# Patient Record
Sex: FEMALE | Race: WHITE | NOT HISPANIC OR LATINO | Employment: OTHER | URBAN - METROPOLITAN AREA
[De-identification: names, ages, dates, MRNs, and addresses within clinical notes are randomized per-mention and may not be internally consistent; named-entity substitution may affect disease eponyms.]

---

## 2017-01-10 ENCOUNTER — HOSPITAL ENCOUNTER (OUTPATIENT)
Dept: GENERAL RADIOLOGY | Facility: CLINIC | Age: 72
Discharge: HOME | End: 2017-01-10
Attending: ORTHOPAEDIC SURGERY | Admitting: ORTHOPAEDIC SURGERY
Payer: MEDICARE

## 2017-01-10 VITALS — DIASTOLIC BLOOD PRESSURE: 73 MMHG | OXYGEN SATURATION: 98 % | HEART RATE: 74 BPM | SYSTOLIC BLOOD PRESSURE: 114 MMHG

## 2017-01-10 DIAGNOSIS — M25.552 PAIN OF LEFT HIP JOINT: ICD-10-CM

## 2017-01-10 LAB
APPEARANCE FLD: NORMAL
COLOR FLD: NORMAL
EOSINOPHIL NFR FLD MANUAL: 7 %
GRAM STN SPEC: NORMAL
LYMPHOCYTES NFR FLD MANUAL: 75 %
MICRO REPORT STATUS: NORMAL
MONOS+MACROS NFR FLD MANUAL: 3 %
NEUTS BAND NFR FLD MANUAL: 15 %
RBC # FLD: NORMAL /UL
SPECIMEN SOURCE FLD: NORMAL
SPECIMEN SOURCE: NORMAL
WBC # FLD AUTO: 248 /UL

## 2017-01-10 PROCEDURE — 87075 CULTR BACTERIA EXCEPT BLOOD: CPT | Performed by: ORTHOPAEDIC SURGERY

## 2017-01-10 PROCEDURE — 87205 SMEAR GRAM STAIN: CPT | Performed by: ORTHOPAEDIC SURGERY

## 2017-01-10 PROCEDURE — 89051 BODY FLUID CELL COUNT: CPT | Performed by: ORTHOPAEDIC SURGERY

## 2017-01-10 PROCEDURE — 87070 CULTURE OTHR SPECIMN AEROBIC: CPT | Performed by: ORTHOPAEDIC SURGERY

## 2017-01-10 PROCEDURE — 20610 DRAIN/INJ JOINT/BURSA W/O US: CPT | Mod: RT

## 2017-01-10 PROCEDURE — 25000125 ZZHC RX 250: Performed by: PHYSICIAN ASSISTANT

## 2017-01-10 RX ORDER — LIDOCAINE HYDROCHLORIDE 10 MG/ML
30 INJECTION, SOLUTION EPIDURAL; INFILTRATION; INTRACAUDAL; PERINEURAL ONCE
Status: COMPLETED | OUTPATIENT
Start: 2017-01-10 | End: 2017-01-10

## 2017-01-10 RX ADMIN — LIDOCAINE HYDROCHLORIDE 3 ML: 10 INJECTION, SOLUTION EPIDURAL; INFILTRATION; INTRACAUDAL; PERINEURAL at 10:31

## 2017-01-10 NOTE — IP AVS SNAPSHOT
MRN:[id number]                      After Visit Summary   1/10/2017    Anne Rangel    MRN: [id number]           Visit Information        Provider Department      1/10/2017 10:00 AM  [facility];  IMAGING NURSE; [facility] Radiology           Review of your medicines      UNREVIEWED medicines. Ask your doctor about these medicines        Dose / Directions    buPROPion 150 MG 12 hr tablet   Commonly known as:  WELLBUTRIN SR        Dose:  150 mg   Take 1 tablet (150 mg) by mouth 2 times daily   Quantity:  60 tablet   Refills:  1       celeBREX 200 MG capsule   Used for:  Myalgia and myositis, unspecified   Generic drug:  celecoxib        TAKE ONE CAPSULE BY MOUTH up to twice DAILY   Quantity:  62   Refills:  6       ciprofloxacin 500 MG tablet   Commonly known as:  CIPRO   Used for:  Other specified counseling        Dose:  500 mg   Take 1 tablet (500 mg) by mouth 2 times daily Take 1 tablet two times a day for up to 3 days for severe diarrhea during travel.   Quantity:  6 tablet   Refills:  0       citalopram 20 MG tablet   Commonly known as:  celeXA        Dose:  20 mg   Take 1 tablet (20 mg) by mouth daily   Quantity:  90 tablet   Refills:  3       IMITREX 100 MG tablet   Used for:  Migraine, unspecified, without mention of intractable migraine without mention of status migrainosus   Generic drug:  SUMAtriptan        1 tab po prn migraines   Quantity:  6   Refills:  6       LIPITOR 40 MG tablet   Generic drug:  atorvastatin        Dose:  40 mg   Take 1 tablet (40 mg) by mouth daily   Quantity:  30 tablet   Refills:  1       LORazepam 1 MG tablet   Commonly known as:  ATIVAN        Dose:  0.5-1 mg   Take 0.5-1 tablets (0.5-1 mg) by mouth every 8 hours as needed for anxiety Take 30 minutes prior to departure.  Do not operate a vehicle after taking this medication   Quantity:  4 tablet   Refills:  0       LUNESTA 2 MG tablet   Generic drug:  eszopiclone        Dose:  2 mg   Take 1  tablet (2 mg) by mouth nightly as needed for sleep   Quantity:  30 tablet   Refills:  5       omeprazole 20 MG tablet        Dose:  20 mg   Take 1 tablet (20 mg) by mouth daily Take 30-60 minutes before a meal.   Quantity:  30 tablet   Refills:  1       traMADol 50 MG tablet   Commonly known as:  ULTRAM        Dose:   mg   Take 1-2 tablets ( mg) by mouth every 6 hours as needed for moderate pain   Quantity:  20 tablet   Refills:  0                Protect others around you: Learn how to safely use, store and throw away your medicines at www.disposemymeds.org.         Follow-ups after your visit        Your next 10 appointments already scheduled     Genaro 10, 2017 10:00 AM   XR JOINT ASPIRATION MAJOR RT with SHXR4, SH IMAGING NURSE, SH [facility] RAD   [facility] Radiology ([facility])    [facility] [facility] [address] [phone]           Stop drinking 1 hour before the exam.  You may take your medicines as usual, except for blood thinners (Coumadin, Plavix, Ticlid, Persantine, Aggrenox, Pletal, Effient, Brilliant). Talk to your doctor if you take these.  Tell your doctor if:   You have ever had an allergic reaction to X-ray dye (contrast fluid).   There is a chance you may be pregnant.  Please bring a list of your current medicines to your exam. Include vitamins, minerals and over-the-counter medicines.  Please call the Imaging Department at your exam site with any questions.            Feb 03, 2017   Procedure with Juan Moncada MD   [facility] PeriOP Services (--)    [facility] [address]  [facility] [id number] [phone]               Care Instructions        Further instructions from your care team         Orthopedic Discharge Instructions:   After Your Injection or Aspiration  ________________________________________    Patient Name:  Anne Rangel  Today's Date:  January 10, 2017  The doctor who performed your Joint Aspiration was  Javon  CODY Mensah at [facility] in the  General Radiology Department  Care of needle site    If you have new numbness down your leg, this may last up to 6 hours, but it should go away. You may need help with walking until your leg feels normal.     Over the next 24 to 48 hours, pain at the needle site may increase before it gets better.     For the next 48 hours, use ice packs for 15 minutes, three to four times a day for pain.    If you have a bandage, you may remove it the next morning.     No tub baths, hot tubs or swimming for 48 hours. You may shower the next day.   Activity    Do not drive until morning.     Limit your activity based on your pain level. Follow your doctor s orders for activity.     You may eat a normal diet.     If you had sedation,   - You may feel sleepy, forgetful or unsteady.   - Do not drink alcohol for 24 hours.  Medicines    If you take aspirin or platelet inhibitors, you can restart them tomorrow.     Restart all other medicines today at your regular dose, including Coumadin (warfarin).    If you are restarting Coumadin, talk to your doctor about having your INR checked.   If you had a steroid shot     The medicine should help reduce swelling and pain. This may take from 7 to 10 days.     Side effects from the shot will be mild and go away in 2 to 3 days. Common side effects may include:  -  Insomnia (trouble sleeping).  -  Heartburn.  -  Flushed face.  -  Water retention (bloating or fluid build-up).  -  Increased appetite (feeling more hungry than usual).  -  Increased blood sugar.  If you have diabetes, watch your blood sugar closely. If needed, call your doctor to help you control your blood sugar.  Some patients will get lasting relief from a single shot. Others may require up to three shots to get results. If you have more than one steroid shot, they should be given two weeks apart.  Some patients do not have relief of symptoms.    Follow-up:  No follow-up  "needed     Call your doctor  or go to the Emergency Room if you have severe pain, fever or problems with bowel or bladder control.      Additional Information About Your Visit        [employer] Information     [employer] lets you send messages to your doctor, view your test results, renew your prescriptions, schedule appointments and more. To sign up, go to [email or web address] . Click on \"Log in\" on the left side of the screen, which will take you to the Welcome page. Then click on \"Sign up Now\" on the right side of the page.     You will be asked to enter the access code listed below, as well as some personal information. Please follow the directions to create your username and password.     Your access code is: [email or web address]  Expires: 4/10/2017  9:39 AM     Your access code will  in 90 days. If you need help or a new code, please call your [facility] clinic or [phone].        Care EveryWhere ID     This is your Care EveryWhere ID. This could be used by other organizations to access your [facility] medical records  [id number]         Primary Care Provider Office Phone # Fax #    Nevaeh Wood [phone]      Thank you!     Thank you for choosing [facility] for your care. Our goal is always to provide you with excellent care. Hearing back from our patients is one way we can continue to improve our services. Please take a few minutes to complete the written survey that you may receive in the mail after you visit with us. Thank you!             Medication List: This is a list of all your medications and when to take them. Check marks below indicate your daily home schedule. Keep this list as a reference.      Medications           Morning Afternoon Evening Bedtime As Needed    buPROPion 150 MG 12 hr tablet   Commonly known as:  WELLBUTRIN SR   Take 1 tablet (150 mg) by mouth 2 times daily                                celeBREX 200 MG capsule   TAKE ONE CAPSULE BY MOUTH up to twice DAILY "   Generic drug:  celecoxib                                ciprofloxacin 500 MG tablet   Commonly known as:  CIPRO   Take 1 tablet (500 mg) by mouth 2 times daily Take 1 tablet two times a day for up to 3 days for severe diarrhea during travel.                                citalopram 20 MG tablet   Commonly known as:  celeXA   Take 1 tablet (20 mg) by mouth daily                                IMITREX 100 MG tablet   1 tab po prn migraines   Generic drug:  SUMAtriptan                                LIPITOR 40 MG tablet   Take 1 tablet (40 mg) by mouth daily   Generic drug:  atorvastatin                                LORazepam 1 MG tablet   Commonly known as:  ATIVAN   Take 0.5-1 tablets (0.5-1 mg) by mouth every 8 hours as needed for anxiety Take 30 minutes prior to departure.  Do not operate a vehicle after taking this medication                                LUNESTA 2 MG tablet   Take 1 tablet (2 mg) by mouth nightly as needed for sleep   Generic drug:  eszopiclone                                omeprazole 20 MG tablet   Take 1 tablet (20 mg) by mouth daily Take 30-60 minutes before a meal.                                traMADol 50 MG tablet   Commonly known as:  ULTRAM   Take 1-2 tablets ( mg) by mouth every 6 hours as needed for moderate pain

## 2017-01-10 NOTE — IP AVS SNAPSHOT
[facility] Radiology    [address] [facility] [address]    Phone:  [phone]                                       After Visit Summary   1/10/2017    Anne Rangel    MRN: [id number]           After Visit Summary Signature Page     I have received my discharge instructions, and my questions have been answered. I have discussed any challenges I see with this plan with the nurse or doctor.    ..........................................................................................................................................  Patient/Patient Representative Signature      ..........................................................................................................................................  Patient Representative Print Name and Relationship to Patient    ..................................................               ................................................  Date                                            Time    ..........................................................................................................................................  Reviewed by Signature/Title    ...................................................              ..............................................  Date                                                            Time

## 2017-01-15 LAB
BACTERIA SPEC CULT: NO GROWTH
MICRO REPORT STATUS: NORMAL
SPECIMEN SOURCE: NORMAL

## 2017-01-18 ENCOUNTER — HOSPITAL LABORATORY (OUTPATIENT)
Dept: OTHER | Facility: CLINIC | Age: 72
End: 2017-01-18

## 2017-01-18 LAB — HGB BLD-MCNC: 13.4 G/DL (ref 11.7–15.7)

## 2017-01-19 ENCOUNTER — TRANSFERRED RECORDS (OUTPATIENT)
Dept: HEALTH INFORMATION MANAGEMENT | Facility: CLINIC | Age: 72
End: 2017-01-19

## 2017-01-23 RX ORDER — ALBUTEROL SULFATE 90 UG/1
2 AEROSOL, METERED RESPIRATORY (INHALATION) 4 TIMES DAILY PRN
COMMUNITY
End: 2017-05-18

## 2017-01-23 RX ORDER — POLYETHYLENE GLYCOL 3350 17 G/17G
17 POWDER, FOR SOLUTION ORAL DAILY PRN
COMMUNITY
End: 2024-09-26

## 2017-01-24 LAB
BACTERIA SPEC CULT: NORMAL
Lab: NORMAL
MICRO REPORT STATUS: NORMAL
SPECIMEN SOURCE: NORMAL

## 2017-02-03 ENCOUNTER — ANESTHESIA EVENT (OUTPATIENT)
Dept: SURGERY | Facility: CLINIC | Age: 72
DRG: 468 | End: 2017-02-03
Payer: MEDICARE

## 2017-02-03 ENCOUNTER — APPOINTMENT (OUTPATIENT)
Dept: GENERAL RADIOLOGY | Facility: CLINIC | Age: 72
DRG: 468 | End: 2017-02-03
Attending: ORTHOPAEDIC SURGERY
Payer: MEDICARE

## 2017-02-03 ENCOUNTER — ANESTHESIA (OUTPATIENT)
Dept: SURGERY | Facility: CLINIC | Age: 72
DRG: 468 | End: 2017-02-03
Payer: MEDICARE

## 2017-02-03 ENCOUNTER — HOSPITAL ENCOUNTER (INPATIENT)
Facility: CLINIC | Age: 72
LOS: 4 days | Discharge: HOME | DRG: 468 | End: 2017-02-07
Attending: ORTHOPAEDIC SURGERY | Admitting: ORTHOPAEDIC SURGERY
Payer: MEDICARE

## 2017-02-03 DIAGNOSIS — M25.552 LEFT HIP PAIN: Primary | ICD-10-CM

## 2017-02-03 DIAGNOSIS — Z96.649 S/P REVISION OF TOTAL HIP: ICD-10-CM

## 2017-02-03 LAB
ABO + RH BLD: ABNORMAL
ABO + RH BLD: ABNORMAL
BLD GP AB SCN SERPL QL: ABNORMAL
BLD PROD TYP BPU: ABNORMAL
BLOOD BANK CMNT PATIENT-IMP: ABNORMAL
CREAT SERPL-MCNC: 1.25 MG/DL (ref 0.52–1.04)
GFR SERPL CREATININE-BSD FRML MDRD: 42 ML/MIN/1.7M2
GRAM STN SPEC: NORMAL
HGB BLD-MCNC: 13.7 G/DL (ref 11.7–15.7)
Lab: NORMAL
MICRO REPORT STATUS: NORMAL
NUM BPU REQUESTED: 2
PLATELET # BLD AUTO: 195 10E9/L (ref 150–450)
SPECIMEN EXP DATE BLD: ABNORMAL
SPECIMEN SOURCE: NORMAL

## 2017-02-03 PROCEDURE — 88305 TISSUE EXAM BY PATHOLOGIST: CPT | Mod: 26 | Performed by: ORTHOPAEDIC SURGERY

## 2017-02-03 PROCEDURE — 36415 COLL VENOUS BLD VENIPUNCTURE: CPT | Performed by: ORTHOPAEDIC SURGERY

## 2017-02-03 PROCEDURE — 25000128 H RX IP 250 OP 636: Performed by: ORTHOPAEDIC SURGERY

## 2017-02-03 PROCEDURE — 25800025 ZZH RX 258: Performed by: ANESTHESIOLOGY

## 2017-02-03 PROCEDURE — 25800025 ZZH RX 258: Performed by: ORTHOPAEDIC SURGERY

## 2017-02-03 PROCEDURE — 27210794 ZZH OR GENERAL SUPPLY STERILE: Performed by: ORTHOPAEDIC SURGERY

## 2017-02-03 PROCEDURE — 88331 PATH CONSLTJ SURG 1 BLK 1SPC: CPT | Performed by: ORTHOPAEDIC SURGERY

## 2017-02-03 PROCEDURE — 87077 CULTURE AEROBIC IDENTIFY: CPT | Performed by: ORTHOPAEDIC SURGERY

## 2017-02-03 PROCEDURE — 40000940 XR PELVIS 1/2 VW

## 2017-02-03 PROCEDURE — C1776 JOINT DEVICE (IMPLANTABLE): HCPCS | Performed by: ORTHOPAEDIC SURGERY

## 2017-02-03 PROCEDURE — 25000125 ZZHC RX 250: Performed by: ORTHOPAEDIC SURGERY

## 2017-02-03 PROCEDURE — 0SRB0JA REPLACEMENT OF LEFT HIP JOINT WITH SYNTHETIC SUBSTITUTE, UNCEMENTED, OPEN APPROACH: ICD-10-PCS | Performed by: ORTHOPAEDIC SURGERY

## 2017-02-03 PROCEDURE — 12000007 ZZH R&B INTERMEDIATE

## 2017-02-03 PROCEDURE — 86901 BLOOD TYPING SEROLOGIC RH(D): CPT | Performed by: ORTHOPAEDIC SURGERY

## 2017-02-03 PROCEDURE — 40000940 XR PELVIS AND HIP LEFT 1 VIEW

## 2017-02-03 PROCEDURE — 36000067 ZZH SURGERY LEVEL 5 1ST 30 MIN: Performed by: ORTHOPAEDIC SURGERY

## 2017-02-03 PROCEDURE — 40000940 XR PELVIS PORT 1/2 VW: Mod: TC

## 2017-02-03 PROCEDURE — 82565 ASSAY OF CREATININE: CPT | Performed by: ORTHOPAEDIC SURGERY

## 2017-02-03 PROCEDURE — 25000128 H RX IP 250 OP 636: Performed by: NURSE ANESTHETIST, CERTIFIED REGISTERED

## 2017-02-03 PROCEDURE — 88305 TISSUE EXAM BY PATHOLOGIST: CPT | Performed by: ORTHOPAEDIC SURGERY

## 2017-02-03 PROCEDURE — 25000125 ZZHC RX 250: Performed by: REGISTERED NURSE

## 2017-02-03 PROCEDURE — 36000069 ZZH SURGERY LEVEL 5 EA 15 ADDTL MIN: Performed by: ORTHOPAEDIC SURGERY

## 2017-02-03 PROCEDURE — 71000012 ZZH RECOVERY PHASE 1 LEVEL 1 FIRST HR: Performed by: ORTHOPAEDIC SURGERY

## 2017-02-03 PROCEDURE — 0SPB0JZ REMOVAL OF SYNTHETIC SUBSTITUTE FROM LEFT HIP JOINT, OPEN APPROACH: ICD-10-PCS | Performed by: ORTHOPAEDIC SURGERY

## 2017-02-03 PROCEDURE — 86900 BLOOD TYPING SEROLOGIC ABO: CPT | Performed by: ORTHOPAEDIC SURGERY

## 2017-02-03 PROCEDURE — 25000125 ZZHC RX 250: Performed by: NURSE ANESTHETIST, CERTIFIED REGISTERED

## 2017-02-03 PROCEDURE — 85049 AUTOMATED PLATELET COUNT: CPT | Performed by: ORTHOPAEDIC SURGERY

## 2017-02-03 PROCEDURE — 88331 PATH CONSLTJ SURG 1 BLK 1SPC: CPT | Mod: 26 | Performed by: ORTHOPAEDIC SURGERY

## 2017-02-03 PROCEDURE — A9270 NON-COVERED ITEM OR SERVICE: HCPCS | Mod: GY | Performed by: ORTHOPAEDIC SURGERY

## 2017-02-03 PROCEDURE — 25000125 ZZHC RX 250: Performed by: ANESTHESIOLOGY

## 2017-02-03 PROCEDURE — 25000132 ZZH RX MED GY IP 250 OP 250 PS 637: Mod: GY | Performed by: ORTHOPAEDIC SURGERY

## 2017-02-03 PROCEDURE — 87205 SMEAR GRAM STAIN: CPT | Performed by: ORTHOPAEDIC SURGERY

## 2017-02-03 PROCEDURE — 85018 HEMOGLOBIN: CPT | Performed by: ORTHOPAEDIC SURGERY

## 2017-02-03 PROCEDURE — 86902 BLOOD TYPE ANTIGEN DONOR EA: CPT | Performed by: ORTHOPAEDIC SURGERY

## 2017-02-03 PROCEDURE — 86850 RBC ANTIBODY SCREEN: CPT | Performed by: ORTHOPAEDIC SURGERY

## 2017-02-03 PROCEDURE — 87075 CULTR BACTERIA EXCEPT BLOOD: CPT | Performed by: ORTHOPAEDIC SURGERY

## 2017-02-03 PROCEDURE — 37000008 ZZH ANESTHESIA TECHNICAL FEE, 1ST 30 MIN: Performed by: ORTHOPAEDIC SURGERY

## 2017-02-03 PROCEDURE — 37000009 ZZH ANESTHESIA TECHNICAL FEE, EACH ADDTL 15 MIN: Performed by: ORTHOPAEDIC SURGERY

## 2017-02-03 PROCEDURE — 25000128 H RX IP 250 OP 636: Performed by: REGISTERED NURSE

## 2017-02-03 PROCEDURE — 40000170 ZZH STATISTIC PRE-PROCEDURE ASSESSMENT II: Performed by: ORTHOPAEDIC SURGERY

## 2017-02-03 PROCEDURE — 87070 CULTURE OTHR SPECIMN AEROBIC: CPT | Performed by: ORTHOPAEDIC SURGERY

## 2017-02-03 PROCEDURE — 86922 COMPATIBILITY TEST ANTIGLOB: CPT | Performed by: ORTHOPAEDIC SURGERY

## 2017-02-03 DEVICE — IMP SCR BONE STRK TORX 6.5X35MM CAN 2030-6535-1: Type: IMPLANTABLE DEVICE | Site: HIP | Status: FUNCTIONAL

## 2017-02-03 DEVICE — IMP SCR BONE STRK TORX 6.5X20MM CAN 2030-6520-1: Type: IMPLANTABLE DEVICE | Site: HIP | Status: FUNCTIONAL

## 2017-02-03 DEVICE — IMP PLUG HIP SECUR-FIT APICAL 2060-0000-1: Type: IMPLANTABLE DEVICE | Site: HIP | Status: FUNCTIONAL

## 2017-02-03 DEVICE — IMP SCR BONE STRK TORX 6.5X25MM CAN 2030-6525-1: Type: IMPLANTABLE DEVICE | Site: HIP | Status: FUNCTIONAL

## 2017-02-03 RX ORDER — BUPROPION HYDROCHLORIDE 150 MG/1
150 TABLET ORAL DAILY
Status: DISCONTINUED | OUTPATIENT
Start: 2017-02-04 | End: 2017-02-07 | Stop reason: HOSPADM

## 2017-02-03 RX ORDER — OXYCODONE HYDROCHLORIDE 5 MG/1
5-10 TABLET ORAL
Status: DISCONTINUED | OUTPATIENT
Start: 2017-02-03 | End: 2017-02-05

## 2017-02-03 RX ORDER — GLYCOPYRROLATE 0.2 MG/ML
INJECTION, SOLUTION INTRAMUSCULAR; INTRAVENOUS PRN
Status: DISCONTINUED | OUTPATIENT
Start: 2017-02-03 | End: 2017-02-03

## 2017-02-03 RX ORDER — ONDANSETRON 2 MG/ML
4 INJECTION INTRAMUSCULAR; INTRAVENOUS EVERY 6 HOURS PRN
Status: DISCONTINUED | OUTPATIENT
Start: 2017-02-03 | End: 2017-02-07

## 2017-02-03 RX ORDER — AMOXICILLIN 250 MG
1-2 CAPSULE ORAL 2 TIMES DAILY
Status: DISCONTINUED | OUTPATIENT
Start: 2017-02-03 | End: 2017-02-07

## 2017-02-03 RX ORDER — ONDANSETRON 2 MG/ML
INJECTION INTRAMUSCULAR; INTRAVENOUS PRN
Status: DISCONTINUED | OUTPATIENT
Start: 2017-02-03 | End: 2017-02-03

## 2017-02-03 RX ORDER — CYCLOBENZAPRINE HCL 5 MG
5 TABLET ORAL 3 TIMES DAILY PRN
Status: DISCONTINUED | OUTPATIENT
Start: 2017-02-03 | End: 2017-02-07

## 2017-02-03 RX ORDER — LIDOCAINE HYDROCHLORIDE 20 MG/ML
INJECTION, SOLUTION INFILTRATION; PERINEURAL PRN
Status: DISCONTINUED | OUTPATIENT
Start: 2017-02-03 | End: 2017-02-03

## 2017-02-03 RX ORDER — FENTANYL CITRATE 0.05 MG/ML
25-50 INJECTION, SOLUTION INTRAMUSCULAR; INTRAVENOUS
Status: DISCONTINUED | OUTPATIENT
Start: 2017-02-03 | End: 2017-02-03

## 2017-02-03 RX ORDER — SODIUM CHLORIDE, SODIUM LACTATE, POTASSIUM CHLORIDE, CALCIUM CHLORIDE 600; 310; 30; 20 MG/100ML; MG/100ML; MG/100ML; MG/100ML
INJECTION, SOLUTION INTRAVENOUS CONTINUOUS
Status: DISCONTINUED | OUTPATIENT
Start: 2017-02-03 | End: 2017-02-03 | Stop reason: HOSPADM

## 2017-02-03 RX ORDER — HYDROMORPHONE HYDROCHLORIDE 1 MG/ML
0.2 INJECTION, SOLUTION INTRAMUSCULAR; INTRAVENOUS; SUBCUTANEOUS
Status: DISCONTINUED | OUTPATIENT
Start: 2017-02-03 | End: 2017-02-03

## 2017-02-03 RX ORDER — DEXAMETHASONE SODIUM PHOSPHATE 4 MG/ML
INJECTION, SOLUTION INTRA-ARTICULAR; INTRALESIONAL; INTRAMUSCULAR; INTRAVENOUS; SOFT TISSUE PRN
Status: DISCONTINUED | OUTPATIENT
Start: 2017-02-03 | End: 2017-02-03

## 2017-02-03 RX ORDER — ACETAMINOPHEN 325 MG/1
975 TABLET ORAL EVERY 8 HOURS
Status: DISCONTINUED | OUTPATIENT
Start: 2017-02-03 | End: 2017-02-05

## 2017-02-03 RX ORDER — EPHEDRINE SULFATE 50 MG/ML
INJECTION, SOLUTION INTRAMUSCULAR; INTRAVENOUS; SUBCUTANEOUS PRN
Status: DISCONTINUED | OUTPATIENT
Start: 2017-02-03 | End: 2017-02-03

## 2017-02-03 RX ORDER — ONDANSETRON 4 MG/1
4 TABLET, ORALLY DISINTEGRATING ORAL EVERY 6 HOURS PRN
Status: DISCONTINUED | OUTPATIENT
Start: 2017-02-03 | End: 2017-02-07

## 2017-02-03 RX ORDER — CEFAZOLIN SODIUM 1 G/3ML
1 INJECTION, POWDER, FOR SOLUTION INTRAMUSCULAR; INTRAVENOUS SEE ADMIN INSTRUCTIONS
Status: DISCONTINUED | OUTPATIENT
Start: 2017-02-03 | End: 2017-02-03 | Stop reason: HOSPADM

## 2017-02-03 RX ORDER — ALBUTEROL SULFATE 90 UG/1
2 AEROSOL, METERED RESPIRATORY (INHALATION) 4 TIMES DAILY PRN
Status: DISCONTINUED | OUTPATIENT
Start: 2017-02-03 | End: 2017-02-07 | Stop reason: HOSPADM

## 2017-02-03 RX ORDER — BUPIVACAINE HYDROCHLORIDE 7.5 MG/ML
INJECTION, SOLUTION INTRASPINAL PRN
Status: DISCONTINUED | OUTPATIENT
Start: 2017-02-03 | End: 2017-02-03

## 2017-02-03 RX ORDER — CEFAZOLIN SODIUM 2 G/100ML
2 INJECTION, SOLUTION INTRAVENOUS
Status: COMPLETED | OUTPATIENT
Start: 2017-02-03 | End: 2017-02-03

## 2017-02-03 RX ORDER — SODIUM CHLORIDE, SODIUM LACTATE, POTASSIUM CHLORIDE, CALCIUM CHLORIDE 600; 310; 30; 20 MG/100ML; MG/100ML; MG/100ML; MG/100ML
INJECTION, SOLUTION INTRAVENOUS CONTINUOUS
Status: DISCONTINUED | OUTPATIENT
Start: 2017-02-03 | End: 2017-02-03

## 2017-02-03 RX ORDER — ACETAMINOPHEN 500 MG
1000 TABLET ORAL ONCE
Status: COMPLETED | OUTPATIENT
Start: 2017-02-03 | End: 2017-02-03

## 2017-02-03 RX ORDER — ONDANSETRON 4 MG/1
4 TABLET, ORALLY DISINTEGRATING ORAL EVERY 30 MIN PRN
Status: DISCONTINUED | OUTPATIENT
Start: 2017-02-03 | End: 2017-02-03

## 2017-02-03 RX ORDER — NALOXONE HYDROCHLORIDE 0.4 MG/ML
.1-.4 INJECTION, SOLUTION INTRAMUSCULAR; INTRAVENOUS; SUBCUTANEOUS
Status: DISCONTINUED | OUTPATIENT
Start: 2017-02-03 | End: 2017-02-06

## 2017-02-03 RX ORDER — ACETAMINOPHEN 325 MG/1
650 TABLET ORAL EVERY 4 HOURS PRN
Status: DISCONTINUED | OUTPATIENT
Start: 2017-02-06 | End: 2017-02-07

## 2017-02-03 RX ORDER — TEMAZEPAM 7.5 MG/1
7.5 CAPSULE ORAL
Status: DISCONTINUED | OUTPATIENT
Start: 2017-02-04 | End: 2017-02-05

## 2017-02-03 RX ORDER — HYDROMORPHONE HYDROCHLORIDE 1 MG/ML
.3-.5 INJECTION, SOLUTION INTRAMUSCULAR; INTRAVENOUS; SUBCUTANEOUS
Status: DISCONTINUED | OUTPATIENT
Start: 2017-02-03 | End: 2017-02-07

## 2017-02-03 RX ORDER — CEFAZOLIN SODIUM 1 G/3ML
1 INJECTION, POWDER, FOR SOLUTION INTRAMUSCULAR; INTRAVENOUS EVERY 8 HOURS
Status: COMPLETED | OUTPATIENT
Start: 2017-02-04 | End: 2017-02-04

## 2017-02-03 RX ORDER — HYDROXYZINE HYDROCHLORIDE 10 MG/1
10 TABLET, FILM COATED ORAL EVERY 6 HOURS PRN
Status: DISCONTINUED | OUTPATIENT
Start: 2017-02-03 | End: 2017-02-07

## 2017-02-03 RX ORDER — ATORVASTATIN CALCIUM 40 MG/1
40 TABLET, FILM COATED ORAL AT BEDTIME
Status: DISCONTINUED | OUTPATIENT
Start: 2017-02-04 | End: 2017-02-07 | Stop reason: HOSPADM

## 2017-02-03 RX ORDER — PROPOFOL 10 MG/ML
INJECTION, EMULSION INTRAVENOUS CONTINUOUS PRN
Status: DISCONTINUED | OUTPATIENT
Start: 2017-02-03 | End: 2017-02-03

## 2017-02-03 RX ORDER — ONDANSETRON 2 MG/ML
4 INJECTION INTRAMUSCULAR; INTRAVENOUS EVERY 30 MIN PRN
Status: DISCONTINUED | OUTPATIENT
Start: 2017-02-03 | End: 2017-02-03

## 2017-02-03 RX ORDER — ESCITALOPRAM OXALATE 20 MG/1
20 TABLET ORAL DAILY
Status: DISCONTINUED | OUTPATIENT
Start: 2017-02-04 | End: 2017-02-07 | Stop reason: HOSPADM

## 2017-02-03 RX ORDER — DEXTROSE MONOHYDRATE, SODIUM CHLORIDE, AND POTASSIUM CHLORIDE 50; 1.49; 4.5 G/1000ML; G/1000ML; G/1000ML
INJECTION, SOLUTION INTRAVENOUS CONTINUOUS
Status: DISCONTINUED | OUTPATIENT
Start: 2017-02-03 | End: 2017-02-04

## 2017-02-03 RX ORDER — PROCHLORPERAZINE MALEATE 5 MG
5 TABLET ORAL EVERY 6 HOURS PRN
Status: DISCONTINUED | OUTPATIENT
Start: 2017-02-03 | End: 2017-02-07

## 2017-02-03 RX ORDER — LIDOCAINE 40 MG/G
CREAM TOPICAL
Status: DISCONTINUED | OUTPATIENT
Start: 2017-02-03 | End: 2017-02-06

## 2017-02-03 RX ADMIN — DEXAMETHASONE SODIUM PHOSPHATE 4 MG: 4 INJECTION, SOLUTION INTRAMUSCULAR; INTRAVENOUS at 13:59

## 2017-02-03 RX ADMIN — Medication 10 MG: at 14:46

## 2017-02-03 RX ADMIN — LIDOCAINE HYDROCHLORIDE 40 MG: 20 INJECTION, SOLUTION INFILTRATION; PERINEURAL at 13:55

## 2017-02-03 RX ADMIN — GLYCOPYRROLATE 0.2 MG: 0.2 INJECTION, SOLUTION INTRAMUSCULAR; INTRAVENOUS at 14:37

## 2017-02-03 RX ADMIN — DEXMEDETOMIDINE 4 MCG: 100 INJECTION, SOLUTION, CONCENTRATE INTRAVENOUS at 14:00

## 2017-02-03 RX ADMIN — SODIUM CHLORIDE, POTASSIUM CHLORIDE, SODIUM LACTATE AND CALCIUM CHLORIDE: 600; 310; 30; 20 INJECTION, SOLUTION INTRAVENOUS at 15:00

## 2017-02-03 RX ADMIN — SENNOSIDES AND DOCUSATE SODIUM 1 TABLET: 8.6; 5 TABLET ORAL at 21:15

## 2017-02-03 RX ADMIN — PHENYLEPHRINE HYDROCHLORIDE 25 MCG: 10 INJECTION, SOLUTION INTRAMUSCULAR; INTRAVENOUS; SUBCUTANEOUS at 15:28

## 2017-02-03 RX ADMIN — PHENYLEPHRINE HYDROCHLORIDE 25 MCG: 10 INJECTION, SOLUTION INTRAMUSCULAR; INTRAVENOUS; SUBCUTANEOUS at 15:58

## 2017-02-03 RX ADMIN — Medication 5 MG: at 14:28

## 2017-02-03 RX ADMIN — Medication 5 MG: at 14:38

## 2017-02-03 RX ADMIN — MIDAZOLAM HYDROCHLORIDE 2 MG: 1 INJECTION, SOLUTION INTRAMUSCULAR; INTRAVENOUS at 13:45

## 2017-02-03 RX ADMIN — BUPIVACAINE HYDROCHLORIDE IN DEXTROSE 12 MG: 7.5 INJECTION, SOLUTION SUBARACHNOID at 13:54

## 2017-02-03 RX ADMIN — PHENYLEPHRINE HYDROCHLORIDE 50 MCG: 10 INJECTION, SOLUTION INTRAMUSCULAR; INTRAVENOUS; SUBCUTANEOUS at 15:46

## 2017-02-03 RX ADMIN — TRANEXAMIC ACID 1 G: 100 INJECTION, SOLUTION INTRAVENOUS at 17:14

## 2017-02-03 RX ADMIN — ACETAMINOPHEN 1000 MG: 500 TABLET ORAL at 12:27

## 2017-02-03 RX ADMIN — PHENYLEPHRINE HYDROCHLORIDE 25 MCG: 10 INJECTION, SOLUTION INTRAMUSCULAR; INTRAVENOUS; SUBCUTANEOUS at 15:19

## 2017-02-03 RX ADMIN — HYDROMORPHONE HYDROCHLORIDE 0.2 MG: 1 INJECTION, SOLUTION INTRAMUSCULAR; INTRAVENOUS; SUBCUTANEOUS at 20:03

## 2017-02-03 RX ADMIN — FENTANYL CITRATE 25 MCG: 50 INJECTION, SOLUTION INTRAMUSCULAR; INTRAVENOUS at 18:20

## 2017-02-03 RX ADMIN — CEFAZOLIN 1 G: 1 INJECTION, POWDER, FOR SOLUTION INTRAMUSCULAR; INTRAVENOUS at 16:00

## 2017-02-03 RX ADMIN — Medication 5 MG: at 16:03

## 2017-02-03 RX ADMIN — CYCLOBENZAPRINE HYDROCHLORIDE 5 MG: 5 TABLET, FILM COATED ORAL at 21:14

## 2017-02-03 RX ADMIN — ACETAMINOPHEN 975 MG: 325 TABLET, FILM COATED ORAL at 21:14

## 2017-02-03 RX ADMIN — DEXMEDETOMIDINE 4 MCG: 100 INJECTION, SOLUTION, CONCENTRATE INTRAVENOUS at 13:54

## 2017-02-03 RX ADMIN — PHENYLEPHRINE HYDROCHLORIDE 25 MCG: 10 INJECTION, SOLUTION INTRAMUSCULAR; INTRAVENOUS; SUBCUTANEOUS at 15:10

## 2017-02-03 RX ADMIN — GLYCOPYRROLATE 0.2 MG: 0.2 INJECTION, SOLUTION INTRAMUSCULAR; INTRAVENOUS at 16:30

## 2017-02-03 RX ADMIN — SODIUM CHLORIDE, POTASSIUM CHLORIDE, SODIUM LACTATE AND CALCIUM CHLORIDE: 600; 310; 30; 20 INJECTION, SOLUTION INTRAVENOUS at 12:53

## 2017-02-03 RX ADMIN — ONDANSETRON 4 MG: 2 INJECTION INTRAMUSCULAR; INTRAVENOUS at 16:04

## 2017-02-03 RX ADMIN — CEFAZOLIN SODIUM 2 G: 2 INJECTION, SOLUTION INTRAVENOUS at 14:00

## 2017-02-03 RX ADMIN — Medication 5 MG: at 16:32

## 2017-02-03 RX ADMIN — HYDROMORPHONE HYDROCHLORIDE 0.5 MG: 1 INJECTION, SOLUTION INTRAMUSCULAR; INTRAVENOUS; SUBCUTANEOUS at 23:39

## 2017-02-03 RX ADMIN — Medication 5 MG: at 14:25

## 2017-02-03 RX ADMIN — HYDROMORPHONE HYDROCHLORIDE 0.5 MG: 1 INJECTION, SOLUTION INTRAMUSCULAR; INTRAVENOUS; SUBCUTANEOUS at 22:23

## 2017-02-03 RX ADMIN — PHENYLEPHRINE HYDROCHLORIDE 50 MCG: 10 INJECTION, SOLUTION INTRAMUSCULAR; INTRAVENOUS; SUBCUTANEOUS at 16:10

## 2017-02-03 RX ADMIN — Medication 5 MG: at 14:34

## 2017-02-03 RX ADMIN — PHENYLEPHRINE HYDROCHLORIDE 50 MCG: 10 INJECTION, SOLUTION INTRAMUSCULAR; INTRAVENOUS; SUBCUTANEOUS at 15:37

## 2017-02-03 RX ADMIN — PHENYLEPHRINE HYDROCHLORIDE 25 MCG: 10 INJECTION, SOLUTION INTRAMUSCULAR; INTRAVENOUS; SUBCUTANEOUS at 16:03

## 2017-02-03 RX ADMIN — PHENYLEPHRINE HYDROCHLORIDE 50 MCG: 10 INJECTION, SOLUTION INTRAMUSCULAR; INTRAVENOUS; SUBCUTANEOUS at 14:58

## 2017-02-03 RX ADMIN — Medication 5 MG: at 14:55

## 2017-02-03 RX ADMIN — OXYCODONE HYDROCHLORIDE 10 MG: 5 TABLET ORAL at 21:14

## 2017-02-03 RX ADMIN — PROPOFOL 50 MCG/KG/MIN: 10 INJECTION, EMULSION INTRAVENOUS at 13:55

## 2017-02-03 RX ADMIN — TRANEXAMIC ACID 1 G: 100 INJECTION, SOLUTION INTRAVENOUS at 14:12

## 2017-02-03 RX ADMIN — PHENYLEPHRINE HYDROCHLORIDE 25 MCG: 10 INJECTION, SOLUTION INTRAMUSCULAR; INTRAVENOUS; SUBCUTANEOUS at 15:52

## 2017-02-03 RX ADMIN — Medication 10 MG: at 14:31

## 2017-02-03 RX ADMIN — PROCHLORPERAZINE EDISYLATE 5 MG: 5 INJECTION INTRAMUSCULAR; INTRAVENOUS at 20:03

## 2017-02-03 RX ADMIN — DEXMEDETOMIDINE 4 MCG: 100 INJECTION, SOLUTION, CONCENTRATE INTRAVENOUS at 13:51

## 2017-02-03 RX ADMIN — POTASSIUM CHLORIDE, DEXTROSE MONOHYDRATE AND SODIUM CHLORIDE: 150; 5; 450 INJECTION, SOLUTION INTRAVENOUS at 22:23

## 2017-02-03 ASSESSMENT — ACTIVITIES OF DAILY LIVING (ADL)
DRESS: 0-->INDEPENDENT
RETIRED_EATING: 0-->INDEPENDENT
FALL_HISTORY_WITHIN_LAST_SIX_MONTHS: NO
COMMUNICATION: 0-->UNDERSTANDS/COMMUNICATES WITHOUT DIFFICULTY
COGNITION: 0 - NO COGNITION ISSUES REPORTED
AMBULATION: 1-->ASSISTIVE EQUIPMENT
RETIRED_COMMUNICATION: 0-->UNDERSTANDS/COMMUNICATES WITHOUT DIFFICULTY
TOILETING: 0-->INDEPENDENT
SWALLOWING: 0-->SWALLOWS FOODS/LIQUIDS WITHOUT DIFFICULTY
SWALLOWING: 0-->SWALLOWS FOODS/LIQUIDS WITHOUT DIFFICULTY
DRESS: 0-->INDEPENDENT
BATHING: 0-->INDEPENDENT
TRANSFERRING: 1-->ASSISTIVE EQUIPMENT
AMBULATION: 1-->ASSISTIVE EQUIPMENT
EATING: 0-->INDEPENDENT
BATHING: 0-->INDEPENDENT
TOILETING: 0-->INDEPENDENT
TRANSFERRING: 1-->ASSISTIVE EQUIPMENT

## 2017-02-03 ASSESSMENT — ENCOUNTER SYMPTOMS: SEIZURES: 0

## 2017-02-03 ASSESSMENT — LIFESTYLE VARIABLES: TOBACCO_USE: 0

## 2017-02-03 ASSESSMENT — COPD QUESTIONNAIRES: COPD: 0

## 2017-02-03 NOTE — ANESTHESIA PROCEDURE NOTES
Peripheral nerve/Neuraxial procedure note : intrathecal  Pre-Procedure  Performed by DANIELITO VICENTE  Location: OB      Pre-Anesthestic Checklist: patient identified, IV checked, site marked, risks and benefits discussed, informed consent, monitors and equipment checked, pre-op evaluation, at physician/surgeon's request and post-op pain management    Timeout  Correct Patient: Yes   Correct Procedure: Yes   Correct Site: Yes   Correct Laterality: Yes   Correct Position: Yes   Site Marked: Yes   .   Procedure Documentation    .    Procedure:    Intrathecal.  Insertion Site:L2-3  (midline approach)      Patient Prep;povidone-iodine 7.5% surgical scrub.  .  Needle: (). # of attempts: 1. # of redirects:. Spinal Needle: Martine tip 25 G. 3 in.  Introducer used. Introducer: 20 G. .     Assessment/Narrative  Paresthesias: No.  .  .  clear CSF fluid removed while sitting   . Comments:  Patient sitting on edge of OR bed, lower back cleaned and prepped in sterile fashion with betadine. 1% lido used to numb area. Introducer placed, spinal needle through introducer. Appropriate flow of CSF and confirmed with aspiration via syringe. Spinal dose given, 12 mg 0.75% bupivacaine. No complications.

## 2017-02-03 NOTE — ANESTHESIA PREPROCEDURE EVALUATION
Procedure: Procedure(s):  ARTHROPLASTY REVISION HIP  Preop diagnosis: failed total hip    Allergies   Allergen Reactions     Dilaudid [Hydromorphone] Nausea and Vomiting     Doxepin Unknown     Meperidine Nausea and Vomiting     Demerol     Past Medical History   Diagnosis Date     Osteoarthrosis, unspecified whether generalized or localized, lower leg 11/00     Osteoarthrosis, unspecified whether generalized or localized, hand 11/00     Myalgia and myositis, unspecified 11/00     Irritable bowel syndrome 11/00     Chronic fatigue syndrome 11/00     Pure hypercholesterolemia 11/00     Urticaria, unspecified 00 Neutropenia 00     iamAFTERCARE FOLLOWING JOINT REPLACEMENT 7/23/2007     Depression with anxiety      Fatigue      Uncomplicated asthma      RBBB      Noninfectious ileitis      Decreased libido      HA (headache)      Gastroesophageal reflux disease      Carpal tunnel syndrome      Anemia      Renal disease      mld     Past Surgical History   Procedure Laterality Date     C nonspecific procedure       s/p Rt. Carpal tunnel release     C nonspecific procedure       s/p bilat Tubal ligation     Gi surgery       hemorrhoid repair     Orthopedic surgery       right sabine-arthroplasty, trigger finger repair, left knee arthroscopy and replacement, bilateral hip replacement,     Gyn surgery       tubal ligation     Prior to Admission medications    Medication Sig Start Date End Date Taking? Authorizing Provider   Celecoxib (CELEBREX PO) Take 200 mg by mouth daily   Yes Reported, Patient   BuPROPion HCl (WELLBUTRIN XL PO) Take 150 mg by mouth daily   Yes Reported, Patient   LORAZEPAM PO Take 0.5 mg by mouth At Bedtime (0.5 x 1 mg tablet = 0.5 mg dose)   Yes Reported, Patient   SUMAtriptan Succinate (IMITREX PO) Take 100 mg by mouth daily as needed for migraine   Yes Reported, Patient   Escitalopram Oxalate (LEXAPRO PO) Take 20 mg by mouth daily   Yes Reported, Patient   albuterol (PROAIR HFA/PROVENTIL  HFA/VENTOLIN HFA) 108 (90 BASE) MCG/ACT Inhaler Inhale 2 puffs into the lungs 4 times daily as needed for shortness of breath / dyspnea or wheezing   Yes Reported, Patient   Ascorbic Acid (VITAMIN C PO) Take 500 mg by mouth daily   Yes Reported, Patient   VITAMIN D, CHOLECALCIFEROL, PO Take 5,000 Units by mouth three times a week (Monday, Wednesday and Friday)   Yes Reported, Patient   Probiotic Product (PROBIOTIC & ACIDOPHILUS EX ST PO) Take 1 capsule by mouth daily   Yes Reported, Patient   polyethylene glycol (MIRALAX/GLYCOLAX) powder Take 17 g by mouth daily as needed for constipation   Yes Reported, Patient   atorvastatin (LIPITOR) 40 MG tablet Take 40 mg by mouth At Bedtime  3/4/15  Yes Meena Woo APRN CNP   omeprazole 20 MG tablet Take 1 tablet (20 mg) by mouth daily Take 30-60 minutes before a meal. 3/4/15  Yes Meena Woo APRN CNP   traMADol (ULTRAM) 50 MG tablet Take 1-2 tablets ( mg) by mouth every 6 hours as needed for moderate pain 3/4/15  Yes Meena Woo APRN CNP   eszopiclone (LUNESTA) 2 MG tablet Take 2 mg by mouth At Bedtime  3/4/15  Yes Meena Woo APRN CNP     Current Facility-Administered Medications Ordered in Epic   Medication Dose Route Frequency Last Rate Last Dose     ceFAZolin sodium-dextrose (ANCEF) infusion 2 g  2 g Intravenous Pre-Op/Pre-procedure x 1 dose         ceFAZolin (ANCEF) 1 g vial to attach to  ml bag for ADULT or 50 ml bag for PEDS  1 g Intravenous See Admin Instructions         tranexamic acid (CYKLOKAPRON) 1 g in NaCl 0.9 % 60 mL bolus  1 g Intravenous Once         tranexamic acid (CYKLOKAPRON) 1 g in NaCl 0.9 % 60 mL bolus  1 g Intravenous Once         lidocaine 1 % 1 mL  1 mL Other Q1H PRN         lactated ringers infusion   Intravenous Continuous 25 mL/hr at 02/03/17 1253       No current [facility]-ordered outpatient prescriptions on file.     Wt Readings from Last 1 Encounters:   02/03/17 53.978 kg (119 lb)     Temp Readings from Last  1 Encounters:   02/03/17 36.5  C (97.7  F) Temporal     BP Readings from Last 6 Encounters:   02/03/17 145/70   01/10/17 114/73   03/04/15 120/60   12/30/04 130/78   07/12/04 108/70   03/04/04 114/70     Pulse Readings from Last 4 Encounters:   01/10/17 74   03/04/15 89   07/12/04 80   03/04/04 80     Resp Readings from Last 1 Encounters:   02/03/17 16     SpO2 Readings from Last 1 Encounters:   02/03/17 98%     Recent Labs   Lab Test  02/03/17   1238   CR  1.25*     Recent Labs   Lab Test  02/03/17   1238  01/18/17   1635  12/20/16   0810   WBC   --    --   3.5*   HGB  13.7  13.4  13.3   PLT   --    --   172              ECG:   ECHO:   CXR:          Anesthesia Evaluation     .        ROS/MED HX    ENT/Pulmonary:     (+)Intermittent asthma , . .   (-) tobacco use, COPD and sleep apnea   Neurologic:      (-) seizures, CVA and migraines   Cardiovascular:     (+) Dyslipidemia, hypertension----. : . . . :. .       METS/Exercise Tolerance:     Hematologic:        (-) history of blood clots   Musculoskeletal:   (+) , , other musculoskeletal-      Upper extremity injury: fibromyalgia.   GI/Hepatic:     (+) GERD Asymptomatic on medication,       Renal/Genitourinary:     (+) chronic renal disease, type: CRI,       Endo:      (-) Type I DM, Type II DM and thyroid disease   Psychiatric:     (+) psychiatric history anxiety and depression      Infectious Disease:         Malignancy:         Other:               Physical Exam  Normal systems: cardiovascular, pulmonary and dental    Airway   Mallampati: III  TM distance: >3 FB  Neck ROM: full    Dental     Cardiovascular   Rhythm and rate: regular and normal  (-) no murmur    Pulmonary    breath sounds clear to auscultation                    Anesthesia Plan      History & Physical Review      ASA Status:  3 .    NPO Status:  > 8 hours    Plan for Spinal with Propofol induction.   PONV prophylaxis:  Ondansetron (or other 5HT-3) and Dexamethasone or Solumedrol       Postoperative  Care  Postoperative pain management:  IV analgesics and Oral pain medications.      Consents  Anesthetic plan, risks, benefits and alternatives discussed with:  Patient..                          .

## 2017-02-03 NOTE — ANESTHESIA CARE TRANSFER NOTE
Patient: Anne Rangel    Procedure(s):  REVISION LEFT TOTAL  HIP ARTHROPLASTY  - Wound Class: I-Clean    Diagnosis: failed total hip  Diagnosis Additional Information: No value filed.    Anesthesia Type:   Spinal     Note:  Airway :Face Mask  Patient transferred to:PACU  Comments: Uneventful transport to PACU   Report to RN  Exchanging well  Pt responds appropriately to command  IV patent  Lips/teeth/dentition as preop status  Questions answered  /81  HR 68 sr wth big pacs  TAT 97.3  RR 16  Sat 98        Vitals: (Last set prior to Anesthesia Care Transfer)    CRNA VITALS  2/3/2017 1715 - 2/3/2017 1750      2/3/2017             Ht Rate: 70    Resp Rate (set): 10                Electronically Signed By: LEONIE GARNETT CRNA  February 3, 2017  5:50 PM

## 2017-02-03 NOTE — BRIEF OP NOTE
[facility] Brief Operative Note    Pre-operative diagnosis: failed total hip   Post-operative diagnosis * No post-op diagnosis entered *     Procedure: Procedure(s):  REVISION LEFT TOTAL  HIP ARTHROPLASTY  - Wound Class: I-Clean   Surgeon(s): Surgeon(s) and Role:     * Juan Moncada MD - Primary   Estimated blood loss: 150 mL    Specimens:   ID Type Source Tests Collected by Time Destination   1 : Left Hip Wound Other ANAEROBIC BACTERIAL CULTURE, GRAM STAIN, WOUND CULTURE AEROBIC BACTERIAL Juan Moncada MD 2/3/2017  2:35 PM    A : Left Hip Tissue Other SURGICAL PATHOLOGY EXAM Juan Moncada MD 2/3/2017  2:42 PM       Findings: See dictatated note

## 2017-02-03 NOTE — PROGRESS NOTES
Admission medication history interview status for the 2/3/2017  admission is complete. See EPIC admission navigator for prior to admission medications     Medication history source reliability:Good    Medication history interview source(s):Patient    Medication history resources (including written lists, pill bottles, clinic record):Patient mailed in her list prior to surgery    Primary pharmacy[facility]    Additional medication history information not noted on PTA med list :None    Time spent in this activity: 30 minutes    Prior to Admission medications    Medication Sig Last Dose Taking? Auth Provider   Celecoxib (CELEBREX PO) Take 200 mg by mouth daily 1/27/2017 at AM Yes Reported, Patient   BuPROPion HCl (WELLBUTRIN XL PO) Take 150 mg by mouth daily 2/3/2017 at 0830 Yes Reported, Patient   LORAZEPAM PO Take 0.5 mg by mouth At Bedtime (0.5 x 1 mg tablet = 0.5 mg dose) 2/2/2017 at 2200 Yes Reported, Patient   SUMAtriptan Succinate (IMITREX PO) Take 100 mg by mouth daily as needed for migraine More than a Month at PRN Yes Reported, Patient   Escitalopram Oxalate (LEXAPRO PO) Take 20 mg by mouth daily 2/3/2017 at 0830 Yes Reported, Patient   albuterol (PROAIR HFA/PROVENTIL HFA/VENTOLIN HFA) 108 (90 BASE) MCG/ACT Inhaler Inhale 2 puffs into the lungs 4 times daily as needed for shortness of breath / dyspnea or wheezing More than a Month at PRN Yes Reported, Patient   Ascorbic Acid (VITAMIN C PO) Take 500 mg by mouth daily 2/3/2017 at 0830 Yes Reported, Patient   VITAMIN D, CHOLECALCIFEROL, PO Take 5,000 Units by mouth three times a week (Monday, Wednesday and Friday) 2/3/2017 at 0830 Yes Reported, Patient   Probiotic Product (PROBIOTIC & ACIDOPHILUS EX ST PO) Take 1 capsule by mouth daily 2/3/2017 at 0830 Yes Reported, Patient   polyethylene glycol (MIRALAX/GLYCOLAX) powder Take 17 g by mouth daily as needed for constipation More than a month at PRN Yes Reported, Patient   atorvastatin (LIPITOR) 40 MG tablet Take 40 mg  by mouth At Bedtime  2/2/2017 at 2200 Yes Meena Woo APRN CNP   omeprazole 20 MG tablet Take 1 tablet (20 mg) by mouth daily Take 30-60 minutes before a meal. 2/1/2017 at AM Yes Meena Woo APRN CNP   traMADol (ULTRAM) 50 MG tablet Take 1-2 tablets ( mg) by mouth every 6 hours as needed for moderate pain More than a Month at PRN Yes Meena Woo APRN CNP   eszopiclone (LUNESTA) 2 MG tablet Take 2 mg by mouth At Bedtime  2/2/2017 at 2200 Yes Meena Woo APRN CNP

## 2017-02-03 NOTE — IP AVS SNAPSHOT
MRN:[id number]                      After Visit Summary   2/3/2017    Anne Rangel    MRN: [id number]           Thank you!     Thank you for choosing [facility] for your care. Our goal is always to provide you with excellent care. Hearing back from our patients is one way we can continue to improve our services. Please take a few minutes to complete the written survey that you may receive in the mail after you visit with us. Thank you!        Patient Information     Date Of Birth          1945        About your hospital stay     You were admitted on:  February 3, 2017 You last received care in the:  [facility]    You were discharged on:  February 7, 2017        Reason for your hospital stay       Revision of prior hip replacement. You were found to have a slow heart rate during your hospitalization and underwent a pacemaker placement            Reason for your hospital stay       Revision of a total hip replacement                  Who to Call     For medical emergencies, please call 911.  For non-urgent questions about your medical care, please call your primary care provider or clinic, [phone]  For questions related to your surgery, please call your surgery clinic        Attending Provider     Provider    Juan Moncada MD       Primary Care Provider Office Phone # Fax #    Nevaeh Wood [phone]       [facility] [address] [address]        After Care Instructions     Activity       Your activity upon discharge is weight bearing as tolerated, use a walker or crutches for assistance            Diet       Follow this diet upon discharge: Regular diet            Diet       Follow this diet upon discharge: Regular            Discharge Instructions - Follow up with Device Check RN        Follow up with Device Check RN in 7-10 days.            Discharge Instructions - Keep incision dry for 72 hours        Keep incision dry for 72 hours (unless Derma Mcfarlane was applied)            Supplies       List the supplies the pt needs to go home  Walker  Gregorio stockings  Aquacel dressing            Wound care and dressings       Instructions to care for your wound at home: Leave aquacel bandage until follow up appointment                  Follow-up Appointments     Follow-up and recommended labs and tests        Follow up with primary care provider, Nevaeh Wood, within 2 weeks for hospital follow- up.  No follow up labs or test are needed.    **Appointment scheduled for Tuesday, Feb. 21st at 11:20 am.            Follow-up and recommended labs and tests        Follow up with Dr. Tinsley as scheduled on 2/16                  Your next 10 appointments already scheduled     Feb 15, 2017 11:00 AM   Pacemaker Check with CANALES DCR2   [facility] [place] ([facility])    [address] [place] [id number] [phone]              Future tests that were ordered for you     Compression stockings                 Further instructions from your care team       Home Care after a Pacemaker  __________________________________________    Patient Name:  Anne Rangel  Today's Date:  February 7, 2017    How should I care for the wound?  Keep the bandage on for 3 days. Change it only if it gets loose or soaked. If you need to change it, use 4x4-inch gauze and a large clear bandage.     Leave the strips of tape on. They will fall off on their own, or we will remove them at your first check-up.    Check your wound daily for signs of infection, such as increased redness, severe swelling or draining. Fever may also be a sign of infection. Call us if you see any of these signs.    Can I take a shower or bath?  If there are no signs of infection, you may shower after the bandage comes off in 3 days. If you take a tub bath, keep the wound dry.    Will I have much pain?  You may have mild to  medium pain for 3 to 5 days. Take acetaminophen (Tylenol) or ibuprofen (Advil) for the pain. If the pain persists or is severe, call us.    How should I limit my activities?  For at least 3 weeks: Do not raise your elbow above your shoulder. You can begin to use your arm as it feels comfortable to you.    In 6 to 8 weeks: You may begin to golf, play tennis, swim and do similar activities.    If you received a new generator (battery), you should not drive for 24 hours.    What about after the wound has healed and I am feeling better?  For your safety, do not swim alone. If possible, avoid climbing a ladder. It is best to stay within 4 feet of the ground.    Avoid activities that may involve rough contact or a hard hit to the area of the device.    What if I feel dizzy or light-headed?  If you feel dizzy or light-headed, please call us.    How will others know that I have a device implanted in me?  Before you leave the hospital, you will receive a temporary ID card. A permanent card will be mailed to you about 6 to 8 weeks later. Always carry the ID card with you. It has important details about your device.    You should also get a MedicAlert bracelet or tag that says you have a pacemaker or defibrillator.   Please ask us for a [employer] brochure, or go to [email or web address].     Always tell doctors, dentists and other care providers that you have a device implanted in you.    Let us know before you plan any surgeries. Your care team must take special steps to keep you safe during certain procedures. These steps will depend on the type of device you have. Your doctor will need to see your ID card. He or she may need to call us for instructions.    Will I need to be careful around electrical equipment?    All of these household appliances are safe to use:    Microwaves     Remote controls    Radios    Small electrical tools    Cordless phones    However, you need to keep all electrical equipment in good  repair.    Strong electro-magnetic fields can interfere with your device. Stay at least a few feet away from:    Stereo speakers, including boom boxes    Magnets and magnetic wands used by airport security; bingo wands    Do not try to fix any motor while it is running. Running motors can make an electro-magnetic field that can interfere with your device.    Use cell phones with the ear that is farthest from your device. Do not put a cell phone in a pocket near your device, unless the phone is turned off.    Avoid the following, which can interfere with your device:    Magnetic resonance imaging (MRI) tests in the hospital    Arc welding    Getting close to a TV or radio transmission tower or to an antenna on a ham and [employer] radio    When do I need to come back for a check-up?  You will need to come back for your first check-up in 7 to 10 days. We will contact you after you leave the hospital. If you do not hear from us within 3 days, call us to set up a visit. Please bring your medicine list or your medicine bottles to this visit.    The battery in your device will need to be checked every 3 months. As it gets older, it will need to be checked more often than this.    For questions or to make an appointment:    Call [facility] at [phone].    If you are deaf or hard of hearing, please let us know. We provide many free services including sign language interpreters, oral interpreters, TTYs, telephone amplifiers, note takers and written materials.            TOTAL HIP REPLACEMENT/ REVISION TAKE HOME INSTRUCTIONS  Your surgeon will answer any questions about your progress. General guidelines for your care are listed below. Your surgeon may give additional instructions for your care at home. Please follow them carefully.    Activity Level  1. Physical activity may be resumed gradually according to your comfort level and your surgeon s instructions. Follow your exercise program as instructed by your  "therapist. Do exercises at least twice daily. Refer to pages 19-22 of your \"Total Hip Replacement \" booklet for details.  2. Complete exercises two hours before bedtime to minimize the effect pain may have on sleep.  3. Do not cross legs. Do not bend past 90 .    Good Health Practices  1. Maintain an adequate fluid intake and eat a well balanced diet.  2. Be sure to include the basic food groups, such as dairy products, meat/fish, vegetables, and fruit. Each of these foods contribute to wound healing and increasing your strength.  3. Surgery, decreased activity and pain medication all contribute to a descrease in bowel activity that can result in constipation. It is recommended that you increase your liquid intake, add fiber to your diet, increase activity, and decrease pain medication use. If you have any problems, notify your physician.  4. Notify your dentist of your total hip surgery and call your dentist one week before a dental appointment for antibiotics.    Incision/Dressing Care  1. Keep incision clean and dry except for showers. Okay to shower per ortho once okay with cardiology. Keep dressing in place during showers, pat dry when done.  2. Keep dressing in place, no dressing changes until seen by ortho.     Things to Watch For  1. Check area surrounding incision daily for increased redness, tenderness, swelling, or drainage. If these occur, please notify your doctor. Also, call if you develop a fever above 101 .  2. Please notify your doctor if you experience any calf pain and/or if you have surgical pain not relieved by the pain medication prescribed by your doctor.    Follow up appointment:___________________________________  Nurse Signature: ________________________________ Date _________ Time ______  Patient Signature:_______________________________ Date ____________________      Revised 01/2014          Pending Results     Date and Time Order Name Status Description    2/3/2017 1436 Anaerobic " "bacterial culture Preliminary             Statement of Approval     Ordered          02/07/17 1004  I have reviewed and agree with all the recommendations and orders detailed in this document.   EFFECTIVE NOW     Approved and electronically signed by:  Leigh Ann Maharaj MD             Admission Information        Provider Department Dept Phone    2/3/2017 Juan Moncada MD  Card[facility] Care [phone]      Your Vitals Were     Blood Pressure Pulse Temperature    123/88 mmHg 81 97.7  F (36.5  C) (Oral)    Respirations Height Weight    16 1.575 m (5' 2\") 59.512 kg (131 lb 3.2 oz)    BMI (Body Mass Index) Pulse Oximetry       23.99 kg/m2 92%       MyChart Information     "[employer] gives you secure access to your electronic health record. If you see a primary care provider, you can also send messages to your care team and make appointments. If you have questions, please call your primary care clinic.  If you do not have a primary care provider, please call [phone] and they will assist you.        Care EveryWhere ID     This is your Care EveryWhere ID. This could be used by other organizations to access your [facility] medical records  [id number]           Review of your medicines      START taking        Dose / Directions    acetaminophen-codeine 300-30 MG per tablet   Commonly known as:  TYLENOL #3   Used for:  S/P revision of total hip        Dose:  1-2 tablet   Take 1-2 tablets by mouth every 4 hours as needed for other (mild or moderate pain)   Quantity:  50 tablet   Refills:  0       aspirin  MG EC tablet   Used for:  S/P revision of total hip        Dose:  325 mg   Take 1 tablet (325 mg) by mouth 2 times daily   Quantity:  90 tablet   Refills:  3       senna-docusate 8.6-50 MG per tablet   Commonly known as:  SENOKOT-S;PERICOLACE   Used for:  S/P revision of total hip        Dose:  2 tablet   Take 2 tablets by mouth 2 times daily   Quantity:  100 tablet   Refills:  1         CONTINUE these " medicines which have NOT CHANGED        Dose / Directions    albuterol 108 (90 BASE) MCG/ACT Inhaler   Commonly known as:  PROAIR HFA/PROVENTIL HFA/VENTOLIN HFA        Dose:  2 puff   Inhale 2 puffs into the lungs 4 times daily as needed for shortness of breath / dyspnea or wheezing   Refills:  0       CELEBREX PO        Dose:  200 mg   Take 200 mg by mouth daily   Refills:  0       IMITREX PO        Dose:  100 mg   Take 100 mg by mouth daily as needed for migraine   Refills:  0       LEXAPRO PO        Dose:  20 mg   Take 20 mg by mouth daily   Refills:  0       LIPITOR 40 MG tablet   Generic drug:  atorvastatin        Dose:  40 mg   Take 40 mg by mouth At Bedtime   Quantity:  30 tablet   Refills:  1       LORAZEPAM PO        Dose:  0.5 mg   Take 0.5 mg by mouth At Bedtime (0.5 x 1 mg tablet = 0.5 mg dose)   Refills:  0       LUNESTA 2 MG tablet   Generic drug:  eszopiclone        Dose:  2 mg   Take 2 mg by mouth At Bedtime   Quantity:  30 tablet   Refills:  5       omeprazole 20 MG tablet        Dose:  20 mg   Take 1 tablet (20 mg) by mouth daily Take 30-60 minutes before a meal.   Quantity:  30 tablet   Refills:  1       polyethylene glycol powder   Commonly known as:  MIRALAX/GLYCOLAX        Dose:  17 g   Take 17 g by mouth daily as needed for constipation   Refills:  0       PROBIOTIC & ACIDOPHILUS EX ST PO        Dose:  1 capsule   Take 1 capsule by mouth daily   Refills:  0       traMADol 50 MG tablet   Commonly known as:  ULTRAM        Dose:   mg   Take 1-2 tablets ( mg) by mouth every 6 hours as needed for moderate pain   Quantity:  20 tablet   Refills:  0       VITAMIN C PO        Dose:  500 mg   Take 500 mg by mouth daily   Refills:  0       VITAMIN D (CHOLECALCIFEROL) PO        Dose:  5000 Units   Take 5,000 Units by mouth three times a week (Monday, Wednesday and Friday)   Refills:  0       WELLBUTRIN XL PO        Dose:  150 mg   Take 150 mg by mouth daily   Refills:  0            Where to  get your medicines      These medications were sent to [facility] [place] - [address]  [address] [place] [address]     Phone:  [phone]    - senna-docusate 8.6-50 MG per tablet      Some of these will need a paper prescription and others can be bought over the counter. Ask your nurse if you have questions.     Bring a paper prescription for each of these medications    - acetaminophen-codeine 300-30 MG per tablet  - aspirin  MG EC tablet             Protect others around you: Learn how to safely use, store and throw away your medicines at www.disposemymeds.org.             Medication List: This is a list of all your medications and when to take them. Check marks below indicate your daily home schedule. Keep this list as a reference.      Medications           Morning Afternoon Evening Bedtime As Needed    acetaminophen-codeine 300-30 MG per tablet   Commonly known as:  TYLENOL #3   Take 1-2 tablets by mouth every 4 hours as needed for other (mild or moderate pain)   Last time this was given:  1 tablet on 2/7/2017  6:56 AM                                albuterol 108 (90 BASE) MCG/ACT Inhaler   Commonly known as:  PROAIR HFA/PROVENTIL HFA/VENTOLIN HFA   Inhale 2 puffs into the lungs 4 times daily as needed for shortness of breath / dyspnea or wheezing   Next Dose Due:  Resume home schedule                                aspirin  MG EC tablet   Take 1 tablet (325 mg) by mouth 2 times daily                                CELEBREX PO   Take 200 mg by mouth daily   Next Dose Due:  Resume home schedule                                IMITREX PO   Take 100 mg by mouth daily as needed for migraine   Next Dose Due:  Resume home schedule                                LEXAPRO PO   Take 20 mg by mouth daily   Last time this was given:  20 mg on 2/7/2017  9:05 AM   Next Dose Due:  2/8 AM                                   LIPITOR 40 MG tablet   Take 40 mg by mouth At  Bedtime   Last time this was given:  40 mg on 2/6/2017  9:53 PM   Generic drug:  atorvastatin   Next Dose Due:  2/7 bedtime                                   LORAZEPAM PO   Take 0.5 mg by mouth At Bedtime (0.5 x 1 mg tablet = 0.5 mg dose)   Last time this was given:  0.5 mg on 2/5/2017  9:55 PM   Next Dose Due:  2/7 bedtime                                   LUNESTA 2 MG tablet   Take 2 mg by mouth At Bedtime   Last time this was given:  2 mg on 2/6/2017 10:31 PM   Generic drug:  eszopiclone   Next Dose Due:  2/7 bedtime                                   omeprazole 20 MG tablet   Take 1 tablet (20 mg) by mouth daily Take 30-60 minutes before a meal.   Next Dose Due:  Resume home schedule                                polyethylene glycol powder   Commonly known as:  MIRALAX/GLYCOLAX   Take 17 g by mouth daily as needed for constipation   Next Dose Due:  Resume home schedule                                PROBIOTIC & ACIDOPHILUS EX ST PO   Take 1 capsule by mouth daily   Next Dose Due:  Resume home schedule                                senna-docusate 8.6-50 MG per tablet   Commonly known as:  SENOKOT-S;PERICOLACE   Take 2 tablets by mouth 2 times daily   Last time this was given:  2 tablets on 2/7/2017 10:47 AM                                traMADol 50 MG tablet   Commonly known as:  ULTRAM   Take 1-2 tablets ( mg) by mouth every 6 hours as needed for moderate pain   Last time this was given:  100 mg on 2/6/2017  6:25 PM   Next Dose Due:  As needed for pain, alternate with tylenol #3                                VITAMIN C PO   Take 500 mg by mouth daily   Next Dose Due:  Resume home schedule                                VITAMIN D (CHOLECALCIFEROL) PO   Take 5,000 Units by mouth three times a week (Monday, Wednesday and Friday)   Next Dose Due:  Resume home schedule                                WELLBUTRIN XL PO   Take 150 mg by mouth daily   Last time this was given:  150 mg on 2/7/2017  9:05 AM   Next  Dose Due:  2/8 AM

## 2017-02-03 NOTE — IP AVS SNAPSHOT
[facility]    [address] [place] [address]., [address]    [place] [address]    Phone:  [phone]                                       After Visit Summary   2/3/2017    Anne Rangel    MRN: [id number]           After Visit Summary Signature Page     I have received my discharge instructions, and my questions have been answered. I have discussed any challenges I see with this plan with the nurse or doctor.    ..........................................................................................................................................  Patient/Patient Representative Signature      ..........................................................................................................................................  Patient Representative Print Name and Relationship to Patient    ..................................................               ................................................  Date                                            Time    ..........................................................................................................................................  Reviewed by Signature/Title    ...................................................              ..............................................  Date                                                            Time

## 2017-02-04 ENCOUNTER — APPOINTMENT (OUTPATIENT)
Dept: PHYSICAL THERAPY | Facility: CLINIC | Age: 72
DRG: 468 | End: 2017-02-04
Attending: ORTHOPAEDIC SURGERY
Payer: MEDICARE

## 2017-02-04 ENCOUNTER — APPOINTMENT (OUTPATIENT)
Dept: CARDIOLOGY | Facility: CLINIC | Age: 72
DRG: 468 | End: 2017-02-04
Attending: INTERNAL MEDICINE
Payer: MEDICARE

## 2017-02-04 LAB
GLUCOSE SERPL-MCNC: 131 MG/DL (ref 70–99)
HGB BLD-MCNC: 11.2 G/DL (ref 11.7–15.7)
TSH SERPL DL<=0.005 MIU/L-ACNC: 1.74 MU/L (ref 0.4–4)

## 2017-02-04 PROCEDURE — 93005 ELECTROCARDIOGRAM TRACING: CPT

## 2017-02-04 PROCEDURE — 97116 GAIT TRAINING THERAPY: CPT | Mod: GP | Performed by: PHYSICAL THERAPIST

## 2017-02-04 PROCEDURE — 25000132 ZZH RX MED GY IP 250 OP 250 PS 637: Mod: GY | Performed by: INTERNAL MEDICINE

## 2017-02-04 PROCEDURE — 82947 ASSAY GLUCOSE BLOOD QUANT: CPT | Performed by: ORTHOPAEDIC SURGERY

## 2017-02-04 PROCEDURE — 97530 THERAPEUTIC ACTIVITIES: CPT | Mod: GP | Performed by: PHYSICAL THERAPIST

## 2017-02-04 PROCEDURE — 25000132 ZZH RX MED GY IP 250 OP 250 PS 637: Mod: GY | Performed by: PHYSICIAN ASSISTANT

## 2017-02-04 PROCEDURE — 25000132 ZZH RX MED GY IP 250 OP 250 PS 637: Mod: GY | Performed by: ORTHOPAEDIC SURGERY

## 2017-02-04 PROCEDURE — 84443 ASSAY THYROID STIM HORMONE: CPT | Performed by: ORTHOPAEDIC SURGERY

## 2017-02-04 PROCEDURE — 25000128 H RX IP 250 OP 636: Performed by: ORTHOPAEDIC SURGERY

## 2017-02-04 PROCEDURE — 99222 1ST HOSP IP/OBS MODERATE 55: CPT | Performed by: INTERNAL MEDICINE

## 2017-02-04 PROCEDURE — A9270 NON-COVERED ITEM OR SERVICE: HCPCS | Mod: GY | Performed by: PHYSICIAN ASSISTANT

## 2017-02-04 PROCEDURE — 25000125 ZZHC RX 250: Performed by: ORTHOPAEDIC SURGERY

## 2017-02-04 PROCEDURE — 36415 COLL VENOUS BLD VENIPUNCTURE: CPT | Performed by: ORTHOPAEDIC SURGERY

## 2017-02-04 PROCEDURE — 93306 TTE W/DOPPLER COMPLETE: CPT | Mod: 26 | Performed by: INTERNAL MEDICINE

## 2017-02-04 PROCEDURE — 93306 TTE W/DOPPLER COMPLETE: CPT

## 2017-02-04 PROCEDURE — 40000193 ZZH STATISTIC PT WARD VISIT: Performed by: PHYSICAL THERAPIST

## 2017-02-04 PROCEDURE — 12000007 ZZH R&B INTERMEDIATE

## 2017-02-04 PROCEDURE — A9270 NON-COVERED ITEM OR SERVICE: HCPCS | Mod: GY | Performed by: ORTHOPAEDIC SURGERY

## 2017-02-04 PROCEDURE — 85018 HEMOGLOBIN: CPT | Performed by: ORTHOPAEDIC SURGERY

## 2017-02-04 PROCEDURE — 93010 ELECTROCARDIOGRAM REPORT: CPT | Performed by: INTERNAL MEDICINE

## 2017-02-04 PROCEDURE — 97161 PT EVAL LOW COMPLEX 20 MIN: CPT | Mod: GP | Performed by: PHYSICAL THERAPIST

## 2017-02-04 PROCEDURE — 99222 1ST HOSP IP/OBS MODERATE 55: CPT | Mod: 25 | Performed by: INTERNAL MEDICINE

## 2017-02-04 PROCEDURE — A9270 NON-COVERED ITEM OR SERVICE: HCPCS | Mod: GY | Performed by: INTERNAL MEDICINE

## 2017-02-04 PROCEDURE — 84443 ASSAY THYROID STIM HORMONE: CPT | Performed by: INTERNAL MEDICINE

## 2017-02-04 PROCEDURE — 97110 THERAPEUTIC EXERCISES: CPT | Mod: GP | Performed by: PHYSICAL THERAPIST

## 2017-02-04 RX ORDER — OXYCODONE HYDROCHLORIDE 5 MG/1
5-10 TABLET ORAL
Qty: 50 TABLET | Refills: 0 | Status: SHIPPED | OUTPATIENT
Start: 2017-02-04 | End: 2017-02-07

## 2017-02-04 RX ORDER — ESZOPICLONE 1 MG/1
2 TABLET, FILM COATED ORAL AT BEDTIME
Status: DISCONTINUED | OUTPATIENT
Start: 2017-02-04 | End: 2017-02-06

## 2017-02-04 RX ORDER — LORAZEPAM 0.5 MG/1
0.5 TABLET ORAL DAILY PRN
Status: DISCONTINUED | OUTPATIENT
Start: 2017-02-04 | End: 2017-02-07 | Stop reason: HOSPADM

## 2017-02-04 RX ADMIN — OXYCODONE HYDROCHLORIDE 10 MG: 5 TABLET ORAL at 04:25

## 2017-02-04 RX ADMIN — OXYCODONE HYDROCHLORIDE 10 MG: 5 TABLET ORAL at 21:26

## 2017-02-04 RX ADMIN — CEFAZOLIN SODIUM 1 G: 1 INJECTION, POWDER, FOR SOLUTION INTRAMUSCULAR; INTRAVENOUS at 00:30

## 2017-02-04 RX ADMIN — ONDANSETRON 4 MG: 4 TABLET, ORALLY DISINTEGRATING ORAL at 18:55

## 2017-02-04 RX ADMIN — OXYCODONE HYDROCHLORIDE 10 MG: 5 TABLET ORAL at 00:29

## 2017-02-04 RX ADMIN — OXYCODONE HYDROCHLORIDE 5 MG: 5 TABLET ORAL at 09:04

## 2017-02-04 RX ADMIN — ONDANSETRON 4 MG: 4 TABLET, ORALLY DISINTEGRATING ORAL at 12:07

## 2017-02-04 RX ADMIN — ATORVASTATIN CALCIUM 40 MG: 40 TABLET, FILM COATED ORAL at 21:26

## 2017-02-04 RX ADMIN — CYCLOBENZAPRINE HYDROCHLORIDE 5 MG: 5 TABLET, FILM COATED ORAL at 06:26

## 2017-02-04 RX ADMIN — ENOXAPARIN SODIUM 40 MG: 40 INJECTION SUBCUTANEOUS at 16:58

## 2017-02-04 RX ADMIN — OMEPRAZOLE 20 MG: 20 CAPSULE, DELAYED RELEASE ORAL at 08:52

## 2017-02-04 RX ADMIN — SENNOSIDES AND DOCUSATE SODIUM 2 TABLET: 8.6; 5 TABLET ORAL at 21:26

## 2017-02-04 RX ADMIN — ACETAMINOPHEN 975 MG: 325 TABLET, FILM COATED ORAL at 06:26

## 2017-02-04 RX ADMIN — OXYCODONE HYDROCHLORIDE 10 MG: 5 TABLET ORAL at 12:07

## 2017-02-04 RX ADMIN — BUPROPION HYDROCHLORIDE 150 MG: 150 TABLET, FILM COATED, EXTENDED RELEASE ORAL at 08:52

## 2017-02-04 RX ADMIN — ACETAMINOPHEN 975 MG: 325 TABLET, FILM COATED ORAL at 21:26

## 2017-02-04 RX ADMIN — CEFAZOLIN SODIUM 1 G: 1 INJECTION, POWDER, FOR SOLUTION INTRAMUSCULAR; INTRAVENOUS at 08:53

## 2017-02-04 RX ADMIN — ACETAMINOPHEN 975 MG: 325 TABLET, FILM COATED ORAL at 14:39

## 2017-02-04 RX ADMIN — ESZOPICLONE 2 MG: 1 TABLET, FILM COATED ORAL at 21:26

## 2017-02-04 RX ADMIN — OXYCODONE HYDROCHLORIDE 10 MG: 5 TABLET ORAL at 15:45

## 2017-02-04 RX ADMIN — ESCITALOPRAM 20 MG: 20 TABLET, FILM COATED ORAL at 08:53

## 2017-02-04 RX ADMIN — SENNOSIDES AND DOCUSATE SODIUM 1 TABLET: 8.6; 5 TABLET ORAL at 08:53

## 2017-02-04 NOTE — PLAN OF CARE
Problem: Hip Replacement, Total (Adult)  Goal: Signs and Symptoms of Listed Potential Problems Will be Absent or Manageable (Hip Replacement, Total)  Signs and symptoms of listed potential problems will be absent or manageable by discharge/transition of care (reference Hip Replacement, Total (Adult) CPG).   Outcome: No Change  A&O x 4, VSS on RA, pain rated a 10/10 ortho notified pain medication increased, drsg CDI, CMS intact, repositioned, voiding adequately in patent patle, IV infusing, continue to monitor.

## 2017-02-04 NOTE — PROVIDER NOTIFICATION
Spoke to on call for Ortho regarding pt's bradycardia, pt is asymptomatic. New order for hospitalist to re-consult.

## 2017-02-04 NOTE — PROGRESS NOTES
SPIRITUAL HEALTH SERVICES  Progress Note  [facility]    Pt was in a lot of pain when I came to room.  Pt asked for short prayer.  After prayer, pt and spouse were told that SH is available for them during their visit.        Tiera James  Chaplain Resident  Pager [phone]

## 2017-02-04 NOTE — PLAN OF CARE
Problem: Goal Outcome Summary  Goal: Goal Outcome Summary  Outcome: Improving  Pt A&O. VSS except a low pulse. Rated pain 4-7, pain managed by IV dilaudid, oxycodone, tylenol, flexeril, dangled and able to make a few steps at bed side, patel catheter D'Cd and DTV. Dressing is DCI. Will continue to monitor.

## 2017-02-04 NOTE — PROGRESS NOTES
hospitalist consult received; patient is s/p revision of L BEATRICE due to ill fitting prosthesis, PMH significant for depression, anxiety, HLD, CKD-3, fibromyalgia, and GERD.  As patient does not have chronic medical conditions that require monitoring, hospitalist will sign-off at this time. PTA medications resumed.  Discussed with bedside RN.  Please call with additional questions or concerns.    James Patel PA-C  2/3/2017, 8:15 PM

## 2017-02-04 NOTE — PROGRESS NOTES
02/04/17 0941   Quick Adds   Type of Visit Initial PT Evaluation   Living Environment   Lives With spouse   Living Arrangements house  (multi-level)   Home Accessibility stairs within home;stairs to enter home;tub/shower is not walk in   Number of Stairs to Enter Home 2   Number of Stairs Within Home 14   Stair Railings at Home inside, present on left side   Transportation Available car;family or friend will provide   Self-Care   Dominant Hand right   Usual Activity Tolerance poor   Current Activity Tolerance poor   Regular Exercise no   Equipment Currently Used at Home none   Activity/Exercise/Self-Care Comment Pt. reports has been in bed for the last 6 weeks due to painful left hip; amb. within the the home to BR and for meals   Functional Level Prior   Ambulation 0-->independent   Transferring 0-->independent   Toileting 0-->independent   Bathing 0-->independent   Dressing 0-->independent   Eating 0-->independent   Communication 0-->understands/communicates without difficulty   Swallowing 0-->swallows foods/liquids without difficulty   Cognition 0 - no cognition issues reported   Fall history within last six months no   Which of the above functional risks had a recent onset or change? ambulation;transferring;toileting;bathing;dressing   Prior Functional Level Comment Pt. amb. stairs on all fours. Pt. furniture walked in home and for showering. Stood for showering but really reached for objets in environment to get into shower.   General Information   Onset of Illness/Injury or Date of Surgery - Date 02/03/17   Referring Physician Juan Telles   Patient/Family Goals Statement Return home with assist of spouse   Pertinent History of Current Problem (include personal factors and/or comorbidities that impact the POC) Admitted for revision left BEATRICE. PMHX: CKD, stage 3, depression, fibromyalgia, asthma, right sabine-arthroplasty knee 2005, left TKA 2007, zoila. BEATRICE 12/2013, 3/2015.   Precautions/Limitations fall  "precautions;other (see comments);left hip precautions  (Anterolateral or direct lateral approach, amb. with assist)   Weight-Bearing Status - LLE weight-bearing as tolerated   Heart Disease Risk Factors (Today with bradycardia and \"dropped beats\" per hospitalist)   General Observations Resting comfortably in bed with , Gerardo, present   Cognitive Status Examination   Orientation orientation to person, place and time   Level of Consciousness alert   Follows Commands and Answers Questions 100% of the time   Personal Safety and Judgment intact   Memory intact   Pain Assessment   Patient Currently in Pain Yes, see Vital Sign flowsheet   Integumentary/Edema   Integumentary/Edema other (describe)   Integumentary/Edema Comments left lat. hip covered with gauze and tape   Posture    Posture Comments Not assess due to supine   Range of Motion (ROM)   ROM Comment Right LE WFLs. Left hip flex. limited to approx. 40 degrees, ankle and knee ROM appear WFLs.   Strength   Strength Comments Right knee ext. and DF 5/5. Assist required with left heelslides and SAQs due to left hip pain.   Bed Mobility   Bed Mobility Comments Hospitalist arrived to state pt. having \"missed beats\" on EKG thus hold mobility until cleared by cardiology which has been ordered.   Transfer Skills   Transfer Comments See above   Gait   Gait Comments See above   Balance   Balance Comments Not assessed   Sensory Examination   Sensory Perception no deficits were identified   Coordination   Coordination no deficits were identified   Muscle Tone   Muscle Tone no deficits were identified   Modality Interventions   Planned Modality Interventions Cryotherapy   General Therapy Interventions   Planned Therapy Interventions bed mobility training;gait training;ROM;strengthening;stretching;transfer training;home program guidelines   Clinical Impression   Criteria for Skilled Therapeutic Intervention yes, treatment indicated   PT Diagnosis impaired mobility, painful " "mobility, weakness and decreased ROM left hip   Influenced by the following impairments Revision left BEATRICE, decreased functional mobility at home for the last 6 weeks   Functional limitations due to impairments Falls risk, assist required for all mobility   Clinical Presentation Evolving/Changing   Clinical Presentation Rationale Declining mobility at home, bradycardia at present with missed beats, clinical judgement   Clinical Decision Making (Complexity) Low complexity   Therapy Frequency` 2 times/day   Predicted Duration of Therapy Intervention (days/wks) 3-4 days   Anticipated Equipment Needs at Discharge tub bench;front wheeled walker;other (see comments)  (RTS)   Anticipated Discharge Disposition Home with Assist;Home with Home Therapy;Transitional Care Facility   Risk & Benefits of therapy have been explained Yes   Patient, Family & other staff in agreement with plan of care Yes   Clinical Impression Comments Pt. will benefit from skilled PT to promote indep. with mobility and ADLs.   [facility] [employer]-PAC TM \"6 Clicks\"   2016, Trustees of [facility], under license to [employer].  All rights reserved.   6 Clicks Short Forms Basic Mobility Inpatient Short Form   [facility] AM-PAC  \"6 Clicks\" V.2 Basic Mobility Inpatient Short Form   1. Turning from your back to your side while in a flat bed without using bedrails? 2 - A Lot   2. Moving from lying on your back to sitting on the side of a flat bed without using bedrails? 2 - A Lot   3. Moving to and from a bed to a chair (including a wheelchair)? 2 - A Lot   4. Standing up from a chair using your arms (e.g., wheelchair, or bedside chair)? 2 - A Lot   5. To walk in hospital room? 2 - A Lot   6. Climbing 3-5 steps with a railing? 1 - Total   Basic Mobility Raw Score (Score out of 24.Lower scores equate to lower levels of function) 11   Total Evaluation Time   Total Evaluation Time (Minutes) 10     "

## 2017-02-04 NOTE — PLAN OF CARE
Problem: Goal Outcome Summary  Goal: Goal Outcome Summary  Outcome: Therapy, unable to show any progress toward functional goals  Surgeon Discharge Plan: Did not specify.     Current Functional Status: Pt. Performed supine LE ex. X 10 reps. With MIN A 1. Pain remained 3/10 throughout Tx. Hold mobility until after cardiology visit and echo. HR in 40's this morning. Hospitalist arrived during PT Tx to deliver this news.    Barriers to Plan/Home: Spouse and pt. would like to return home at ID. Pt. With decreased functional endurance over last 6 weeks due to increased left hip pain with mobility. Was amb. Only short distances in the home, using furniture for support. Reports amb. Up and down steps ( 2 sets of 7-steps) on all fours. Spouse present during Tx. And very helpful. Barriers to returning home are 14 steps to access bedroom, 2 steps into house, pt. With revision BEATRICE left, decreased functional endurance prior to surgery.

## 2017-02-04 NOTE — PROGRESS NOTES
"Orthopedic Surgery  2/4/2017  POD #1    S: Patient voices no complaints today.     O: Blood pressure 111/61, pulse 45, temperature 97.8  F (36.6  C), temperature source Oral, resp. rate 16, height 1.575 m (5' 2\"), weight 53.978 kg (119 lb), SpO2 96 %.  HGB     11.2   2/4/2017  Neurovascularly intact.  Calves are negative bilaterally, both soft and nontender.  The dressing is C/D/I.      A: Ms. Rangel is doing well status post Procedure(s):  ARTHROPLASTY REVISION HIP.    P: Continue physical therapy. Anticipate discharge to home on monday.    Patient is aware that i will be out of town for the next week, and my partners are covering in my absence.    Juan Telles  [phone]    "

## 2017-02-04 NOTE — ANESTHESIA POSTPROCEDURE EVALUATION
Patient: Anne Rangel    Procedure(s):  REVISION LEFT TOTAL  HIP ARTHROPLASTY  - Wound Class: I-Clean    Diagnosis:failed total hip  Diagnosis Additional Information: No value filed.    Anesthesia Type:  Spinal    Note:  Anesthesia Post Evaluation    Patient location during evaluation: PACU  Patient participation: Able to fully participate in evaluation  Level of consciousness: awake and alert  Pain management: satisfactory to patient  Airway patency: patent  Cardiovascular status: hemodynamically stable  Respiratory status: acceptable and unassisted  Hydration status: balanced  PONV: none     Anesthetic complications: None          Last vitals:  Filed Vitals:    02/03/17 1820 02/03/17 1830 02/03/17 1840   BP: 115/81 113/62 111/64   Temp:      Resp: 12 12 18   SpO2: 99%           Electronically Signed By: Florencio Mosqueda MD  February 3, 2017  7:00 PM

## 2017-02-04 NOTE — H&P
CARDIOLOGY CONSULTATION      REASON FOR CONSULTATION:  Second-degree AV block, 2:1 conduction.      HISTORY OF PRESENT ILLNESS:  Mrs. Anne Rangel is a very nice 71-year-old woman with a history of prior left hip arthroplasty who underwent surgical revision on 02/03/2017.  Her history is also notable for RBBB, anxiety, migraine, depression, dyslipidemia and gastroesophageal reflux disease.  Postoperatively, she was noted to be bradycardic and an ECG demonstrated 2:1 AV node conduction with a ventricular rate of 43 beats per minute and a right bundle branch morphology.  The patient denied feeling dizzy, short of breath,  hest pain or fatigue. The baseline ECG demonstrates a right bundle branch block pattern with a QRS duration of 138 milliseconds and a normal DE interval.  An echocardiogram demonstrated preserved LV systolic function with an ejection fraction of 60-65% and no evidence of regional wall motion abnormality and no significant valvular heart disease.      It is notable that the patient has had significant uncontrolled pain following her surgery.  She has continued to struggle with adequate pain control and is currently complaining of 5/10 leg pain despite ongoing treatment with narcotics.  Additionally, the patient has a history of severe nausea in response to narcotics and is receiving around the clock Zofran and Compazine in order to prophylax against this.  She has not been up and out of bed yet but denies any cardiac or pulmonary symptoms at rest.      PAST MEDICAL HISTORY:   1.  Baseline right bundle branch block.   2.  Status post left hip arthroplasty revision.   3.  Anxiety/depression.   4.  Dyslipidemia.   5.  GERD.      FAMILY HISTORY:  Noncontributory.      SOCIAL HISTORY:  The patient is .  She is a nonsmoker.      PHYSICAL EXAMINATION:   GENERAL:  Well-appearing older woman, appears mildly uncomfortable.   NECK:  There are intermittent lin A waves.   CARDIAC:  Bradycardic,  regular with no significant murmurs.   LUNGS:  On anterior auscultation are clear throughout.   ABDOMEN:  Soft and nontender.   EXTREMITIES:  The left extremity is fully wrapped.  The right extremity is warm.   NEUROLOGIC:  Alert and oriented x3.   PSYCHIATRIC:  Appropriate demeanor and affect.      ASSESSMENT AND PLAN:   1.  2:1 AV block.      At baseline there is a right bundle branch block, and she may have developed progressive conduction disease.  However, in the setting of her significant uncontrolled pain and nausea, this may represent increased vagal tone as well.  As the patient is hemodynamically stable and asymptomatic, I think it is prudent to monitor for the time being.  If the patient's block does not resolve with improved pain control then she should undergo implantation of a dual-chamber pacemaker device Monday.  The patient should be kept n.p.o. on  night and I will request that the Heart Rhythm team formally assessed the patient on Monday as well.         JAYMIE PAZ MD             D: 2017 14:42   T: 2017 15:32   MT: #[id number]      Name:     AYLA AGUILAR   MRN:      [id number]        Account:      [id number]   :      1945           Admitted:     [id number]      Document: [id number]

## 2017-02-04 NOTE — PLAN OF CARE
Problem: Goal Outcome Summary  Goal: Goal Outcome Summary  OT: per PT, hold OT today until cleared for OOB by cardiology.  Will reschedule.

## 2017-02-04 NOTE — PROGRESS NOTES
[facility]  Progress Note  [facility]    Visit per request.  Pt didn't feel good but welcomed a short visit.  Shared about her family and that she was a member of [facility].  By the time she finished sharing, she was tired.  Ended the visit with a prayer.      Tiera James  Chaplain Resident  Pager [phone]

## 2017-02-04 NOTE — CONSULTS
[facility]    Hospitalist Consultation    Date of Admission:  2/3/2017  Date of Consult (When I saw the patient): 02/04/2017    Assessment and Plan  Anne Rangel is a 71 year old female with hx of anxiety and migraines who was admitted to the Orthopedic service on 2/3/2017 for left BEATRICE revision. Hospitalist service was consulted for bradycardia.    Status post left BEATRICE revision on 2/3/2017  Tolerated procedure with no immediate complications.  mL.  - Post-op cares, pain control, and VTE prophylaxis as per Ortho    2nd degree AV block, mobitz type II  Noted to be bradycardic to 40s post-op. Most recent EKG (1/27) showed sinus bradycardia (HR 62) with RBBB. EKG (2/4) showed 2nd degree AV block, mobitz type II. The patient has otherwise been asymptomatic  - TTE  - Check TSH  - Cardiology has been consulted  Addendum: TSH and TTE reviewed. No significant findings. Will await Cardiology input    Dyslipidemia  Chronic and stable on atorvastatin 40 mg daily    GERD  Chronic and stable on omeprazole    Major recurrent depressive disorder with anxiety  [PTA: bupropion 150 mg daily, escitalopram 20 mg daily, eszopiclone 2 mg qhs, lorazepam 0.5 mg qhs]  - Continues on bupropion, escitalopram, and eszopiclone  - Lorazepam available prn    FEN: regular diet  DVT Prophylaxis: Enoxaparin (Lovenox) SQ, as per Ortho  Code Status: Full Code    Disposition: Expected discharge in 2-3 days once cleared by Cardiology.    Leigh Ann Maharaj    Reason for Consult  Reason for consult: Bradycardia    Primary Care Physician  Nevaeh Wood    Chief Complaint  Left BEATRICE revision    History is obtained from the patient and review of medical records    History of Present Illness  Anne Rangel is a 71 year old female with hx of anxiety and migraines who was admitted to the Orthopedic service on 2/3/2017 for left BEATRICE revision. Hospitalist service was consulted for bradycardia.    The patient reports no known  history of bradycardia. Her pre-op EKG on 1/27/17 showed sinus bradycardia (HR 62) with RBBB. She was noted to be bradycardic to mid-40s post-op. I ordered an EKG today which showed 2nd degree AV block, mobitz type 2. She is asymptomatic. She does not take any AV-king[place] blocking agents. She denies current or recent history of cp/sob, dizziness/lightheadedness, or history of syncope. She denies fevers/chills or recent illnesses. Her main concern is left hip pain.     Past Medical History   I have reviewed this patient's medical history and updated the medical record  Past Medical History   Diagnosis Date     Osteoarthrosis, unspecified whether generalized or localized, lower leg 11/00     Osteoarthrosis, unspecified whether generalized or localized, hand 11/00     Myalgia and myositis, unspecified 11/00     Irritable bowel syndrome 11/00     Chronic fatigue syndrome 11/00     Pure hypercholesterolemia 11/00     Urticaria, unspecified 00 Neutropenia 00     iamAFTERCARE FOLLOWING JOINT REPLACEMENT 7/23/2007     Depression with anxiety      Fatigue      Uncomplicated asthma      RBBB      Noninfectious ileitis      Decreased libido      HA (headache)      Gastroesophageal reflux disease      Carpal tunnel syndrome      Anemia      Renal disease      mld       Past Surgical History  I have reviewed this patient's surgical history and updated the medical record  Past Surgical History   Procedure Laterality Date     C nonspecific procedure       s/p Rt. Carpal tunnel release     C nonspecific procedure       s/p bilat Tubal ligation     Gi surgery       hemorrhoid repair     Orthopedic surgery       right sabine-arthroplasty, trigger finger repair, left knee arthroscopy and replacement, bilateral hip replacement,     Gyn surgery       tubal ligation       Prior to Admission Medications  Prior to Admission Medications   Prescriptions Last Dose Informant Patient Reported? Taking?   Ascorbic Acid (VITAMIN C PO) 2/3/2017 at  0830 Self Yes Yes   Sig: Take 500 mg by mouth daily   BuPROPion HCl (WELLBUTRIN XL PO) 2/3/2017 at 0830 Self Yes Yes   Sig: Take 150 mg by mouth daily   Celecoxib (CELEBREX PO) 1/27/2017 at AM Self Yes Yes   Sig: Take 200 mg by mouth daily   Escitalopram Oxalate (LEXAPRO PO) 2/3/2017 at 0830 Self Yes Yes   Sig: Take 20 mg by mouth daily   LORAZEPAM PO 2/2/2017 at 2200 Self Yes Yes   Sig: Take 0.5 mg by mouth At Bedtime (0.5 x 1 mg tablet = 0.5 mg dose)   Probiotic Product (PROBIOTIC & ACIDOPHILUS EX ST PO) 2/3/2017 at 0830 Self Yes Yes   Sig: Take 1 capsule by mouth daily   SUMAtriptan Succinate (IMITREX PO) More than a Month at PRN Self Yes Yes   Sig: Take 100 mg by mouth daily as needed for migraine   VITAMIN D, CHOLECALCIFEROL, PO 2/3/2017 at 0830 Self Yes Yes   Sig: Take 5,000 Units by mouth three times a week (Monday, Wednesday and Friday)   albuterol (PROAIR HFA/PROVENTIL HFA/VENTOLIN HFA) 108 (90 BASE) MCG/ACT Inhaler More than a Month at PRN Self Yes Yes   Sig: Inhale 2 puffs into the lungs 4 times daily as needed for shortness of breath / dyspnea or wheezing   atorvastatin (LIPITOR) 40 MG tablet 2/2/2017 at 2200 Self Yes Yes   Sig: Take 40 mg by mouth At Bedtime    eszopiclone (LUNESTA) 2 MG tablet 2/2/2017 at 2200 Self Yes Yes   Sig: Take 2 mg by mouth At Bedtime    omeprazole 20 MG tablet 2/1/2017 at AM Self Yes Yes   Sig: Take 1 tablet (20 mg) by mouth daily Take 30-60 minutes before a meal.   polyethylene glycol (MIRALAX/GLYCOLAX) powder More than a month at PRN Self Yes Yes   Sig: Take 17 g by mouth daily as needed for constipation   traMADol (ULTRAM) 50 MG tablet More than a Month at PRN Self Yes Yes   Sig: Take 1-2 tablets ( mg) by mouth every 6 hours as needed for moderate pain      Facility-Administered Medications: None     Allergies  Allergies   Allergen Reactions     Dilaudid [Hydromorphone] Nausea and Vomiting     Doxepin Unknown     Meperidine Nausea and Vomiting     Demerol       Social  History  I have reviewed this patient's social history  - She denies history of smoking. She reports drinking 1 glass of wine every evening.   - She is  and lives with her .     Family History  I have reviewed this patient's family history  Family History   Problem Relation Age of Onset     C.A.D. Paternal Grandmother      angina     DIABETES Maternal Grandfather      adult onset     Hypertension Mother      Allergies Father      Alzheimer Disease Mother      Memory issues- unsure if alzheimers     Arthritis Mother      osteoarthritis     CANCER Maternal Grandfather      of stomach or esophagus-heavy smoker     CEREBROVASCULAR DISEASE Maternal Grandmother      series of TIA's     Depression Daughter      Depression Mother      GASTROINTESTINAL DISEASE Mother      acid reflux     Neurologic Disorder Father      migraines     Neurologic Disorder Sister      migraines     Respiratory Father      chronic brochitis- born during a flu epidemic in 1918       Review of Systems  The 10 point Review of Systems is negative other than noted in the HPI    Physical Exam  Temp: 97.8  F (36.6  C) Temp src: Oral BP: 111/61 mmHg Pulse: (!) 45 Heart Rate: 44 Resp: 16 SpO2: 96 % O2 Device: None (Room air) Oxygen Delivery: 2 LPM  Vital Signs with Ranges  Temp:  [97.4  F (36.3  C)-98.1  F (36.7  C)] 97.8  F (36.6  C)  Pulse:  [45] 45  Heart Rate:  [41-61] 44  Resp:  [11-18] 16  BP: ()/(55-81) 111/61 mmHg  SpO2:  [87 %-100 %] 96 %  119 lbs 0 oz    Constitutional: Appears comfortable, NAD  Eyes: Sclera white, conjunctiva clear, EOMI  HEENT: NC/AT, MMM  Respiratory: Breathing non-labored. Lungs CTAB - no wheezes/crackles/rhonchi  Cardiovascular: Heart rate slow, no m/r/g. No pedal edema.   GI: +BS. Abd soft/NT  Skin: Warm and dry. No rash.  Musculoskeletal: Normal muscle bulk and tone  Neurologic: Alert and appropriate. MUSTAFA  Psychiatric: Normal affect    Data  -Data reviewed today: All pertinent laboratory and imaging  results from this encounter were reviewed. I personally reviewed the EKG tracing showing 2nd degree AV block mobitz type II.    Recent Labs  Lab 02/04/17  0640 02/03/17  1238   HGB 11.2* 13.7   PLT  --  195   CR  --  1.25*   *  --        Recent Results (from the past 24 hour(s))   XR Pelvis Port 1/2 Views    Narrative    This exam was marked as non-reportable because it will not be read by a   radiologist or a [place] non-radiologist provider.             XR Pelvis 1/2 Views    Narrative    PELVIS ONE TO TWO VIEWS  2/3/2017 4:07 PM     HISTORY: Intraoperative imaging    COMPARISON: 2/3/2017 at 1532 hours       Impression    IMPRESSION: A femoral component appears in place projecting within the  proximal femur. Acetabular component appears in place. The femoral  head has not been placed in the femur is not in anatomic position  currently.    KIMBERLEY OLVERA MD   XR Pelvis w Hip Left 1 View    Narrative    XR PELVIS AND HIP LEFT 1 VIEW  2/3/2017 6:24 PM     HISTORY:  Post Op Hip Arthroplasty    COMPARISON: Earlier films    FINDINGS:  A left hip arthroplasty has been performed. The components  are in good position. No postoperative complications are identified. A  previous right hip arthroplasty is also noted.    JEVON KYAE MD

## 2017-02-05 ENCOUNTER — APPOINTMENT (OUTPATIENT)
Dept: PHYSICAL THERAPY | Facility: CLINIC | Age: 72
DRG: 468 | End: 2017-02-05
Attending: ORTHOPAEDIC SURGERY
Payer: MEDICARE

## 2017-02-05 LAB
ANION GAP SERPL CALCULATED.3IONS-SCNC: 4 MMOL/L (ref 3–14)
BACTERIA SPEC CULT: NO GROWTH
BUN SERPL-MCNC: 15 MG/DL (ref 7–30)
CALCIUM SERPL-MCNC: 7.9 MG/DL (ref 8.5–10.1)
CHLORIDE SERPL-SCNC: 110 MMOL/L (ref 94–109)
CO2 SERPL-SCNC: 28 MMOL/L (ref 20–32)
CREAT SERPL-MCNC: 1.26 MG/DL (ref 0.52–1.04)
ERYTHROCYTE [DISTWIDTH] IN BLOOD BY AUTOMATED COUNT: 13.5 % (ref 10–15)
GFR SERPL CREATININE-BSD FRML MDRD: 42 ML/MIN/1.7M2
GLUCOSE SERPL-MCNC: 86 MG/DL (ref 70–99)
HCT VFR BLD AUTO: 32.5 % (ref 35–47)
HGB BLD-MCNC: 10.6 G/DL (ref 11.7–15.7)
Lab: NORMAL
MCH RBC QN AUTO: 31.6 PG (ref 26.5–33)
MCHC RBC AUTO-ENTMCNC: 32.6 G/DL (ref 31.5–36.5)
MCV RBC AUTO: 97 FL (ref 78–100)
MICRO REPORT STATUS: NORMAL
PLATELET # BLD AUTO: 141 10E9/L (ref 150–450)
POTASSIUM SERPL-SCNC: 4.3 MMOL/L (ref 3.4–5.3)
RBC # BLD AUTO: 3.35 10E12/L (ref 3.8–5.2)
SODIUM SERPL-SCNC: 142 MMOL/L (ref 133–144)
SPECIMEN SOURCE: NORMAL
WBC # BLD AUTO: 4.9 10E9/L (ref 4–11)

## 2017-02-05 PROCEDURE — 25000132 ZZH RX MED GY IP 250 OP 250 PS 637: Mod: GY | Performed by: ORTHOPAEDIC SURGERY

## 2017-02-05 PROCEDURE — 25000128 H RX IP 250 OP 636: Performed by: ORTHOPAEDIC SURGERY

## 2017-02-05 PROCEDURE — A9270 NON-COVERED ITEM OR SERVICE: HCPCS | Mod: GY | Performed by: ORTHOPAEDIC SURGERY

## 2017-02-05 PROCEDURE — 40000193 ZZH STATISTIC PT WARD VISIT: Performed by: PHYSICAL THERAPIST

## 2017-02-05 PROCEDURE — 36415 COLL VENOUS BLD VENIPUNCTURE: CPT | Performed by: INTERNAL MEDICINE

## 2017-02-05 PROCEDURE — A9270 NON-COVERED ITEM OR SERVICE: HCPCS | Mod: GY | Performed by: PHYSICIAN ASSISTANT

## 2017-02-05 PROCEDURE — 25000132 ZZH RX MED GY IP 250 OP 250 PS 637: Mod: GY | Performed by: PHYSICIAN ASSISTANT

## 2017-02-05 PROCEDURE — 99232 SBSQ HOSP IP/OBS MODERATE 35: CPT | Performed by: INTERNAL MEDICINE

## 2017-02-05 PROCEDURE — 97530 THERAPEUTIC ACTIVITIES: CPT | Mod: GP | Performed by: PHYSICAL THERAPIST

## 2017-02-05 PROCEDURE — 97116 GAIT TRAINING THERAPY: CPT | Mod: GP | Performed by: PHYSICAL THERAPIST

## 2017-02-05 PROCEDURE — 97110 THERAPEUTIC EXERCISES: CPT | Mod: GP | Performed by: PHYSICAL THERAPIST

## 2017-02-05 PROCEDURE — 25000132 ZZH RX MED GY IP 250 OP 250 PS 637: Mod: GY | Performed by: INTERNAL MEDICINE

## 2017-02-05 PROCEDURE — 25000125 ZZHC RX 250: Performed by: ORTHOPAEDIC SURGERY

## 2017-02-05 PROCEDURE — 85027 COMPLETE CBC AUTOMATED: CPT | Performed by: INTERNAL MEDICINE

## 2017-02-05 PROCEDURE — A9270 NON-COVERED ITEM OR SERVICE: HCPCS | Mod: GY | Performed by: INTERNAL MEDICINE

## 2017-02-05 PROCEDURE — 12000007 ZZH R&B INTERMEDIATE

## 2017-02-05 PROCEDURE — 80048 BASIC METABOLIC PNL TOTAL CA: CPT | Performed by: INTERNAL MEDICINE

## 2017-02-05 PROCEDURE — 99231 SBSQ HOSP IP/OBS SF/LOW 25: CPT | Performed by: INTERNAL MEDICINE

## 2017-02-05 RX ORDER — HYDROCODONE BITARTRATE AND ACETAMINOPHEN 5; 325 MG/1; MG/1
1-2 TABLET ORAL EVERY 4 HOURS PRN
Status: DISCONTINUED | OUTPATIENT
Start: 2017-02-05 | End: 2017-02-07

## 2017-02-05 RX ORDER — TRAMADOL HYDROCHLORIDE 50 MG/1
50-100 TABLET ORAL EVERY 6 HOURS PRN
Status: DISCONTINUED | OUTPATIENT
Start: 2017-02-05 | End: 2017-02-07

## 2017-02-05 RX ORDER — ESZOPICLONE 1 MG/1
2 TABLET, FILM COATED ORAL
Status: DISCONTINUED | OUTPATIENT
Start: 2017-02-05 | End: 2017-02-07

## 2017-02-05 RX ADMIN — HYDROCODONE BITARTRATE AND ACETAMINOPHEN 1 TABLET: 5; 325 TABLET ORAL at 18:41

## 2017-02-05 RX ADMIN — LORAZEPAM 0.5 MG: 0.5 TABLET ORAL at 21:55

## 2017-02-05 RX ADMIN — CYCLOBENZAPRINE HYDROCHLORIDE 5 MG: 5 TABLET, FILM COATED ORAL at 23:29

## 2017-02-05 RX ADMIN — SENNOSIDES AND DOCUSATE SODIUM 2 TABLET: 8.6; 5 TABLET ORAL at 21:55

## 2017-02-05 RX ADMIN — TRAMADOL HYDROCHLORIDE 100 MG: 50 TABLET, COATED ORAL at 11:40

## 2017-02-05 RX ADMIN — HYDROCODONE BITARTRATE AND ACETAMINOPHEN 1 TABLET: 5; 325 TABLET ORAL at 19:55

## 2017-02-05 RX ADMIN — OMEPRAZOLE 20 MG: 20 CAPSULE, DELAYED RELEASE ORAL at 06:53

## 2017-02-05 RX ADMIN — ESZOPICLONE 2 MG: 1 TABLET, FILM COATED ORAL at 21:55

## 2017-02-05 RX ADMIN — ONDANSETRON 4 MG: 4 TABLET, ORALLY DISINTEGRATING ORAL at 18:13

## 2017-02-05 RX ADMIN — BUPROPION HYDROCHLORIDE 150 MG: 150 TABLET, FILM COATED, EXTENDED RELEASE ORAL at 09:17

## 2017-02-05 RX ADMIN — SENNOSIDES AND DOCUSATE SODIUM 2 TABLET: 8.6; 5 TABLET ORAL at 09:17

## 2017-02-05 RX ADMIN — ONDANSETRON 4 MG: 4 TABLET, ORALLY DISINTEGRATING ORAL at 11:05

## 2017-02-05 RX ADMIN — ACETAMINOPHEN 975 MG: 325 TABLET, FILM COATED ORAL at 06:53

## 2017-02-05 RX ADMIN — CYCLOBENZAPRINE HYDROCHLORIDE 5 MG: 5 TABLET, FILM COATED ORAL at 14:45

## 2017-02-05 RX ADMIN — ESCITALOPRAM 20 MG: 20 TABLET, FILM COATED ORAL at 09:17

## 2017-02-05 RX ADMIN — ATORVASTATIN CALCIUM 40 MG: 40 TABLET, FILM COATED ORAL at 21:54

## 2017-02-05 RX ADMIN — ENOXAPARIN SODIUM 40 MG: 40 INJECTION SUBCUTANEOUS at 18:15

## 2017-02-05 NOTE — PROGRESS NOTES
[facility]    Hospitalist Progress Note    Date of Service (when I saw the patient): 02/05/2017    Assessment and Plan  Anne Rangel is a 71 year old female with hx of anxiety and migraines who was admitted to the Orthopedic service on 2/3/2017 for left BEATRICE revision. Hospitalist service was consulted for bradycardia.    Status post left BEATRICE revision on 2/3/2017  Tolerated procedure with no immediate complications.  mL.  - Post-op cares, pain control, and VTE prophylaxis as per Ortho    2nd degree AV block, mobitz type II  Noted to be bradycardic to 40s post-op, but asymptomatic. Most recent EKG prior to admission (1/27) showed sinus bradycardia (HR 62) with RBBB. EKG (2/4) showed 2nd degree AV block, mobitz type II with 2:1 conduction. TTE (2/6) showed moderately dilated left atrium and borderline dilated ascending aorta, but was otherwise normal with LVEF 60-65%.   - Remains in 2nd degree AV block with 2:1 conduction  - EP to see patient tomorrow for potential pacemaker    Dyslipidemia  Chronic and stable on atorvastatin 40 mg daily    GERD  Chronic and stable on omeprazole    Major recurrent depressive disorder with anxiety  [PTA: bupropion 150 mg daily, escitalopram 20 mg daily, eszopiclone 2 mg qhs, lorazepam 0.5 mg qhs]  - Continues on bupropion, escitalopram, and eszopiclone  - Lorazepam available prn    DVT Prophylaxis: Enoxaparin (Lovenox) SQ  Code Status: Full Code    Disposition: Expected discharge in the next 2 days once evaluate by EP Cardiology.    Leigh Ann Maharaj    Interval History  No events overnight. Remains in 2nd degree AV block with 2:1 conduction. Denies cp/sob, dizziness/lightheadedness.     -Data reviewed today: I reviewed all new labs and imaging results over the last 24 hours. I personally reviewed no images or EKG's today.    Physical Exam  Temp: 97.5  F (36.4  C) Temp src: Oral BP: 115/73 mmHg Pulse: (!) 45 Heart Rate: 47 Resp: 16 SpO2: 93 % O2 Device:  None (Room air)    Filed Vitals:    02/03/17 1230   Weight: 53.978 kg (119 lb)     Vital Signs with Ranges  Temp:  [97.4  F (36.3  C)-98.3  F (36.8  C)] 97.5  F (36.4  C)  Pulse:  [44-47] 45  Heart Rate:  [44-48] 47  Resp:  [16] 16  BP: (100-115)/(51-73) 115/73 mmHg  SpO2:  [92 %-96 %] 93 %  I/O last 3 completed shifts:  In: 440 [P.O.:440]  Out: 900 [Urine:900]    Constitutional: Appears comfortable, NAD  Respiratory: Breathing non-labored.   Neuro: Alert and appropriate, MUSTAFA  Psych: Normal affect      Medications       eszopiclone  2 mg Oral At Bedtime     sodium chloride (PF)  3 mL Intracatheter Q8H     senna-docusate  1-2 tablet Oral BID     enoxaparin  40 mg Subcutaneous Q24H     acetaminophen  975 mg Oral Q8H     atorvastatin  40 mg Oral At Bedtime     buPROPion (WELLBUTRIN XL) 24 hr tablet 150 mg  150 mg Oral Daily     escitalopram (LEXAPRO) tablet 20 mg  20 mg Oral Daily     omeprazole  20 mg Oral QAM AC       Data    Recent Labs  Lab 02/05/17  0620 02/04/17  0640 02/03/17  1238   WBC 4.9  --   --    HGB 10.6* 11.2* 13.7   MCV 97  --   --    *  --  195     --   --    POTASSIUM 4.3  --   --    CHLORIDE 110*  --   --    CO2 28  --   --    BUN 15  --   --    CR 1.26*  --  1.25*   ANIONGAP 4  --   --    SUE 7.9*  --   --    GLC 86 131*  --        No results found for this or any previous visit (from the past 24 hour(s)).

## 2017-02-05 NOTE — PROGRESS NOTES
[facility]    Cardiology Progress Note    Date of Service (when I saw the patient): 02/05/2017    Assessment and Plan  Mrs. Anne Rangel is a very nice 71-year-old woman with a history of prior left hip arthroplasty who underwent surgical revision on 02/03/2017 and post operatively was found to have 2:1 AV block. Her baseline ECG demonstrated RBBB. Her HRs have been in the mid-40s, and she has been asymptomatic including while walking with PT. TSH was normal, she has had no chest pain or ischemic ECG changes. An echocardiogram demonstrated preserved LV systolic function with an ejection fraction of 60-65% and no evidence of regional wall motion abnormality and no significant valvular heart disease.     24 hour events;  Remains bradycardic. There is ongoing 2:1 conduction on the ECG and tele.  Tele was disconnected during the patient's PT and I am unable to assess if conduction changed with exercise. I've asked the nurses to print strips this afternoon while the patient is working with PT for our review.     Plan:  1. Second degree heart block, possible Mobitz II  Her pain control is improved, and she is >36 hours post op. If the AV block were due to mediations or vagal tone I expect it would have resolved by now. The persistent block and baseline right bundle suggest this is Mobitz II and she would benefit from placement of a dual chamber PPM. However, we will review the strips during PT to see if she can conduct 1:1 with exercise, and make a final determination about the need for device therapy tomorrow morning as her pain control continues to improve.   -Keep NPO at MN for possible device tomorrow  -Heart rhythm to see in AM      Suha Barrios      Physical Exam  Temp: 97.5  F (36.4  C) Temp src: Oral BP: 115/73 mmHg Pulse: (!) 45 Heart Rate: 47 Resp: 16 SpO2: 93 % O2 Device: None (Room air)    Filed Vitals:    02/03/17 1230   Weight: 53.978 kg (119 lb)     Vital Signs with Ranges  Temp:   [97.4  F (36.3  C)-98.3  F (36.8  C)] 97.5  F (36.4  C)  Pulse:  [44-47] 45  Heart Rate:  [44-48] 47  Resp:  [16] 16  BP: (100-115)/(51-73) 115/73 mmHg  SpO2:  [92 %-96 %] 93 %  I/O last 3 completed shifts:  In: 440 [P.O.:440]  Out: 900 [Urine:900]    PHYSICAL EXAMINATION:    GENERAL:  Well-appearing older woman, appears mildly uncomfortable.    NECK:  There are intermittent lin A waves.    CARDIAC:  Bradycardic, regular with no significant murmurs.    LUNGS:  On anterior auscultation are clear throughout.    ABDOMEN:  Soft and nontender.    EXTREMITIES:  The left extremity is fully wrapped.  The right extremity is warm.    NEUROLOGIC:  Alert and oriented x3.    PSYCHIATRIC:  Appropriate demeanor and affect.     Medications       eszopiclone  2 mg Oral At Bedtime     sodium chloride (PF)  3 mL Intracatheter Q8H     senna-docusate  1-2 tablet Oral BID     enoxaparin  40 mg Subcutaneous Q24H     acetaminophen  975 mg Oral Q8H     atorvastatin  40 mg Oral At Bedtime     buPROPion (WELLBUTRIN XL) 24 hr tablet 150 mg  150 mg Oral Daily     escitalopram (LEXAPRO) tablet 20 mg  20 mg Oral Daily     omeprazole  20 mg Oral QAM AC       Data    Recent Labs  Lab 02/05/17  0620 02/04/17  0640 02/03/17  1238   WBC 4.9  --   --    HGB 10.6* 11.2* 13.7   MCV 97  --   --    *  --  195     --   --    POTASSIUM 4.3  --   --    CHLORIDE 110*  --   --    CO2 28  --   --    BUN 15  --   --    CR 1.26*  --  1.25*   ANIONGAP 4  --   --    SUE 7.9*  --   --    GLC 86 131*  --        No results found for this or any previous visit (from the past 24 hour(s)).

## 2017-02-05 NOTE — PLAN OF CARE
Problem: Goal Outcome Summary  Goal: Goal Outcome Summary  Outcome: No Change  Pt A/O x4. Up Ax1 GB and walker to BSC.  VSS, bradycardia. On tele. Cardiac consult. CMS intact. Drsg C/D/I. Denies nausea. Pain controlled with oxyCODONE. Voiding per BSC. Progressing per POC. Will cont to monitor.

## 2017-02-05 NOTE — PROGRESS NOTES
"[facility]  Orthopaedics/Foot and Ankle Surgery  Daily Post-Op Note    02/05/2017          Assessment and Plan:    Assessment:   Post-operative day #2  Procedure(s) with comments:  ARTHROPLASTY REVISION HIP (Left) - REVISION LEFT TOTAL  HIP ARTHROPLASTY       Plan:   1. WBAT LLE.  2. Cont. PT/OT.  3. Continued nausea with oxycodone.  Will change to Norco and add tramadol to pain regimen to help potentially limit need for narcotics.  4. Appreciate hospitalist co-management.  Remains in 2nd AV block but asymptomatic.  EP evaluation tomorrow.  5. Lovenox, SCDs for DVT prophy.  6. D/c home pending EP evaluation tomorrow.            Interval History:   No acute events overnight.  HR remains stable in 2nd degree block.  Continued nausea with oxycodone but otherwise no complaints.              Physical Exam:   Blood pressure 119/47, pulse 46, temperature 97.6  F (36.4  C), temperature source Oral, resp. rate 16, height 1.575 m (5' 2\"), weight 53.978 kg (119 lb), SpO2 97 %.  I/O last 3 completed shifts:  In: 1040 [P.O.:1040]  Out: 800 [Urine:800]    L hip dressings c/d/i.  AT, GSC, EHL intact.  SILT sp/dp/plantar nn.  Toes wwp w/ brisk cap refill.  No focal calf swelling or tenderness.           Data:   All laboratory data related to this surgery reviewed.    Recent Labs   Lab Test  02/05/17   0620  02/04/17   0640  02/03/17   1238  01/18/17   1635  12/20/16   0810   HGB  10.6*  11.2*  13.7  13.4  13.3     No lab results found.   Recent Labs   Lab Test  02/05/17   0620   WBC  4.9   PLT  141*   POTASSIUM  4.3   CR  1.26*       "

## 2017-02-05 NOTE — PLAN OF CARE
Problem: Goal Outcome Summary  Goal: Goal Outcome Summary  OT: Orders received, chart reviewed and noted that pt is in bradycardia and is undergoing cardiac evaluation and possible pacer placement.  Chart states hold mobility until cleared by cardiology.  Will hold OT today and determine when appropriate to evaluate.

## 2017-02-05 NOTE — PLAN OF CARE
Problem: Goal Outcome Summary  Goal: Goal Outcome Summary  Outcome: Improving  Pt is A & O x 4. Lungs sound clear, bowel sounds active, cms intact. Received tramadol and flexeril for pain. Voiding adequately. SL. Up with 1 and walker. Tele was 2nd degree AVB, type 1, cardiology following. Will continue to monitor.

## 2017-02-06 ENCOUNTER — APPOINTMENT (OUTPATIENT)
Dept: CARDIOLOGY | Facility: CLINIC | Age: 72
DRG: 468 | End: 2017-02-06
Attending: INTERNAL MEDICINE
Payer: MEDICARE

## 2017-02-06 LAB
COPATH REPORT: NORMAL
CREAT SERPL-MCNC: 1 MG/DL (ref 0.52–1.04)
GFR SERPL CREATININE-BSD FRML MDRD: 55 ML/MIN/1.7M2
PLATELET # BLD AUTO: 132 10E9/L (ref 150–450)

## 2017-02-06 PROCEDURE — 99152 MOD SED SAME PHYS/QHP 5/>YRS: CPT

## 2017-02-06 PROCEDURE — 33208 INSRT HEART PM ATRIAL & VENT: CPT | Mod: KX | Performed by: INTERNAL MEDICINE

## 2017-02-06 PROCEDURE — 25000125 ZZHC RX 250: Performed by: INTERNAL MEDICINE

## 2017-02-06 PROCEDURE — 02H63JZ INSERTION OF PACEMAKER LEAD INTO RIGHT ATRIUM, PERCUTANEOUS APPROACH: ICD-10-PCS | Performed by: INTERNAL MEDICINE

## 2017-02-06 PROCEDURE — 99152 MOD SED SAME PHYS/QHP 5/>YRS: CPT | Performed by: INTERNAL MEDICINE

## 2017-02-06 PROCEDURE — C1892 INTRO/SHEATH,FIXED,PEEL-AWAY: HCPCS

## 2017-02-06 PROCEDURE — 33208 INSRT HEART PM ATRIAL & VENT: CPT

## 2017-02-06 PROCEDURE — 0JH606Z INSERTION OF PACEMAKER, DUAL CHAMBER INTO CHEST SUBCUTANEOUS TISSUE AND FASCIA, OPEN APPROACH: ICD-10-PCS | Performed by: INTERNAL MEDICINE

## 2017-02-06 PROCEDURE — 85049 AUTOMATED PLATELET COUNT: CPT | Performed by: ORTHOPAEDIC SURGERY

## 2017-02-06 PROCEDURE — 82565 ASSAY OF CREATININE: CPT | Performed by: ORTHOPAEDIC SURGERY

## 2017-02-06 PROCEDURE — A9270 NON-COVERED ITEM OR SERVICE: HCPCS | Mod: GY | Performed by: ORTHOPAEDIC SURGERY

## 2017-02-06 PROCEDURE — 02HK3JZ INSERTION OF PACEMAKER LEAD INTO RIGHT VENTRICLE, PERCUTANEOUS APPROACH: ICD-10-PCS | Performed by: INTERNAL MEDICINE

## 2017-02-06 PROCEDURE — 99153 MOD SED SAME PHYS/QHP EA: CPT | Performed by: INTERNAL MEDICINE

## 2017-02-06 PROCEDURE — 21000001 ZZH R&B HEART CARE

## 2017-02-06 PROCEDURE — 99222 1ST HOSP IP/OBS MODERATE 55: CPT | Mod: 25 | Performed by: INTERNAL MEDICINE

## 2017-02-06 PROCEDURE — 25000132 ZZH RX MED GY IP 250 OP 250 PS 637: Mod: GY | Performed by: ORTHOPAEDIC SURGERY

## 2017-02-06 PROCEDURE — 99153 MOD SED SAME PHYS/QHP EA: CPT

## 2017-02-06 PROCEDURE — A9270 NON-COVERED ITEM OR SERVICE: HCPCS | Mod: GY | Performed by: PHYSICIAN ASSISTANT

## 2017-02-06 PROCEDURE — 27210795 ZZH PAD DEFIB QUICK CR4

## 2017-02-06 PROCEDURE — 25000128 H RX IP 250 OP 636: Performed by: INTERNAL MEDICINE

## 2017-02-06 PROCEDURE — 25000132 ZZH RX MED GY IP 250 OP 250 PS 637: Mod: GY | Performed by: PHYSICIAN ASSISTANT

## 2017-02-06 PROCEDURE — C1898 LEAD, PMKR, OTHER THAN TRANS: HCPCS

## 2017-02-06 PROCEDURE — 99231 SBSQ HOSP IP/OBS SF/LOW 25: CPT | Performed by: INTERNAL MEDICINE

## 2017-02-06 PROCEDURE — 36415 COLL VENOUS BLD VENIPUNCTURE: CPT | Performed by: ORTHOPAEDIC SURGERY

## 2017-02-06 PROCEDURE — 27210784 ZZH KIT PACEMAKER CR8

## 2017-02-06 PROCEDURE — C1785 PMKR, DUAL, RATE-RESP: HCPCS

## 2017-02-06 RX ORDER — MORPHINE SULFATE 2 MG/ML
1-2 INJECTION, SOLUTION INTRAMUSCULAR; INTRAVENOUS EVERY 5 MIN PRN
Status: DISCONTINUED | OUTPATIENT
Start: 2017-02-06 | End: 2017-02-06 | Stop reason: HOSPADM

## 2017-02-06 RX ORDER — IBUTILIDE FUMARATE 1 MG/10ML
1 INJECTION, SOLUTION INTRAVENOUS
Status: DISCONTINUED | OUTPATIENT
Start: 2017-02-06 | End: 2017-02-06 | Stop reason: HOSPADM

## 2017-02-06 RX ORDER — CEFAZOLIN SODIUM 2 G/100ML
2 INJECTION, SOLUTION INTRAVENOUS
Status: COMPLETED | OUTPATIENT
Start: 2017-02-06 | End: 2017-02-06

## 2017-02-06 RX ORDER — LIDOCAINE HYDROCHLORIDE 10 MG/ML
10-30 INJECTION, SOLUTION EPIDURAL; INFILTRATION; INTRACAUDAL; PERINEURAL
Status: DISCONTINUED | OUTPATIENT
Start: 2017-02-06 | End: 2017-02-06 | Stop reason: HOSPADM

## 2017-02-06 RX ORDER — LIDOCAINE HYDROCHLORIDE AND EPINEPHRINE 10; 10 MG/ML; UG/ML
10-30 INJECTION, SOLUTION INFILTRATION; PERINEURAL
Status: DISCONTINUED | OUTPATIENT
Start: 2017-02-06 | End: 2017-02-06 | Stop reason: HOSPADM

## 2017-02-06 RX ORDER — SODIUM CHLORIDE 450 MG/100ML
INJECTION, SOLUTION INTRAVENOUS CONTINUOUS
Status: DISCONTINUED | OUTPATIENT
Start: 2017-02-06 | End: 2017-02-06 | Stop reason: HOSPADM

## 2017-02-06 RX ORDER — NALOXONE HYDROCHLORIDE 0.4 MG/ML
0.4 INJECTION, SOLUTION INTRAMUSCULAR; INTRAVENOUS; SUBCUTANEOUS EVERY 5 MIN PRN
Status: DISCONTINUED | OUTPATIENT
Start: 2017-02-06 | End: 2017-02-06 | Stop reason: HOSPADM

## 2017-02-06 RX ORDER — LORAZEPAM 2 MG/ML
.5-2 INJECTION INTRAMUSCULAR EVERY 10 MIN PRN
Status: DISCONTINUED | OUTPATIENT
Start: 2017-02-06 | End: 2017-02-06 | Stop reason: HOSPADM

## 2017-02-06 RX ORDER — PROMETHAZINE HYDROCHLORIDE 25 MG/ML
6.25-25 INJECTION, SOLUTION INTRAMUSCULAR; INTRAVENOUS EVERY 4 HOURS PRN
Status: DISCONTINUED | OUTPATIENT
Start: 2017-02-06 | End: 2017-02-06 | Stop reason: HOSPADM

## 2017-02-06 RX ORDER — DIPHENHYDRAMINE HYDROCHLORIDE 50 MG/ML
25-50 INJECTION INTRAMUSCULAR; INTRAVENOUS
Status: DISCONTINUED | OUTPATIENT
Start: 2017-02-06 | End: 2017-02-06 | Stop reason: HOSPADM

## 2017-02-06 RX ORDER — ONDANSETRON 2 MG/ML
4 INJECTION INTRAMUSCULAR; INTRAVENOUS EVERY 4 HOURS PRN
Status: DISCONTINUED | OUTPATIENT
Start: 2017-02-06 | End: 2017-02-06 | Stop reason: HOSPADM

## 2017-02-06 RX ORDER — IBUTILIDE FUMARATE 1 MG/10ML
0.01 INJECTION, SOLUTION INTRAVENOUS
Status: DISCONTINUED | OUTPATIENT
Start: 2017-02-06 | End: 2017-02-06 | Stop reason: HOSPADM

## 2017-02-06 RX ORDER — KETOROLAC TROMETHAMINE 30 MG/ML
15 INJECTION, SOLUTION INTRAMUSCULAR; INTRAVENOUS
Status: DISCONTINUED | OUTPATIENT
Start: 2017-02-06 | End: 2017-02-06 | Stop reason: HOSPADM

## 2017-02-06 RX ORDER — HEPARIN SODIUM 1000 [USP'U]/ML
1000-10000 INJECTION, SOLUTION INTRAVENOUS; SUBCUTANEOUS EVERY 5 MIN PRN
Status: DISCONTINUED | OUTPATIENT
Start: 2017-02-06 | End: 2017-02-06 | Stop reason: HOSPADM

## 2017-02-06 RX ORDER — PROTAMINE SULFATE 10 MG/ML
5-40 INJECTION, SOLUTION INTRAVENOUS EVERY 10 MIN PRN
Status: DISCONTINUED | OUTPATIENT
Start: 2017-02-06 | End: 2017-02-06 | Stop reason: HOSPADM

## 2017-02-06 RX ORDER — FLUMAZENIL 0.1 MG/ML
0.2 INJECTION, SOLUTION INTRAVENOUS
Status: DISCONTINUED | OUTPATIENT
Start: 2017-02-06 | End: 2017-02-06 | Stop reason: HOSPADM

## 2017-02-06 RX ORDER — BUPIVACAINE HYDROCHLORIDE 2.5 MG/ML
10-30 INJECTION, SOLUTION EPIDURAL; INFILTRATION; INTRACAUDAL
Status: DISCONTINUED | OUTPATIENT
Start: 2017-02-06 | End: 2017-02-06 | Stop reason: HOSPADM

## 2017-02-06 RX ORDER — DOBUTAMINE HYDROCHLORIDE 200 MG/100ML
5-40 INJECTION INTRAVENOUS CONTINUOUS PRN
Status: DISCONTINUED | OUTPATIENT
Start: 2017-02-06 | End: 2017-02-06 | Stop reason: HOSPADM

## 2017-02-06 RX ORDER — LIDOCAINE HYDROCHLORIDE 10 MG/ML
10-30 INJECTION, SOLUTION EPIDURAL; INFILTRATION; INTRACAUDAL; PERINEURAL
Status: COMPLETED | OUTPATIENT
Start: 2017-02-06 | End: 2017-02-06

## 2017-02-06 RX ORDER — ADENOSINE 3 MG/ML
6-12 INJECTION, SOLUTION INTRAVENOUS EVERY 5 MIN PRN
Status: DISCONTINUED | OUTPATIENT
Start: 2017-02-06 | End: 2017-02-06 | Stop reason: HOSPADM

## 2017-02-06 RX ORDER — LIDOCAINE 40 MG/G
CREAM TOPICAL
Status: CANCELLED | OUTPATIENT
Start: 2017-02-06

## 2017-02-06 RX ORDER — BUPIVACAINE HYDROCHLORIDE 2.5 MG/ML
10-30 INJECTION, SOLUTION EPIDURAL; INFILTRATION; INTRACAUDAL
Status: COMPLETED | OUTPATIENT
Start: 2017-02-06 | End: 2017-02-06

## 2017-02-06 RX ORDER — FUROSEMIDE 10 MG/ML
20-100 INJECTION INTRAMUSCULAR; INTRAVENOUS
Status: DISCONTINUED | OUTPATIENT
Start: 2017-02-06 | End: 2017-02-06 | Stop reason: HOSPADM

## 2017-02-06 RX ORDER — PROTAMINE SULFATE 10 MG/ML
1-5 INJECTION, SOLUTION INTRAVENOUS
Status: DISCONTINUED | OUTPATIENT
Start: 2017-02-06 | End: 2017-02-06 | Stop reason: HOSPADM

## 2017-02-06 RX ORDER — FENTANYL CITRATE 50 UG/ML
25-50 INJECTION, SOLUTION INTRAMUSCULAR; INTRAVENOUS
Status: DISCONTINUED | OUTPATIENT
Start: 2017-02-06 | End: 2017-02-06 | Stop reason: HOSPADM

## 2017-02-06 RX ORDER — LIDOCAINE 40 MG/G
CREAM TOPICAL
Status: DISCONTINUED | OUTPATIENT
Start: 2017-02-06 | End: 2017-02-07 | Stop reason: HOSPADM

## 2017-02-06 RX ORDER — NALOXONE HYDROCHLORIDE 0.4 MG/ML
.1-.4 INJECTION, SOLUTION INTRAMUSCULAR; INTRAVENOUS; SUBCUTANEOUS
Status: DISCONTINUED | OUTPATIENT
Start: 2017-02-06 | End: 2017-02-07 | Stop reason: HOSPADM

## 2017-02-06 RX ADMIN — TRAMADOL HYDROCHLORIDE 100 MG: 50 TABLET, COATED ORAL at 18:25

## 2017-02-06 RX ADMIN — OMEPRAZOLE 20 MG: 20 CAPSULE, DELAYED RELEASE ORAL at 08:24

## 2017-02-06 RX ADMIN — ESCITALOPRAM 20 MG: 20 TABLET, FILM COATED ORAL at 08:24

## 2017-02-06 RX ADMIN — TRAMADOL HYDROCHLORIDE 50 MG: 50 TABLET, COATED ORAL at 09:46

## 2017-02-06 RX ADMIN — CEFAZOLIN SODIUM 2 G: 2 INJECTION, SOLUTION INTRAVENOUS at 11:42

## 2017-02-06 RX ADMIN — ESZOPICLONE 2 MG: 1 TABLET, FILM COATED ORAL at 22:31

## 2017-02-06 RX ADMIN — LIDOCAINE HYDROCHLORIDE 10 ML: 10 INJECTION, SOLUTION EPIDURAL; INFILTRATION; INTRACAUDAL; PERINEURAL at 11:43

## 2017-02-06 RX ADMIN — CYCLOBENZAPRINE HYDROCHLORIDE 5 MG: 5 TABLET, FILM COATED ORAL at 22:31

## 2017-02-06 RX ADMIN — BUPROPION HYDROCHLORIDE 150 MG: 150 TABLET, FILM COATED, EXTENDED RELEASE ORAL at 08:24

## 2017-02-06 RX ADMIN — FENTANYL CITRATE 50 MCG: 50 INJECTION, SOLUTION INTRAMUSCULAR; INTRAVENOUS at 11:42

## 2017-02-06 RX ADMIN — SENNOSIDES AND DOCUSATE SODIUM 2 TABLET: 8.6; 5 TABLET ORAL at 21:53

## 2017-02-06 RX ADMIN — ACETAMINOPHEN 650 MG: 325 TABLET, FILM COATED ORAL at 22:29

## 2017-02-06 RX ADMIN — ATORVASTATIN CALCIUM 40 MG: 40 TABLET, FILM COATED ORAL at 21:53

## 2017-02-06 RX ADMIN — MIDAZOLAM HYDROCHLORIDE 1 MG: 1 INJECTION, SOLUTION INTRAMUSCULAR; INTRAVENOUS at 11:42

## 2017-02-06 RX ADMIN — BUPIVACAINE HYDROCHLORIDE 10 ML: 2.5 INJECTION, SOLUTION EPIDURAL; INFILTRATION; INTRACAUDAL at 11:44

## 2017-02-06 RX ADMIN — SENNOSIDES AND DOCUSATE SODIUM 1 TABLET: 8.6; 5 TABLET ORAL at 08:24

## 2017-02-06 ASSESSMENT — PAIN DESCRIPTION - DESCRIPTORS: DESCRIPTORS: ACHING

## 2017-02-06 NOTE — PLAN OF CARE
Problem: Goal Outcome Summary  Goal: Goal Outcome Summary  Surgeon Discharge Plan: Home Monday.    Current Functional Status: PT: Needs Janeth and vc for exercise, bed mobility, transfers, and gait with FWW, WBAT L, 100', followed by w/c.    Barriers to Plan/Home: Postop pain, edema, weakness, stairs, and current Cardiology CORRALES.

## 2017-02-06 NOTE — PLAN OF CARE
Problem: Goal Outcome Summary  Goal: Goal Outcome Summary  Outcome: Improving  Pt A/O x4. Up Ax1 GB and walker. VSS-Tele Bradycardia with 2degree AVB. Pain controlled with Norco and flexeril. Denied Nausea. Drsg C/D/I. CMS intact. Voiding adequately per BR, progressing per POC. Will cont to monitor.

## 2017-02-06 NOTE — CONSULTS
[facility]    Cardiac Electrophysiology Consultation     Date of Admission:  2/3/2017  Date of Consult (When I saw the patient): 02/06/2017    Assessment and Plan  Anne Rangel is a 71 year old female who was admitted on 2/3/2017. I was asked to see the patient for 2:1 AV block with RBBB. S/p left BEATRICE revision 3 days ago and noted to be in this rhythm since. No AVN meds. Pre-op ECG shows nsr with normal pr interval and RBBB. Normal LVEF. does note intermittent sob and lightheadedness at home. Suspect AV conduction abnl from intrinsic conduction disease and likely was reason for intermittent sob and near-syncope. Likely would get worse over time and may result in fall re-injure her hip. Recommending a ppm today. Risks and benefits discussed including but not limited to vascular injury and infection..    Eliud Shaffer    Code Status   Full Code    Primary Care Physician  Nevaeh Wood    History is obtained from the patient    Past Medical History  I have reviewed this patient's medical history and updated it with pertinent information if needed.   Past Medical History   Diagnosis Date     Osteoarthrosis, unspecified whether generalized or localized, lower leg 11/00     Osteoarthrosis, unspecified whether generalized or localized, hand 11/00     Myalgia and myositis, unspecified 11/00     Irritable bowel syndrome 11/00     Chronic fatigue syndrome 11/00     Pure hypercholesterolemia 11/00     Urticaria, unspecified 00     Neutropenia 00     iamAFTERCARE FOLLOWING JOINT REPLACEMENT 7/23/2007     Depression with anxiety      Fatigue      Uncomplicated asthma      RBBB      Noninfectious ileitis      Decreased libido      HA (headache)      Gastroesophageal reflux disease      Carpal tunnel syndrome      Anemia      Renal disease      mld       Past Surgical History  I have reviewed this patient's surgical history and updated it with pertinent information if needed.  Past Surgical History    Procedure Laterality Date     C nonspecific procedure       s/p Rt. Carpal tunnel release     C nonspecific procedure       s/p bilat Tubal ligation     Gi surgery       hemorrhoid repair     Orthopedic surgery       right sabine-arthroplasty, trigger finger repair, left knee arthroscopy and replacement, bilateral hip replacement,     Gyn surgery       tubal ligation       Prior to Admission Medications  Prior to Admission Medications   Prescriptions Last Dose Informant Patient Reported? Taking?   Ascorbic Acid (VITAMIN C PO) 2/3/2017 at 0830 Self Yes Yes   Sig: Take 500 mg by mouth daily   BuPROPion HCl (WELLBUTRIN XL PO) 2/3/2017 at 0830 Self Yes Yes   Sig: Take 150 mg by mouth daily   Celecoxib (CELEBREX PO) 1/27/2017 at AM Self Yes Yes   Sig: Take 200 mg by mouth daily   Escitalopram Oxalate (LEXAPRO PO) 2/3/2017 at 0830 Self Yes Yes   Sig: Take 20 mg by mouth daily   LORAZEPAM PO 2/2/2017 at 2200 Self Yes Yes   Sig: Take 0.5 mg by mouth At Bedtime (0.5 x 1 mg tablet = 0.5 mg dose)   Probiotic Product (PROBIOTIC & ACIDOPHILUS EX ST PO) 2/3/2017 at 0830 Self Yes Yes   Sig: Take 1 capsule by mouth daily   SUMAtriptan Succinate (IMITREX PO) More than a Month at PRN Self Yes Yes   Sig: Take 100 mg by mouth daily as needed for migraine   VITAMIN D, CHOLECALCIFEROL, PO 2/3/2017 at 0830 Self Yes Yes   Sig: Take 5,000 Units by mouth three times a week (Monday, Wednesday and Friday)   albuterol (PROAIR HFA/PROVENTIL HFA/VENTOLIN HFA) 108 (90 BASE) MCG/ACT Inhaler More than a Month at PRN Self Yes Yes   Sig: Inhale 2 puffs into the lungs 4 times daily as needed for shortness of breath / dyspnea or wheezing   atorvastatin (LIPITOR) 40 MG tablet 2/2/2017 at 2200 Self Yes Yes   Sig: Take 40 mg by mouth At Bedtime    eszopiclone (LUNESTA) 2 MG tablet 2/2/2017 at 2200 Self Yes Yes   Sig: Take 2 mg by mouth At Bedtime    omeprazole 20 MG tablet 2/1/2017 at AM Self Yes Yes   Sig: Take 1 tablet (20 mg) by mouth daily Take 30-60  minutes before a meal.   polyethylene glycol (MIRALAX/GLYCOLAX) powder More than a month at PRN Self Yes Yes   Sig: Take 17 g by mouth daily as needed for constipation   traMADol (ULTRAM) 50 MG tablet More than a Month at PRN Self Yes Yes   Sig: Take 1-2 tablets ( mg) by mouth every 6 hours as needed for moderate pain      Facility-Administered Medications: None     Allergies  Allergies   Allergen Reactions     Dilaudid [Hydromorphone] Nausea and Vomiting     Doxepin Unknown     Meperidine Nausea and Vomiting     Demerol       Social History  I have reviewed this patient's social history and updated it with pertinent information if needed. Anne Rangel  reports that she has never smoked. She does not have any smokeless tobacco history on file. She reports that she drinks alcohol. She reports that she does not use illicit drugs.    Family History  I have reviewed this patient's family history and updated it with pertinent information if needed.   Family History   Problem Relation Age of Onset     C.A.D. Paternal Grandmother      angina     DIABETES Maternal Grandfather      adult onset     Hypertension Mother      Allergies Father      Alzheimer Disease Mother      Memory issues- unsure if alzheimers     Arthritis Mother      osteoarthritis     CANCER Maternal Grandfather      of stomach or esophagus-heavy smoker     CEREBROVASCULAR DISEASE Maternal Grandmother      series of TIA's     Depression Daughter      Depression Mother      GASTROINTESTINAL DISEASE Mother      acid reflux     Neurologic Disorder Father      migraines     Neurologic Disorder Sister      migraines     Respiratory Father      chronic brochitis- born during a flu epidemic in 1918       Review of Systems  Comprehensive review of systems was performed with pertinent positives and negatives listed in assessment and plan section.    Physical Exam  Temp: 98  F (36.7  C) Temp src: Oral BP: 134/66 mmHg Pulse: (!) 44 Heart Rate: 49 Resp: 16  SpO2: 98 % O2 Device: None (Room air)    Vital Signs with Ranges  Temp:  [97  F (36.1  C)-98.3  F (36.8  C)] 98  F (36.7  C)  Pulse:  [42-49] 44  Heart Rate:  [46-49] 49  Resp:  [16-18] 16  BP: (110-134)/(47-71) 134/66 mmHg  SpO2:  [95 %-98 %] 98 %  119 lbs 0 oz    Constitutional: awake, alert, cooperative, no apparent distress, and appears stated age  Eyes: Lids and lashes normal, pupils equal, round and reactive to light, extra ocular muscles intact, sclera clear, conjunctiva normal  ENT: Normocephalic, without obvious abnormality, atraumatic, sinuses nontender on palpation, external ears without lesions, oral pharynx with moist mucous membranes, tonsils without erythema or exudates, gums normal and good dentition.  Hematologic / Lymphatic: no cervical lymphadenopathy  Respiratory: No increased work of breathing, good air exchange, clear to auscultation bilaterally, no crackles or wheezing  Cardiovascular: Normal apical impulse, regular rate and rhythm, normal S1 and S2, no S3 or S4, and no murmur noted  GI: No scars, normal bowel sounds, soft, non-distended, non-tender, no masses palpated, no hepatosplenomegally  Skin: no bruising or bleeding  Musculoskeletal: There is no redness, warmth, or swelling of the joints.  Full range of motion noted.  Motor strength is 5 out of 5 all extremities bilaterally.  Tone is normal.  Neurologic: Awake, alert, oriented to name, place and time.  Cranial nerves II-XII are grossly intact.  Motor is 5 out of 5 bilaterally.  Cerebellar finger to nose, heel to shin intact.  Sensory is intact.  Babinski down going, Romberg negative, and gait is normal.  Neuropsychiatric: General: normal, calm and normal eye contact    Data  I personally reviewed all recent ECGs and images.  Results for orders placed or performed during the hospital encounter of 02/03/17 (from the past 24 hour(s))   Platelet count   Result Value Ref Range    Platelet Count 132 (L) 150 - 450 10e9/L   Creatinine   Result  Value Ref Range    Creatinine 1.00 0.52 - 1.04 mg/dL    GFR Estimate 55 (L) >60 mL/min/1.7m2    GFR Estimate If Black 66 >60 mL/min/1.7m2

## 2017-02-06 NOTE — PLAN OF CARE
Problem: Goal Outcome Summary  Goal: Goal Outcome Summary  Outcome: Improving  A&O, HR sarika, Tele SB with BBB and 2nd degree AV block.  Cms intact, drsg CDI, taking Norco for pain control, denies nausea.  Pt requested Ativan at bedtime for anxiety, NPO at midnight for possibly procedure tomorrow.  Up with assist x 1 and walker, voiding, will continue to montior.

## 2017-02-06 NOTE — PLAN OF CARE
Problem: Goal Outcome Summary  Goal: Goal Outcome Summary  Physical Therapy: Patient leaving floor for pacemaker placement. Will cancel AM therapy session.

## 2017-02-06 NOTE — PROGRESS NOTES
[facility]    Hospitalist Progress Note    Date of Service (when I saw the patient): 02/06/2017    Assessment and Plan  Anne Rangel is a 71 year old female with hx of anxiety and migraines who was admitted to the Orthopedic service on 2/3/2017 for left BEATRICE revision. Hospitalist service was consulted for bradycardia.    Status post left BEATRICE revision on 2/3/2017  Tolerated procedure with no immediate complications.  mL.  - Post-op cares, pain control, and VTE prophylaxis as per Ortho    2nd degree AV block, mobitz type II  Noted to be bradycardic to 40s post-op, but asymptomatic. Most recent EKG prior to admission (1/27) showed sinus bradycardia (HR 62) with RBBB. EKG (2/4) showed 2nd degree AV block, mobitz type II with 2:1 conduction. TTE (2/6) showed moderately dilated left atrium and borderline dilated ascending aorta, but was otherwise normal with LVEF 60-65%.   - Pacemaker placement today    Dyslipidemia  Chronic and stable on atorvastatin 40 mg daily    GERD  Chronic and stable on omeprazole    Major recurrent depressive disorder with anxiety  [PTA: bupropion 150 mg daily, escitalopram 20 mg daily, eszopiclone 2 mg qhs, lorazepam 0.5 mg qhs]  - Continues on bupropion, escitalopram, and eszopiclone  - Lorazepam available prn    DVT Prophylaxis: Enoxaparin (Lovenox) SQ  Code Status: Full Code    Disposition: Expected discharge in the next 2 days once evaluate by EP Cardiology.    Leigh Ann Maharaj    Interval History  No events overnight. Remains in 2nd degree AV block with 2:1 conduction. Met with EP today - plan for PPM placement    -Data reviewed today: I reviewed all new labs and imaging results over the last 24 hours. I personally reviewed no images or EKG's today.    Physical Exam  Temp: 98  F (36.7  C) Temp src: Oral BP: 134/66 mmHg Pulse: (!) 44 Heart Rate: 49 Resp: 16 SpO2: 98 % O2 Device: None (Room air)    Filed Vitals:    02/03/17 1230   Weight: 53.978 kg (119 lb)      Vital Signs with Ranges  Temp:  [97  F (36.1  C)-98.3  F (36.8  C)] 98  F (36.7  C)  Pulse:  [42-49] 44  Heart Rate:  [46-49] 49  Resp:  [16-18] 16  BP: (110-134)/(47-71) 134/66 mmHg  SpO2:  [95 %-98 %] 98 %  I/O last 3 completed shifts:  In: 1250 [P.O.:1250]  Out: 350 [Urine:350]    Constitutional: Appears comfortable, NAD  Respiratory: Breathing non-labored.   Neuro: Alert and appropriate, MUSTAFA  Psych: Normal affect      Medications    NaCl         ceFAZolin  2 g Intravenous Pre-Op/Pre-procedure x 1 dose     eszopiclone  2 mg Oral At Bedtime     sodium chloride (PF)  3 mL Intracatheter Q8H     senna-docusate  1-2 tablet Oral BID     enoxaparin  40 mg Subcutaneous Q24H     atorvastatin  40 mg Oral At Bedtime     buPROPion (WELLBUTRIN XL) 24 hr tablet 150 mg  150 mg Oral Daily     escitalopram (LEXAPRO) tablet 20 mg  20 mg Oral Daily     omeprazole  20 mg Oral QAM AC       Data    Recent Labs  Lab 02/06/17  0713 02/05/17  0620 02/04/17  0640 02/03/17  1238   WBC  --  4.9  --   --    HGB  --  10.6* 11.2* 13.7   MCV  --  97  --   --    * 141*  --  195   NA  --  142  --   --    POTASSIUM  --  4.3  --   --    CHLORIDE  --  110*  --   --    CO2  --  28  --   --    BUN  --  15  --   --    CR 1.00 1.26*  --  1.25*   ANIONGAP  --  4  --   --    SUE  --  7.9*  --   --    GLC  --  86 131*  --        No results found for this or any previous visit (from the past 24 hour(s)).

## 2017-02-06 NOTE — PROVIDER NOTIFICATION
Paged Dr. Monson to clarify order to administer lovenox 40 mg daily.  MD states to HOLD dose this evening.

## 2017-02-06 NOTE — PROGRESS NOTES
"Anne Rangel is a 71 year old female patient.  1. Left hip pain      Past Medical History   Diagnosis Date     Osteoarthrosis, unspecified whether generalized or localized, lower leg 11/00     Osteoarthrosis, unspecified whether generalized or localized, hand 11/00     Myalgia and myositis, unspecified 11/00     Irritable bowel syndrome 11/00     Chronic fatigue syndrome 11/00     Pure hypercholesterolemia 11/00     Urticaria, unspecified 00 Neutropenia 00     iamAFTERCARE FOLLOWING JOINT REPLACEMENT 7/23/2007     Depression with anxiety      Fatigue      Uncomplicated asthma      RBBB      Noninfectious ileitis      Decreased libido      HA (headache)      Gastroesophageal reflux disease      Carpal tunnel syndrome      Anemia      Renal disease      mld     Current Outpatient Prescriptions   Medication Sig Dispense Refill     oxyCODONE (ROXICODONE) 5 MG IR tablet Take 1-2 tablets (5-10 mg) by mouth every 3 hours as needed for moderate to severe pain 50 tablet 0     Allergies   Allergen Reactions     Dilaudid [Hydromorphone] Nausea and Vomiting     Doxepin Unknown     Meperidine Nausea and Vomiting     Demerol     Active Problems:    Left hip pain    Blood pressure 120/90, pulse 80, temperature 98  F (36.7  C), temperature source Oral, resp. rate 17, height 1.575 m (5' 2\"), weight 53.978 kg (119 lb), SpO2 98 %.    Subjective     POD 3 s/p left total hip revision arthroplasty    Denies needing pain medicine last 24 hours since been on bed rest per cardiology.    Cardiology monitoring for 2nd degree AV block with RBBB. Pacemaker placed today    Objective   A&O x 3  Skin: surgical dressing intact, no drainage noted.  MSK: Able to move toes. calfs with no edema or tenderness  Neuro: no focal defects    Assessment & Plan   1. 50% or less weight bearing on LLE, ambulate with assist. No active or passive abduction of left hip  2. Cont. PT/OT when allowed by cardiology. Activity order placed for when cleared to " resume activity. No abduction of left hip  3. Currently ordered Norco and Tramadol for pain control, has not been using since minimal pain with no ambulation  4. Pacemaker placed today, cardiology monitoring  5. Lovenox, SCDs for DVT prophylaxis  6. Aquacel banadage ordered for just prior to discharge    Chetna Connell  2/6/2017

## 2017-02-06 NOTE — PLAN OF CARE
Problem: Goal Outcome Summary  Goal: Goal Outcome Summary  Outcome: No Change  A&OX4. Denies pain and SOB. Pacemaker site looks clean, dry and intact. TELE shows 100% V-paced. New IV placed on the lt arm. Up with 1 person assist with walker, with 50% or less weight bearing on the LLE. Lt hip incision looks CDI. Will continue to monitor/

## 2017-02-06 NOTE — PLAN OF CARE
Problem: Goal Outcome Summary  Goal: Goal Outcome Summary  OT:Pt. s/p pacemaker placement earlier today. Will reschedule OT evaluation to 2/7.

## 2017-02-07 ENCOUNTER — APPOINTMENT (OUTPATIENT)
Dept: GENERAL RADIOLOGY | Facility: CLINIC | Age: 72
DRG: 468 | End: 2017-02-07
Attending: INTERNAL MEDICINE
Payer: MEDICARE

## 2017-02-07 ENCOUNTER — APPOINTMENT (OUTPATIENT)
Dept: PHYSICAL THERAPY | Facility: CLINIC | Age: 72
DRG: 468 | End: 2017-02-07
Attending: ORTHOPAEDIC SURGERY
Payer: MEDICARE

## 2017-02-07 ENCOUNTER — APPOINTMENT (OUTPATIENT)
Dept: OCCUPATIONAL THERAPY | Facility: CLINIC | Age: 72
DRG: 468 | End: 2017-02-07
Attending: ORTHOPAEDIC SURGERY
Payer: MEDICARE

## 2017-02-07 VITALS
TEMPERATURE: 97.7 F | HEART RATE: 81 BPM | SYSTOLIC BLOOD PRESSURE: 123 MMHG | RESPIRATION RATE: 16 BRPM | OXYGEN SATURATION: 92 % | BODY MASS INDEX: 24.14 KG/M2 | HEIGHT: 62 IN | WEIGHT: 131.2 LBS | DIASTOLIC BLOOD PRESSURE: 88 MMHG

## 2017-02-07 PROBLEM — Z96.649 S/P REVISION OF TOTAL HIP: Status: ACTIVE | Noted: 2017-02-07

## 2017-02-07 LAB
BLD PROD TYP BPU: NORMAL
BLD PROD TYP BPU: NORMAL
BLD UNIT ID BPU: 0
BLD UNIT ID BPU: 0
BLOOD PRODUCT CODE: NORMAL
BLOOD PRODUCT CODE: NORMAL
BPU ID: NORMAL
BPU ID: NORMAL
INTERPRETATION ECG - MUSE: NORMAL
TRANSFUSION STATUS PATIENT QL: NORMAL

## 2017-02-07 PROCEDURE — 99231 SBSQ HOSP IP/OBS SF/LOW 25: CPT | Performed by: INTERNAL MEDICINE

## 2017-02-07 PROCEDURE — 25000132 ZZH RX MED GY IP 250 OP 250 PS 637: Mod: GY | Performed by: PHYSICIAN ASSISTANT

## 2017-02-07 PROCEDURE — 40000940 XR CHEST 2 VW

## 2017-02-07 PROCEDURE — A9270 NON-COVERED ITEM OR SERVICE: HCPCS | Mod: GY | Performed by: INTERNAL MEDICINE

## 2017-02-07 PROCEDURE — 97116 GAIT TRAINING THERAPY: CPT | Mod: GP | Performed by: PHYSICAL THERAPIST

## 2017-02-07 PROCEDURE — 97535 SELF CARE MNGMENT TRAINING: CPT | Mod: GO | Performed by: OCCUPATIONAL THERAPIST

## 2017-02-07 PROCEDURE — A9270 NON-COVERED ITEM OR SERVICE: HCPCS | Mod: GY | Performed by: PHYSICIAN ASSISTANT

## 2017-02-07 PROCEDURE — 97166 OT EVAL MOD COMPLEX 45 MIN: CPT | Mod: GO | Performed by: OCCUPATIONAL THERAPIST

## 2017-02-07 PROCEDURE — 25000132 ZZH RX MED GY IP 250 OP 250 PS 637: Mod: GY | Performed by: INTERNAL MEDICINE

## 2017-02-07 PROCEDURE — 97110 THERAPEUTIC EXERCISES: CPT | Mod: GP | Performed by: PHYSICAL THERAPIST

## 2017-02-07 PROCEDURE — 97530 THERAPEUTIC ACTIVITIES: CPT | Mod: GP | Performed by: PHYSICAL THERAPIST

## 2017-02-07 PROCEDURE — 40000193 ZZH STATISTIC PT WARD VISIT: Performed by: PHYSICAL THERAPIST

## 2017-02-07 PROCEDURE — 99232 SBSQ HOSP IP/OBS MODERATE 35: CPT | Performed by: INTERNAL MEDICINE

## 2017-02-07 PROCEDURE — 40000133 ZZH STATISTIC OT WARD VISIT: Performed by: OCCUPATIONAL THERAPIST

## 2017-02-07 PROCEDURE — 97530 THERAPEUTIC ACTIVITIES: CPT | Mod: GO | Performed by: OCCUPATIONAL THERAPIST

## 2017-02-07 RX ORDER — AMOXICILLIN 250 MG
2 CAPSULE ORAL 2 TIMES DAILY
Qty: 100 TABLET | Refills: 1 | Status: SHIPPED | OUTPATIENT
Start: 2017-02-07 | End: 2017-05-18

## 2017-02-07 RX ORDER — AMOXICILLIN 250 MG
2 CAPSULE ORAL 2 TIMES DAILY
Status: DISCONTINUED | OUTPATIENT
Start: 2017-02-07 | End: 2017-02-07 | Stop reason: HOSPADM

## 2017-02-07 RX ORDER — BISACODYL 10 MG
10 SUPPOSITORY, RECTAL RECTAL DAILY PRN
Status: DISCONTINUED | OUTPATIENT
Start: 2017-02-07 | End: 2017-02-07 | Stop reason: HOSPADM

## 2017-02-07 RX ADMIN — ACETAMINOPHEN AND CODEINE PHOSPHATE 1 TABLET: 300; 30 TABLET ORAL at 06:56

## 2017-02-07 RX ADMIN — SENNOSIDES AND DOCUSATE SODIUM 2 TABLET: 8.6; 5 TABLET ORAL at 10:47

## 2017-02-07 RX ADMIN — OMEPRAZOLE 20 MG: 20 CAPSULE, DELAYED RELEASE ORAL at 06:51

## 2017-02-07 RX ADMIN — BISACODYL 10 MG: 10 SUPPOSITORY RECTAL at 10:48

## 2017-02-07 RX ADMIN — ESCITALOPRAM 20 MG: 20 TABLET, FILM COATED ORAL at 09:05

## 2017-02-07 RX ADMIN — BUPROPION HYDROCHLORIDE 150 MG: 150 TABLET, FILM COATED, EXTENDED RELEASE ORAL at 09:05

## 2017-02-07 ASSESSMENT — ACTIVITIES OF DAILY LIVING (ADL): PREVIOUS_RESPONSIBILITIES: MEAL PREP;HOUSEKEEPING;LAUNDRY;SHOPPING;MEDICATION MANAGEMENT;FINANCES;DRIVING;YARDWORK

## 2017-02-07 NOTE — PROGRESS NOTES
[facility]    Hospitalist Progress Note    Date of Service (when I saw the patient): 02/07/2017    Assessment and Plan  Anne Rangel is a 71 year old female with hx of anxiety and migraines who was admitted to the Orthopedic service on 2/3/2017 for left BEATRICE revision. Hospitalist service was consulted for bradycardia and ultimately underwent uncomplicated PPM on 2/6/2017.    Status post left BEATRICE revision on 2/3/2017  Tolerated procedure with no immediate complications.  mL.  - Post-op cares as per Ortho    2nd degree AV block, mobitz type II  Noted to be bradycardic to 40s post-op, but asymptomatic. Most recent EKG prior to admission (1/27) showed sinus bradycardia (HR 62) with RBBB. EKG (2/4) showed 2nd degree AV block, mobitz type II with 2:1 conduction. TTE (2/6) showed moderately dilated left atrium and borderline dilated ascending aorta, but was otherwise normal with LVEF 60-65%. EP Cardiology was consulted and recommended PPM placement. Patient underwent uncomplicated PPM placement on 2/6/2017.  - Patient has received instructions on incision care and arm restrictions  - She will follow up in device clinic in 1 week    Dyslipidemia  Chronic and stable on atorvastatin 40 mg daily    GERD  Chronic and stable on omeprazole    Major recurrent depressive disorder with anxiety  [PTA: bupropion 150 mg daily, escitalopram 20 mg daily, eszopiclone 2 mg qhs, lorazepam 0.5 mg qhs]  - Continues on home regimen.    DVT Prophylaxis: Enoxaparin (Lovenox) SQ  Code Status: Prior    Disposition: Expected discharge to home today once cleared by Ortho (Ortho to still go through activity and follow up)     Leigh Ann Maharaj    Interval History  No events overnight. Eager to discharge today. Pain adequately controlled. Denies cp/sob, dizziness/lightheadedness, or nausea    -Data reviewed today: I reviewed all new labs and imaging results over the last 24 hours. I personally reviewed no images or EKG's  today.    Physical Exam  Temp: 97.7  F (36.5  C) Temp src: Oral BP: 123/88 mmHg Pulse: 81 Heart Rate: 84 Resp: 16 SpO2: 92 % O2 Device: None (Room air)    Filed Vitals:    02/03/17 1230 02/07/17 0639   Weight: 53.978 kg (119 lb) 59.512 kg (131 lb 3.2 oz)     Vital Signs with Ranges  Temp:  [97.7  F (36.5  C)-98.4  F (36.9  C)] 97.7  F (36.5  C)  Pulse:  [81] 81  Heart Rate:  [79-90] 84  Resp:  [16-18] 16  BP: (119-146)/(71-88) 123/88 mmHg  SpO2:  [92 %-98 %] 92 %  I/O last 3 completed shifts:  In: 300 [P.O.:300]  Out: 2200 [Urine:2200]    Constitutional: Appears comfortable, NAD  Respiratory: Breathing non-labored. Lungs CTAB - no wheezes, crackles, or rhonchi  Cardiovascular: Heart RRR, no m/r/g. No pedal edema  GI: +BS, abd soft/NT  Neuro: Alert and appropriate, MUSTAFA  Psych: Normal affect      Medications       senna-docusate  2 tablet Oral BID     sodium chloride (PF)  3 mL Intracatheter Q8H     atorvastatin  40 mg Oral At Bedtime     buPROPion (WELLBUTRIN XL) 24 hr tablet 150 mg  150 mg Oral Daily     escitalopram (LEXAPRO) tablet 20 mg  20 mg Oral Daily     omeprazole  20 mg Oral QAM AC       Data    Recent Labs  Lab 02/06/17  0713 02/05/17  0620 02/04/17  0640 02/03/17  1238   WBC  --  4.9  --   --    HGB  --  10.6* 11.2* 13.7   MCV  --  97  --   --    * 141*  --  195   NA  --  142  --   --    POTASSIUM  --  4.3  --   --    CHLORIDE  --  110*  --   --    CO2  --  28  --   --    BUN  --  15  --   --    CR 1.00 1.26*  --  1.25*   ANIONGAP  --  4  --   --    SUE  --  7.9*  --   --    GLC  --  86 131*  --        Recent Results (from the past 24 hour(s))   X-ray Chest 2 vws*    Narrative    CHEST TWO VIEWS   2/7/2017 8:38 AM     HISTORY: Check for pneumothorax and lead position. Have x-ray read  immediately.    COMPARISON: None.    FINDINGS: Pacer has been placed with two leads in position. No  pneumothorax. Slight fibrosis left base. Heart size normal.      Impression    IMPRESSION: Pacer has been placed  recently and there is no evidence  for pneumothorax.    AVELINO STEINER MD

## 2017-02-07 NOTE — PROGRESS NOTES
"Cardiology Progress Note          Assessment and Plan:   Anne Rangel is a 71 year old female with hx of anxiety and migraines who was admitted to the Orthopedic service on 2/3/2017 for left BEATRICE revision. POst-op was noted to have bradycardia and 2nd degree heart block.    1) 2nd degree AV block, mobitz type II  -Symptomatic, pacemaker placement yesterday  -CXR and device check OK  -V pacing in the 80's  -Expect HR to come down as pain improves  -Site looks OK    2) Status post left BEATRICE revision on 2/3/2017                 Interval History:   Itching at device site              Review of Systems:   As per subjective, otherwise 5 systems reviewed and negative.           Physical Exam:   Blood pressure 130/76, pulse 81, temperature 97.8  F (36.6  C), temperature source Oral, resp. rate 16, height 1.575 m (5' 2\"), weight 59.512 kg (131 lb 3.2 oz), SpO2 98 %.      Vital Sign Ranges  Temperature Temp  Av  F (36.7  C)  Min: 97.7  F (36.5  C)  Max: 98.4  F (36.9  C)   Blood pressure Systolic (24hrs), Av mmHg, Min:90 mmHg, Max:146 mmHg       Diastolic (24hrs), Av mmHg, Min:69 mmHg, Max:92 mmHg      Pulse Pulse  Av.1  Min: 80  Max: 94   Respirations Resp  Av.4  Min: 16  Max: 18   Pulse oximetry SpO2  Av.2 %  Min: 94 %  Max: 98 %         Intake/Output Summary (Last 24 hours) at 17 0943  Last data filed at 17 0815   Gross per 24 hour   Intake    300 ml   Output   2450 ml   Net  -2150 ml       Wt Readings from Last 2 Encounters:   17 59.512 kg (131 lb 3.2 oz)   03/04/15 58.287 kg (128 lb 8 oz)      Constitutional:   NAD   Skin:   Warm and dry   Head:   Nontraumatic   Lungs:   CTAB   Cardiovascular:   S1, S2, RRR, PPM site C/D/I without hematoma   Abdomen:   Benign   Neurological:   Grossly nonfocal            Medications:   Reviewed             Data:     Results for orders placed or performed during the hospital encounter of 17 (from the past 24 hour(s))   EP Perm pacer " dble lead    Narrative    PROCEDURES PERFORMED:   1. Implantation of a dual-chamber pacemaker, MRI compatible  2. Conscious sedation.   3. Cardiac fluoroscopy    INDICATION: symptomatic 2:1 AV block with lightheadedness and without  reversible causes identified     HISTORY OF PRESENT ILLNESS: This is a 71 year-old patient with a  history of RBBB who had left BEATRICE 3 days ago and soon thereafter has  been in 2:1 AV block with RBBB likely from intrinsic conduction  disease. Pt has been noticing intermittent lightheadedness and was  likely from AV block. Risks of the procedure was discussed before the  procedure including but not limited to vascular injury, infection,  pneumothorax, and cardiac perforation.    METHOD: The patient was prepped and draped in the usual manner. The  procedure was performed under conscious sedation for 28 minutes from  1141 to 1209. 1 mg of Versed and 50 mcg of Fentanyl were used. Heart  rate, BP, respiration, oxygen saturation, and patient responses were  monitored throughout the procedure with the RN assistance. Intravenous  antibiotic was given. 1% lidocaine was infiltrated into the the left  axillary vein area. This vein was access using the Seldinger's  technique times two. An incision was then made with a #15 blade. A  6-Maltese sheath was then glide over the wire into the vein. A lead was  then advanced into the heart and was fixed into the right ventricular  apical area. Appropriate sensing and threshold were obtained. A  6-Maltese sheath was glided over the wire into the vein. A lead was  advanced to the right atrium and fixed into the right atrial  appendage. There was no diaphragmatic stimulation with high output  pacing.  The leads were then secured to the pectoralis muscle fascia  using O-Ethibond sutures.     A pocket was then fashioned and was irrigated with bacitracin  solution. The leads were then attached to the device and placed in the  pocket.  The pocket was then closed with  2-0 and 4-0 Vicryl sutures.  Steri-Strips and an OpSite dressing were then placed over the  incision.  The patient [facility] then [facility]  in [facility] condition.     DEVICE GENERATOR: Company: [employer], Model number: L331,  Serial number: [id number]     LEAD INFORMATION:   RA: Company: [employer], Model: 7740, serial number: [id number]  RV: Company: [employer], Model: 7741, serial number: [id number]      DEVICE MEASUREMENT:   RA: Sensin.4 millivolts, Threshold: 1.0 volts at 0.4 milliseconds,  562 ohms.  RV: Sensin.3 millivolts, Threshold: 0.6 volts at 0.4 milliseconds,  795 ohms.              COMPLICATIONS: None.    Fluoroscopy (minutes): 1    CONCLUSION:  1. Uneventful implantation of a dual-chamber pacemaker, MRI compatible    MD DOMENICA Vera MD   X-ray Chest 2 vws*    Narrative    CHEST TWO VIEWS   2017 8:38 AM     HISTORY: Check for pneumothorax and lead position. Have x-ray read  immediately.    COMPARISON: None.    FINDINGS: Pacer has been placed with two leads in position. No  pneumothorax. Slight fibrosis left base. Heart size normal.      Impression    IMPRESSION: Pacer has been placed recently and there is no evidence  for pneumothorax.    AVELINO STEINER MD

## 2017-02-07 NOTE — PLAN OF CARE
Problem: Goal Outcome Summary  Goal: Goal Outcome Summary  OT: Noted no OT orders, left message with Chetna Connell's PA office for OT orders and to clarify change in WB status from WBAT to 50% PWB L LE. Please enter OT orders in epic as appropriate.

## 2017-02-07 NOTE — PROGRESS NOTES
Pt seen post implant of a dual chamber pacemaker ([employer]) on 2-6-17 for bradycardia/ intermittent mobitz II AVB. Chest x-ray reviewed; report state no pneumothorax and good lead placement. Pacemaker function normal per [facility] PPM Rep. Instructions on incision care, arm restrictions, and routine device clinic follow up in 7 days. Pt to limit use of left arm for 3 weeks (2-27-17) Pt instructed to remove outer dressing in 3 days,( on 2-9-17), keeping the steri strips in place and may shower at that time. Pt instructed to observe for signs of infection, bleeding, swelling at incision area and arm of affected side. Appt on Wednesday February 15th at 11am @ [facility] device clinic in [place]/  Pt's  present for teaching. Many questions answered. Pt will be on Lovenox post op BEATRICE revision.

## 2017-02-07 NOTE — PLAN OF CARE
Problem: Goal Outcome Summary  Goal: Goal Outcome Summary  PT: Cancel at initial attempt this am. Pt out of room for x-ray and pacemaker rep present, awaiting pt's return for pacemaker teaching.

## 2017-02-07 NOTE — PLAN OF CARE
Problem: Goal Outcome Summary  Goal: Goal Outcome Summary  Physical Therapy Discharge Summary    Reason for therapy discharge:    Discharged to home.    Progress towards therapy goal(s). See goals on Care Plan in [facility] electronic health record for goal details.  Goals met    Therapy recommendation(s):    No further therapy is recommended.  Continue home exercise program.

## 2017-02-07 NOTE — PLAN OF CARE
Problem: Goal Outcome Summary  Goal: Goal Outcome Summary  Outcome: No Change  VSS. A&Ox4. L hip pain minimal, declining pain meds. Tele: 100% V paced. Aquacell dressing placed to L hip. Pacemaker dressing CDI. CMS intact. BS active, passing gas, senna and suppository given. Up Ax1 walker. Tolerating regular diet. D/c home per cards, ortho and hospitalist. Awaiting med rec completion then d/c home with  soon.

## 2017-02-07 NOTE — PLAN OF CARE
Problem: Goal Outcome Summary  Goal: Goal Outcome Summary  Outcome: No Change  A&O x4, tele 100% V paced, pacer site dressing CDI, left pain/muscle spasm relieved with prn tylenol, tylenol #3 and flexeril.CMS  Intact. Denies SOB, CP, N/V. +flatus. Up with one assist, gait belt and walker while maintaining 50% or less weight bearing with ambulation on LLE.

## 2017-02-07 NOTE — PLAN OF CARE
"Problem: Goal Outcome Summary  Goal: Goal Outcome Summary  Surgeon Discharge Plan: \"D/c home pending EP evaluation\"    Current Functional Status: Pt transfers sit <> stand with supervision-SBA, cues for hand placement and 50% weight bearing on LLE with all transfers. Min A for sit > supine to manage LEs in to bed. Ambulated 150 ft x 1 with FWW and SBA, cues for weight bearing and gait pattern with AD but no overt LOB or instability observed. Completed supine LE BEATRICE exercises with cues for technique and use of handout.     Barriers to Plan/Home: Has not yet completed stairs with PT (will trial this pm), 50% or less weightbearing restriction     PM Session: Pt instructed in safe stair negotiation, completed up/down 6 stairs with B UE support using one railing and SPC on contralateral side. Reviewed gait pattern with FWW and ambulated ~120 ft x 2 with close SBA.      "

## 2017-02-07 NOTE — PLAN OF CARE
Problem: Goal Outcome Summary  Goal: Goal Outcome Summary  OT: Eval and treatment completed. Pt lives in a house with her  and was independent with ADL/IADL's prior to surgery. Pt s/p L revision BEATRICE and PPM. Pt currently limited due to hip and pacemaker precautions, decreased strength and ROM in L hip, pain and balance.     Surgeon Discharge Plan: home today            Current Functional Status: pt educated in AE for LE dress and able to complete with supervison/mod I and min A for L sock which pt's  to A until I at home. Pt to be issued long shoe horn. Pt completed toilet transfer with cue for pacemaker precaution mod I/I/supervision and will be getting an RTS for home use. Pt educated in walk in shower transfer side stepping over lip with ww and  A and car transfer verbally. Pt and  receptive to education and no further concerns with ADL's.     Barriers to Plan/Home: none with  A.    Occupational Therapy Discharge Summary    Reason for therapy discharge:    All goals and outcomes met, no further needs identified.    Progress towards therapy goal(s). See goals on Care Plan in [facility] electronic health record for goal details.  Goals met    Therapy recommendation(s):    No further therapy is recommended. home with  A with ADL/IADL's as needed and to be issued RTS and long shoe horn.

## 2017-02-07 NOTE — DISCHARGE INSTRUCTIONS
Home Care after a Pacemaker  __________________________________________    Patient Name:  Anne Rangel  Today's Date:  February 7, 2017    How should I care for the wound?  Keep the bandage on for 3 days. Change it only if it gets loose or soaked. If you need to change it, use 4x4-inch gauze and a large clear bandage.     Leave the strips of tape on. They will fall off on their own, or we will remove them at your first check-up.    Check your wound daily for signs of infection, such as increased redness, severe swelling or draining. Fever may also be a sign of infection. Call us if you see any of these signs.    Can I take a shower or bath?  If there are no signs of infection, you may shower after the bandage comes off in 3 days. If you take a tub bath, keep the wound dry.    Will I have much pain?  You may have mild to medium pain for 3 to 5 days. Take acetaminophen (Tylenol) or ibuprofen (Advil) for the pain. If the pain persists or is severe, call us.    How should I limit my activities?  For at least 3 weeks: Do not raise your elbow above your shoulder. You can begin to use your arm as it feels comfortable to you.    In 6 to 8 weeks: You may begin to golf, play tennis, swim and do similar activities.    If you received a new generator (battery), you should not drive for 24 hours.    What about after the wound has healed and I am feeling better?  For your safety, do not swim alone. If possible, avoid climbing a ladder. It is best to stay within 4 feet of the ground.    Avoid activities that may involve rough contact or a hard hit to the area of the device.    What if I feel dizzy or light-headed?  If you feel dizzy or light-headed, please call us.    How will others know that I have a device implanted in me?  Before you leave the hospital, you will receive a temporary ID card. A permanent card will be mailed to you about 6 to 8 weeks later. Always carry the ID card with you. It has important details about  your device.    You should also get a MedicAlert bracelet or tag that says you have a pacemaker or defibrillator.   Please ask us for a MedicAlert brochure, or go to [email or web address].     Always tell doctors, dentists and other care providers that you have a device implanted in you.    Let us know before you plan any surgeries. Your care team must take special steps to keep you safe during certain procedures. These steps will depend on the type of device you have. Your doctor will need to see your ID card. He or she may need to call us for instructions.    Will I need to be careful around electrical equipment?    All of these household appliances are safe to use:    Microwaves     Remote controls    Radios    Small electrical tools    Cordless phones    However, you need to keep all electrical equipment in good repair.    Strong electro-magnetic fields can interfere with your device. Stay at least a few feet away from:    Stereo speakers, including boom boxes    Magnets and magnetic wands used by airport security; bingo wands    Do not try to fix any motor while it is running. Running motors can make an electro-magnetic field that can interfere with your device.    Use cell phones with the ear that is farthest from your device. Do not put a cell phone in a pocket near your device, unless the phone is turned off.    Avoid the following, which can interfere with your device:    Magnetic resonance imaging (MRI) tests in the hospital    Arc welding    Getting close to a TV or radio transmission tower or to an antenna on a ham and [employer] radio    When do I need to come back for a check-up?  You will need to come back for your first check-up in 7 to 10 days. We will contact you after you leave the hospital. If you do not hear from us within 3 days, call us to set up a visit. Please bring your medicine list or your medicine bottles to this visit.    The battery in your device will need to be checked every 3 months. As it  "gets older, it will need to be checked more often than this.    For questions or to make an appointment:    Call [facility] at [phone].    If you are deaf or hard of hearing, please let us know. We provide many free services including sign language interpreters, oral interpreters, TTYs, telephone amplifiers, note takers and written materials.            TOTAL HIP REPLACEMENT/ REVISION TAKE HOME INSTRUCTIONS  Your surgeon will answer any questions about your progress. General guidelines for your care are listed below. Your surgeon may give additional instructions for your care at home. Please follow them carefully.    Activity Level  1. Physical activity may be resumed gradually according to your comfort level and your surgeon s instructions. Follow your exercise program as instructed by your therapist. Do exercises at least twice daily. Refer to pages 19-22 of your \"Total Hip Replacement \" booklet for details.  2. Complete exercises two hours before bedtime to minimize the effect pain may have on sleep.  3. Do not cross legs. Do not bend past 90 .    Good Health Practices  1. Maintain an adequate fluid intake and eat a well balanced diet.  2. Be sure to include the basic food groups, such as dairy products, meat/fish, vegetables, and fruit. Each of these foods contribute to wound healing and increasing your strength.  3. Surgery, decreased activity and pain medication all contribute to a descrease in bowel activity that can result in constipation. It is recommended that you increase your liquid intake, add fiber to your diet, increase activity, and decrease pain medication use. If you have any problems, notify your physician.  4. Notify your dentist of your total hip surgery and call your dentist one week before a dental appointment for antibiotics.    Incision/Dressing Care  1. Keep incision clean and dry except for showers. Okay to shower per ortho once okay with cardiology. Keep dressing in place " during showers, pat dry when done.  2. Keep dressing in place, no dressing changes until seen by ortho.     Things to Watch For  1. Check area surrounding incision daily for increased redness, tenderness, swelling, or drainage. If these occur, please notify your doctor. Also, call if you develop a fever above 101 .  2. Please notify your doctor if you experience any calf pain and/or if you have surgical pain not relieved by the pain medication prescribed by your doctor.    Follow up appointment:___________________________________  Nurse Signature: ________________________________ Date _________ Time ______  Patient Signature:_______________________________ Date ____________________      Revised 01/2014

## 2017-02-08 ENCOUNTER — CARE COORDINATION (OUTPATIENT)
Dept: CARDIOLOGY | Facility: CLINIC | Age: 72
End: 2017-02-08

## 2017-02-08 NOTE — CONSULTS
Revised      CARDIOLOGY CONSULTATION          REASON FOR CONSULTATION:  Second-degree AV block, 2:1 conduction.          HISTORY OF PRESENT ILLNESS:  Mrs. Anne Rangel is a very nice 71-year-old woman with a history of prior left hip arthroplasty who underwent surgical revision on 02/03/2017.  Her history is also notable for RBBB, anxiety, migraine, depression, dyslipidemia and gastroesophageal reflux disease.  Postoperatively, she was noted to be bradycardic and an ECG demonstrated 2:1 AV node conduction with a ventricular rate of 43 beats per minute and a right bundle branch morphology.  The patient denied feeling dizzy, short of breath,  hest pain or fatigue. The baseline ECG demonstrates a right bundle branch block pattern with a QRS duration of 138 milliseconds and a normal AK interval.  An echocardiogram demonstrated preserved LV systolic function with an ejection fraction of 60-65% and no evidence of regional wall motion abnormality and no significant valvular heart disease.          It is notable that the patient has had significant uncontrolled pain following her surgery.  She has continued to struggle with adequate pain control and is currently complaining of 5/10 leg pain despite ongoing treatment with narcotics.  Additionally, the patient has a history of severe nausea in response to narcotics and is receiving around the clock Zofran and Compazine in order to prophylax against this.  She has not been up and out of bed yet but denies any cardiac or pulmonary symptoms at rest.          PAST MEDICAL HISTORY:     1.  Baseline right bundle branch block.     2.  Status post left hip arthroplasty revision.     3.  Anxiety/depression.     4.  Dyslipidemia.     5.  GERD.          FAMILY HISTORY:  Noncontributory.          SOCIAL HISTORY:  The patient is .  She is a nonsmoker.          PHYSICAL EXAMINATION:     GENERAL:  Well-appearing older woman, appears mildly uncomfortable.     NECK:  There are  intermittent lin A waves.     CARDIAC:  Bradycardic, regular with no significant murmurs.     LUNGS:  On anterior auscultation are clear throughout.     ABDOMEN:  Soft and nontender.     EXTREMITIES:  The left extremity is fully wrapped.  The right extremity is warm.     NEUROLOGIC:  Alert and oriented x3.     PSYCHIATRIC:  Appropriate demeanor and affect.          ASSESSMENT AND PLAN:     1.  2:1 AV block.        At baseline there is a right bundle branch block, and she may have developed progressive conduction disease.  However, in the setting of her significant uncontrolled pain and nausea, this may represent increased vagal tone as well.  As the patient is hemodynamically stable and asymptomatic, I think it is prudent to monitor for the time being.  If the patient's block does not resolve with improved pain control then she should undergo implantation of a dual-chamber pacemaker device Monday.  The patient should be kept n.p.o. on  night and I will request that the Heart Rhythm team formally assessed the patient on Monday as well.        Revised work type lg 17         JAYMIE PAZ MD             D: 2017 14:42   T: 2017 15:32   MT: [id number]      Name:     AYLA AGUILAR   MRN:      [id number]        Account:       [id number]   :      1945           Consult Date:  2017      Document: [id number]

## 2017-02-08 NOTE — PROGRESS NOTES
Called patient to discuss any post hospital d/c questions she may have, review medication changes, and confirm f/u appts. Patient denied any questions regarding new medications. Patient was wondering if it was ok if she continued to take Celebrex after pacemaker placement. RN confirmed with device clinic RN that there is no contraindication from a cardiac standpoint to take Celebrex post PPM. RN advised patient that nothing was noted about d/c Celebrex either in her discharge summary from Dr. Maharaj. Patient denied any SOB, chest pain, or light headedness. RN confirmed with patient that he has an apt scheduled on 2/15/17 with device clinic. Patient advised to call clinic with any cardiac related questions or concerns prior to his apt on 2/15/17. Patient verbalized understanding and agreed with plan.

## 2017-02-10 LAB
BACTERIA SPEC CULT: ABNORMAL
Lab: ABNORMAL
MICRO REPORT STATUS: ABNORMAL
SPECIMEN SOURCE: ABNORMAL

## 2017-02-15 ENCOUNTER — ALLIED HEALTH/NURSE VISIT (OUTPATIENT)
Dept: CARDIOLOGY | Facility: CLINIC | Age: 72
End: 2017-02-15
Payer: MEDICARE

## 2017-02-15 DIAGNOSIS — Z95.0 CARDIAC PACEMAKER IN SITU: Primary | ICD-10-CM

## 2017-02-15 PROCEDURE — 93280 PM DEVICE PROGR EVAL DUAL: CPT | Performed by: INTERNAL MEDICINE

## 2017-02-15 NOTE — PROGRESS NOTES
[employer] Accolade 7-10 day Post Pacemaker Device Check  AP: <1 % : 100 %  Mode: DDD  bpm        Underlying Rhythm: 2:1 AVB  Heart Rate: Heart rate stable with good variability. Patient c/o being tired, 2 wks ago had hip revision. Hgb 10.3  Sensing: WNL    Pacing Threshold: WNL    Impedance: WNL  Battery Status: Approximately 15 years longevity.  Incision: Steri strips removed, incision clean and dry, minimal bruising.  Atrial Arrhythmia: 0  Ventricular Arrhythmia: 1 PMT logged, EGM showed the device tracking at [place]  Setting Change: 0    Care Plan: Discussed incisional care, signs of infection, left arm restrictions and home monitor. Follow up in 6 weeks. Orders placed for 3 month F/U with DONNIE and patient directed to scheduling. LACI Bueno RN

## 2017-02-15 NOTE — MR AVS SNAPSHOT
After Visit Summary   2/15/2017    Anne Rangel    MRN: [id number]           Patient Information     Date Of Birth          1945        Visit Information        Provider Department      2/15/2017 11:00 AM CANALES DCR2 [facility]        Today's Diagnoses     Cardiac pacemaker in situ    -  1       Follow-ups after your visit        Additional Services     Follow-Up with Cardiac Advanced Practice Provider                 Your next 10 appointments already scheduled     Mar 29, 2017 11:00 AM CDT   Pacemaker Check with CANALES DCR2   [facility] ([facility])    [address]  [place] [address] [phone]            May 18, 2017  3:10 PM CDT   [facility] EP RETURN with Alison Curtis PA-C   [facility] ([facility])    [address]  [place] [facility] [place] [id number] [phone]              Future tests that were ordered for you today     Open Future Orders        Priority Expected Expires Ordered    Follow-Up with Cardiac Advanced Practice Provider Routine 5/15/2017 2/15/2018 2/15/2017            Who to contact     If you have questions or need follow up information about today's clinic visit or your schedule please contact [facility] directly at [phone].  Normal or non-critical lab and imaging results will be communicated to you by MyChart, letter or phone within 4 business days after the clinic has received the results. If you do not hear from us within 7 days, please contact the clinic through MyChart or phone. If you have a critical or abnormal lab result, we will notify you by phone as soon as possible.  Submit refill requests through [employer] or call your pharmacy and they will forward the refill request to us. Please allow 3 business days for your refill to be completed.           Additional Information About Your Visit        MyChart Information     [employer] gives you secure access to your electronic health record. If you see a primary care provider, you can also send messages to your care team and make appointments. If you have questions, please call your primary care clinic.  If you do not have a primary care provider, please call [phone] and they will assist you.        Care EveryWhere ID     This is your Care EveryWhere ID. This could be used by other organizations to access your [place] medical records  [id number]         Blood Pressure from Last 3 Encounters:   02/07/17 123/88   01/10/17 114/73   03/04/15 120/60    Weight from Last 3 Encounters:   02/07/17 59.5 kg (131 lb 3.2 oz)   03/04/15 58.3 kg (128 lb 8 oz)   12/30/04 63 kg (138 lb 12.8 oz)              We Performed the Following     PM DEVICE PROGRAMMING EVAL, DUAL LEAD PACER ([id number])        Primary Care Provider Office Phone # Fax #    Nevaeh Wood [phone]       [facility] [address]   [place] [address]        Thank you!     Thank you for choosing [facility] [place]  for your care. Our goal is always to provide you with excellent care. Hearing back from our patients is one way we can continue to improve our services. Please take a few minutes to complete the written survey that you may receive in the mail after your visit with us. Thank you!             Your Updated Medication List - Protect others around you: Learn how to safely use, store and throw away your medicines at www.disposemymeds.org.          This list is accurate as of: 2/15/17 11:50 AM.  Always use your most recent med list.                   Brand Name Dispense Instructions for use    acetaminophen-codeine 300-30 MG per tablet    TYLENOL #3    50 tablet    Take 1-2 tablets by mouth every 4 hours as needed for other (mild or moderate pain)       albuterol 108 (90 BASE) MCG/ACT  Inhaler    PROAIR HFA/PROVENTIL HFA/VENTOLIN HFA     Inhale 2 puffs into the lungs 4 times daily as needed for shortness of breath / dyspnea or wheezing       aspirin  MG EC tablet     90 tablet    Take 1 tablet (325 mg) by mouth 2 times daily       CELEBREX PO      Take 200 mg by mouth daily       IMITREX PO      Take 100 mg by mouth daily as needed for migraine       LEXAPRO PO      Take 20 mg by mouth daily       LIPITOR 40 MG tablet   Generic drug:  atorvastatin     30 tablet    Take 40 mg by mouth At Bedtime       LORAZEPAM PO      Take 0.5 mg by mouth At Bedtime (0.5 x 1 mg tablet = 0.5 mg dose)       LUNESTA 2 MG tablet   Generic drug:  eszopiclone     30 tablet    Take 2 mg by mouth At Bedtime       omeprazole 20 MG tablet     30 tablet    Take 1 tablet (20 mg) by mouth daily Take 30-60 minutes before a meal.       polyethylene glycol powder    MIRALAX/GLYCOLAX     Take 17 g by mouth daily as needed for constipation       PROBIOTIC & ACIDOPHILUS EX ST PO      Take 1 capsule by mouth daily       senna-docusate 8.6-50 MG per tablet    SENOKOT-S;PERICOLACE    100 tablet    Take 2 tablets by mouth 2 times daily       traMADol 50 MG tablet    ULTRAM    20 tablet    Take 1-2 tablets ( mg) by mouth every 6 hours as needed for moderate pain       VITAMIN C PO      Take 500 mg by mouth daily       VITAMIN D (CHOLECALCIFEROL) PO      Take 5,000 Units by mouth three times a week (Monday, Wednesday and Friday)       WELLBUTRIN XL PO      Take 150 mg by mouth daily

## 2017-03-27 NOTE — OP NOTE
DATE OF PROCEDURE:  02/03/2017      SURGEON:  Juan eTlles MD      ASSISTANT:  DARRYL Soler      PREOPERATIVE DIAGNOSIS:  Failed left total hip arthroplasty, acetabular component.      POSTOPERATIVE DIAGNOSIS:  Failed left total hip arthroplasty, acetabular component.      PROCEDURE:  Revision left hip arthroplasty with revision of acetabular component, revision of left femoral head.      DESCRIPTION OF PROCEDURE:  Anne Rangel was brought to the operating room.  After satisfactory anesthesia, the patient was placed in lateral decubitus position.  The left lower extremity was prepped and draped in the usual sterile fashion.  The patient received 1 gram of Ancef preoperatively.      The left leg was prepped and draped in the usual sterile fashion.  A straight lateral incision was made.  Dissection was carried down to the extensor mechanism, carried down to the fascia.  The fascia was split longitudinally.  Abductors were identified and were found to be pristine.  The patient had had a previous direct anterior approach.  Anterior one-third of the abductors was reflected in standard fashion.  Dissection was carried down to the hip capsule.  Capsulectomy was performed in a T-type fashion.  Hip was dislocated.  The femoral head was removed.  The acetabulum was carefully exposed.  The acetabular component itself was found to be loose.  The acetabular component was removed.  Specimens were sent to pathology which noted no sign of deep infection.  As well, a specimen was sent for culture.  Clinically, I did not feel that there was a deep infection.  The acetabular component had been removed, and the femoral component was examined as well.  The femoral head had been removed, but the femoral stem itself was examined, and there was no evidence of loosening of the femoral stem.  This was well ingrown.  Attention was then directed back to the acetabulum.  The trunnion on the femoral side was protected, and the  acetabulum was reamed sequentially up to 54 mm.  A 54 mm Tritanium cup was then impacted into place in approximately 45 degrees of abduction as well as 15 degrees of anteversion.  Screws were placed and these gave excellent fixation.  There was good bleeding bone noted prior to impacting the new acetabular component.  A neutral liner was impacted into place for a 40 mm femoral head.  The patient had previously had a 36 mm head, and it was felt important to increase the size to prevent instability.  Following this, x-rays were obtained which showed satisfactory position of components.  Trial reduction was performed with a +3 head.  There was excellent restoration of leg length as well as excellent stability in all planes including combined flexion, internal rotation and extension, and external rotation.  The trial head was removed, and the real 40 mm +8 metal head was impacted into place.  The wound was thoroughly irrigated again.  The abductors were closed with interrupted #5 Ethibond through transosseous sutures and oversewn with a FiberWire suture on a running basis.  Fascia was closed with a running Stratafix suture.  Interrupted 2-0 Vicryl, 3-0 subcuticular Vicryl, and staples were applied.  The patient left the operating room in satisfactory condition.         RAYMOND HOUSTON MD             D: 2017 14:57   T: 2017 06:17   MT: SAMIR#[id number]      Name:     AYLA AGUILAR   MRN:      [id number]        Account:        [id number]   :      1945           Procedure Date: 2017      Document: [id number]       cc: Nevaeh Wood MD

## 2017-03-29 ENCOUNTER — ALLIED HEALTH/NURSE VISIT (OUTPATIENT)
Dept: CARDIOLOGY | Facility: CLINIC | Age: 72
End: 2017-03-29
Payer: MEDICARE

## 2017-03-29 DIAGNOSIS — I44.30 AV BLOCK: ICD-10-CM

## 2017-03-29 DIAGNOSIS — Z95.0 CARDIAC PACEMAKER IN SITU: Primary | ICD-10-CM

## 2017-03-29 PROCEDURE — 93280 PM DEVICE PROGR EVAL DUAL: CPT | Performed by: INTERNAL MEDICINE

## 2017-03-29 NOTE — MR AVS SNAPSHOT
After Visit Summary   3/29/2017    Anne Rangel    MRN: [id number]           Patient Information     Date Of Birth          1945        Visit Information        Provider Department      3/29/2017 11:00 AM CANALES DCR2 [facility]        Today's Diagnoses     Cardiac pacemaker in situ    -  1    AV block           Follow-ups after your visit        Your next 10 appointments already scheduled     May 18, 2017  3:10 PM CDT   [facility] with Alison Curtis PA-C   [facility] ([facility])    [address]  [place] [address]   [phone]            Jul 10, 2017  8:00 AM CDT   Remote PPM Check with CANALES TECH1   [facility] ([facility])    [address]  [place] [address]   [phone]           This appointment is for a remote check of your pacemaker.  This is not an appointment at the office.              Who to contact     If you have questions or need follow up information about today's clinic visit or your schedule please contact [facility] directly at [phone].  Normal or non-critical lab and imaging results will be communicated to you by MyChart, letter or phone within 4 business days after the clinic has received the results. If you do not hear from us within 7 days, please contact the clinic through MyChart or phone. If you have a critical or abnormal lab result, we will notify you by phone as soon as possible.  Submit refill requests through [employer] or call your pharmacy and they will forward the refill request to us. Please allow 3 business days for your refill to be completed.          Additional Information About Your Visit        MyChart Information     [employer] gives you secure access to your electronic health record. If you see a primary care provider,  you can also send messages to your care team and make appointments. If you have questions, please call your primary care clinic.  If you do not have a primary care provider, please call [phone] and they will assist you.        Care EveryWhere ID     This is your Care EveryWhere ID. This could be used by other organizations to access your [place] medical records  [id number]         Blood Pressure from Last 3 Encounters:   02/07/17 123/88   01/10/17 114/73   03/04/15 120/60    Weight from Last 3 Encounters:   02/07/17 59.5 kg (131 lb 3.2 oz)   03/04/15 58.3 kg (128 lb 8 oz)   12/30/04 63 kg (138 lb 12.8 oz)              We Performed the Following     PM DEVICE PROGRAMMING EVAL, DUAL LEAD PACER ([id number])        Primary Care Provider Office Phone # Fax #    Nevaeh Wood [phone]       [facility] [address]   [place] [facility] [address]        Thank you!     Thank you for choosing [facility] [place]  for your care. Our goal is always to provide you with excellent care. Hearing back from our patients is one way we can continue to improve our services. Please take a few minutes to complete the written survey that you may receive in the mail after your visit with us. Thank you!             Your Updated Medication List - Protect others around you: Learn how to safely use, store and throw away your medicines at www.disposemymeds.org.          This list is accurate as of: 3/29/17 11:43 AM.  Always use your most recent med list.                   Brand Name Dispense Instructions for use    acetaminophen-codeine 300-30 MG per tablet    TYLENOL #3    50 tablet    Take 1-2 tablets by mouth every 4 hours as needed for other (mild or moderate pain)       albuterol 108 (90 BASE) MCG/ACT Inhaler    PROAIR HFA/PROVENTIL HFA/VENTOLIN HFA     Inhale 2 puffs into the lungs 4 times daily as needed for shortness of breath / dyspnea or wheezing       aspirin   MG EC tablet     90 tablet    Take 1 tablet (325 mg) by mouth 2 times daily       CELEBREX PO      Take 200 mg by mouth daily       IMITREX PO      Take 100 mg by mouth daily as needed for migraine       LEXAPRO PO      Take 20 mg by mouth daily       LIPITOR 40 MG tablet   Generic drug:  atorvastatin     30 tablet    Take 40 mg by mouth At Bedtime       LORAZEPAM PO      Take 0.5 mg by mouth At Bedtime (0.5 x 1 mg tablet = 0.5 mg dose)       LUNESTA 2 MG tablet   Generic drug:  eszopiclone     30 tablet    Take 2 mg by mouth At Bedtime       omeprazole 20 MG tablet     30 tablet    Take 1 tablet (20 mg) by mouth daily Take 30-60 minutes before a meal.       polyethylene glycol powder    MIRALAX/GLYCOLAX     Take 17 g by mouth daily as needed for constipation       PROBIOTIC & ACIDOPHILUS EX ST PO      Take 1 capsule by mouth daily       senna-docusate 8.6-50 MG per tablet    SENOKOT-S;PERICOLACE    100 tablet    Take 2 tablets by mouth 2 times daily       traMADol 50 MG tablet    ULTRAM    20 tablet    Take 1-2 tablets ( mg) by mouth every 6 hours as needed for moderate pain       VITAMIN C PO      Take 500 mg by mouth daily       VITAMIN D (CHOLECALCIFEROL) PO      Take 5,000 Units by mouth three times a week (Monday, Wednesday and Friday)       WELLBUTRIN XL PO      Take 150 mg by mouth daily

## 2017-03-29 NOTE — PROGRESS NOTES
[employer] Accolade (D) Pacemaker Device Check  AP: 0 % : 100 %  Mode: DDD         Underlying Rhythm: SR with second degree AVB type 2, 2:1 conduction  Heart Rate: good variability  Sensing: WNL    Pacing Threshold: WNL   Impedance: WNL  Battery Status: 14 yrs estimated longevity  Atrial Arrhythmia: 5 events for PMT, 3 with EGMs for review. 2 EGMs show ST vs. SVT at UTR of 130, third EGM shows PMT successfully broken by PMT algorithm returning pt to AS/ rhythm.   Ventricular Arrhythmia: none  Setting Change: none    Care Plan: remote in 3 months, scheduled.   Incision CDI, no exposed sutures.   Educated pt and  on remote monitoring.  They also had many questions on function of device, all questions answered.   Cl

## 2017-03-31 NOTE — DISCHARGE SUMMARY
"Discharge Summary    Anne Rangel MRN# [id number]   YOB: 1945 Age: 71 year old     Date of Admission: 2/3/2017    Date of Discharge: 2/7/2017    Reason for Admission: Anne Rangel is an 71 year old female who was admitted to the hospital following surgery.    Preoperative Diagnosis: failed total hip    Postoperative Diagnosis: failed total hip    Procedure Completed:  Revision left total hip arthroplasty    Hospital Course:  Ms. Rangel was admitted and underwent the above procedure. The patient tolerated the procedure well. There were no complications. Following surgery she was admitted to the floor.  She was bradycardic in post-op, cardiology was consulted, and a pacemaker was placed.  During her stay she did not require any blood transfusions.  Her last hemoglobin was noted to be   Lab Results   Component Value Date    HGB 10.6 02/05/2017   .  Her pain was controlled with oral pain medication.  During her stay she progressed well in physical therapy and all the therapy goals were met.     Discharge Physical Exam:  /88 (BP Location: Left arm)  Pulse 81  Temp 97.7  F (36.5  C) (Oral)  Resp 16  Ht 1.575 m (5' 2\")  Wt 59.5 kg (131 lb 3.2 oz)  SpO2 92%  BMI 24 kg/m2  Neurovascularly intact, distal pulses present bilaterally.  Calves are negative bilaterally, both soft and nontender.    Assessment: Ms. Rangel is stable and doing well status post revision left total hip arthroplasty.    Plan: We will discharge her home on analgesics and deep venous thrombosis prophylaxis.  She will follow-up with me approximately 2 weeks from surgery.  She will follow-up with cardiology as instructed.  She may call [phone] to schedule an appointment with me.    Meds:   Anne Rangel   Home Medication Instructions SHERI:[id number]    Printed on:03/31/17 1314   Medication Information                      acetaminophen-codeine (TYLENOL #3) 300-30 MG per tablet  Take 1-2 tablets by mouth every " 4 hours as needed for other (mild or moderate pain)             albuterol (PROAIR HFA/PROVENTIL HFA/VENTOLIN HFA) 108 (90 BASE) MCG/ACT Inhaler  Inhale 2 puffs into the lungs 4 times daily as needed for shortness of breath / dyspnea or wheezing             Ascorbic Acid (VITAMIN C PO)  Take 500 mg by mouth daily             aspirin  MG EC tablet  Take 1 tablet (325 mg) by mouth 2 times daily             atorvastatin (LIPITOR) 40 MG tablet  Take 40 mg by mouth At Bedtime              BuPROPion HCl (WELLBUTRIN XL PO)  Take 150 mg by mouth daily             Celecoxib (CELEBREX PO)  Take 200 mg by mouth daily             Escitalopram Oxalate (LEXAPRO PO)  Take 20 mg by mouth daily             eszopiclone (LUNESTA) 2 MG tablet  Take 2 mg by mouth At Bedtime              LORAZEPAM PO  Take 0.5 mg by mouth At Bedtime (0.5 x 1 mg tablet = 0.5 mg dose)             omeprazole 20 MG tablet  Take 1 tablet (20 mg) by mouth daily Take 30-60 minutes before a meal.             polyethylene glycol (MIRALAX/GLYCOLAX) powder  Take 17 g by mouth daily as needed for constipation             Probiotic Product (PROBIOTIC & ACIDOPHILUS EX ST PO)  Take 1 capsule by mouth daily             senna-docusate (SENOKOT-S;PERICOLACE) 8.6-50 MG per tablet  Take 2 tablets by mouth 2 times daily             SUMAtriptan Succinate (IMITREX PO)  Take 100 mg by mouth daily as needed for migraine             traMADol (ULTRAM) 50 MG tablet  Take 1-2 tablets ( mg) by mouth every 6 hours as needed for moderate pain             VITAMIN D, CHOLECALCIFEROL, PO  Take 5,000 Units by mouth three times a week (Monday, Wednesday and Friday)

## 2017-04-30 ENCOUNTER — DOCUMENTATION ONLY (OUTPATIENT)
Dept: OTHER | Facility: CLINIC | Age: 72
End: 2017-04-30

## 2017-04-30 DIAGNOSIS — Z71.89 ACP (ADVANCE CARE PLANNING): Chronic | ICD-10-CM

## 2017-05-17 PROBLEM — R55 NEAR SYNCOPE: Status: ACTIVE | Noted: 2017-05-17

## 2017-05-17 PROBLEM — I45.10 RBBB: Status: ACTIVE | Noted: 2017-05-17

## 2017-05-17 PROBLEM — Z95.0 CARDIAC PACEMAKER IN SITU: Status: ACTIVE | Noted: 2017-05-17

## 2017-05-17 PROBLEM — I44.2 ATRIOVENTRICULAR BLOCK, COMPLETE (H): Status: ACTIVE | Noted: 2017-05-17

## 2017-05-18 ENCOUNTER — OFFICE VISIT (OUTPATIENT)
Dept: CARDIOLOGY | Facility: CLINIC | Age: 72
End: 2017-05-18
Attending: INTERNAL MEDICINE
Payer: MEDICARE

## 2017-05-18 VITALS
DIASTOLIC BLOOD PRESSURE: 68 MMHG | BODY MASS INDEX: 23.66 KG/M2 | WEIGHT: 128.6 LBS | HEIGHT: 62 IN | SYSTOLIC BLOOD PRESSURE: 110 MMHG | HEART RATE: 78 BPM

## 2017-05-18 DIAGNOSIS — I44.2 ATRIOVENTRICULAR BLOCK, COMPLETE (H): Primary | ICD-10-CM

## 2017-05-18 DIAGNOSIS — Z95.0 CARDIAC PACEMAKER IN SITU: ICD-10-CM

## 2017-05-18 PROCEDURE — 93000 ELECTROCARDIOGRAM COMPLETE: CPT | Performed by: PHYSICIAN ASSISTANT

## 2017-05-18 PROCEDURE — 99213 OFFICE O/P EST LOW 20 MIN: CPT | Performed by: PHYSICIAN ASSISTANT

## 2017-05-18 NOTE — LETTER
"5/18/2017    MD Osvaldo Hay   [address]  [facility] [address]    RE: Anne LENZ Juan Carlos       Dear Colleague,    I had the pleasure of seeing Anne today when she came for followup of her recent pacemaker implantation.  She is a very pleasant 71-year-old with a longstanding history of right bundle branch block as well as anxiety/depression, migraine headaches, dyslipidemia and acid reflux.  In 02/2017, she was admitted for a left hip revision.  She was noted to have 2:1 AV block with a heart rate of 43.  Dr. Monson elicited a history of some dyspnea on exertion as well as intermittent lightheadedness at home and it was felt that she should proceed with pacemaker implantation.      She had a dual-chamber MRI compatible [employer] pacemaker placed on 02/06/2017.  She has subsequently had 2 device checks, the last being 03/29 showing 0% A pacing and 100% V pacing.  She has had some SVT at the upper tracking rate as well as some PMT, but no significant arrhythmias.      Anne tells me that she is doing well from a cardiac standpoint.  She is back to weight training and aerobics.  She has not had any problems with discomfort, fever, chills or drainage from the pacemaker site.  Denies edema, orthopnea, PND, lightheadedness, maria dolores syncope or palpitations.  She does think that she is \"a little better\" than she previously had been, but really felt that before she did not feel too terribly.      Echocardiogram done in the hospital showed EF was 60-65%.  No significant valvular abnormalities.  No regional wall motion abnormalities were noted.      EKG today, which I overread, confirmed to be pacing.      Outpatient Encounter Prescriptions as of 5/18/2017   Medication Sig Dispense Refill     Celecoxib (CELEBREX PO) Take 200 mg by mouth daily       BuPROPion HCl (WELLBUTRIN XL PO) Take 150 mg by mouth daily       LORAZEPAM PO Take 0.5 mg by mouth At Bedtime (0.5 x 1 mg tablet = 0.5 " mg dose)       SUMAtriptan Succinate (IMITREX PO) Take 100 mg by mouth daily as needed for migraine       Escitalopram Oxalate (LEXAPRO PO) Take 20 mg by mouth daily       Ascorbic Acid (VITAMIN C PO) Take 500 mg by mouth daily       VITAMIN D, CHOLECALCIFEROL, PO Take 5,000 Units by mouth three times a week (Monday, Wednesday and Friday)       Probiotic Product (PROBIOTIC & ACIDOPHILUS EX ST PO) Take 1 capsule by mouth daily       polyethylene glycol (MIRALAX/GLYCOLAX) powder Take 17 g by mouth daily as needed for constipation       atorvastatin (LIPITOR) 40 MG tablet Take 40 mg by mouth At Bedtime  30 tablet 1     omeprazole 20 MG tablet Take 1 tablet (20 mg) by mouth daily Take 30-60 minutes before a meal. 30 tablet 1     traMADol (ULTRAM) 50 MG tablet Take 1-2 tablets ( mg) by mouth every 6 hours as needed for moderate pain 20 tablet 0     eszopiclone (LUNESTA) 2 MG tablet Take 2 mg by mouth At Bedtime  30 tablet 5     [DISCONTINUED] acetaminophen-codeine (TYLENOL #3) 300-30 MG per tablet Take 1-2 tablets by mouth every 4 hours as needed for other (mild or moderate pain) 50 tablet 0     [DISCONTINUED] senna-docusate (SENOKOT-S;PERICOLACE) 8.6-50 MG per tablet Take 2 tablets by mouth 2 times daily 100 tablet 1     [DISCONTINUED] aspirin  MG EC tablet Take 1 tablet (325 mg) by mouth 2 times daily 90 tablet 3     [DISCONTINUED] albuterol (PROAIR HFA/PROVENTIL HFA/VENTOLIN HFA) 108 (90 BASE) MCG/ACT Inhaler Inhale 2 puffs into the lungs 4 times daily as needed for shortness of breath / dyspnea or wheezing       No facility-administered encounter medications on file as of 5/18/2017.      ASSESSMENT AND PLAN:      1. 2:1 heart block.  On 03/29, her 6-week device check showed that she was 0% A-paced and 100% V-paced.  She has had no significant atrial arrhythmias.      At this time, we will have her continue routine device monitoring.  She will see Dr. Monson in a year.  They will contact us in the interim  with any questions or concerns.     Again, thank you for allowing me to participate in the care of your patient.      Sincerely,    Alison Curtis PA-C     [facility] [place]

## 2017-05-18 NOTE — PROGRESS NOTES
HPI and Plan:   See dictation #[id number]    Orders Placed This Encounter   Procedures     Follow-Up with Electrophysiologist     Follow-Up with Device Clinic     EKG 12-lead complete w/read - Clinics (performed today)       Medications Discontinued During This Encounter   Medication Reason     acetaminophen-codeine (TYLENOL #3) 300-30 MG per tablet Therapy completed     albuterol (PROAIR HFA/PROVENTIL HFA/VENTOLIN HFA) 108 (90 BASE) MCG/ACT Inhaler Stopped by Patient     aspirin  MG EC tablet Stopped by Patient     senna-docusate (SENOKOT-S;PERICOLACE) 8.6-50 MG per tablet Therapy completed         Encounter Diagnoses   Name Primary?     Cardiac pacemaker in situ      Atrioventricular block 2:1 Yes       CURRENT MEDICATIONS:  Current Outpatient Prescriptions   Medication Sig Dispense Refill     Celecoxib (CELEBREX PO) Take 200 mg by mouth daily       BuPROPion HCl (WELLBUTRIN XL PO) Take 150 mg by mouth daily       LORAZEPAM PO Take 0.5 mg by mouth At Bedtime (0.5 x 1 mg tablet = 0.5 mg dose)       SUMAtriptan Succinate (IMITREX PO) Take 100 mg by mouth daily as needed for migraine       Escitalopram Oxalate (LEXAPRO PO) Take 20 mg by mouth daily       Ascorbic Acid (VITAMIN C PO) Take 500 mg by mouth daily       VITAMIN D, CHOLECALCIFEROL, PO Take 5,000 Units by mouth three times a week (Monday, Wednesday and Friday)       Probiotic Product (PROBIOTIC & ACIDOPHILUS EX ST PO) Take 1 capsule by mouth daily       polyethylene glycol (MIRALAX/GLYCOLAX) powder Take 17 g by mouth daily as needed for constipation       atorvastatin (LIPITOR) 40 MG tablet Take 40 mg by mouth At Bedtime  30 tablet 1     omeprazole 20 MG tablet Take 1 tablet (20 mg) by mouth daily Take 30-60 minutes before a meal. 30 tablet 1     traMADol (ULTRAM) 50 MG tablet Take 1-2 tablets ( mg) by mouth every 6 hours as needed for moderate pain 20 tablet 0     eszopiclone (LUNESTA) 2 MG tablet Take 2 mg by mouth At Bedtime  30 tablet 5        ALLERGIES     Allergies   Allergen Reactions     Dilaudid [Hydromorphone] Nausea and Vomiting     Doxepin Unknown     Meperidine Nausea and Vomiting     Demerol       PAST MEDICAL HISTORY:  Past Medical History:   Diagnosis Date     Anemia      Carpal tunnel syndrome      Chronic fatigue syndrome 11/00     Decreased libido      Depression with anxiety      Fatigue      Gastroesophageal reflux disease      HA (headache)      iamAFTERCARE FOLLOWING JOINT REPLACEMENT 7/23/2007     Irritable bowel syndrome 11/00     Myalgia and myositis, unspecified 11/00     Near syncope 5/17/2017     Neutropenia 00     Noninfectious ileitis      Osteoarthrosis, unspecified whether generalized or localized, hand 11/00     Osteoarthrosis, unspecified whether generalized or localized, lower leg 11/00     Pure hypercholesterolemia 11/00     RBBB      Renal disease     mld     Uncomplicated asthma      Urticaria, unspecified 00       PAST SURGICAL HISTORY:  Past Surgical History:   Procedure Laterality Date     ARTHROPLASTY REVISION HIP Left 2/3/2017    Procedure: ARTHROPLASTY REVISION HIP;  Surgeon: Juan Moncada MD;  Location: [facility] OR     C NONSPECIFIC PROCEDURE      s/p Rt. Carpal tunnel release     C NONSPECIFIC PROCEDURE      s/p bilat Tubal ligation     GI SURGERY      hemorrhoid repair     GYN SURGERY      tubal ligation     ORTHOPEDIC SURGERY      right sabine-arthroplasty, trigger finger repair, left knee arthroscopy and replacement, bilateral hip replacement,       FAMILY HISTORY:  Family History   Problem Relation Age of Onset     Hypertension Mother      Alzheimer Disease Mother      Memory issues- unsure if alzheimers     Arthritis Mother      osteoarthritis     Depression Mother      GASTROINTESTINAL DISEASE Mother      acid reflux     Allergies Father      Neurologic Disorder Father      migraines     Respiratory Father      chronic brochitis- born during a flu epidemic in 1918     CEREBROVASCULAR DISEASE Maternal  Grandmother      series of TIA's     DIABETES Maternal Grandfather      adult onset     CANCER Maternal Grandfather      of stomach or esophagus-heavy smoker     C.A.D. Paternal Grandmother      angina     Depression Daughter      Neurologic Disorder Sister      migraines       SOCIAL HISTORY:  Social History     Social History     Marital status:      Spouse name: Christiano Rangel     Number of children: 3     Years of education: Masters+     Occupational History     Nursing Home [employer]     Social History Main Topics     Smoking status: Never Smoker     Smokeless tobacco: None     Alcohol use Yes      Comment: 1 Drink with dinner     Drug use: No     Sexual activity: Yes     Partners: Male     Other Topics Concern      Service No     Blood Transfusions Yes     C-Sect. 1974, lost a lot of blood-given 6? 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               Review of Systems:  Skin:  Negative       Eyes:  Negative      ENT:  Negative      Respiratory:  Negative for shortness of breath;dyspnea on exertion;cough     Cardiovascular:  Negative for;palpitations;chest pain;edema;syncope or  "near-syncope;exercise intolerance;fatigue;lightheadedness;dizziness      Gastroenterology: Negative      Genitourinary:  Negative      Musculoskeletal:  Negative      Neurologic:  Negative      Psychiatric:  Negative      Heme/Lymph/Imm:  Negative      Endocrine:  Negative        Physical Exam:  Vitals: /68  Pulse 78  Ht 1.568 m (5' 1.73\")  Wt 58.3 kg (128 lb 9.6 oz)  BMI 23.73 kg/m2    Constitutional:  cooperative, alert and oriented, well developed, well nourished, in no acute distress        Skin:  warm and dry to the touch, no apparent skin lesions or masses noted        Head:  normocephalic, no masses or lesions        Eyes:  pupils equal and round;conjunctivae and lids unremarkable;sclera white        ENT:  no pallor or cyanosis, dentition good        Neck:  JVP normal;no carotid bruit        Chest:  normal breath sounds, clear to auscultation, normal A-P diameter, normal symmetry, normal respiratory excursion, no use of accessory muscles   pacemaker incision in the left infraclavicular area was well-healed      Cardiac: regular rhythm;no murmurs, gallops or rubs detected                  Abdomen:  abdomen soft        Vascular: pulses full and equal                                        Extremities and Back:  no edema;no deformities, clubbing, cyanosis, erythema observed              Neurological:  affect appropriate, oriented to time, person and place            "

## 2017-05-18 NOTE — PATIENT INSTRUCTIONS
1. Glad you're doing so well after pacemaker placement    2. Last device check on 3/29 showed no pacing from top or heart and 100% pacing from bottom of heart.      3. No changes today but CALL if any issues before you see Dr. Monson in 1 year. Pacemaker nurses are at [phone] and Nichelle's nurses Court/Claire are at [phone]

## 2017-05-18 NOTE — MR AVS SNAPSHOT
After Visit Summary   5/18/2017    Anne Rangel    MRN: [id number]           Patient Information     Date Of Birth          1945        Visit Information        Provider Department      5/18/2017 3:10 PM Alison Curtis PA-C [facility]        Today's Diagnoses     Atrioventricular block 2:1    -  1    Cardiac pacemaker in situ          Care Instructions    1. Glad you're doing so well after pacemaker placement    2. Last device check on 3/29 showed no pacing from top or heart and 100% pacing from bottom of heart.      3. No changes today but CALL if any issues before you see Dr. Monson in 1 year. Pacemaker nurses are at [phone] and Nichelle's nurses Court/Claire are at [phone]        Follow-ups after your visit        Additional Services     Follow-Up with Device Clinic           Follow-Up with Electrophysiologist                 Your next 10 appointments already scheduled     Jul 10, 2017  8:00 AM CDT   Remote PPM Check with CANALES TECH1   [facility] ([facility])    [address] [place] [id number] [phone]           This appointment is for a remote check of your pacemaker.  This is not an appointment at the office.              Future tests that were ordered for you today     Open Future Orders        Priority Expected Expires Ordered    Follow-Up with Device Clinic Routine 5/18/2018 6/22/2018 5/18/2017    Follow-Up with Electrophysiologist Routine 5/18/2018 9/30/2018 5/18/2017            Who to contact     If you have questions or need follow up information about today's clinic visit or your schedule please contact [facility] directly at [phone].  Normal or non-critical lab and imaging results will be communicated to you by MyChart, letter or phone within 4 business days after the clinic has received the results.  "If you do not hear from us within 7 days, please contact the clinic through [employer] or phone. If you have a critical or abnormal lab result, we will notify you by phone as soon as possible.  Submit refill requests through [employer] or call your pharmacy and they will forward the refill request to us. Please allow 3 business days for your refill to be completed.          Additional Information About Your Visit        D[employer] Information     [employer] gives you secure access to your electronic health record. If you see a primary care provider, you can also send messages to your care team and make appointments. If you have questions, please call your primary care clinic.  If you do not have a primary care provider, please call [phone] and they will assist you.        Care EveryWhere ID     This is your Care EveryWhere ID. This could be used by other organizations to access your [place] medical records  [id number]        Your Vitals Were     Pulse Height BMI (Body Mass Index)             78 1.568 m (5' 1.73\") 23.73 kg/m2          Blood Pressure from Last 3 Encounters:   05/18/17 110/68   02/07/17 123/88   01/10/17 114/73    Weight from Last 3 Encounters:   05/18/17 58.3 kg (128 lb 9.6 oz)   02/07/17 59.5 kg (131 lb 3.2 oz)   03/04/15 58.3 kg (128 lb 8 oz)              We Performed the Following     EKG 12-lead complete w/read - Clinics (performed today)     Follow-Up with Cardiac Advanced Practice Provider        Primary Care Provider Office Phone # Fax #    Nevaeh Wood [phone]       [facility] [address] [facility] [address]  [facility] [address]        Thank you!     Thank you for choosing [facility] [place]  for your care. Our goal is always to provide you with excellent care. Hearing back from our patients is one way we can continue to improve our services. Please take a few minutes to complete the written survey that you may receive in the mail after " your visit with us. Thank you!             Your Updated Medication List - Protect others around you: Learn how to safely use, store and throw away your medicines at www.disposemymeds.org.          This list is accurate as of: 5/18/17  3:50 PM.  Always use your most recent med list.                   Brand Name Dispense Instructions for use    CELEBREX PO      Take 200 mg by mouth daily       IMITREX PO      Take 100 mg by mouth daily as needed for migraine       LEXAPRO PO      Take 20 mg by mouth daily       LIPITOR 40 MG tablet   Generic drug:  atorvastatin     30 tablet    Take 40 mg by mouth At Bedtime       LORAZEPAM PO      Take 0.5 mg by mouth At Bedtime (0.5 x 1 mg tablet = 0.5 mg dose)       LUNESTA 2 MG tablet   Generic drug:  eszopiclone     30 tablet    Take 2 mg by mouth At Bedtime       omeprazole 20 MG tablet     30 tablet    Take 1 tablet (20 mg) by mouth daily Take 30-60 minutes before a meal.       polyethylene glycol powder    MIRALAX/GLYCOLAX     Take 17 g by mouth daily as needed for constipation       PROBIOTIC & ACIDOPHILUS EX ST PO      Take 1 capsule by mouth daily       traMADol 50 MG tablet    ULTRAM    20 tablet    Take 1-2 tablets ( mg) by mouth every 6 hours as needed for moderate pain       VITAMIN C PO      Take 500 mg by mouth daily       VITAMIN D (CHOLECALCIFEROL) PO      Take 5,000 Units by mouth three times a week (Monday, Wednesday and Friday)       WELLBUTRIN XL PO      Take 150 mg by mouth daily

## 2017-05-19 NOTE — PROGRESS NOTES
"HISTORY OF PRESENT ILLNESS:  I had the pleasure of seeing Anne today when she came for followup of her recent pacemaker implantation.  She is a very pleasant 71-year-old with a longstanding history of right bundle branch block as well as anxiety/depression, migraine headaches, dyslipidemia and acid reflux.  In 02/2017, she was admitted for a left hip revision.  She was noted to have 2:1 AV block with a heart rate of 43.  Dr. Monson elicited a history of some dyspnea on exertion as well as intermittent lightheadedness at home and it was felt that she should proceed with pacemaker implantation.      She had a dual-chamber MRI compatible [employer] pacemaker placed on 02/06/2017.  She has subsequently had 2 device checks, the last being 03/29 showing 0% A pacing and 100% V pacing.  She has had some SVT at the upper tracking rate as well as some PMT, but no significant arrhythmias.      Anne tells me that she is doing well from a cardiac standpoint.  She is back to weight training and aerobics.  She has not had any problems with discomfort, fever, chills or drainage from the pacemaker site.  Denies edema, orthopnea, PND, lightheadedness, maria dolores syncope or palpitations.  She does think that she is \"a little better\" than she previously had been, but really felt that before she did not feel too terribly.      Echocardiogram done in the hospital showed EF was 60-65%.  No significant valvular abnormalities.  No regional wall motion abnormalities were noted.      EKG today, which I overread, confirmed to be pacing.        ASSESSMENT AND PLAN:      1. 2:1 heart block.  On 03/29, her 6-week device check showed that she was 0% A-paced and 100% V-paced.  She has had no significant atrial arrhythmias.      At this time, we will have her continue routine device monitoring.  She will see Dr. Monson in a year.  They will contact us in the interim with any questions or concerns.         BONNIE OSPINA PA-C             D: " 2017 16:09   T: 2017 15:05   MT: AISHA      Name:     AYLA AGUILAR   MRN:      [id number]        Account:      [id number]   :      1945           Service Date: 2017      Document: [id number]

## 2017-06-16 ENCOUNTER — HOSPITAL ENCOUNTER (OUTPATIENT)
Dept: MAMMOGRAPHY | Facility: CLINIC | Age: 72
Discharge: HOME | End: 2017-06-16
Attending: INTERNAL MEDICINE | Admitting: INTERNAL MEDICINE
Payer: MEDICARE

## 2017-06-16 DIAGNOSIS — Z12.31 VISIT FOR SCREENING MAMMOGRAM: ICD-10-CM

## 2017-06-16 PROCEDURE — G0202 SCR MAMMO BI INCL CAD: HCPCS

## 2017-07-10 ENCOUNTER — ALLIED HEALTH/NURSE VISIT (OUTPATIENT)
Dept: CARDIOLOGY | Facility: CLINIC | Age: 72
End: 2017-07-10
Payer: MEDICARE

## 2017-07-10 DIAGNOSIS — Z95.0 CARDIAC PACEMAKER IN SITU: Primary | ICD-10-CM

## 2017-07-10 PROCEDURE — 93296 REM INTERROG EVL PM/IDS: CPT | Performed by: INTERNAL MEDICINE

## 2017-07-10 PROCEDURE — 93294 REM INTERROG EVL PM/LDLS PM: CPT | Performed by: INTERNAL MEDICINE

## 2017-07-10 NOTE — MR AVS SNAPSHOT
After Visit Summary   7/10/2017    Anne Rangel    MRN: [id number]           Patient Information     Date Of Birth          1945        Visit Information        Provider Department      7/10/2017 8:00 AM CANALES TECH1 [facility] [place]        Today's Diagnoses     Cardiac pacemaker in situ    -  1       Follow-ups after your visit        Who to contact     If you have questions or need follow up information about today's clinic visit or your schedule please contact [facility] [place] directly at [phone].  Normal or non-critical lab and imaging results will be communicated to you by [employer], letter or phone within 4 business days after the clinic has received the results. If you do not hear from us within 7 days, please contact the clinic through Stell[employer] or phone. If you have a critical or abnormal lab result, we will notify you by phone as soon as possible.  Submit refill requests through [employer] or call your pharmacy and they will forward the refill request to us. Please allow 3 business days for your refill to be completed.          Additional Information About Your Visit        MyChart Information     [employer] gives you secure access to your electronic health record. If you see a primary care provider, you can also send messages to your care team and make appointments. If you have questions, please call your primary care clinic.  If you do not have a primary care provider, please call [phone] and they will assist you.        Care EveryWhere ID     This is your Care EveryWhere ID. This could be used by other organizations to access your [place] medical records  [id number]         Blood Pressure from Last 3 Encounters:   05/18/17 110/68   02/07/17 123/88   01/10/17 114/73    Weight from Last 3 Encounters:   05/18/17 58.3 kg (128 lb 9.6 oz)   02/07/17 59.5 kg (131 lb 3.2 oz)   03/04/15 58.3 kg (128 lb 8 oz)               We Performed the Following     INTERROGATION DEVICE EVAL REMOTE, PACER/ICD (22013)     PM DEVICE INTERROGATE REMOTE (33659)        Primary Care Provider Office Phone # Fax #    Nevaeh Wood [phone]       [facility] [address] [facility] [address]  [facility] [address]        Equal Access to Services     [facility]: Hadii aad ku hadasho Soomaali, waaxda luqadaha, qaybta kaalmada adeegyada, waxay idiin hayaan adeeg kharash la'aan . So [facility] [phone].    ATENCIÓN: Si habla español, tiene a hinson disposición servicios gratuitos de asistencia lingüística. Lemuelame al [phone].    We comply with applicable federal civil rights laws and [place] laws. We do not discriminate on the basis of race, color, national origin, age, disability sex, sexual orientation or gender identity.            Thank you!     Thank you for choosing [facility] [place]  for your care. Our goal is always to provide you with excellent care. Hearing back from our patients is one way we can continue to improve our services. Please take a few minutes to complete the written survey that you may receive in the mail after your visit with us. Thank you!             Your Updated Medication List - Protect others around you: Learn how to safely use, store and throw away your medicines at www.disposemymeds.org.          This list is accurate as of: 7/10/17  8:10 AM.  Always use your most recent med list.                   Brand Name Dispense Instructions for use Diagnosis    CELEBREX PO      Take 200 mg by mouth daily        IMITREX PO      Take 100 mg by mouth daily as needed for migraine        LEXAPRO PO      Take 20 mg by mouth daily        LIPITOR 40 MG tablet   Generic drug:  atorvastatin     30 tablet    Take 40 mg by mouth At Bedtime        LORAZEPAM PO      Take 0.5 mg by mouth At Bedtime (0.5 x 1 mg tablet = 0.5 mg dose)        LUNESTA 2 MG tablet   Generic drug:  eszopiclone     30  tablet    Take 2 mg by mouth At Bedtime        omeprazole 20 MG tablet     30 tablet    Take 1 tablet (20 mg) by mouth daily Take 30-60 minutes before a meal.        polyethylene glycol powder    MIRALAX/GLYCOLAX     Take 17 g by mouth daily as needed for constipation        PROBIOTIC & ACIDOPHILUS EX ST PO      Take 1 capsule by mouth daily        traMADol 50 MG tablet    ULTRAM    20 tablet    Take 1-2 tablets ( mg) by mouth every 6 hours as needed for moderate pain        VITAMIN C PO      Take 500 mg by mouth daily        VITAMIN D (CHOLECALCIFEROL) PO      Take 5,000 Units by mouth three times a week (Monday, Wednesday and Friday)        WELLBUTRIN XL PO      Take 150 mg by mouth daily

## 2017-07-10 NOTE — PROGRESS NOTES
[employer] Accolade (D) Remote PPM Device Check  AP: 0 % : 100 %  Mode: DDD        Presenting Rhythm: AS/  Heart Rate: Adequate rates per histogram  Sensing: Stable    Pacing Threshold: Stable    Impedance: Stable  Battery Status: 14 years  Atrial Arrhythmia: No mode switch episodes. 5 PMT episodes detected, EGMs show atrial rate at max tracking rate of 130bpm.   Ventricular Arrhythmia: None     Care Plan: F/u PPM Latitude q 3 months. Sent letter with results. LISANDRA Grant

## 2017-10-16 ENCOUNTER — ALLIED HEALTH/NURSE VISIT (OUTPATIENT)
Dept: CARDIOLOGY | Facility: CLINIC | Age: 72
End: 2017-10-16
Payer: MEDICARE

## 2017-10-16 DIAGNOSIS — Z95.0 CARDIAC PACEMAKER IN SITU: Primary | ICD-10-CM

## 2017-10-16 PROCEDURE — 93294 REM INTERROG EVL PM/LDLS PM: CPT | Performed by: INTERNAL MEDICINE

## 2017-10-16 PROCEDURE — 93296 REM INTERROG EVL PM/IDS: CPT | Performed by: INTERNAL MEDICINE

## 2017-10-16 NOTE — PROGRESS NOTES
[employer] Accolade MRI EL L331 (D) Remote PPM Device Check  AP: 0% : 100%  Mode: DDD        Presenting Rhythm: AS/  Heart Rate: adequate heart rates per histogram  Sensing: RA: stable RV: not performed    Pacing Threshold: stable    Impedance: stable  Battery Status: 14 years remaining  Atrial Arrhythmia: none  Ventricular Arrhythmia: none   Other: 5 PMT episodes. EGMs available show AS/ at max tracking rate of 130bpm.     Care Plan: F/U Latitude NXT q 3 months. Mailed letter with results and next transmission date. Johnson FRY

## 2017-10-16 NOTE — MR AVS SNAPSHOT
After Visit Summary   10/16/2017    Anne Rangel    MRN: [id number]           Patient Information     Date Of Birth          1945        Visit Information        Provider Department      10/16/2017 3:30 PM PARKER ARZATE [facility]        Today's Diagnoses     Cardiac pacemaker in situ    -  1       Follow-ups after your visit        Your next 10 appointments already scheduled     Oct 16, 2017  3:30 PM CDT   (Arrive by 1:30 PM)   Remote PPM Check with PARKER ARZATE   [facility] ([facility])    [address] [place] [id number] [phone]           This appointment is for a remote check of your pacemaker.  This is not an appointment at the office.              Who to contact     If you have questions or need follow up information about today's clinic visit or your schedule please contact [facility] directly at [phone].  Normal or non-critical lab and imaging results will be communicated to you by [employer], letter or phone within 4 business days after the clinic has received the results. If you do not hear from us within 7 days, please contact the clinic through [employer] or phone. If you have a critical or abnormal lab result, we will notify you by phone as soon as possible.  Submit refill requests through [employer] or call your pharmacy and they will forward the refill request to us. Please allow 3 business days for your refill to be completed.          Additional Information About Your Visit        MyChart Information     [employer] gives you secure access to your electronic health record. If you see a primary care provider, you can also send messages to your care team and make appointments. If you have questions, please call your primary care clinic.  If you do not have a primary care provider, please call [phone] and they will assist you.         Care EveryWhere ID     This is your Care EveryWhere ID. This could be used by other organizations to access your [place] medical records  [id number]         Blood Pressure from Last 3 Encounters:   05/18/17 110/68   02/07/17 123/88   01/10/17 114/73    Weight from Last 3 Encounters:   05/18/17 58.3 kg (128 lb 9.6 oz)   02/07/17 59.5 kg (131 lb 3.2 oz)   03/04/15 58.3 kg (128 lb 8 oz)              We Performed the Following     INTERROGATION DEVICE EVAL REMOTE, PACER/ICD (10980)     PM DEVICE INTERROGATE REMOTE (53421)        Primary Care Provider Office Phone # Fax #    Nevaeh Wood [phone]       [facility] [address] [facility] [facility] [address]  [place] [address]        Equal Access to Services     [facility]: Hadii aad ku hadasho Soomaali, waaxda luqadaha, qaybta kaalmada adeegyada, waxay idiin hayaan adeeg kharalove benites . So [facility] [phone].    ATENCIÓN: Si habla español, tiene a hinson disposición servicios gratuitos de asistencia lingüística. Llame al [phone].    We comply with applicable federal civil rights laws and [place] laws. We do not discriminate on the basis of race, color, national origin, age, disability, sex, sexual orientation, or gender identity.            Thank you!     Thank you for choosing [facility] [place]  for your care. Our goal is always to provide you with excellent care. Hearing back from our patients is one way we can continue to improve our services. Please take a few minutes to complete the written survey that you may receive in the mail after your visit with us. Thank you!             Your Updated Medication List - Protect others around you: Learn how to safely use, store and throw away your medicines at www.disposemymeds.org.          This list is accurate as of: 10/16/17  8:45 AM.  Always use your most recent med list.                   Brand Name Dispense Instructions for use Diagnosis    CELEBREX PO       Take 200 mg by mouth daily        IMITREX PO      Take 100 mg by mouth daily as needed for migraine        LEXAPRO PO      Take 20 mg by mouth daily        LIPITOR 40 MG tablet   Generic drug:  atorvastatin     30 tablet    Take 40 mg by mouth At Bedtime        LORAZEPAM PO      Take 0.5 mg by mouth At Bedtime (0.5 x 1 mg tablet = 0.5 mg dose)        LUNESTA 2 MG tablet   Generic drug:  eszopiclone     30 tablet    Take 2 mg by mouth At Bedtime        omeprazole 20 MG tablet     30 tablet    Take 1 tablet (20 mg) by mouth daily Take 30-60 minutes before a meal.        polyethylene glycol powder    MIRALAX/GLYCOLAX     Take 17 g by mouth daily as needed for constipation        PROBIOTIC & ACIDOPHILUS EX ST PO      Take 1 capsule by mouth daily        traMADol 50 MG tablet    ULTRAM    20 tablet    Take 1-2 tablets ( mg) by mouth every 6 hours as needed for moderate pain        VITAMIN C PO      Take 500 mg by mouth daily        VITAMIN D (CHOLECALCIFEROL) PO      Take 5,000 Units by mouth three times a week (Monday, Wednesday and Friday)        WELLBUTRIN XL PO      Take 150 mg by mouth daily

## 2017-12-27 NOTE — PROGRESS NOTES
Workers Compensation INITIAL Office Visit    Date of Visit: 12/27/2017  Employer: [employer]  Date of Injury: 12/4/2017    CHIEF COMPLAINT:   Chief Complaint   Patient presents with   • Worker's Compensation     DOI: 12-4-2017 RT Hip and Knees       HISTORY OF PRESENT ILLNESS:   Latonya Arrington is a 61 year old female, who presents with a complaint of an injury that reportedly occurred during work activities.  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 On 4 December she had received some donations and was carrying the boxes when she misstepped and fell essentially landing on bilateral knees. As she fell she also apparently lightly struck her chin region and after falling rolled onto her right hip which was concerning for her insofar as that was a replaced right hip. She was then seen in urgent care (please review those notes to include plain film radiographic support). She presents today in scheduled/routine follow-up expressing pleasure and satisfaction with her progress to the degree of complete resolution. She denies knee pain under any circumstance. She denies hip discomfort. She denies any discomfort to the chin head or neck regions. She states that she never discolored that is to say found any black or blue marks. There was never any suspicion or report of loss of consciousness or closed head trauma. At this point the patient is taking no medication for this injury.       Mechanism of Injury:  She states that while she was at work on 12/4/2017, she was carrying some boxes when she misstepped and fell primarily landing on her knees.    She denies any other precipitating event or activity.      She has no previous problems in this area.      Pain was assessed on a scale of 0 to 10 where 0 is described as no pain and 10 as excruciating pain such as sudden amputation of the hand.  She describes the pain now as 0.    Aggravating factors include:  N/A    Alleviating factors include:    N/A    REVIEW   02/07/17 1423   Quick Adds   Type of Visit Initial Occupational Therapy Evaluation   Living Environment   Lives With spouse   Living Arrangements house   Home Accessibility stairs to enter home;stairs within home   Number of Stairs to Enter Home 2   Number of Stairs Within Home 14   Transportation Available car;family or friend will provide   Self-Care   Dominant Hand right   Regular Exercise no   Equipment Currently Used at Home (has ww at home she can use)   Activity/Exercise/Self-Care Comment walk in shower   Functional Level Prior   Ambulation 0-->independent   Transferring 0-->independent   Toileting 0-->independent   Bathing 0-->independent   Dressing 0-->independent   Eating 0-->independent   Communication 0-->understands/communicates without difficulty   Swallowing 0-->swallows foods/liquids without difficulty   Cognition 0 - no cognition issues reported   Fall history within last six months no   Prior Functional Level Comment Pts  able to A with ADL/IADL's as needed when return home.    General Information   Onset of Illness/Injury or Date of Surgery - Date 02/03/17   Referring Physician Juan Moncada MD   Patient/Family Goals Statement return home with  A   Additional Occupational Profile Info/Pertinent History of Current Problem s/p L BEATRICE revision on 2/3/17, found to need PPM s/p PPM 2/6/17.   Precautions/Limitations fall precautions  (no L hip abduction and pacemaker precautions. )   Weight-Bearing Status - LLE weight-bearing as tolerated   Cognitive Status Examination   Orientation orientation to person, place and time   Level of Consciousness alert   Able to Follow Commands WNL/WFL   Personal Safety (Cognitive) WNL/WFL   Memory intact   Attention No deficits were identified   Organization/Problem Solving No deficits were identified   Executive Function No deficits were identified   Sensory Examination   Sensory Quick Adds No deficits were identified   Pain Assessment   Patient  Currently in Pain (min L hip pain did not rate)   Range of Motion (ROM)   ROM Comment B UE AROM WFL    Strength   Strength Comments B UE strength WFL   Transfer Skill: Bed to Chair/Chair to Bed   Level of [place]: Bed to Chair stand-by assist   Physical Assist/Nonphysical Assist: Bed to Chair 1 person assist;verbal cues   Weight-Bearing Restrictions weight-bearing as tolerated   Assistive Device - Transfer Skill Bed to Chair Chair to Bed Rehab Eval standard walker   Transfer Skill: Sit to Stand   Physical Assist/Nonphysical Assist: Sit/Stand supervision;1 person assist;verbal cues   Transfer Skill: Sit to Stand weight-bearing as tolerated   Assistive Device for Transfer: Sit/Stand standard walker   Transfer Skill: Toilet Transfer   Level of [place]: Toilet stand-by assist   Physical Assist/Nonphysical Assist: Toilet 1 person assist;verbal cues   Weight-Bearing Restrictions: Toilet weight-bearing as tolerated   Assistive Device standard walker;grab bars   Lower Body Dressing   Level of [place]: Dress Lower Body minimum assist (75% patients effort)   Physical Assist/Nonphysical Assist: Dress Lower Body 1 person assist;verbal cues   Toileting   Level of [place]: Toilet independent   Grooming   Level of [place]: Grooming stand-by assist   Physical Assist/Nonphysical Assist: Grooming 1 person assist   Eating/Self Feeding   Level of [place]: Eating independent   Instrumental Activities of Daily Living (IADL)   Previous Responsibilities meal prep;housekeeping;laundry;shopping;medication management;finances;driving;yardwork   Activities of Daily Living Analysis   Impairments Contributing to Impaired Activities of Daily Living balance impaired;pain;post surgical precautions;ROM decreased;strength decreased   General Therapy Interventions   Planned Therapy Interventions ADL retraining;transfer training   Clinical Impression   Criteria for Skilled Therapeutic Interventions Met yes, treatment  OF SYSTEMS:   A review of systems was performed and findings relevant to this injury are also included in the HPI, Mechanism of Injury, or as below. Additional/general constitutional review of systems completely benign. Patient states appetite good, weight stable. No  or bowel pattern changes. Denies any head or neck discomforts. Denies hip discomforts as well as any knee discomfort.    SMOKING HISTORY: Smoking history is reviewed in the record.    ALLERGIES: Allergies are reviewed in the record.     CURRENT MEDICATIONS: Current Medications are reviewed in the record.      Medications relevant to this injury include: None currently      PROBLEM LIST: The Problem List is reviewed in the record and findings relevant to the injury are included in the HPI.    PAST HISTORY: Past medical history and past surgical history are reviewed in the record and findings relevant to the injury are included in the HPI.       SOCIAL HISTORY:  Reviewed    PHYSICAL EXAM: Latonya is a well developed female, who appears in no acute distress.    She is awake, alert and cooperative and pleasant.   Vitals:    12/27/17 1057   BP: 138/84   Pulse: 78   Resp: 20   Temp: 98.4 °F (36.9 °C)     Patient is alert orientated ×3 appropriate in response and affect. No hint of soft tissue swellings or discolorations. Excellent range of motion all joints. Nerves, arteries, veins and lymphatics intact. No focal neurologic deficit or hint of vascular compromise.    DIAGNOSTICS:  None further  ASSESSMENT:    DIAGNOSIS:    1. Acute pain of both knees    2. Pain in right hip        CAUSATION:  Work-related: Yes    Maximal Medical Improvement(MMI):  December 27, 2017    Permanent Partial Disability (PPD):  0%    WORK STATUS:  Completely unrestricted    PLAN:  Employee may return to work without restrictions.  Employee is discharged from care.   Return Date: 12/27/2017       Reached maximum medical improvement as of 12/27/2017     RESTRICTIONS:     No  "indicated   OT Diagnosis decreased ADL's and functional mobility   Influenced by the following impairments impaired strength and ROM in L hip, min pain, hip restrictions and pacemaker restrictions and decreased balance   Assessment of Occupational Performance 3-5 Performance Deficits   Identified Performance Deficits impaired independence and safety with basic ADL's ie dressing, bathing, toileting and functional mobility   Clinical Decision Making (Complexity) Moderate complexity   Therapy Frequency (Eval and treatment only)   Anticipated Equipment Needs at Discharge raised toilet seat  (long shoe horn)   Anticipated Discharge Disposition Home with Assist  ( A with ADL/IADL's as needed)   Risks and Benefits of Treatment have been explained. Yes   Patient, Family & other staff in agreement with plan of care Yes   [facility] AM-PAC  \"6 Clicks\" Daily Activity Inpatient Short Form   1. Putting on and taking off regular lower body clothing? 3 - A Little   2. Bathing (including washing, rinsing, drying)? 3 - A Little   3. Toileting, which includes using toilet, bedpan or urinal? 3 - A Little   4. Putting on and taking off regular upper body clothing? 4 - None   5. Taking care of personal grooming such as brushing teeth? 4 - None   6. Eating meals? 4 - None   Daily Activity Raw Score (Score out of 24.Lower scores equate to lower levels of function) 21   Total Evaluation Time   Total Evaluation Time (Minutes) 10     " Restrictions    TREATMENT PLAN:  Medications for this injury/condition:   None further  Referral/Consult:  Diagnostic Testing:   None         Instructions:   BE SAFE and HEALTHY NEW YEAR    NEXT RETURN VISIT: None further   Thank you for the privilege of providing medical care for this injury/condition.  If there are any questions, please call the occupational health clinic at Dept: [phone].    Electronically signed on 12/27/2017 at 11:07 AM by:   Michele Brito PA-C   [place] [facility]    The physician below agrees with the plan and restrictions placed on the patient by the provider above.  Deyanira Gracia MD

## 2018-01-22 ENCOUNTER — ALLIED HEALTH/NURSE VISIT (OUTPATIENT)
Dept: CARDIOLOGY | Facility: CLINIC | Age: 73
End: 2018-01-22
Payer: MEDICARE

## 2018-01-22 DIAGNOSIS — Z95.0 CARDIAC PACEMAKER IN SITU: Primary | ICD-10-CM

## 2018-01-22 PROCEDURE — 93294 REM INTERROG EVL PM/LDLS PM: CPT | Performed by: INTERNAL MEDICINE

## 2018-01-22 PROCEDURE — 93296 REM INTERROG EVL PM/IDS: CPT | Performed by: INTERNAL MEDICINE

## 2018-01-22 NOTE — PROGRESS NOTES
[employer] Accolade (D) Remote PPM Device Check  AP: 0 % : 99 %  Mode: DDD        Presenting Rhythm: AS/  Heart Rate: Adequate rates per histogram  Sensing: Stable    Pacing Threshold: Stable    Impedance: Stable  Battery Status: 13 years  Atrial Arrhythmia: 1 mode switch episode for PAT lastign 12 seconds, ventricular rates controlled. 5 PMT episodes occurred, EGMs show atrial rate at max track rate of 130bpm.   Ventricular Arrhythmia: None     Care Plan: F/u annual threshold in 3 months. Gave patient results over the phone. Rudy,CVT

## 2018-01-22 NOTE — MR AVS SNAPSHOT
After Visit Summary   1/22/2018    Anne Rangel    MRN: [id number]           Patient Information     Date Of Birth          1945        Visit Information        Provider Department      1/22/2018 4:30 PM PARKER ARZATE [facility]   [place]        Today's Diagnoses     Cardiac pacemaker in situ    -  1       Follow-ups after your visit        Your next 10 appointments already scheduled     Jan 22, 2018  4:30 PM CST   Remote PPM Check with CANALES TECH1   [facility]   [place] ([facility])    [address] [place] [address] [address]  [facility] [address] [phone]           This appointment is for a remote check of your pacemaker.  This is not an appointment at the office.              Who to contact     If you have questions or need follow up information about today's clinic visit or your schedule please contact [facility] directly at [phone].  Normal or non-critical lab and imaging results will be communicated to you by OANDAhart, letter or phone within 4 business days after the clinic has received the results. If you do not hear from us within 7 days, please contact the clinic through OANDAhart or phone. If you have a critical or abnormal lab result, we will notify you by phone as soon as possible.  Submit refill requests through [employer] or call your pharmacy and they will forward the refill request to us. Please allow 3 business days for your refill to be completed.          Additional Information About Your Visit        MyChart Information     [employer] gives you secure access to your electronic health record. If you see a primary care provider, you can also send messages to your care team and make appointments. If you have questions, please call your primary care clinic.  If you do not have a primary care provider, please call [phone] and they will assist you.        Care EveryWhere ID     This  is your Care EveryWhere ID. This could be used by other organizations to access your [place] medical records  [id number]         Blood Pressure from Last 3 Encounters:   05/18/17 110/68   02/07/17 123/88   01/10/17 114/73    Weight from Last 3 Encounters:   05/18/17 58.3 kg (128 lb 9.6 oz)   02/07/17 59.5 kg (131 lb 3.2 oz)   03/04/15 58.3 kg (128 lb 8 oz)              We Performed the Following     INTERROGATION DEVICE EVAL REMOTE, PACER/ICD (73338)     PM DEVICE INTERROGATE REMOTE (50330)        Primary Care Provider Office Phone # Fax #    Nevaeh Wood [phone]       SARATH CHAMBERS [address] [facility] [address]  [facility] [address]        Equal Access to Services     [facility] : Hadii aad ku hadasho Soomaali, waaxda luqadaha, qaybta kaalmada adeegyada, waxay norain hayaan bess benites . So [facility] [phone].    ATENCIÓN: Si habla español, tiene a hinson disposición servicios gratuitos de asistencia lingüística. Llame al [phone].    We comply with applicable federal civil rights laws and [place] laws. We do not discriminate on the basis of race, color, national origin, age, disability, sex, sexual orientation, or gender identity.            Thank you!     Thank you for choosing [facility]  for your care. Our goal is always to provide you with excellent care. Hearing back from our patients is one way we can continue to improve our services. Please take a few minutes to complete the written survey that you may receive in the mail after your visit with us. Thank you!             Your Updated Medication List - Protect others around you: Learn how to safely use, store and throw away your medicines at www.disposemymeds.org.          This list is accurate as of: 1/22/18  9:02 AM.  Always use your most recent med list.                   Brand Name Dispense Instructions for use Diagnosis    CELEBREX PO      Take 200 mg by mouth daily         IMITREX PO      Take 100 mg by mouth daily as needed for migraine        LEXAPRO PO      Take 20 mg by mouth daily        LIPITOR 40 MG tablet   Generic drug:  atorvastatin     30 tablet    Take 40 mg by mouth At Bedtime        LORAZEPAM PO      Take 0.5 mg by mouth At Bedtime (0.5 x 1 mg tablet = 0.5 mg dose)        LUNESTA 2 MG tablet   Generic drug:  eszopiclone     30 tablet    Take 2 mg by mouth At Bedtime        omeprazole 20 MG tablet     30 tablet    Take 1 tablet (20 mg) by mouth daily Take 30-60 minutes before a meal.        polyethylene glycol powder    MIRALAX/GLYCOLAX     Take 17 g by mouth daily as needed for constipation        PROBIOTIC & ACIDOPHILUS EX ST PO      Take 1 capsule by mouth daily        traMADol 50 MG tablet    ULTRAM    20 tablet    Take 1-2 tablets ( mg) by mouth every 6 hours as needed for moderate pain        VITAMIN C PO      Take 500 mg by mouth daily        VITAMIN D (CHOLECALCIFEROL) PO      Take 5,000 Units by mouth three times a week (Monday, Wednesday and Friday)        WELLBUTRIN XL PO      Take 150 mg by mouth daily

## 2018-04-23 ENCOUNTER — OFFICE VISIT (OUTPATIENT)
Dept: CARDIOLOGY | Facility: CLINIC | Age: 73
End: 2018-04-23
Payer: MEDICARE

## 2018-04-23 ENCOUNTER — ALLIED HEALTH/NURSE VISIT (OUTPATIENT)
Dept: CARDIOLOGY | Facility: CLINIC | Age: 73
End: 2018-04-23
Payer: MEDICARE

## 2018-04-23 VITALS
DIASTOLIC BLOOD PRESSURE: 75 MMHG | HEART RATE: 87 BPM | SYSTOLIC BLOOD PRESSURE: 128 MMHG | BODY MASS INDEX: 24.24 KG/M2 | HEIGHT: 62 IN | WEIGHT: 131.7 LBS

## 2018-04-23 DIAGNOSIS — I44.2 ATRIOVENTRICULAR BLOCK, COMPLETE (H): ICD-10-CM

## 2018-04-23 DIAGNOSIS — I45.10 RBBB: ICD-10-CM

## 2018-04-23 DIAGNOSIS — Z95.0 CARDIAC PACEMAKER IN SITU: ICD-10-CM

## 2018-04-23 DIAGNOSIS — E78.00 PURE HYPERCHOLESTEROLEMIA: Primary | ICD-10-CM

## 2018-04-23 PROCEDURE — 93280 PM DEVICE PROGR EVAL DUAL: CPT | Performed by: INTERNAL MEDICINE

## 2018-04-23 PROCEDURE — 99213 OFFICE O/P EST LOW 20 MIN: CPT | Performed by: INTERNAL MEDICINE

## 2018-04-23 NOTE — PROGRESS NOTES
HPI and Plan:   See dictation  [id number]  No orders of the defined types were placed in this encounter.      No orders of the defined types were placed in this encounter.      There are no discontinued medications.      Encounter Diagnoses   Name Primary?     Cardiac pacemaker in situ      Pure hypercholesterolemia Yes     Atrioventricular block 2:1      RBBB        CURRENT MEDICATIONS:  Current Outpatient Prescriptions   Medication Sig Dispense Refill     Ascorbic Acid (VITAMIN C PO) Take 500 mg by mouth daily       atorvastatin (LIPITOR) 40 MG tablet Take 40 mg by mouth At Bedtime  30 tablet 1     BuPROPion HCl (WELLBUTRIN XL PO) Take 150 mg by mouth daily       Celecoxib (CELEBREX PO) Take 200 mg by mouth daily       Escitalopram Oxalate (LEXAPRO PO) Take 20 mg by mouth daily       eszopiclone (LUNESTA) 2 MG tablet Take 2 mg by mouth At Bedtime  30 tablet 5     LORAZEPAM PO Take 0.5 mg by mouth At Bedtime (0.5 x 1 mg tablet = 0.5 mg dose)       omeprazole 20 MG tablet Take 1 tablet (20 mg) by mouth daily Take 30-60 minutes before a meal. 30 tablet 1     polyethylene glycol (MIRALAX/GLYCOLAX) powder Take 17 g by mouth daily as needed for constipation       Probiotic Product (PROBIOTIC & ACIDOPHILUS EX ST PO) Take 1 capsule by mouth daily       SUMAtriptan Succinate (IMITREX PO) Take 100 mg by mouth daily as needed for migraine       traMADol (ULTRAM) 50 MG tablet Take 1-2 tablets ( mg) by mouth every 6 hours as needed for moderate pain 20 tablet 0     VITAMIN D, CHOLECALCIFEROL, PO Take 5,000 Units by mouth three times a week (Monday, Wednesday and Friday)         ALLERGIES     Allergies   Allergen Reactions     Dilaudid [Hydromorphone] Nausea and Vomiting     Doxepin Unknown     Meperidine Nausea and Vomiting     Demerol       PAST MEDICAL HISTORY:  Past Medical History:   Diagnosis Date     Anemia      Carpal tunnel syndrome      Chronic fatigue syndrome 11/00     Decreased libido      Depression with anxiety       Fatigue      Gastroesophageal reflux disease      HA (headache)      iamAFTERCARE FOLLOWING JOINT REPLACEMENT 7/23/2007     Irritable bowel syndrome 11/00     Myalgia and myositis, unspecified 11/00     Near syncope 5/17/2017     Neutropenia 00     Noninfectious ileitis      Osteoarthrosis, unspecified whether generalized or localized, hand 11/00     Osteoarthrosis, unspecified whether generalized or localized, lower leg 11/00     Pure hypercholesterolemia 11/00     RBBB      Renal disease     mld     Uncomplicated asthma      Urticaria, unspecified 00       PAST SURGICAL HISTORY:  Past Surgical History:   Procedure Laterality Date     ARTHROPLASTY REVISION HIP Left 2/3/2017    Procedure: ARTHROPLASTY REVISION HIP;  Surgeon: Juan Moncada MD;  Location: [facility] OR     C NONSPECIFIC PROCEDURE      s/p Rt. Carpal tunnel release     C NONSPECIFIC PROCEDURE      s/p bilat Tubal ligation     GI SURGERY      hemorrhoid repair     GYN SURGERY      tubal ligation     ORTHOPEDIC SURGERY      right sabine-arthroplasty, trigger finger repair, left knee arthroscopy and replacement, bilateral hip replacement,       FAMILY HISTORY:  Family History   Problem Relation Age of Onset     Hypertension Mother      Alzheimer Disease Mother      Memory issues- unsure if alzheimers     Arthritis Mother      osteoarthritis     Depression Mother      GASTROINTESTINAL DISEASE Mother      acid reflux     Allergies Father      Neurologic Disorder Father      migraines     Respiratory Father      chronic brochitis- born during a flu epidemic in 1918     CEREBROVASCULAR DISEASE Maternal Grandmother      series of TIA's     DIABETES Maternal Grandfather      adult onset     CANCER Maternal Grandfather      of stomach or esophagus-heavy smoker     C.A.D. Paternal Grandmother      angina     Depression Daughter      Neurologic Disorder Sister      migraines       SOCIAL HISTORY:  Social History     Social History     Marital status:       "Spouse name: Christiano Rangel     Number of children: 3     Years of education: Masters+     Occupational History     Nursing Home [employer]     Social History Main Topics     Smoking status: Never Smoker     Smokeless tobacco: Never Used     Alcohol use Yes      Comment: 1 Drink with dinner     Drug use: No     Sexual activity: Yes     Partners: Male     Other Topics Concern      Service No     Blood Transfusions Yes     C-Sect. 1974, lost a lot of blood-given 6? 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               Review of Systems:  Skin:  Negative       Eyes:  Positive for glasses    ENT:  Negative      Respiratory:  Negative for shortness of breath;dyspnea on exertion;cough     Cardiovascular:  Negative for;palpitations;chest pain;edema;syncope or near-syncope;exercise intolerance;fatigue;lightheadedness;dizziness      Gastroenterology: Negative      Genitourinary:  Negative      Musculoskeletal:  Negative      Neurologic:  Negative      Psychiatric:  Negative      Heme/Lymph/Imm:  Negative      Endocrine:  Negative        Physical Exam:  Vitals: /75  Pulse 87  Ht 1.568 m (5' 1.75\")  " Wt 59.7 kg (131 lb 11.2 oz)  BMI 24.28 kg/m2    Constitutional:  cooperative, alert and oriented, well developed, well nourished, in no acute distress        Skin:  warm and dry to the touch, no apparent skin lesions or masses noted          Head:  normocephalic, no masses or lesions        Eyes:  pupils equal and round, conjunctivae and lids unremarkable, sclera white, no xanthalasma, EOMS intact, no nystagmus        Lymph:No Cervical lymphadenopathy present     ENT:  no pallor or cyanosis, dentition good        Neck:  carotid pulses are full and equal bilaterally, JVP normal, no carotid bruit        Respiratory:  normal breath sounds, clear to auscultation, normal A-P diameter, normal symmetry, normal respiratory excursion, no use of accessory muscles         Cardiac: regular rhythm, normal S1/S2, no S3 or S4, apical impulse not displaced, no murmurs, gallops or rubs                pulses full and equal, no bruits auscultated                                        GI:  abdomen soft, non-tender, BS normoactive, no mass, no HSM, no bruits        Extremities and Muscular Skeletal:  no deformities, clubbing, cyanosis, erythema observed              Neurological:  no gross motor deficits        Psych:  Alert and Oriented x 3        CC  MD SARATH Hay [facility]  [address] [place] [address]

## 2018-04-23 NOTE — PROGRESS NOTES
[employer] MRI Pacemaker Device Check  AP: <1 % : 99 %  Mode: DDD        Underlying Rhythm: SR with first degree AVB  Heart Rate: Adequate variation per histogram. A few PMT episodes due to pt reaching upper rate with exercise. No symptoms.   Sensing: stable    Pacing Threshold: stable   Impedance: stable.  Battery Status: 12.5 years  Device Site: healed  Atrial Arrhythmia: none  Ventricular Arrhythmia: none  Setting Change: AV delay extended to 200 ms (min) and 300 ms (max) with resulting AS/VS. She was implanted fro 2nd degree AVB. Dr Monson agreed with changes.     Care Plan: f/u 3 months remote PPM check.

## 2018-04-23 NOTE — PROGRESS NOTES
Service Date: 2018      HISTORY OF PRESENT ILLNESS:  Thank you for allowing me to participate in the care of this very delightful patient.  As you know, Anne is a 72-year-old female with a history of AV conduction disease manifested with right bundle branch block and 2:1 AV conduction associated with dyspnea on exertion and lightheadedness.  Subsequently, a dual-chamber pacemaker was implanted with resolution of her symptoms.  Her pacemaker will be checked later today but previous checks show appropriate function.  She is a pacing 100% of the time in the ventricles.      The patient is doing quite well.  She denies any chest pain, chest discomfort.  She is looking forward to a vacation in [place] coming up soon and wondered whether she needs to bring the remote check monitor with her.  I told her do not bother with it and enjoy her vacation.  I do not anticipate we are going to find anything new from the device check later today.  If that is the case, will come back to see Nichelle Curtis, one of our advanced practice providers, in a year and see me every other year.      cc:   MD Osvaldo Bonilla [address], [place] [address]         DOMENICA HOWELL MD             D: 2018   T: 2018   MT: SYD      Name:     ANNE AGUILAR   MRN:      [id number]        Account:      [id number]   :      1945           Service Date: 2018      Document: [id number]

## 2018-04-23 NOTE — MR AVS SNAPSHOT
After Visit Summary   4/23/2018    Anne Rangel    MRN: [id number]           Patient Information     Date Of Birth          1945        Visit Information        Provider Department      4/23/2018 2:30 PM CANALES PRINCEN [facility]        Today's Diagnoses     Cardiac pacemaker in situ        Atrioventricular block 2:1           Follow-ups after your visit        Your next 10 appointments already scheduled     Apr 23, 2018  2:30 PM CDT   Pacemaker Check with CANALES DCRN   [facility] ([facility])    [address] [facility] [address]  [facility] [address] [phone] [address]            Jul 31, 2018  1:45 PM CDT   Remote PPM Check with CANALES TECH1   [facility] ([facility])    [address]  [facility] [address] [phone] [address]           This appointment is for a remote check of your pacemaker.  This is not an appointment at the office.              Future tests that were ordered for you today     Open Future Orders        Priority Expected Expires Ordered    Follow-Up with Cardiac Advanced Practice Provider Routine 4/23/2019 4/24/2019 4/23/2018            Who to contact     If you have questions or need follow up information about today's clinic visit or your schedule please contact [facility] directly at [phone].  Normal or non-critical lab and imaging results will be communicated to you by MyChart, letter or phone within 4 business days after the clinic has received the results. If you do not hear from us within 7 days, please contact the clinic through MyChart or phone. If you have a critical or abnormal lab result, we will notify you by phone as soon as possible.  Submit refill requests through [employer] or call your pharmacy and they will forward the refill request to us. Please allow 3 business days for your  refill to be completed.          Additional Information About Your Visit        MyChart Information     [employer] gives you secure access to your electronic health record. If you see a primary care provider, you can also send messages to your care team and make appointments. If you have questions, please call your primary care clinic.  If you do not have a primary care provider, please call [phone] and they will assist you.        Care EveryWhere ID     This is your Care EveryWhere ID. This could be used by other organizations to access your [place] medical records  [id number]         Blood Pressure from Last 3 Encounters:   04/23/18 128/75   05/18/17 110/68   02/07/17 123/88    Weight from Last 3 Encounters:   04/23/18 59.7 kg (131 lb 11.2 oz)   05/18/17 58.3 kg (128 lb 9.6 oz)   02/07/17 59.5 kg (131 lb 3.2 oz)              We Performed the Following     Follow-Up with Device Clinic     PM DEVICE PROGRAMMING EVAL, DUAL LEAD PACER (58219)        Primary Care Provider Office Phone # Fax #    Nevaeh Wood [phone]       SAR[facility] CHAMBERS [address]   [facility] [address]        Equal Access to Services     JANAY CRAWFORD : Hadii aad ku hadasho Soomaali, waaxda luqadaha, qaybta kaalmada adeegyada, waxay ney hayjoselin gibson. So [facility] [phone].    ATENCIÓN: Si habla español, tiene a hinson disposición servicios gratuitos de asistencia lingüística. Llame al [phone].    We comply with applicable federal civil rights laws and [place] laws. We do not discriminate on the basis of race, color, national origin, age, disability, sex, sexual orientation, or gender identity.            Thank you!     Thank you for choosing [facility]  for your care. Our goal is always to provide you with excellent care. Hearing back from our patients is one way we can continue to improve our services. Please take a few minutes to complete the  written survey that you may receive in the mail after your visit with us. Thank you!             Your Updated Medication List - Protect others around you: Learn how to safely use, store and throw away your medicines at www.disposemymeds.org.          This list is accurate as of 4/23/18  2:13 PM.  Always use your most recent med list.                   Brand Name Dispense Instructions for use Diagnosis    CELEBREX PO      Take 200 mg by mouth daily        IMITREX PO      Take 100 mg by mouth daily as needed for migraine        LEXAPRO PO      Take 20 mg by mouth daily        LIPITOR 40 MG tablet   Generic drug:  atorvastatin     30 tablet    Take 40 mg by mouth At Bedtime        LORAZEPAM PO      Take 0.5 mg by mouth At Bedtime (0.5 x 1 mg tablet = 0.5 mg dose)        LUNESTA 2 MG tablet   Generic drug:  eszopiclone     30 tablet    Take 2 mg by mouth At Bedtime        omeprazole 20 MG tablet     30 tablet    Take 1 tablet (20 mg) by mouth daily Take 30-60 minutes before a meal.        polyethylene glycol powder    MIRALAX/GLYCOLAX     Take 17 g by mouth daily as needed for constipation        PROBIOTIC & ACIDOPHILUS EX ST PO      Take 1 capsule by mouth daily        traMADol 50 MG tablet    ULTRAM    20 tablet    Take 1-2 tablets ( mg) by mouth every 6 hours as needed for moderate pain        VITAMIN C PO      Take 500 mg by mouth daily        VITAMIN D (CHOLECALCIFEROL) PO      Take 5,000 Units by mouth three times a week (Monday, Wednesday and Friday)        WELLBUTRIN XL PO      Take 150 mg by mouth daily

## 2018-04-23 NOTE — LETTER
2018      MD Osvaldo Hay [address]  [facility] [address]      RE: Anne Aguilar       Dear Colleague,    I had the pleasure of seeing Anne Aguilar in the [facility].    Service Date: 2018      HISTORY OF PRESENT ILLNESS:  Thank you for allowing me to participate in the care of this very delightful patient.  As you know, Anne is a 72-year-old female with a history of AV conduction disease manifested with right bundle branch block and 2:1 AV conduction associated with dyspnea on exertion and lightheadedness.  Subsequently, a dual-chamber pacemaker was implanted with resolution of her symptoms.  Her pacemaker will be checked later today but previous checks show appropriate function.  She is a pacing 100% of the time in the ventricles.      The patient is doing quite well.  She denies any chest pain, chest discomfort.  She is looking forward to a vacation in [place] coming up soon and wondered whether she needs to bring the remote check monitor with her.  I told her do not bother with it and enjoy her vacation.  I do not anticipate we are going to find anything new from the device check later today.  If that is the case, will come back to see Nichelle Curtis, one of our advanced practice providers, in a year and see me every other year.      cc:   MD Osvaldo Bonilla   [address] [address]         DOMENICA HOWELL MD             D: 2018   T: 2018   MT: SYD      Name:     ANNE AGUILAR   MRN:      [id number]        Account:      [id number]   :      1945           Service Date: 2018      Document: [id number]         Outpatient Encounter Prescriptions as of 2018   Medication Sig Dispense Refill     Ascorbic Acid (VITAMIN C PO) Take 500 mg by mouth daily       atorvastatin (LIPITOR) 40 MG tablet Take 40 mg by mouth At Bedtime  30 tablet 1     BuPROPion  HCl (WELLBUTRIN XL PO) Take 150 mg by mouth daily       Celecoxib (CELEBREX PO) Take 200 mg by mouth daily       Escitalopram Oxalate (LEXAPRO PO) Take 20 mg by mouth daily       eszopiclone (LUNESTA) 2 MG tablet Take 2 mg by mouth At Bedtime  30 tablet 5     LORAZEPAM PO Take 0.5 mg by mouth At Bedtime (0.5 x 1 mg tablet = 0.5 mg dose)       omeprazole 20 MG tablet Take 1 tablet (20 mg) by mouth daily Take 30-60 minutes before a meal. 30 tablet 1     polyethylene glycol (MIRALAX/GLYCOLAX) powder Take 17 g by mouth daily as needed for constipation       Probiotic Product (PROBIOTIC & ACIDOPHILUS EX ST PO) Take 1 capsule by mouth daily       SUMAtriptan Succinate (IMITREX PO) Take 100 mg by mouth daily as needed for migraine       traMADol (ULTRAM) 50 MG tablet Take 1-2 tablets ( mg) by mouth every 6 hours as needed for moderate pain 20 tablet 0     VITAMIN D, CHOLECALCIFEROL, PO Take 5,000 Units by mouth three times a week (Monday, Wednesday and Friday)       No facility-administered encounter medications on file as of 4/23/2018.        Again, thank you for allowing me to participate in the care of your patient.      Sincerely,    Eliud Shaffer MD     [facility]

## 2018-04-23 NOTE — MR AVS SNAPSHOT
After Visit Summary   4/23/2018    Anne Rangel    MRN: [id number]           Patient Information     Date Of Birth          1945        Visit Information        Provider Department      4/23/2018 1:15 PM Eliud Monson MD [facility]        Today's Diagnoses     Pure hypercholesterolemia    -  1    Cardiac pacemaker in situ        Atrioventricular block 2:1        RBBB           Follow-ups after your visit        Additional Services     Follow-Up with Cardiac Advanced Practice Provider                 Your next 10 appointments already scheduled     Apr 23, 2018  2:30 PM CDT   Pacemaker Check with PARKER DILLARD   [facility] ([facility])    [facility]  [facility] [address] [phone] OPT 2              Future tests that were ordered for you today     Open Future Orders        Priority Expected Expires Ordered    Follow-Up with Cardiac Advanced Practice Provider Routine 4/23/2019 4/24/2019 4/23/2018            Who to contact     If you have questions or need follow up information about today's clinic visit or your schedule please contact [facility] directly at [phone].  Normal or non-critical lab and imaging results will be communicated to you by [employer], letter or phone within 4 business days after the clinic has received the results. If you do not hear from us within 7 days, please contact the clinic through [employer] or phone. If you have a critical or abnormal lab result, we will notify you by phone as soon as possible.  Submit refill requests through [employer] or call your pharmacy and they will forward the refill request to us. Please allow 3 business days for your refill to be completed.          Additional Information About Your Visit        [employer] Information     [employer] gives you secure access to your electronic health record. If you see a  "primary care provider, you can also send messages to your care team and make appointments. If you have questions, please call your primary care clinic.  If you do not have a primary care provider, please call [phone] and they will assist you.        Care EveryWhere ID     This is your Care EveryWhere ID. This could be used by other organizations to access your [facility] medical records  [id number]        Your Vitals Were     Pulse Height BMI (Body Mass Index)             87 1.568 m (5' 1.75\") 24.28 kg/m2          Blood Pressure from Last 3 Encounters:   04/23/18 128/75   05/18/17 110/68   02/07/17 123/88    Weight from Last 3 Encounters:   04/23/18 59.7 kg (131 lb 11.2 oz)   05/18/17 58.3 kg (128 lb 9.6 oz)   02/07/17 59.5 kg (131 lb 3.2 oz)              We Performed the Following     Follow-Up with Electrophysiologist        Primary Care Provider Office Phone # Fax #    Nevaeh Wood [phone]       [facility] [address] [facility] [address]  [facility] [address]        Equal Access to Services     [facility] : Hadii aad ku hadasho Sonettie, waaxda luqadaha, qaybta kaalmada adeegyada, juan benites . So [facility] [phone].    ATENCIÓN: Si habla español, tiene a hinson disposición servicios gratuitos de asistencia lingüística. [facility] [phone].    We comply with applicable federal civil rights laws and [place] laws. We do not discriminate on the basis of race, color, national origin, age, disability, sex, sexual orientation, or gender identity.            Thank you!     Thank you for choosing [facility]  for your care. Our goal is always to provide you with excellent care. Hearing back from our patients is one way we can continue to improve our services. Please take a few minutes to complete the written survey that you may receive in the mail after your visit with us. Thank you!             Your Updated Medication List - " Protect others around you: Learn how to safely use, store and throw away your medicines at www.disposemymeds.org.          This list is accurate as of 4/23/18  1:46 PM.  Always use your most recent med list.                   Brand Name Dispense Instructions for use Diagnosis    CELEBREX PO      Take 200 mg by mouth daily        IMITREX PO      Take 100 mg by mouth daily as needed for migraine        LEXAPRO PO      Take 20 mg by mouth daily        LIPITOR 40 MG tablet   Generic drug:  atorvastatin     30 tablet    Take 40 mg by mouth At Bedtime        LORAZEPAM PO      Take 0.5 mg by mouth At Bedtime (0.5 x 1 mg tablet = 0.5 mg dose)        LUNESTA 2 MG tablet   Generic drug:  eszopiclone     30 tablet    Take 2 mg by mouth At Bedtime        omeprazole 20 MG tablet     30 tablet    Take 1 tablet (20 mg) by mouth daily Take 30-60 minutes before a meal.        polyethylene glycol powder    MIRALAX/GLYCOLAX     Take 17 g by mouth daily as needed for constipation        PROBIOTIC & ACIDOPHILUS EX ST PO      Take 1 capsule by mouth daily        traMADol 50 MG tablet    ULTRAM    20 tablet    Take 1-2 tablets ( mg) by mouth every 6 hours as needed for moderate pain        VITAMIN C PO      Take 500 mg by mouth daily        VITAMIN D (CHOLECALCIFEROL) PO      Take 5,000 Units by mouth three times a week (Monday, Wednesday and Friday)        WELLBUTRIN XL PO      Take 150 mg by mouth daily

## 2018-04-23 NOTE — LETTER
4/23/2018    MD Osvaldo Hay [address]  [facility] [address]    RE: Anne Rangel       Dear Colleague,    I had the pleasure of seeing Anne Rangel in the [facility].    HPI and Plan:   See dictation  [id number]  No orders of the defined types were placed in this encounter.      No orders of the defined types were placed in this encounter.      There are no discontinued medications.      Encounter Diagnoses   Name Primary?     Cardiac pacemaker in situ      Pure hypercholesterolemia Yes     Atrioventricular block 2:1      RBBB        CURRENT MEDICATIONS:  Current Outpatient Prescriptions   Medication Sig Dispense Refill     Ascorbic Acid (VITAMIN C PO) Take 500 mg by mouth daily       atorvastatin (LIPITOR) 40 MG tablet Take 40 mg by mouth At Bedtime  30 tablet 1     BuPROPion HCl (WELLBUTRIN XL PO) Take 150 mg by mouth daily       Celecoxib (CELEBREX PO) Take 200 mg by mouth daily       Escitalopram Oxalate (LEXAPRO PO) Take 20 mg by mouth daily       eszopiclone (LUNESTA) 2 MG tablet Take 2 mg by mouth At Bedtime  30 tablet 5     LORAZEPAM PO Take 0.5 mg by mouth At Bedtime (0.5 x 1 mg tablet = 0.5 mg dose)       omeprazole 20 MG tablet Take 1 tablet (20 mg) by mouth daily Take 30-60 minutes before a meal. 30 tablet 1     polyethylene glycol (MIRALAX/GLYCOLAX) powder Take 17 g by mouth daily as needed for constipation       Probiotic Product (PROBIOTIC & ACIDOPHILUS EX ST PO) Take 1 capsule by mouth daily       SUMAtriptan Succinate (IMITREX PO) Take 100 mg by mouth daily as needed for migraine       traMADol (ULTRAM) 50 MG tablet Take 1-2 tablets ( mg) by mouth every 6 hours as needed for moderate pain 20 tablet 0     VITAMIN D, CHOLECALCIFEROL, PO Take 5,000 Units by mouth three times a week (Monday, Wednesday and Friday)         ALLERGIES     Allergies   Allergen Reactions     Dilaudid [Hydromorphone] Nausea and Vomiting     Doxepin  Unknown     Meperidine Nausea and Vomiting     Demerol       PAST MEDICAL HISTORY:  Past Medical History:   Diagnosis Date     Anemia      Carpal tunnel syndrome      Chronic fatigue syndrome 11/00     Decreased libido      Depression with anxiety      Fatigue      Gastroesophageal reflux disease      HA (headache)      iamAFTERCARE FOLLOWING JOINT REPLACEMENT 7/23/2007     Irritable bowel syndrome 11/00     Myalgia and myositis, unspecified 11/00     Near syncope 5/17/2017     Neutropenia 00     Noninfectious ileitis      Osteoarthrosis, unspecified whether generalized or localized, hand 11/00     Osteoarthrosis, unspecified whether generalized or localized, lower leg 11/00     Pure hypercholesterolemia 11/00     RBBB      Renal disease     mld     Uncomplicated asthma      Urticaria, unspecified 00       PAST SURGICAL HISTORY:  Past Surgical History:   Procedure Laterality Date     ARTHROPLASTY REVISION HIP Left 2/3/2017    Procedure: ARTHROPLASTY REVISION HIP;  Surgeon: Juan Moncada MD;  Location: [facility] OR     C NONSPECIFIC PROCEDURE      s/p Rt. Carpal tunnel release     C NONSPECIFIC PROCEDURE      s/p bilat Tubal ligation     GI SURGERY      hemorrhoid repair     GYN SURGERY      tubal ligation     ORTHOPEDIC SURGERY      right sabine-arthroplasty, trigger finger repair, left knee arthroscopy and replacement, bilateral hip replacement,       FAMILY HISTORY:  Family History   Problem Relation Age of Onset     Hypertension Mother      Alzheimer Disease Mother      Memory issues- unsure if alzheimers     Arthritis Mother      osteoarthritis     Depression Mother      GASTROINTESTINAL DISEASE Mother      acid reflux     Allergies Father      Neurologic Disorder Father      migraines     Respiratory Father      chronic brochitis- born during a flu epidemic in 1918     CEREBROVASCULAR DISEASE Maternal Grandmother      series of TIA's     DIABETES Maternal Grandfather      adult onset     CANCER Maternal Grandfather       of stomach or esophagus-heavy smoker     C.A.D. Paternal Grandmother      angina     Depression Daughter      Neurologic Disorder Sister      migraines       SOCIAL HISTORY:  Social History     Social History     Marital status:      Spouse name: Christiano Rangel     Number of children: 3     Years of education: Masters+     Occupational History     Nursing Home [employer]     Social History Main Topics     Smoking status: Never Smoker     Smokeless tobacco: Never Used     Alcohol use Yes      Comment: 1 Drink with dinner     Drug use: No     Sexual activity: Yes     Partners: Male     Other Topics Concern      Service No     Blood Transfusions Yes     C-Sect. 1974, lost a lot of blood-given 6? 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               Review of Systems:  Skin:  Negative       Eyes:  Positive for glasses    ENT:  Negative      Respiratory:  Negative for shortness of breath;dyspnea on exertion;cough     Cardiovascular:  Negative for;palpitations;chest pain;edema;syncope or near-syncope;exercise intolerance;fatigue;lightheadedness;dizziness      Gastroenterology: Negative     "  Genitourinary:  Negative      Musculoskeletal:  Negative      Neurologic:  Negative      Psychiatric:  Negative      Heme/Lymph/Imm:  Negative      Endocrine:  Negative        Physical Exam:  Vitals: /75  Pulse 87  Ht 1.568 m (5' 1.75\")  Wt 59.7 kg (131 lb 11.2 oz)  BMI 24.28 kg/m2    Constitutional:  cooperative, alert and oriented, well developed, well nourished, in no acute distress        Skin:  warm and dry to the touch, no apparent skin lesions or masses noted          Head:  normocephalic, no masses or lesions        Eyes:  pupils equal and round, conjunctivae and lids unremarkable, sclera white, no xanthalasma, EOMS intact, no nystagmus        Lymph:No Cervical lymphadenopathy present     ENT:  no pallor or cyanosis, dentition good        Neck:  carotid pulses are full and equal bilaterally, JVP normal, no carotid bruit        Respiratory:  normal breath sounds, clear to auscultation, normal A-P diameter, normal symmetry, normal respiratory excursion, no use of accessory muscles         Cardiac: regular rhythm, normal S1/S2, no S3 or S4, apical impulse not displaced, no murmurs, gallops or rubs                pulses full and equal, no bruits auscultated                                        GI:  abdomen soft, non-tender, BS normoactive, no mass, no HSM, no bruits        Extremities and Muscular Skeletal:  no deformities, clubbing, cyanosis, erythema observed              Neurological:  no gross motor deficits        Psych:  Alert and Oriented x 3        CC  MD SARATH Hay  [address] [place] [address]                Thank you for allowing me to participate in the care of your patient.      Sincerely,     Eliud Shaffer MD     [facility] [place] [facility]    cc:   MD SARATH Hay  [address] [place] [address]        "

## 2018-06-28 ENCOUNTER — HOSPITAL ENCOUNTER (OUTPATIENT)
Dept: MAMMOGRAPHY | Facility: CLINIC | Age: 73
Discharge: HOME | End: 2018-06-28
Attending: INTERNAL MEDICINE | Admitting: INTERNAL MEDICINE
Payer: MEDICARE

## 2018-06-28 DIAGNOSIS — Z12.31 ENCOUNTER FOR SCREENING MAMMOGRAM FOR HIGH-RISK PATIENT: ICD-10-CM

## 2018-06-28 PROCEDURE — 77063 BREAST TOMOSYNTHESIS BI: CPT

## 2018-07-31 ENCOUNTER — ALLIED HEALTH/NURSE VISIT (OUTPATIENT)
Dept: CARDIOLOGY | Facility: CLINIC | Age: 73
End: 2018-07-31
Payer: MEDICARE

## 2018-07-31 DIAGNOSIS — Z95.0 CARDIAC PACEMAKER IN SITU: Primary | ICD-10-CM

## 2018-07-31 PROCEDURE — 93296 REM INTERROG EVL PM/IDS: CPT | Performed by: INTERNAL MEDICINE

## 2018-07-31 PROCEDURE — 93294 REM INTERROG EVL PM/LDLS PM: CPT | Performed by: INTERNAL MEDICINE

## 2018-07-31 NOTE — MR AVS SNAPSHOT
After Visit Summary   7/31/2018    Anne Rangel    MRN: [id number]           Patient Information     Date Of Birth          1945        Visit Information        Provider Department      7/31/2018 1:45 PM PARKER ARZATE [facility]        Today's Diagnoses     Cardiac pacemaker in situ    -  1       Follow-ups after your visit        Your next 10 appointments already scheduled     Jul 31, 2018  1:45 PM CDT   Remote PPM Check with PARKER ARZATE   [facility]   [place] [facility]    [facility] [facility] [address]  [facility] [address] [phone] OPT 2           This appointment is for a remote check of your pacemaker.  This is not an appointment at the office.              Who to contact     If you have questions or need follow up information about today's clinic visit or your schedule please contact [facility] directly at [phone].  Normal or non-critical lab and imaging results will be communicated to you by [employer], letter or phone within 4 business days after the clinic has received the results. If you do not hear from us within 7 days, please contact the clinic through [employer] or phone. If you have a critical or abnormal lab result, we will notify you by phone as soon as possible.  Submit refill requests through [email or web address] or call your pharmacy and they will forward the refill request to us. Please allow 3 business days for your refill to be completed.          Additional Information About Your Visit        MyChart Information     [email or web address] gives you secure access to your electronic health record. If you see a primary care provider, you can also send messages to your care team and make appointments. If you have questions, please call your primary care clinic.  If you do not have a primary care provider, please call [phone] and they will assist you.        Care EveryWhere ID      This is your Care EveryWhere ID. This could be used by other organizations to access your [place] medical records  [id number]         Blood Pressure from Last 3 Encounters:   04/23/18 128/75   05/18/17 110/68   02/07/17 123/88    Weight from Last 3 Encounters:   04/23/18 59.7 kg (131 lb 11.2 oz)   05/18/17 58.3 kg (128 lb 9.6 oz)   02/07/17 59.5 kg (131 lb 3.2 oz)              We Performed the Following     INTERROGATION DEVICE EVAL REMOTE, PACER/ICD (57296)     PM DEVICE INTERROGATE REMOTE (71882)        Primary Care Provider Office Phone # Fax #    Nevaeh Wood [phone]       SARATH CHAMBERS [address] [facility] [address]  [facility] [address]        Equal Access to Services     [facility]: Hadii aad ku hadasho Soomaali, waaxda luqadaha, qaybta kaalmada adeegyada, waxay idiin hayaapallavi benites . So [facility] [phone].    ATENCIÓN: Si habla español, tiene a hinson disposición servicios gratuitos de asistencia lingüística. Llame al [phone].    We comply with applicable federal civil rights laws and [place] laws. We do not discriminate on the basis of race, color, national origin, age, disability, sex, sexual orientation, or gender identity.            Thank you!     Thank you for choosing [facility]  for your care. Our goal is always to provide you with excellent care. Hearing back from our patients is one way we can continue to improve our services. Please take a few minutes to complete the written survey that you may receive in the mail after your visit with us. Thank you!             Your Updated Medication List - Protect others around you: Learn how to safely use, store and throw away your medicines at www.disposemymeds.org.          This list is accurate as of 7/31/18  9:24 AM.  Always use your most recent med list.                   Brand Name Dispense Instructions for use Diagnosis    CELEBREX PO      Take 200 mg by mouth daily         IMITREX PO      Take 100 mg by mouth daily as needed for migraine        LEXAPRO PO      Take 20 mg by mouth daily        LIPITOR 40 MG tablet   Generic drug:  atorvastatin     30 tablet    Take 40 mg by mouth At Bedtime        LORAZEPAM PO      Take 0.5 mg by mouth At Bedtime (0.5 x 1 mg tablet = 0.5 mg dose)        LUNESTA 2 MG tablet   Generic drug:  eszopiclone     30 tablet    Take 2 mg by mouth At Bedtime        omeprazole 20 MG tablet     30 tablet    Take 1 tablet (20 mg) by mouth daily Take 30-60 minutes before a meal.        polyethylene glycol powder    MIRALAX/GLYCOLAX     Take 17 g by mouth daily as needed for constipation        PROBIOTIC & ACIDOPHILUS EX ST PO      Take 1 capsule by mouth daily        traMADol 50 MG tablet    ULTRAM    20 tablet    Take 1-2 tablets ( mg) by mouth every 6 hours as needed for moderate pain        VITAMIN C PO      Take 500 mg by mouth daily        VITAMIN D (CHOLECALCIFEROL) PO      Take 5,000 Units by mouth three times a week (Monday, Wednesday and Friday)        WELLBUTRIN XL PO      Take 150 mg by mouth daily

## 2018-07-31 NOTE — PROGRESS NOTES
[employer] (D) Remote PPM Device Check  AP: 0 % : 41 %  Mode: DDD        Presenting Rhythm: AS/  Heart Rate: Adequate rates per histogram  Sensing: Stable    Pacing Threshold: Stable    Impedance: Stable  Battery Status: 13 years  Atrial Arrhythmia: None  Ventricular Arrhythmia: None     Care Plan: F/u PPM Latitude q 3 months. Gave patient results over the phone.  Rudy,CVT

## 2018-11-06 ENCOUNTER — ALLIED HEALTH/NURSE VISIT (OUTPATIENT)
Dept: CARDIOLOGY | Facility: CLINIC | Age: 73
End: 2018-11-06
Payer: MEDICARE

## 2018-11-06 DIAGNOSIS — Z95.0 CARDIAC PACEMAKER IN SITU: Primary | ICD-10-CM

## 2018-11-06 PROCEDURE — 93296 REM INTERROG EVL PM/IDS: CPT | Performed by: INTERNAL MEDICINE

## 2018-11-06 PROCEDURE — 93294 REM INTERROG EVL PM/LDLS PM: CPT | Performed by: INTERNAL MEDICINE

## 2018-11-06 NOTE — MR AVS SNAPSHOT
After Visit Summary   11/6/2018    Anne Rangel    MRN: [id number]           Patient Information     Date Of Birth          1945        Visit Information        Provider Department      11/6/2018 3:30 PM [facility]        Today's Diagnoses     Cardiac pacemaker in situ    -  1       Follow-ups after your visit        Your next 10 appointments already scheduled     Nov 06, 2018  3:30 PM CST   Remote PPM Check with CANALES TECH1   [facility] ([facility])    [address]  [facility] [address] [phone] OPT 2           This appointment is for a remote check of your pacemaker.  This is not an appointment at the office.            Genaro 15, 2019   Procedure with Eliane Barone MD   [facility] ([facility] [place] [facility])    [address] [address]   [phone]           [facility] is located at [address] [place], [place] [address]Maria Esther Halea            Feb 19, 2019  4:45 PM CST   Remote PPM Check with CANALES TECH1   [facility] ([facility])    [id number]  [facility] [address] [phone] OPT 2           This appointment is for a remote check of your pacemaker.  This is not an appointment at the office.              Who to contact     If you have questions or need follow up information about today's clinic visit or your schedule please contact [facility] directly at [phone].  Normal or non-critical lab and imaging results will be communicated to you by MyChart, letter or phone within 4 business days after the clinic has received the results. If you do not hear from us within 7 days, please contact the clinic through MyChart or phone. If you have a critical or abnormal lab result, we will notify you by phone as soon as possible.  Submit refill  requests through [employer] or call your pharmacy and they will forward the refill request to us. Please allow 3 business days for your refill to be completed.          Additional Information About Your Visit        [employer] Information     [employer] gives you secure access to your electronic health record. If you see a primary care provider, you can also send messages to your care team and make appointments. If you have questions, please call your primary care clinic.  If you do not have a primary care provider, please call [phone] and they will assist you.        Care EveryWhere ID     This is your Care EveryWhere ID. This could be used by other organizations to access your [facility] medical records  [id number]         Blood Pressure from Last 3 Encounters:   04/23/18 128/75   05/18/17 110/68   02/07/17 123/88    Weight from Last 3 Encounters:   04/23/18 59.7 kg (131 lb 11.2 oz)   05/18/17 58.3 kg (128 lb 9.6 oz)   02/07/17 59.5 kg (131 lb 3.2 oz)              We Performed the Following     INTERROGATION DEVICE EVAL REMOTE, PACER/ICD (68167)     PM DEVICE INTERROGATE REMOTE (22223)        Primary Care Provider Office Phone # Fax #    Nevaeh Wood [phone]       [facility] [address] [address]        Equal Access to Services     JNAAY CRAWFORD : Hadii aad ku hadasho Soomaali, waaxda luqadaha, qaybta kaalmada adeegyada, juan benites . So [facility] [phone].    ATENCIÓN: Si habla español, tiene a hinson disposición servicios gratuitos de asistencia lingüística. Llame al [phone].    We comply with applicable federal civil rights laws and [place] laws. We do not discriminate on the basis of race, color, national origin, age, disability, sex, sexual orientation, or gender identity.            Thank you!     Thank you for choosing [facility]  for your care. Our goal is always to provide you with  excellent care. Hearing back from our patients is one way we can continue to improve our services. Please take a few minutes to complete the written survey that you may receive in the mail after your visit with us. Thank you!             Your Updated Medication List - Protect others around you: Learn how to safely use, store and throw away your medicines at www.disposemymeds.org.          This list is accurate as of 11/6/18  9:11 AM.  Always use your most recent med list.                   Brand Name Dispense Instructions for use Diagnosis    CELEBREX PO      Take 200 mg by mouth daily        IMITREX PO      Take 100 mg by mouth daily as needed for migraine        LEXAPRO PO      Take 20 mg by mouth daily        LIPITOR 40 MG tablet   Generic drug:  atorvastatin     30 tablet    Take 40 mg by mouth At Bedtime        LORAZEPAM PO      Take 0.5 mg by mouth At Bedtime (0.5 x 1 mg tablet = 0.5 mg dose)        LUNESTA 2 MG tablet   Generic drug:  eszopiclone     30 tablet    Take 2 mg by mouth At Bedtime        omeprazole 20 MG tablet     30 tablet    Take 1 tablet (20 mg) by mouth daily Take 30-60 minutes before a meal.        polyethylene glycol powder    MIRALAX/GLYCOLAX     Take 17 g by mouth daily as needed for constipation        PROBIOTIC & ACIDOPHILUS EX ST PO      Take 1 capsule by mouth daily        traMADol 50 MG tablet    ULTRAM    20 tablet    Take 1-2 tablets ( mg) by mouth every 6 hours as needed for moderate pain        VITAMIN C PO      Take 500 mg by mouth daily        VITAMIN D (CHOLECALCIFEROL) PO      Take 5,000 Units by mouth three times a week (Monday, Wednesday and Friday)        WELLBUTRIN XL PO      Take 150 mg by mouth daily

## 2018-11-06 NOTE — PROGRESS NOTES
[employer] MRI EL L331 (D) Remote PPM Device Check  AP: 0% : 28%  Mode: DDD        Presenting Rhythm: SR, vent rate 85bpm  Heart Rate: adequate heart rates per histogram  Sensing: stable    Pacing Threshold: stable    Impedance: stable  Battery Status: 13 years remaining  Atrial Arrhythmia: none  Ventricular Arrhythmia: none     Care Plan: F/U [facility] NXT q 3 months. Order in for annual OV to be scheduled in April 2019.  No answer, left message with results and next transmission date. Johnson FRY

## 2018-11-27 RX ORDER — ONDANSETRON 2 MG/ML
4 INJECTION INTRAMUSCULAR; INTRAVENOUS
Status: CANCELLED | OUTPATIENT
Start: 2018-11-27

## 2018-11-27 RX ORDER — LIDOCAINE 40 MG/G
CREAM TOPICAL
Status: CANCELLED | OUTPATIENT
Start: 2018-11-27

## 2019-01-15 ENCOUNTER — HOSPITAL ENCOUNTER (OUTPATIENT)
Facility: CLINIC | Age: 74
Discharge: HOME | End: 2019-01-15
Attending: COLON & RECTAL SURGERY | Admitting: COLON & RECTAL SURGERY
Payer: MEDICARE

## 2019-01-15 VITALS
BODY MASS INDEX: 23 KG/M2 | HEIGHT: 62 IN | RESPIRATION RATE: 28 BRPM | SYSTOLIC BLOOD PRESSURE: 118 MMHG | OXYGEN SATURATION: 98 % | WEIGHT: 125 LBS | HEART RATE: 74 BPM | DIASTOLIC BLOOD PRESSURE: 78 MMHG

## 2019-01-15 LAB — COLONOSCOPY: NORMAL

## 2019-01-15 PROCEDURE — 99153 MOD SED SAME PHYS/QHP EA: CPT | Performed by: COLON & RECTAL SURGERY

## 2019-01-15 PROCEDURE — G0500 MOD SEDAT ENDO SERVICE >5YRS: HCPCS | Performed by: COLON & RECTAL SURGERY

## 2019-01-15 PROCEDURE — G0105 COLORECTAL SCRN; HI RISK IND: HCPCS | Performed by: COLON & RECTAL SURGERY

## 2019-01-15 PROCEDURE — 25000128 H RX IP 250 OP 636: Performed by: COLON & RECTAL SURGERY

## 2019-01-15 PROCEDURE — 45378 DIAGNOSTIC COLONOSCOPY: CPT | Performed by: COLON & RECTAL SURGERY

## 2019-01-15 RX ORDER — NALOXONE HYDROCHLORIDE 0.4 MG/ML
.1-.4 INJECTION, SOLUTION INTRAMUSCULAR; INTRAVENOUS; SUBCUTANEOUS
Status: DISCONTINUED | OUTPATIENT
Start: 2019-01-15 | End: 2019-01-15 | Stop reason: HOSPADM

## 2019-01-15 RX ORDER — ONDANSETRON 2 MG/ML
4 INJECTION INTRAMUSCULAR; INTRAVENOUS EVERY 6 HOURS PRN
Status: DISCONTINUED | OUTPATIENT
Start: 2019-01-15 | End: 2019-01-15 | Stop reason: HOSPADM

## 2019-01-15 RX ORDER — ONDANSETRON 4 MG/1
4 TABLET, ORALLY DISINTEGRATING ORAL EVERY 6 HOURS PRN
Status: DISCONTINUED | OUTPATIENT
Start: 2019-01-15 | End: 2019-01-15 | Stop reason: HOSPADM

## 2019-01-15 RX ORDER — FENTANYL CITRATE 50 UG/ML
INJECTION, SOLUTION INTRAMUSCULAR; INTRAVENOUS PRN
Status: DISCONTINUED | OUTPATIENT
Start: 2019-01-15 | End: 2019-01-15 | Stop reason: HOSPADM

## 2019-01-15 RX ORDER — SODIUM CHLORIDE 9 MG/ML
INJECTION, SOLUTION INTRAVENOUS CONTINUOUS PRN
Status: DISCONTINUED | OUTPATIENT
Start: 2019-01-15 | End: 2019-01-15 | Stop reason: HOSPADM

## 2019-01-15 RX ORDER — ONDANSETRON 2 MG/ML
INJECTION INTRAMUSCULAR; INTRAVENOUS PRN
Status: DISCONTINUED | OUTPATIENT
Start: 2019-01-15 | End: 2019-01-15 | Stop reason: HOSPADM

## 2019-01-15 RX ORDER — FLUMAZENIL 0.1 MG/ML
0.2 INJECTION, SOLUTION INTRAVENOUS
Status: DISCONTINUED | OUTPATIENT
Start: 2019-01-15 | End: 2019-01-15 | Stop reason: HOSPADM

## 2019-01-15 ASSESSMENT — MIFFLIN-ST. JEOR: SCORE: 1025.25

## 2019-01-15 NOTE — H&P
Pre-Endoscopy History and Physical     Anne Rangel MRN# [id number]   YOB: 1945 Age: 73 year old     Date of Procedure: 1/15/2019  Primary care provider: Nevaeh Wood  Type of Endoscopy: colonoscopy  Reason for Procedure: screening  Type of Anesthesia Anticipated: moderate sedation    HPI:    Anne is a 73 year old female who will be undergoing the above procedure.  Patient reports a history of irritable bowel syndrome with a tendency to be constipated. She does note rectal bleeding with a hard stool. Recently she has been taking 1 teaspoon of Miralax at night which has helped but not resolved the issue.    A history and physical has been performed. The patient's medications and allergies have been reviewed. The risks and benefits of the procedure and the sedation options and risks were discussed with the patient.  All questions were answered and informed consent was obtained.      She denies a personal or family history of anesthesia complications or bleeding disorders.   Prior to Admission medications    Medication Sig Start Date End Date Taking? Authorizing Provider   Ascorbic Acid (VITAMIN C PO) Take 500 mg by mouth daily   Yes Reported, Patient   atorvastatin (LIPITOR) 40 MG tablet Take 40 mg by mouth At Bedtime  3/4/15  Yes Meena Woo APRN CNP   BuPROPion HCl (WELLBUTRIN XL PO) Take 150 mg by mouth daily   Yes Reported, Patient   Celecoxib (CELEBREX PO) Take 200 mg by mouth daily   Yes Reported, Patient   Escitalopram Oxalate (LEXAPRO PO) Take 20 mg by mouth daily   Yes Reported, Patient   eszopiclone (LUNESTA) 2 MG tablet Take 2 mg by mouth At Bedtime  3/4/15  Yes Menea Woo APRN CNP   omeprazole 20 MG tablet Take 1 tablet (20 mg) by mouth daily Take 30-60 minutes before a meal. 3/4/15  Yes Meena Woo APRN CNP   Probiotic Product (PROBIOTIC & ACIDOPHILUS EX ST PO) Take 1 capsule by mouth daily   Yes Reported, Patient   traMADol (ULTRAM) 50 MG tablet Take 1-2  tablets ( mg) by mouth every 6 hours as needed for moderate pain 3/4/15  Yes Meena Woo APRN CNP   VITAMIN D, CHOLECALCIFEROL, PO Take 5,000 Units by mouth three times a week (Monday, Wednesday and Friday)   Yes Reported, Patient   LORAZEPAM PO Take 0.5 mg by mouth At Bedtime (0.5 x 1 mg tablet = 0.5 mg dose)    Reported, Patient   polyethylene glycol (MIRALAX/GLYCOLAX) powder Take 17 g by mouth daily as needed for constipation    Reported, Patient   SUMAtriptan Succinate (IMITREX PO) Take 100 mg by mouth daily as needed for migraine    Reported, Patient       Allergies   Allergen Reactions     Dilaudid [Hydromorphone] Nausea and Vomiting     Doxepin Unknown     Meperidine Nausea and Vomiting     Demerol        No current facility-administered medications for this encounter.        Patient Active Problem List   Diagnosis     Myalgia and myositis     Chronic fatigue syndrome     Pure hypercholesterolemia     Osteoarthrosis, hand     Irritable bowel syndrome     Urticaria     Neutropenia (H)     Left hip pain     S/P revision of total hip     ACP (advance care planning)     RBBB     Atrioventricular block 2:1     Near syncope     Cardiac pacemaker in situ        Past Medical History:   Diagnosis Date     Anemia      Carpal tunnel syndrome      Chronic fatigue syndrome 11/00     Decreased libido      Depression with anxiety      Fatigue      Gastroesophageal reflux disease      HA (headache)      iamAFTERCARE FOLLOWING JOINT REPLACEMENT 7/23/2007     Irritable bowel syndrome 11/00     Myalgia and myositis, unspecified 11/00     Near syncope 5/17/2017     Neutropenia 00     Noninfectious ileitis      Osteoarthrosis, unspecified whether generalized or localized, hand 11/00     Osteoarthrosis, unspecified whether generalized or localized, lower leg 11/00     Pure hypercholesterolemia 11/00     RBBB      Renal disease     mld     Uncomplicated asthma      Urticaria, unspecified 00        Past Surgical  "History:   Procedure Laterality Date     ARTHROPLASTY REVISION HIP Left 2/3/2017    Procedure: ARTHROPLASTY REVISION HIP;  Surgeon: Juan Moncada MD;  Location: [facility] OR     C NONSPECIFIC PROCEDURE      s/p Rt. Carpal tunnel release     C NONSPECIFIC PROCEDURE      s/p bilat Tubal ligation     GI SURGERY      hemorrhoid repair     GYN SURGERY      tubal ligation     ORTHOPEDIC SURGERY      right sabine-arthroplasty, trigger finger repair, left knee arthroscopy and replacement, bilateral hip replacement,       Social History     Tobacco Use     Smoking status: Never Smoker     Smokeless tobacco: Never Used   Substance Use Topics     Alcohol use: Yes     Comment: 1 Drink with dinner       Family History   Problem Relation Age of Onset     Hypertension Mother      Alzheimer Disease Mother         Memory issues- unsure if alzheimers     Arthritis Mother         osteoarthritis     Depression Mother      Gastrointestinal Disease Mother         acid reflux     Allergies Father      Neurologic Disorder Father         migraines     Respiratory Father         chronic brochitis- born during a flu epidemic in 1918     Cerebrovascular Disease Maternal Grandmother         series of TIA's     Diabetes Maternal Grandfather         adult onset     Cancer Maternal Grandfather         of stomach or esophagus-heavy smoker     C.A.D. Paternal Grandmother         angina     Depression Daughter      Neurologic Disorder Sister         migraines       REVIEW OF SYSTEMS:     5 point ROS negative except as noted above in HPI, including Gen., Resp., CV, GI &  system review.      PHYSICAL EXAM:   There were no vitals taken for this visit. Estimated body mass index is 24.28 kg/m  as calculated from the following:    Height as of 4/23/18: 1.568 m (5' 1.75\").    Weight as of 4/23/18: 59.7 kg (131 lb 11.2 oz).   GENERAL APPEARANCE: healthy  MENTAL STATUS: alert  AIRWAY EXAM: Mallampatti Class II (visualization of the soft palate, fauces, and " uvula)  RESP: lungs clear to auscultation on expiration but occasional inspiratory wheezes  CV: regular rates and rhythm      DIAGNOSTICS:    Not indicated      IMPRESSION   ASA Class 2 - Mild systemic disease        PLAN:       Colonoscopy with possible polypectomy, possible biopsy. The indications, procedure and risks were explained to the patient who agrees to proceed.       The above has been forwarded to the consulting provider.      Signed Electronically by: Eliane Barone  January 15, 2019

## 2019-01-15 NOTE — BRIEF OP NOTE
[facility]    Brief Operative Note    Pre-operative diagnosis: PREVENTATIVE SCREENING  Post-operative diagnosis diverticulosis, hypertrophied anal papilla  Procedure: Procedure(s):  COLONOSCOPY  Surgeon: Surgeon(s) and Role:     * Eliane Barone MD - Primary  Anesthesia: Conscious Sedation   Estimated blood loss:   None  Drains: None  Specimens: * No specimens in log *  Findings:  See Provation procedure note in Epic   .  Complications: None.  Implants: None.

## 2019-02-23 ENCOUNTER — ANCILLARY PROCEDURE (OUTPATIENT)
Dept: CARDIOLOGY | Facility: CLINIC | Age: 74
End: 2019-02-23
Attending: INTERNAL MEDICINE
Payer: MEDICARE

## 2019-02-23 DIAGNOSIS — I44.1 MOBITZ TYPE 2 SECOND DEGREE ATRIOVENTRICULAR BLOCK: ICD-10-CM

## 2019-02-23 PROCEDURE — 93296 REM INTERROG EVL PM/IDS: CPT | Performed by: INTERNAL MEDICINE

## 2019-03-06 LAB
MDC_IDC_EPISODE_DTM: NORMAL
MDC_IDC_EPISODE_ID: NORMAL
MDC_IDC_EPISODE_TYPE: NORMAL
MDC_IDC_LEAD_IMPLANT_DT: NORMAL
MDC_IDC_LEAD_IMPLANT_DT: NORMAL
MDC_IDC_LEAD_LOCATION: NORMAL
MDC_IDC_LEAD_LOCATION: NORMAL
MDC_IDC_LEAD_LOCATION_DETAIL_1: NORMAL
MDC_IDC_LEAD_LOCATION_DETAIL_1: NORMAL
MDC_IDC_LEAD_MFG: NORMAL
MDC_IDC_LEAD_MFG: NORMAL
MDC_IDC_LEAD_MODEL: NORMAL
MDC_IDC_LEAD_MODEL: NORMAL
MDC_IDC_LEAD_POLARITY_TYPE: NORMAL
MDC_IDC_LEAD_POLARITY_TYPE: NORMAL
MDC_IDC_LEAD_SERIAL: NORMAL
MDC_IDC_LEAD_SERIAL: NORMAL
MDC_IDC_MSMT_BATTERY_DTM: NORMAL
MDC_IDC_MSMT_BATTERY_REMAINING_LONGEVITY: 138 MO
MDC_IDC_MSMT_BATTERY_REMAINING_PERCENTAGE: 100 %
MDC_IDC_MSMT_BATTERY_STATUS: NORMAL
MDC_IDC_MSMT_LEADCHNL_RA_IMPEDANCE_VALUE: 658 OHM
MDC_IDC_MSMT_LEADCHNL_RA_PACING_THRESHOLD_AMPLITUDE: 0.5 V
MDC_IDC_MSMT_LEADCHNL_RA_PACING_THRESHOLD_PULSEWIDTH: 0.4 MS
MDC_IDC_MSMT_LEADCHNL_RV_IMPEDANCE_VALUE: 684 OHM
MDC_IDC_MSMT_LEADCHNL_RV_PACING_THRESHOLD_AMPLITUDE: 1.1 V
MDC_IDC_MSMT_LEADCHNL_RV_PACING_THRESHOLD_PULSEWIDTH: 0.4 MS
MDC_IDC_PG_IMPLANT_DTM: NORMAL
MDC_IDC_PG_MFG: NORMAL
MDC_IDC_PG_MODEL: NORMAL
MDC_IDC_PG_SERIAL: NORMAL
MDC_IDC_PG_TYPE: NORMAL
MDC_IDC_SESS_CLINIC_NAME: NORMAL
MDC_IDC_SESS_DTM: NORMAL
MDC_IDC_SESS_TYPE: NORMAL
MDC_IDC_SET_BRADY_AT_MODE_SWITCH_MODE: NORMAL
MDC_IDC_SET_BRADY_AT_MODE_SWITCH_RATE: 170 {BEATS}/MIN
MDC_IDC_SET_BRADY_LOWRATE: 60 {BEATS}/MIN
MDC_IDC_SET_BRADY_MAX_SENSOR_RATE: 130 {BEATS}/MIN
MDC_IDC_SET_BRADY_MAX_TRACKING_RATE: 130 {BEATS}/MIN
MDC_IDC_SET_BRADY_MODE: NORMAL
MDC_IDC_SET_BRADY_PAV_DELAY_HIGH: 200 MS
MDC_IDC_SET_BRADY_PAV_DELAY_LOW: 300 MS
MDC_IDC_SET_BRADY_SAV_DELAY_HIGH: 200 MS
MDC_IDC_SET_BRADY_SAV_DELAY_LOW: 300 MS
MDC_IDC_SET_LEADCHNL_RA_PACING_AMPLITUDE: 2 V
MDC_IDC_SET_LEADCHNL_RA_PACING_CAPTURE_MODE: NORMAL
MDC_IDC_SET_LEADCHNL_RA_PACING_POLARITY: NORMAL
MDC_IDC_SET_LEADCHNL_RA_PACING_PULSEWIDTH: 0.4 MS
MDC_IDC_SET_LEADCHNL_RA_SENSING_ADAPTATION_MODE: NORMAL
MDC_IDC_SET_LEADCHNL_RA_SENSING_POLARITY: NORMAL
MDC_IDC_SET_LEADCHNL_RA_SENSING_SENSITIVITY: 0.25 MV
MDC_IDC_SET_LEADCHNL_RV_PACING_AMPLITUDE: 3.5 V
MDC_IDC_SET_LEADCHNL_RV_PACING_CAPTURE_MODE: NORMAL
MDC_IDC_SET_LEADCHNL_RV_PACING_POLARITY: NORMAL
MDC_IDC_SET_LEADCHNL_RV_PACING_PULSEWIDTH: 0.4 MS
MDC_IDC_SET_LEADCHNL_RV_SENSING_ADAPTATION_MODE: NORMAL
MDC_IDC_SET_LEADCHNL_RV_SENSING_POLARITY: NORMAL
MDC_IDC_SET_LEADCHNL_RV_SENSING_SENSITIVITY: 1.5 MV
MDC_IDC_SET_ZONE_DETECTION_INTERVAL: 375 MS
MDC_IDC_SET_ZONE_TYPE: NORMAL
MDC_IDC_SET_ZONE_VENDOR_TYPE: NORMAL
MDC_IDC_STAT_AT_BURDEN_PERCENT: 0 %
MDC_IDC_STAT_AT_DTM_END: NORMAL
MDC_IDC_STAT_AT_DTM_START: NORMAL
MDC_IDC_STAT_BRADY_DTM_END: NORMAL
MDC_IDC_STAT_BRADY_DTM_START: NORMAL
MDC_IDC_STAT_BRADY_RA_PERCENT_PACED: 0 %
MDC_IDC_STAT_BRADY_RV_PERCENT_PACED: 39 %
MDC_IDC_STAT_EPISODE_RECENT_COUNT: 0
MDC_IDC_STAT_EPISODE_RECENT_COUNT_DTM_END: NORMAL
MDC_IDC_STAT_EPISODE_RECENT_COUNT_DTM_START: NORMAL
MDC_IDC_STAT_EPISODE_TYPE: NORMAL
MDC_IDC_STAT_EPISODE_VENDOR_TYPE: NORMAL

## 2019-05-13 ENCOUNTER — ANCILLARY PROCEDURE (OUTPATIENT)
Dept: CARDIOLOGY | Facility: CLINIC | Age: 74
End: 2019-05-13
Attending: INTERNAL MEDICINE
Payer: MEDICARE

## 2019-05-13 VITALS
HEART RATE: 75 BPM | WEIGHT: 125.8 LBS | DIASTOLIC BLOOD PRESSURE: 76 MMHG | HEIGHT: 62 IN | SYSTOLIC BLOOD PRESSURE: 106 MMHG | BODY MASS INDEX: 23.15 KG/M2

## 2019-05-13 DIAGNOSIS — Z95.0 CARDIAC PACEMAKER IN SITU: Primary | ICD-10-CM

## 2019-05-13 DIAGNOSIS — I44.1 MOBITZ TYPE 2 SECOND DEGREE ATRIOVENTRICULAR BLOCK: ICD-10-CM

## 2019-05-13 DIAGNOSIS — I44.2 ATRIOVENTRICULAR BLOCK, COMPLETE (H): Primary | ICD-10-CM

## 2019-05-13 PROCEDURE — 93280 PM DEVICE PROGR EVAL DUAL: CPT | Performed by: INTERNAL MEDICINE

## 2019-05-13 PROCEDURE — 99213 OFFICE O/P EST LOW 20 MIN: CPT | Mod: 25 | Performed by: PHYSICIAN ASSISTANT

## 2019-05-13 ASSESSMENT — MIFFLIN-ST. JEOR: SCORE: 1024.91

## 2019-05-13 NOTE — PATIENT INSTRUCTIONS
1. Reviewed your pacemaker check, which looked great! Using top wire <1% of the time and bottom wire 44% of the time.  This is good!  Will recheck this in the office in 1 year. Continue remote checks    2. Continue excellent exercise regimen.  Pay attention to exercise tolerance. If you find you're having to cancel plans or not able to keep up the pace at your exercise places, CALL!  We can also get testing if needed [phone] (my nurses Amanda Yun and Doreen)    3. See us back in 1 year but call if issues prior! [phone]

## 2019-05-13 NOTE — LETTER
"5/13/2019    MD Osvaldo Hay [address]  [facility] [address]    RE: Anne Rangel       Dear Colleague,    I had the pleasure of seeing Anne Rangel in the [facility].    HPI:   I had the pleasure of seeing Anne when she came for annual follow-up.  She is a 73 year old who sees Dr. Monson for her history of:    1.  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  Status post dual-chamber [employer] pacemaker 2/2017    Anne saw Dr. Monson 1 year ago at which time she was doing well without complaints.    Happily, she has continued to do well. She participated in a 90 minute water aeorbic class 3 days/week over the winter and is now doing a 60 minute dance class most days of the week.  She and her  went on a River Cruise in [facility] this past year and had a wonderful time.     She feels like she'd \"like a bit more energy\" than she has but has noted no drop in exercise tolerance, chest discomfort or shortness of breath. No palpitations, dizziness or orthostasis. She has not had to change her exercise classes or quit early because she doesn't have enough energy. She sleeps well.       Device interrogation today showed less than 1% a pacing in DDD 60-1 30.  44% V pacing noted.  Echocardiogram 2/2017 showed EF 60 to 65%.  Left atrium was moderately dilated.  No significant valvular abnormalities.    Assessment & Plan:    1. 2:1 Heart Block s/p dual chamber [employer] PPM 2017    Device interrogation as above    Feeling really good without complaints    PLAN:    Continue routine device interrogations    See Dr. Monson 1 year with PPM interrogation      2. \"Would like more energy\"    Asked her to continue to monitor this. Has never cancelled plans, quit a class early or been unable to do everything she wants to do    TSH " 2017 wnl    PLAN:  Monitor for objective change in exercise tolerance and contact us if this occurs      Nichelle Curtis PA-C, MSPAS      Orders Placed This Encounter   Procedures     Follow-Up with Electrophysiologist     EKG 12-lead complete w/read - Clinics (to be scheduled)     Orders Placed This Encounter   Medications     UNABLE TO FIND     Sig: MEDICATION NAME: Immune Health Vitamin containing Vitamin C, D and more     There are no discontinued medications.      Encounter Diagnosis   Name Primary?     Atrioventricular block 2:1        CURRENT MEDICATIONS:  Current Outpatient Medications   Medication Sig Dispense Refill     Ascorbic Acid (VITAMIN C PO) Take 500 mg by mouth daily       atorvastatin (LIPITOR) 40 MG tablet Take 40 mg by mouth At Bedtime  30 tablet 1     BuPROPion HCl (WELLBUTRIN XL PO) Take 150 mg by mouth daily       Celecoxib (CELEBREX PO) Take 200 mg by mouth daily       Escitalopram Oxalate (LEXAPRO PO) Take 20 mg by mouth daily       eszopiclone (LUNESTA) 2 MG tablet Take 2 mg by mouth At Bedtime  30 tablet 5     LORAZEPAM PO Take 0.5 mg by mouth At Bedtime (0.5 x 1 mg tablet = 0.5 mg dose)       omeprazole 20 MG tablet Take 1 tablet (20 mg) by mouth daily Take 30-60 minutes before a meal. 30 tablet 1     polyethylene glycol (MIRALAX/GLYCOLAX) powder Take 17 g by mouth daily as needed for constipation       Probiotic Product (PROBIOTIC & ACIDOPHILUS EX ST PO) Take 1 capsule by mouth daily       SUMAtriptan Succinate (IMITREX PO) Take 100 mg by mouth daily as needed for migraine       traMADol (ULTRAM) 50 MG tablet Take 1-2 tablets ( mg) by mouth every 6 hours as needed for moderate pain 20 tablet 0     UNABLE TO FIND MEDICATION NAME: Immune Health Vitamin containing Vitamin C, D and more       VITAMIN D, CHOLECALCIFEROL, PO Take 5,000 Units by mouth three times a week (Monday, Wednesday and Friday)         ALLERGIES     Allergies   Allergen Reactions     Blood-Group Specific Substance  Other (See Comments)     Patient has anti-K antibody.  Blood for the transfusion might be delayed.       Dilaudid [Hydromorphone] Nausea and Vomiting     Doxepin Unknown     Other reaction(s): Intolerance-Can't Take  Not effective for sleep     Meperidine Nausea and Vomiting     Demerol     Meperidine Nausea and Vomiting     Morphine      PN: LW Reaction: Vomiting,Nausea     No Clinical Screening - See Comments      PN: LW Reaction: all opiates     Pregabalin Other (See Comments)     Not effective       PAST MEDICAL HISTORY:  Past Medical History:   Diagnosis Date     Anemia      Carpal tunnel syndrome      Chronic fatigue syndrome 11/00     Decreased libido      Depression with anxiety      Fatigue      Gastroesophageal reflux disease      HA (headache)      iamAFTERCARE FOLLOWING JOINT REPLACEMENT 7/23/2007     Irritable bowel syndrome 11/00     Myalgia and myositis, unspecified 11/00     Near syncope 5/17/2017     Neutropenia 00     Noninfectious ileitis      Osteoarthrosis, unspecified whether generalized or localized, hand 11/00     Osteoarthrosis, unspecified whether generalized or localized, lower leg 11/00     Pure hypercholesterolemia 11/00     RBBB      Renal disease     mld     Uncomplicated asthma      Urticaria, unspecified 00       PAST SURGICAL HISTORY:  Past Surgical History:   Procedure Laterality Date     ARTHROPLASTY REVISION HIP Left 2/3/2017    Procedure: ARTHROPLASTY REVISION HIP;  Surgeon: Juan Moncada MD;  Location:  OR     C NONSPECIFIC PROCEDURE      s/p Rt. Carpal tunnel release     C NONSPECIFIC PROCEDURE      s/p bilat Tubal ligation     COLONOSCOPY N/A 1/15/2019    Procedure: COLONOSCOPY;  Surgeon: Eliane aBrone MD;  Location:  GI     GI SURGERY      hemorrhoid repair     GYN SURGERY      tubal ligation     ORTHOPEDIC SURGERY      right sabine-arthroplasty, trigger finger repair, left knee arthroscopy and replacement, bilateral hip replacement,       FAMILY  HISTORY:  Family History   Problem Relation Age of Onset     Hypertension Mother      Alzheimer Disease Mother         Memory issues- unsure if alzheimers     Arthritis Mother         osteoarthritis     Depression Mother      Gastrointestinal Disease Mother         acid reflux     Allergies Father      Neurologic Disorder Father         migraines     Respiratory Father         chronic brochitis- born during a flu epidemic in 1918     Cerebrovascular Disease Maternal Grandmother         series of TIA's     Diabetes Maternal Grandfather         adult onset     Cancer Maternal Grandfather         of stomach or esophagus-heavy smoker     C.A.D. Paternal Grandmother         angina     Depression Daughter      Neurologic Disorder Sister         migraines       SOCIAL HISTORY:  Social History     Socioeconomic History     Marital status:      Spouse name: Christiano Rangel     Number of children: 3     Years of education: Masters+     Highest education level: None   Occupational History     Occupation: [employer]     Employer: [employer]   Social Needs     Financial resource strain: None     Food insecurity:     Worry: None     Inability: None     Transportation needs:     Medical: None     Non-medical: None   Tobacco Use     Smoking status: Never Smoker     Smokeless tobacco: Never Used   Substance and Sexual Activity     Alcohol use: Yes     Comment: 1 Drink with dinner     Drug use: No     Sexual activity: Yes     Partners: Male   Lifestyle     Physical activity:     Days per week: None     Minutes per session: None     Stress: None   Relationships     Social connections:     Talks on phone: None     Gets together: None     Attends Mandaen service: None     Active member of club or organization: None     Attends meetings of clubs or organizations: None     Relationship status: None     Intimate partner violence:     Fear of current or ex partner: None     Emotionally abused: None      "Physically abused: None     Forced sexual activity: None   Other Topics Concern      Service No     Blood Transfusions Yes     Comment: [employer]. 1974, lost a lot of blood-given 6? 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, had vaccines Hep B, Flu     Hobby Hazards No     Sleep Concern No     Comment: Nortriptyline helps     Stress Concern No     Comment: 3 children     Weight Concern No     Special Diet No     Back Care Yes     Comment: Low back pain, has osteoarthritis     Exercise Yes     Comment: 3-4 times a week     Bike Helmet No     Seat Belt Yes     Self-Exams No     Parent/sibling w/ CABG, MI or angioplasty before 65F 55M? 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               Review of Systems:  Skin:  Negative     Eyes:  Positive for glasses  ENT:  Negative    Respiratory:  Negative for shortness of breath;dyspnea on exertion;cough  Cardiovascular:  Negative for;palpitations;chest pain;edema;syncope or near-syncope;exercise intolerance;fatigue;lightheadedness;dizziness    Gastroenterology: Negative    Genitourinary:  Negative    Musculoskeletal:  Negative    Neurologic:  Negative    Psychiatric:  Negative    Heme/Lymph/Imm:  Negative    Endocrine:  Negative      Physical Exam:  Vitals: /76   Pulse 75   Ht 1.568 m (5' 1.75\")   Wt 57.1 kg (125 lb 12.8 oz)   BMI 23.20 kg/m       Constitutional:  cooperative, alert and oriented, well developed, well nourished, in no acute distress        Skin:  warm and dry to the touch, no apparent skin " lesions or masses noted        Head:  normocephalic, no masses or lesions        Eyes:  pupils equal and round, conjunctivae and lids unremarkable, sclera white, no xanthalasma, EOMS intact, no nystagmus        ENT:  no pallor or cyanosis, dentition good        Neck:  JVP normal;no carotid bruit        Chest:  normal breath sounds, clear to auscultation, normal A-P diameter, normal symmetry, normal respiratory excursion, no use of accessory muscles        Cardiac: regular rhythm, normal S1/S2, no S3 or S4, apical impulse not displaced, no murmurs, gallops or rubs                  Abdomen:  abdomen soft        Vascular: pulses full and equal                                      Extremities and Back:  no deformities, clubbing, cyanosis, erythema observed        Neurological:  no gross motor deficits          Recent Lab Results:  LIPID RESULTS:  Lab Results   Component Value Date    CHOL 211 (H) 07/12/2004    HDL 60 07/12/2004     (H) 07/12/2004    TRIG 102 07/12/2004    CHOLHDLRATIO 3.5 07/12/2004       LIVER ENZYME RESULTS:  Lab Results   Component Value Date    AST 33 07/12/2004    ALT 18 07/12/2004       CBC RESULTS:  Lab Results   Component Value Date    WBC 4.9 02/05/2017    RBC 3.35 (L) 02/05/2017    HGB 10.6 (L) 02/05/2017    HCT 32.5 (L) 02/05/2017    MCV 97 02/05/2017    MCH 31.6 02/05/2017    MCHC 32.6 02/05/2017    RDW 13.5 02/05/2017     (L) 02/06/2017       BMP RESULTS:  Lab Results   Component Value Date     02/05/2017    POTASSIUM 4.3 02/05/2017    CHLORIDE 110 (H) 02/05/2017    CO2 28 02/05/2017    ANIONGAP 4 02/05/2017    GLC 86 02/05/2017    BUN 15 02/05/2017    CR 1.00 02/06/2017    GFRESTIMATED 55 (L) 02/06/2017    GFRESTBLACK 66 02/06/2017    SUE 7.9 (L) 02/05/2017        A1C RESULTS:  No results found for: A1C    INR RESULTS:  Lab Results   Component Value Date    INR 0.96 07/17/2007    INR 0.96 01/04/2005                   Thank you for allowing me to participate in the care  of your patient.    Sincerely,     Alison Curtis PA-C     [facility]

## 2019-05-13 NOTE — PROGRESS NOTES
"HPI:   I had the pleasure of seeing Anne when she came for annual follow-up.  She is a 73 year old who sees Dr. Monson for her history of:    1.  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  Status post dual-chamber [employer] pacemaker 2/2017    Anne saw Dr. Monson 1 year ago at which time she was doing well without complaints.    Happily, she has continued to do well. She participated in a 90 minute water aeorbic class 3 days/week over the winter and is now doing a 60 minute dance class most days of the week.  She and her  went on a River Cruise in [facility] this past year and had a wonderful time.     She feels like she'd \"like a bit more energy\" than she has but has noted no drop in exercise tolerance, chest discomfort or shortness of breath. No palpitations, dizziness or orthostasis. She has not had to change her exercise classes or quit early because she doesn't have enough energy. She sleeps well.       Device interrogation today showed less than 1% a pacing in DDD 60-1 30.  44% V pacing noted.  Echocardiogram 2/2017 showed EF 60 to 65%.  Left atrium was moderately dilated.  No significant valvular abnormalities.    Assessment & Plan:    1. 2:1 Heart Block s/p dual chamber [employer] PPM 2017    Device interrogation as above    Feeling really good without complaints    PLAN:    Continue routine device interrogations    See Dr. Monson 1 year with PPM interrogation      2. \"Would like more energy\"    Asked her to continue to monitor this. Has never cancelled plans, quit a class early or been unable to do everything she wants to do    TSH 2017 wnl    PLAN:  Monitor for objective change in exercise tolerance and contact us if this occurs      Nichelle Curtis PA-C, MSPAS      Orders Placed This Encounter   Procedures     Follow-Up with Electrophysiologist     EKG 12-lead complete w/read - " Clinics (to be scheduled)     Orders Placed This Encounter   Medications     UNABLE TO FIND     Sig: MEDICATION NAME: Immune Health Vitamin containing Vitamin C, D and more     There are no discontinued medications.      Encounter Diagnosis   Name Primary?     Atrioventricular block 2:1        CURRENT MEDICATIONS:  Current Outpatient Medications   Medication Sig Dispense Refill     Ascorbic Acid (VITAMIN C PO) Take 500 mg by mouth daily       atorvastatin (LIPITOR) 40 MG tablet Take 40 mg by mouth At Bedtime  30 tablet 1     BuPROPion HCl (WELLBUTRIN XL PO) Take 150 mg by mouth daily       Celecoxib (CELEBREX PO) Take 200 mg by mouth daily       Escitalopram Oxalate (LEXAPRO PO) Take 20 mg by mouth daily       eszopiclone (LUNESTA) 2 MG tablet Take 2 mg by mouth At Bedtime  30 tablet 5     LORAZEPAM PO Take 0.5 mg by mouth At Bedtime (0.5 x 1 mg tablet = 0.5 mg dose)       omeprazole 20 MG tablet Take 1 tablet (20 mg) by mouth daily Take 30-60 minutes before a meal. 30 tablet 1     polyethylene glycol (MIRALAX/GLYCOLAX) powder Take 17 g by mouth daily as needed for constipation       Probiotic Product (PROBIOTIC & ACIDOPHILUS EX ST PO) Take 1 capsule by mouth daily       SUMAtriptan Succinate (IMITREX PO) Take 100 mg by mouth daily as needed for migraine       traMADol (ULTRAM) 50 MG tablet Take 1-2 tablets ( mg) by mouth every 6 hours as needed for moderate pain 20 tablet 0     UNABLE TO FIND MEDICATION NAME: Immune Health Vitamin containing Vitamin C, D and more       VITAMIN D, CHOLECALCIFEROL, PO Take 5,000 Units by mouth three times a week (Monday, Wednesday and Friday)         ALLERGIES     Allergies   Allergen Reactions     Blood-Group Specific Substance Other (See Comments)     Patient has anti-K antibody.  Blood for the transfusion might be delayed.       Dilaudid [Hydromorphone] Nausea and Vomiting     Doxepin Unknown     Other reaction(s): Intolerance-Can't Take  Not effective for sleep      Meperidine Nausea and Vomiting     Demerol     Meperidine Nausea and Vomiting     Morphine      PN: LW Reaction: Vomiting,Nausea     No Clinical Screening - See Comments      PN: LW Reaction: all opiates     Pregabalin Other (See Comments)     Not effective       PAST MEDICAL HISTORY:  Past Medical History:   Diagnosis Date     Anemia      Carpal tunnel syndrome      Chronic fatigue syndrome 11/00     Decreased libido      Depression with anxiety      Fatigue      Gastroesophageal reflux disease      HA (headache)      iamAFTERCARE FOLLOWING JOINT REPLACEMENT 7/23/2007     Irritable bowel syndrome 11/00     Myalgia and myositis, unspecified 11/00     Near syncope 5/17/2017     Neutropenia 00     Noninfectious ileitis      Osteoarthrosis, unspecified whether generalized or localized, hand 11/00     Osteoarthrosis, unspecified whether generalized or localized, lower leg 11/00     Pure hypercholesterolemia 11/00     RBBB      Renal disease     mld     Uncomplicated asthma      Urticaria, unspecified 00       PAST SURGICAL HISTORY:  Past Surgical History:   Procedure Laterality Date     ARTHROPLASTY REVISION HIP Left 2/3/2017    Procedure: ARTHROPLASTY REVISION HIP;  Surgeon: Juan Moncada MD;  Location:  OR     C NONSPECIFIC PROCEDURE      s/p Rt. Carpal tunnel release     C NONSPECIFIC PROCEDURE      s/p bilat Tubal ligation     COLONOSCOPY N/A 1/15/2019    Procedure: COLONOSCOPY;  Surgeon: Eliane Barone MD;  Location:  GI     GI SURGERY      hemorrhoid repair     GYN SURGERY      tubal ligation     ORTHOPEDIC SURGERY      right sabine-arthroplasty, trigger finger repair, left knee arthroscopy and replacement, bilateral hip replacement,       FAMILY HISTORY:  Family History   Problem Relation Age of Onset     Hypertension Mother      Alzheimer Disease Mother         Memory issues- unsure if alzheimers     Arthritis Mother         osteoarthritis     Depression Mother      Gastrointestinal Disease Mother          acid reflux     Allergies Father      Neurologic Disorder Father         migraines     Respiratory Father         chronic brochitis- born during a flu epidemic in 1918     Cerebrovascular Disease Maternal Grandmother         series of TIA's     Diabetes Maternal Grandfather         adult onset     Cancer Maternal Grandfather         of stomach or esophagus-heavy smoker     C.A.D. Paternal Grandmother         angina     Depression Daughter      Neurologic Disorder Sister         migraines       SOCIAL HISTORY:  Social History     Socioeconomic History     Marital status:      Spouse name: Christiano Rangel     Number of children: 3     Years of education: Masters+     Highest education level: None   Occupational History     Occupation: [employer]     Employer: [employer]   Social Needs     Financial resource strain: None     Food insecurity:     Worry: None     Inability: None     Transportation needs:     Medical: None     Non-medical: None   Tobacco Use     Smoking status: Never Smoker     Smokeless tobacco: Never Used   Substance and Sexual Activity     Alcohol use: Yes     Comment: 1 Drink with dinner     Drug use: No     Sexual activity: Yes     Partners: Male   Lifestyle     Physical activity:     Days per week: None     Minutes per session: None     Stress: None   Relationships     Social connections:     Talks on phone: None     Gets together: None     Attends Amish service: None     Active member of club or organization: None     Attends meetings of clubs or organizations: None     Relationship status: None     Intimate partner violence:     Fear of current or ex partner: None     Emotionally abused: None     Physically abused: None     Forced sexual activity: None   Other Topics Concern      Service No     Blood Transfusions Yes     Comment: C-Sect. 1974, lost a lot of blood-given 6? units of blood     Caffeine Concern No     Occupational Exposure Yes     Comment:  "[employer], had vaccines Hep B, Flu     Hobby Hazards No     Sleep Concern No     Comment: Nortriptyline helps     Stress Concern No     Comment: 3 children     Weight Concern No     Special Diet No     Back Care Yes     Comment: Low back pain, has osteoarthritis     Exercise Yes     Comment: 3-4 times a week     Bike Helmet No     Seat Belt Yes     Self-Exams No     Parent/sibling w/ CABG, MI or angioplasty before 65F 55M? 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               Review of Systems:  Skin:  Negative     Eyes:  Positive for glasses  ENT:  Negative    Respiratory:  Negative for shortness of breath;dyspnea on exertion;cough  Cardiovascular:  Negative for;palpitations;chest pain;edema;syncope or near-syncope;exercise intolerance;fatigue;lightheadedness;dizziness    Gastroenterology: Negative    Genitourinary:  Negative    Musculoskeletal:  Negative    Neurologic:  Negative    Psychiatric:  Negative    Heme/Lymph/Imm:  Negative    Endocrine:  Negative      Physical Exam:  Vitals: /76   Pulse 75   Ht 1.568 m (5' 1.75\")   Wt 57.1 kg (125 lb 12.8 oz)   BMI 23.20 kg/m      Constitutional:  cooperative, alert and oriented, well developed, well nourished, in no acute distress        Skin:  warm and dry to the touch, no apparent skin lesions or masses noted        Head:  normocephalic, no masses or lesions        Eyes:  pupils equal and round, conjunctivae and lids unremarkable, sclera white, no xanthalasma, EOMS intact, no nystagmus        ENT:  no pallor or cyanosis, dentition good        Neck:  JVP " normal;no carotid bruit        Chest:  normal breath sounds, clear to auscultation, normal A-P diameter, normal symmetry, normal respiratory excursion, no use of accessory muscles        Cardiac: regular rhythm, normal S1/S2, no S3 or S4, apical impulse not displaced, no murmurs, gallops or rubs                  Abdomen:  abdomen soft        Vascular: pulses full and equal                                      Extremities and Back:  no deformities, clubbing, cyanosis, erythema observed        Neurological:  no gross motor deficits          Recent Lab Results:  LIPID RESULTS:  Lab Results   Component Value Date    CHOL 211 (H) 07/12/2004    HDL 60 07/12/2004     (H) 07/12/2004    TRIG 102 07/12/2004    CHOLHDLRATIO 3.5 07/12/2004       LIVER ENZYME RESULTS:  Lab Results   Component Value Date    AST 33 07/12/2004    ALT 18 07/12/2004       CBC RESULTS:  Lab Results   Component Value Date    WBC 4.9 02/05/2017    RBC 3.35 (L) 02/05/2017    HGB 10.6 (L) 02/05/2017    HCT 32.5 (L) 02/05/2017    MCV 97 02/05/2017    MCH 31.6 02/05/2017    MCHC 32.6 02/05/2017    RDW 13.5 02/05/2017     (L) 02/06/2017       BMP RESULTS:  Lab Results   Component Value Date     02/05/2017    POTASSIUM 4.3 02/05/2017    CHLORIDE 110 (H) 02/05/2017    CO2 28 02/05/2017    ANIONGAP 4 02/05/2017    GLC 86 02/05/2017    BUN 15 02/05/2017    CR 1.00 02/06/2017    GFRESTIMATED 55 (L) 02/06/2017    GFRESTBLACK 66 02/06/2017    SUE 7.9 (L) 02/05/2017        A1C RESULTS:  No results found for: A1C    INR RESULTS:  Lab Results   Component Value Date    INR 0.96 07/17/2007    INR 0.96 01/04/2005

## 2019-06-04 LAB
MDC_IDC_EPISODE_DTM: NORMAL
MDC_IDC_EPISODE_DTM: NORMAL
MDC_IDC_EPISODE_ID: NORMAL
MDC_IDC_EPISODE_ID: NORMAL
MDC_IDC_EPISODE_TYPE: NORMAL
MDC_IDC_EPISODE_TYPE: NORMAL
MDC_IDC_LEAD_IMPLANT_DT: NORMAL
MDC_IDC_LEAD_IMPLANT_DT: NORMAL
MDC_IDC_LEAD_LOCATION: NORMAL
MDC_IDC_LEAD_LOCATION: NORMAL
MDC_IDC_LEAD_LOCATION_DETAIL_1: NORMAL
MDC_IDC_LEAD_LOCATION_DETAIL_1: NORMAL
MDC_IDC_LEAD_MFG: NORMAL
MDC_IDC_LEAD_MFG: NORMAL
MDC_IDC_LEAD_MODEL: NORMAL
MDC_IDC_LEAD_MODEL: NORMAL
MDC_IDC_LEAD_POLARITY_TYPE: NORMAL
MDC_IDC_LEAD_POLARITY_TYPE: NORMAL
MDC_IDC_LEAD_SERIAL: NORMAL
MDC_IDC_LEAD_SERIAL: NORMAL
MDC_IDC_MSMT_BATTERY_REMAINING_LONGEVITY: 144 MO
MDC_IDC_MSMT_BATTERY_STATUS: NORMAL
MDC_IDC_MSMT_LEADCHNL_RA_IMPEDANCE_VALUE: 789 OHM
MDC_IDC_MSMT_LEADCHNL_RA_PACING_THRESHOLD_AMPLITUDE: 0.8 V
MDC_IDC_MSMT_LEADCHNL_RA_PACING_THRESHOLD_PULSEWIDTH: 0.4 MS
MDC_IDC_MSMT_LEADCHNL_RA_SENSING_INTR_AMPL: 2.7 MV
MDC_IDC_MSMT_LEADCHNL_RV_IMPEDANCE_VALUE: 719 OHM
MDC_IDC_MSMT_LEADCHNL_RV_PACING_THRESHOLD_AMPLITUDE: 1 V
MDC_IDC_MSMT_LEADCHNL_RV_PACING_THRESHOLD_PULSEWIDTH: 0.4 MS
MDC_IDC_MSMT_LEADCHNL_RV_SENSING_INTR_AMPL: 8.3 MV
MDC_IDC_PG_IMPLANT_DTM: NORMAL
MDC_IDC_PG_MFG: NORMAL
MDC_IDC_PG_MODEL: NORMAL
MDC_IDC_PG_SERIAL: NORMAL
MDC_IDC_PG_TYPE: NORMAL
MDC_IDC_SESS_CLINIC_NAME: NORMAL
MDC_IDC_SESS_DTM: NORMAL
MDC_IDC_SESS_TYPE: NORMAL
MDC_IDC_SET_BRADY_AT_MODE_SWITCH_MODE: NORMAL
MDC_IDC_SET_BRADY_AT_MODE_SWITCH_RATE: 170 {BEATS}/MIN
MDC_IDC_SET_BRADY_HYSTRATE: NORMAL
MDC_IDC_SET_BRADY_LOWRATE: 60 {BEATS}/MIN
MDC_IDC_SET_BRADY_MAX_SENSOR_RATE: 130 {BEATS}/MIN
MDC_IDC_SET_BRADY_MAX_TRACKING_RATE: 130 {BEATS}/MIN
MDC_IDC_SET_BRADY_MODE: NORMAL
MDC_IDC_SET_BRADY_PAV_DELAY_HIGH: 200 MS
MDC_IDC_SET_BRADY_PAV_DELAY_LOW: 300 MS
MDC_IDC_SET_BRADY_SAV_DELAY_HIGH: 200 MS
MDC_IDC_SET_BRADY_SAV_DELAY_LOW: 300 MS
MDC_IDC_SET_LEADCHNL_RA_PACING_AMPLITUDE: 2 V
MDC_IDC_SET_LEADCHNL_RA_PACING_ANODE_ELECTRODE_1: NORMAL
MDC_IDC_SET_LEADCHNL_RA_PACING_ANODE_LOCATION_1: NORMAL
MDC_IDC_SET_LEADCHNL_RA_PACING_CAPTURE_MODE: NORMAL
MDC_IDC_SET_LEADCHNL_RA_PACING_CATHODE_ELECTRODE_1: NORMAL
MDC_IDC_SET_LEADCHNL_RA_PACING_CATHODE_LOCATION_1: NORMAL
MDC_IDC_SET_LEADCHNL_RA_PACING_POLARITY: NORMAL
MDC_IDC_SET_LEADCHNL_RA_PACING_PULSEWIDTH: 0.4 MS
MDC_IDC_SET_LEADCHNL_RA_SENSING_ADAPTATION_MODE: NORMAL
MDC_IDC_SET_LEADCHNL_RA_SENSING_ANODE_ELECTRODE_1: NORMAL
MDC_IDC_SET_LEADCHNL_RA_SENSING_ANODE_LOCATION_1: NORMAL
MDC_IDC_SET_LEADCHNL_RA_SENSING_CATHODE_ELECTRODE_1: NORMAL
MDC_IDC_SET_LEADCHNL_RA_SENSING_CATHODE_LOCATION_1: NORMAL
MDC_IDC_SET_LEADCHNL_RA_SENSING_POLARITY: NORMAL
MDC_IDC_SET_LEADCHNL_RA_SENSING_SENSITIVITY: 0.25 MV
MDC_IDC_SET_LEADCHNL_RV_PACING_AMPLITUDE: 1.5 V
MDC_IDC_SET_LEADCHNL_RV_PACING_ANODE_ELECTRODE_1: NORMAL
MDC_IDC_SET_LEADCHNL_RV_PACING_ANODE_LOCATION_1: NORMAL
MDC_IDC_SET_LEADCHNL_RV_PACING_CAPTURE_MODE: NORMAL
MDC_IDC_SET_LEADCHNL_RV_PACING_CATHODE_ELECTRODE_1: NORMAL
MDC_IDC_SET_LEADCHNL_RV_PACING_CATHODE_LOCATION_1: NORMAL
MDC_IDC_SET_LEADCHNL_RV_PACING_POLARITY: NORMAL
MDC_IDC_SET_LEADCHNL_RV_PACING_PULSEWIDTH: 0.4 MS
MDC_IDC_SET_LEADCHNL_RV_SENSING_ADAPTATION_MODE: NORMAL
MDC_IDC_SET_LEADCHNL_RV_SENSING_ANODE_ELECTRODE_1: NORMAL
MDC_IDC_SET_LEADCHNL_RV_SENSING_ANODE_LOCATION_1: NORMAL
MDC_IDC_SET_LEADCHNL_RV_SENSING_CATHODE_ELECTRODE_1: NORMAL
MDC_IDC_SET_LEADCHNL_RV_SENSING_CATHODE_LOCATION_1: NORMAL
MDC_IDC_SET_LEADCHNL_RV_SENSING_POLARITY: NORMAL
MDC_IDC_SET_LEADCHNL_RV_SENSING_SENSITIVITY: 1.5 MV
MDC_IDC_SET_ZONE_DETECTION_INTERVAL: 375 MS
MDC_IDC_SET_ZONE_TYPE: NORMAL
MDC_IDC_SET_ZONE_VENDOR_TYPE: NORMAL
MDC_IDC_STAT_BRADY_RA_PERCENT_PACED: 1 %
MDC_IDC_STAT_BRADY_RV_PERCENT_PACED: 44 %
MDC_IDC_STAT_EPISODE_RECENT_COUNT: 0
MDC_IDC_STAT_EPISODE_RECENT_COUNT_DTM_END: NORMAL
MDC_IDC_STAT_EPISODE_RECENT_COUNT_DTM_START: NORMAL
MDC_IDC_STAT_EPISODE_TOTAL_COUNT: 0
MDC_IDC_STAT_EPISODE_TOTAL_COUNT_DTM_END: NORMAL
MDC_IDC_STAT_EPISODE_TYPE: NORMAL
MDC_IDC_STAT_EPISODE_TYPE: NORMAL
MDC_IDC_STAT_EPISODE_VENDOR_TYPE: NORMAL
MDC_IDC_STAT_EPISODE_VENDOR_TYPE: NORMAL

## 2019-08-19 ENCOUNTER — ANCILLARY PROCEDURE (OUTPATIENT)
Dept: CARDIOLOGY | Facility: CLINIC | Age: 74
End: 2019-08-19
Attending: INTERNAL MEDICINE
Payer: MEDICARE

## 2019-08-19 DIAGNOSIS — Z95.0 CARDIAC PACEMAKER IN SITU: ICD-10-CM

## 2019-08-19 PROCEDURE — 93294 REM INTERROG EVL PM/LDLS PM: CPT | Performed by: INTERNAL MEDICINE

## 2019-08-19 PROCEDURE — 93296 REM INTERROG EVL PM/IDS: CPT | Performed by: INTERNAL MEDICINE

## 2019-08-30 LAB
MDC_IDC_EPISODE_DTM: NORMAL
MDC_IDC_EPISODE_ID: NORMAL
MDC_IDC_EPISODE_TYPE: NORMAL
MDC_IDC_LEAD_IMPLANT_DT: NORMAL
MDC_IDC_LEAD_IMPLANT_DT: NORMAL
MDC_IDC_LEAD_LOCATION: NORMAL
MDC_IDC_LEAD_LOCATION: NORMAL
MDC_IDC_LEAD_LOCATION_DETAIL_1: NORMAL
MDC_IDC_LEAD_LOCATION_DETAIL_1: NORMAL
MDC_IDC_LEAD_MFG: NORMAL
MDC_IDC_LEAD_MFG: NORMAL
MDC_IDC_LEAD_MODEL: NORMAL
MDC_IDC_LEAD_MODEL: NORMAL
MDC_IDC_LEAD_POLARITY_TYPE: NORMAL
MDC_IDC_LEAD_POLARITY_TYPE: NORMAL
MDC_IDC_LEAD_SERIAL: NORMAL
MDC_IDC_LEAD_SERIAL: NORMAL
MDC_IDC_MSMT_BATTERY_DTM: NORMAL
MDC_IDC_MSMT_BATTERY_REMAINING_LONGEVITY: 132 MO
MDC_IDC_MSMT_BATTERY_REMAINING_PERCENTAGE: 100 %
MDC_IDC_MSMT_BATTERY_STATUS: NORMAL
MDC_IDC_MSMT_LEADCHNL_RA_IMPEDANCE_VALUE: 823 OHM
MDC_IDC_MSMT_LEADCHNL_RA_PACING_THRESHOLD_AMPLITUDE: 0.5 V
MDC_IDC_MSMT_LEADCHNL_RA_PACING_THRESHOLD_PULSEWIDTH: 0.4 MS
MDC_IDC_MSMT_LEADCHNL_RV_IMPEDANCE_VALUE: 725 OHM
MDC_IDC_MSMT_LEADCHNL_RV_PACING_THRESHOLD_AMPLITUDE: 1.2 V
MDC_IDC_MSMT_LEADCHNL_RV_PACING_THRESHOLD_PULSEWIDTH: 0.4 MS
MDC_IDC_PG_IMPLANT_DTM: NORMAL
MDC_IDC_PG_MFG: NORMAL
MDC_IDC_PG_MODEL: NORMAL
MDC_IDC_PG_SERIAL: NORMAL
MDC_IDC_PG_TYPE: NORMAL
MDC_IDC_SESS_CLINIC_NAME: NORMAL
MDC_IDC_SESS_DTM: NORMAL
MDC_IDC_SESS_TYPE: NORMAL
MDC_IDC_SET_BRADY_AT_MODE_SWITCH_MODE: NORMAL
MDC_IDC_SET_BRADY_AT_MODE_SWITCH_RATE: 170 {BEATS}/MIN
MDC_IDC_SET_BRADY_LOWRATE: 60 {BEATS}/MIN
MDC_IDC_SET_BRADY_MAX_SENSOR_RATE: 130 {BEATS}/MIN
MDC_IDC_SET_BRADY_MAX_TRACKING_RATE: 130 {BEATS}/MIN
MDC_IDC_SET_BRADY_MODE: NORMAL
MDC_IDC_SET_BRADY_PAV_DELAY_HIGH: 200 MS
MDC_IDC_SET_BRADY_PAV_DELAY_LOW: 300 MS
MDC_IDC_SET_BRADY_SAV_DELAY_HIGH: 200 MS
MDC_IDC_SET_BRADY_SAV_DELAY_LOW: 300 MS
MDC_IDC_SET_LEADCHNL_RA_PACING_AMPLITUDE: 2 V
MDC_IDC_SET_LEADCHNL_RA_PACING_CAPTURE_MODE: NORMAL
MDC_IDC_SET_LEADCHNL_RA_PACING_POLARITY: NORMAL
MDC_IDC_SET_LEADCHNL_RA_PACING_PULSEWIDTH: 0.4 MS
MDC_IDC_SET_LEADCHNL_RA_SENSING_ADAPTATION_MODE: NORMAL
MDC_IDC_SET_LEADCHNL_RA_SENSING_POLARITY: NORMAL
MDC_IDC_SET_LEADCHNL_RA_SENSING_SENSITIVITY: 0.25 MV
MDC_IDC_SET_LEADCHNL_RV_PACING_AMPLITUDE: 1.6 V
MDC_IDC_SET_LEADCHNL_RV_PACING_CAPTURE_MODE: NORMAL
MDC_IDC_SET_LEADCHNL_RV_PACING_POLARITY: NORMAL
MDC_IDC_SET_LEADCHNL_RV_PACING_PULSEWIDTH: 0.4 MS
MDC_IDC_SET_LEADCHNL_RV_SENSING_ADAPTATION_MODE: NORMAL
MDC_IDC_SET_LEADCHNL_RV_SENSING_POLARITY: NORMAL
MDC_IDC_SET_LEADCHNL_RV_SENSING_SENSITIVITY: 1.5 MV
MDC_IDC_SET_ZONE_DETECTION_INTERVAL: 375 MS
MDC_IDC_SET_ZONE_TYPE: NORMAL
MDC_IDC_SET_ZONE_VENDOR_TYPE: NORMAL
MDC_IDC_STAT_AT_BURDEN_PERCENT: 0 %
MDC_IDC_STAT_AT_DTM_END: NORMAL
MDC_IDC_STAT_AT_DTM_START: NORMAL
MDC_IDC_STAT_BRADY_DTM_END: NORMAL
MDC_IDC_STAT_BRADY_DTM_START: NORMAL
MDC_IDC_STAT_BRADY_RA_PERCENT_PACED: 0 %
MDC_IDC_STAT_BRADY_RV_PERCENT_PACED: 72 %
MDC_IDC_STAT_EPISODE_RECENT_COUNT: 0
MDC_IDC_STAT_EPISODE_RECENT_COUNT_DTM_END: NORMAL
MDC_IDC_STAT_EPISODE_RECENT_COUNT_DTM_START: NORMAL
MDC_IDC_STAT_EPISODE_TYPE: NORMAL
MDC_IDC_STAT_EPISODE_VENDOR_TYPE: NORMAL

## 2019-10-01 ENCOUNTER — HEALTH MAINTENANCE LETTER (OUTPATIENT)
Age: 74
End: 2019-10-01

## 2019-11-13 ENCOUNTER — THERAPY VISIT (OUTPATIENT)
Dept: PHYSICAL THERAPY | Facility: CLINIC | Age: 74
End: 2019-11-13
Payer: MEDICARE

## 2019-11-13 ENCOUNTER — TELEPHONE (OUTPATIENT)
Dept: CARDIOLOGY | Facility: CLINIC | Age: 74
End: 2019-11-13

## 2019-11-13 DIAGNOSIS — M54.16 LUMBAR RADICULOPATHY: ICD-10-CM

## 2019-11-13 PROCEDURE — 97112 NEUROMUSCULAR REEDUCATION: CPT | Mod: GP | Performed by: PHYSICAL THERAPIST

## 2019-11-13 PROCEDURE — 97110 THERAPEUTIC EXERCISES: CPT | Mod: GP | Performed by: PHYSICAL THERAPIST

## 2019-11-13 PROCEDURE — 97162 PT EVAL MOD COMPLEX 30 MIN: CPT | Mod: GP | Performed by: PHYSICAL THERAPIST

## 2019-11-13 NOTE — LETTER
DEPARTMENT OF HEALTH AND HUMAN SERVICES  CENTERS FOR MEDICARE & MEDICAID SERVICES    PLAN/UPDATED PLAN OF PROGRESS FOR OUTPATIENT REHABILITATION    PATIENTS NAME:  Anne Rangel   : 1945  PROVIDER NUMBER:    [id number]    HICN:  [id number]     PROVIDER NAME: [facility]AP[facility]    MEDICAL RECORD NUMBER: [id number]     START OF CARE DATE:  SOC Date: 19   TYPE:  PT    PRIMARY/TREATMENT DIAGNOSIS: (Pertinent Medical Diagnosis)  Lumbar radiculopathy    VISITS FROM START OF CARE:  Rxs Used: 1     [place] [facility] Initial Evaluation    Subjective:  The history is provided by the patient. No  was used.   Type of problem:  Lumbar  Condition occurred with:  Other reason. This is a chronic condition   Problem details: On or about 4/15/2019, I started to have low back pain.  The pain is at L4-L5.  There is some misalignment causing pinching.  Raising my left leg causes the back pain.  I have had left hip replacement and revision.  I have thigh and groin pain.  The hip and knee is doing OK.  I have right partial knee replacement and the knee needs more surgery.  I have had a pacemaker.  Standing, leaning over a counter and walking increases the pain.  Pressure downward is painful   Spinal injection on in 9/15/2019.  Minimal changes.  Patient reports pain:  Lumbar spine left and lower lumbar spine. Radiates to:  Thigh left and gluteals left (anterior). Associated symptoms:  Loss of motion/stiffness and loss of strength. Symptoms are exacerbated by standing and walking and relieved by rest (sitting, celebrex).  Anne Rangel being seen for low back and left thigh pain .   Problem began 2019 (MD appointment). Where condition occurred: for unknown reasons.Problem occurred: unknown.    and reported as 2/10 (sitting, standing 5-6/10 or raising the  leg 5-6/10) on pain scale. General health as reported by patient is good.  Pertinent medical history includes:  Depression, implanted device, migraines/headaches, osteoarthritis and sleep disorder/apnea. Other medical history details: IBS-with constipation, pain at rest and night .   Other medical allergies details: opiods.  Surgeries include:  Heart surgery and orthopedic surgery. Other surgery history details: bundle branch blockage with pacemaker.  bilateral BEATRICE with left revision, bilateral knee with right partial and left full.  Current medications:  Anti-depressants, pain medication and sleep medication.   Primary job tasks include:  Prolonged sitting, prolonged standing, lifting/carrying and pushing/pulling.  Pain is described as aching and is constant. Pain is worse in the P.M.. Since onset symptoms are gradually worsening (shot has worn off). Special tests:  CT scan (L4-L5).     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 Work activity restrictions: retired, house work with self restrictions.  PATIENTS NAME:  Anne Rangel   : 1945    Home/work barriers: house, steps ,garden travel, grand children.  Red flags:  None as reported by patient.                 Objective:  Standing Alignment:    Cervical/Thoracic:  Forward head and thoracic kyphosis increased (sitting posture poor)  Shoulder/UE:  Rounded shoulders  Lumbar:  Lordosis decr  Gait:    Gait Type:  Antalgic     Deviations:  Hip:  Decr dynamic control LGeneral Deviations:  Yady decr and stance time decr  Flexibility/Screens:   Positive screens:  Hip and Lumbar  Lower Extremity:  Decreased left lower extremity flexibility:Hip Flexors; Quadriceps and Hamstrings  Decreased right lower extremity flexibility:  Hip Flexors; Quadriceps and Hamstrings    Lumbar/SI Evaluation  ROM:    AROM Lumbar:   Flexion:        Toes   Ext:                    Minimal    Side Bend:        Left:     Right:   Rotation:           Left:     Right:   Side Glide:        Left:     Right:       Strength: left hip abduciton 4/5,,  TA 1/5  Lumbar Myotomes:    T12-L3 (Hip Flex):  Left: 4+    Right: 5  L2-4 (Quads):  Left:  5    Right:  4+  L4 (Ankle DF):  Left:  5    Right:  5  L5 (Great Toe Ext): Left: 5    Right: 5   S1 (Toe Raise):  Left: 5    Right: 5  Neural Tension/Mobility:    Left side:  Femoral Nerve (tightness) positive.  Left side:Slump  negative.   Right side:   Femoral Nerve (tightness) positive.  Right side:   Slump  negative.   Lumbar Palpation:    Tenderness present at Left:    Quadratus Lumborum; Erector Spinae; Piriformis; Gluteus Medius; Hip Flexors and Vertebral  Lumbar Provocation:    Left positive with:  Mobility and PROM hip  Spinal Segmental Conclusions:   Level: Hypo noted at L4 and L5       Hip Evaluation  Hip PROM:    Flexion: Left: 100  Right:  Extension: Left: 0- minimal movement  Right:  Abduction: Left: 15 degrees   Right:  PATIENTS NAME:  Anne Rangel   : 1945    Assessment/Plan:    Patient is a 73 year old female with lumbar and left side hip complaints.    Patient has the following significant findings with corresponding treatment plan.                Diagnosis 1:  Lumbar radiculopathy with left hip tightness  Pain -  manual therapy, self management, education and home program  Decreased ROM/flexibility - manual therapy, therapeutic exercise, therapeutic activity and home program  Decreased joint mobility - manual therapy, therapeutic exercise, therapeutic activity and home program  Decreased strength - therapeutic exercise, therapeutic activities and home program  Impaired balance - neuro re-education, therapeutic activities and home program  Impaired muscle performance - neuro re-education and home program  Decreased function - therapeutic activities and home program  Impaired posture - neuro re-education, therapeutic activities and home program  Evaluation ongoing     Therapy Evaluation Codes:   1) History comprised of:   Personal factors that impact the plan of care:      Living environment,  Past/current experiences and Time since onset of symptoms.    Comorbidity factors that impact the plan of care are:      Depression, Implanted device, Migraines/headaches, Osteoarthritis, Sleep disorder/apnea, Weakness and bilateral BEATRICE, bilateral TKA and pacemaker.     Medications impacting care: Anti-depressant, Pain and Sleep.  2) Examination of Body Systems comprised of:   Body structures and functions that impact the plan of care:      Hip, Knee, Lumbar spine and Pelvis.   Activity limitations that impact the plan of care are:      Bending, Cooking, Driving, Dressing, Lifting, Sitting, Stairs, Standing, Walking and house work.  3) Clinical presentation characteristics are:   Evolving/Changing.  4) Decision-Making    Moderate complexity using standardized patient assessment instrument and/or measureable assessment of functional outcome.  Cumulative Therapy Evaluation is: Moderate complexity.    Previous and current functional limitations:  (See Goal Flow Sheet for this information)    Short term and Long term goals: (See Goal Flow Sheet for this information)     Communication ability:  Patient appears to be able to clearly communicate and understand verbal and written communication and follow directions correctly.  Treatment Explanation - The following has been discussed with the patient:   RX ordered/plan of care  This patient would benefit from PT intervention to resume normal activities.   Rehab potential is good.    Frequency:  1 X week, once daily  Duration:  for 10 weeks  Discharge Plan:  Achieve all LTG.  PATIENTS NAME:  Anne Rangel   : 1945    Independent in home treatment program.    Caregiver Signature/Credentials _____________________________ Date ________       Treating Provider: Sasha Mckay, PT      I have reviewed and certified the need for these services and plan of treatment while under my care.        PHYSICIAN'S SIGNATURE:   _________________________________________   "Date___________   Anjel Bernardo MD    Certification period:  Beginning of Cert date period: 11/13/19 to  End of Cert period date: 02/05/20     Functional Level Progress Report: Please see attached \"Goal Flow sheet for Functional level.\"    ____X____ Continue Services or       ________ DC Services                Service dates: From  SOC Date: 11/13/19 date to present                         "

## 2019-11-13 NOTE — TELEPHONE ENCOUNTER
Received MRI form due to patient's PPM. Filled out form, reviewed and signed by Dr. Monson. Faxed back to [facility] at [phone].

## 2019-11-13 NOTE — PROGRESS NOTES
[place] [facility] Initial Evaluation  Subjective:  The history is provided by the patient. No  was used.   Type of problem:  Lumbar   Condition occurred with:  Other reason. This is a chronic condition   Problem details: On or about 4/15/2019, I started to have low back pain.  The pain is at L4-L5.  There is some misalignment causing pinching.  Raising my left leg causes the back pain.  I have had left hip replacement and revision.  I have thigh and groin pain.  The hip and knee is doing OK.  I have right partial knee replacement and the knee needs more surgery.  I have had a pacemaker.  Standing, leaning over a counter and walking increases the pain.  Pressure downward is painful   Spinal injection on in 9/15/2019.  Minimal changes. .   Patient reports pain:  Lumbar spine left and lower lumbar spine. Radiates to:  Thigh left and gluteals left (anterior). Associated symptoms:  Loss of motion/stiffness and loss of strength. Symptoms are exacerbated by standing and walking and relieved by rest (sitting, celebrex).    Anne Rangel being seen for low back and left thigh pain .   Problem began 11/12/2019 (MD appointment). Where condition occurred: for unknown reasons.Problem occurred: unknown.    and reported as 2/10 (sitting, standing 5-6/10 or raising the  leg 5-6/10) on pain scale. General health as reported by patient is good. Pertinent medical history includes:  Depression, implanted device, migraines/headaches, osteoarthritis and sleep disorder/apnea. Other medical history details: IBS-with constipation, pain at rest and night .   Other medical allergies details: opiods.  Surgeries include:  Heart surgery and orthopedic surgery. Other surgery history details: bundle branch blockage with pacemaker.  bilateral BEATRICE with left revision, bilateral knee with right partial and left full.  Current medications:  Anti-depressants, pain medication and sleep medication.   Primary job tasks  include:  Prolonged sitting, prolonged standing, lifting/carrying and pushing/pulling.  Pain is described as aching and is constant. Pain is worse in the P.M.. Since onset symptoms are gradually worsening (shot has worn off). Special tests:  CT scan (L4-L5).     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 Work activity restrictions: retired, house work with self restrictions.    Home/work barriers: house, steps ,garden travel, grand children.  Red flags:  None as reported by patient.                      Objective:  Standing Alignment:    Cervical/Thoracic:  Forward head and thoracic kyphosis increased (sitting posture poor)  Shoulder/UE:  Rounded shoulders  Lumbar:  Lordosis decr            Gait:    Gait Type:  Antalgic     Deviations:  Hip:  Decr dynamic control LGeneral Deviations:  Yady decr and stance time decr    Flexibility/Screens:   Positive screens:  Hip and Lumbar    Lower Extremity:  Decreased left lower extremity flexibility:Hip Flexors; Quadriceps and Hamstrings    Decreased right lower extremity flexibility:  Hip Flexors; Quadriceps and Hamstrings               Lumbar/SI Evaluation  ROM:    AROM Lumbar:   Flexion:        Toes   Ext:                    Minimal    Side Bend:        Left:     Right:   Rotation:           Left:     Right:   Side Glide:        Left:     Right:         Strength: left hip abduciton 4/5,, TA 1/5  Lumbar Myotomes:    T12-L3 (Hip Flex):  Left: 4+    Right: 5  L2-4 (Quads):  Left:  5    Right:  4+  L4 (Ankle DF):  Left:  5    Right:  5  L5 (Great Toe Ext): Left: 5    Right: 5   S1 (Toe Raise):  Left: 5    Right: 5        Neural Tension/Mobility:    Left side:  Femoral Nerve (tightness) positive.  Left side:Slump  negative.   Right side:   Femoral Nerve (tightness) positive.  Right side:   Slump  negative.   Lumbar Palpation:    Tenderness present at Left:    Quadratus Lumborum; Erector Spinae; Piriformis; Gluteus Medius; Hip Flexors and Vertebral      Lumbar  Provocation:    Left positive with:  Mobility and PROM hip    Spinal Segmental Conclusions:     Level: Hypo noted at L4 and L5                                        Hip Evaluation  Hip PROM:    Flexion: Left: 100  Right:  Extension: Left: 0- minimal movement  Right:  Abduction: Left: 15 degrees   Right:                                     General     ROS    Assessment/Plan:    Patient is a 73 year old female with lumbar and left side hip complaints.    Patient has the following significant findings with corresponding treatment plan.                Diagnosis 1:  Lumbar radiculopathy with left hip tightness  Pain -  manual therapy, self management, education and home program  Decreased ROM/flexibility - manual therapy, therapeutic exercise, therapeutic activity and home program  Decreased joint mobility - manual therapy, therapeutic exercise, therapeutic activity and home program  Decreased strength - therapeutic exercise, therapeutic activities and home program  Impaired balance - neuro re-education, therapeutic activities and home program  Impaired muscle performance - neuro re-education and home program  Decreased function - therapeutic activities and home program  Impaired posture - neuro re-education, therapeutic activities and home program  Evaluation ongoing     Therapy Evaluation Codes:   1) History comprised of:   Personal factors that impact the plan of care:      Living environment, Past/current experiences and Time since onset of symptoms.    Comorbidity factors that impact the plan of care are:      Depression, Implanted device, Migraines/headaches, Osteoarthritis, Sleep disorder/apnea, Weakness and bilateral BEATRICE, bilateral TKA and pacemaker.     Medications impacting care: Anti-depressant, Pain and Sleep.  2) Examination of Body Systems comprised of:   Body structures and functions that impact the plan of care:      Hip, Knee, Lumbar spine and Pelvis.   Activity limitations that impact the plan of care  are:      Bending, Cooking, Driving, Dressing, Lifting, Sitting, Stairs, Standing, Walking and house work.  3) Clinical presentation characteristics are:   Evolving/Changing.  4) Decision-Making    Moderate complexity using standardized patient assessment instrument and/or measureable assessment of functional outcome.  Cumulative Therapy Evaluation is: Moderate complexity.    Previous and current functional limitations:  (See Goal Flow Sheet for this information)    Short term and Long term goals: (See Goal Flow Sheet for this information)     Communication ability:  Patient appears to be able to clearly communicate and understand verbal and written communication and follow directions correctly.  Treatment Explanation - The following has been discussed with the patient:   RX ordered/plan of care  This patient would benefit from PT intervention to resume normal activities.   Rehab potential is good.    Frequency:  1 X week, once daily  Duration:  for 10 weeks  Discharge Plan:  Achieve all LTG.  Independent in home treatment program.    Please refer to the daily flowsheet for treatment today, total treatment time and time spent performing 1:1 timed codes.

## 2019-11-25 ENCOUNTER — HOSPITAL ENCOUNTER (OUTPATIENT)
Dept: BONE DENSITY | Facility: CLINIC | Age: 74
End: 2019-11-25
Attending: NEUROLOGICAL SURGERY
Payer: MEDICARE

## 2019-11-25 ENCOUNTER — HOSPITAL ENCOUNTER (OUTPATIENT)
Facility: CLINIC | Age: 74
Discharge: HOME | End: 2019-11-25
Admitting: NEUROLOGICAL SURGERY
Payer: MEDICARE

## 2019-11-25 ENCOUNTER — HOSPITAL ENCOUNTER (OUTPATIENT)
Dept: MRI IMAGING | Facility: CLINIC | Age: 74
End: 2019-11-25
Attending: NEUROLOGICAL SURGERY
Payer: MEDICARE

## 2019-11-25 ENCOUNTER — DOCUMENTATION ONLY (OUTPATIENT)
Dept: CARDIOLOGY | Facility: CLINIC | Age: 74
End: 2019-11-25

## 2019-11-25 ENCOUNTER — ANCILLARY PROCEDURE (OUTPATIENT)
Dept: CARDIOLOGY | Facility: CLINIC | Age: 74
End: 2019-11-25
Attending: INTERNAL MEDICINE
Payer: MEDICARE

## 2019-11-25 VITALS — OXYGEN SATURATION: 96 %

## 2019-11-25 DIAGNOSIS — M81.0 OSTEOPOROSIS, UNSPECIFIED OSTEOPOROSIS TYPE, UNSPECIFIED PATHOLOGICAL FRACTURE PRESENCE: ICD-10-CM

## 2019-11-25 DIAGNOSIS — M54.16 LUMBAR RADICULAR PAIN: ICD-10-CM

## 2019-11-25 DIAGNOSIS — Z95.0 CARDIAC PACEMAKER IN SITU: ICD-10-CM

## 2019-11-25 PROCEDURE — 72148 MRI LUMBAR SPINE W/O DYE: CPT

## 2019-11-25 PROCEDURE — 93296 REM INTERROG EVL PM/IDS: CPT | Performed by: INTERNAL MEDICINE

## 2019-11-25 PROCEDURE — 40000863 ZZH STATISTIC RADIOLOGY XRAY, US, CT, MAR, NM

## 2019-11-25 PROCEDURE — 93294 REM INTERROG EVL PM/LDLS PM: CPT | Performed by: INTERNAL MEDICINE

## 2019-11-25 PROCEDURE — 77081 DXA BONE DENSITY APPENDICULR: CPT

## 2019-11-25 NOTE — TELEPHONE ENCOUNTER
Post MRI device Check.  Device check done by Minetta Brook Rep post MRI. Device and lead measurement stable. No changes made. Patient had remote device check done today, 11/25/219 and has next remote device check scheduled for 03/03/20.

## 2019-11-25 NOTE — PROGRESS NOTES
Pt up after scan and ambulated safely with stand by assist back to recliner. Pacemaker rep here to reset PPM settings. Pt denies discomfort. No change in heart rate during exam.

## 2019-11-25 NOTE — PROGRESS NOTES
Assessment completed prior to MRI scan.  Pacemaker rep placed MRI john pacemaker in DOO mode Pt denies pain, nausea or vomiting at this time. Pt ambulating safely in to scan area.

## 2019-11-27 LAB
MDC_IDC_EPISODE_DTM: NORMAL
MDC_IDC_EPISODE_DTM: NORMAL
MDC_IDC_EPISODE_ID: NORMAL
MDC_IDC_EPISODE_ID: NORMAL
MDC_IDC_EPISODE_TYPE: NORMAL
MDC_IDC_EPISODE_TYPE: NORMAL
MDC_IDC_LEAD_IMPLANT_DT: NORMAL
MDC_IDC_LEAD_IMPLANT_DT: NORMAL
MDC_IDC_LEAD_LOCATION: NORMAL
MDC_IDC_LEAD_LOCATION: NORMAL
MDC_IDC_LEAD_LOCATION_DETAIL_1: NORMAL
MDC_IDC_LEAD_LOCATION_DETAIL_1: NORMAL
MDC_IDC_LEAD_MFG: NORMAL
MDC_IDC_LEAD_MFG: NORMAL
MDC_IDC_LEAD_MODEL: NORMAL
MDC_IDC_LEAD_MODEL: NORMAL
MDC_IDC_LEAD_POLARITY_TYPE: NORMAL
MDC_IDC_LEAD_POLARITY_TYPE: NORMAL
MDC_IDC_LEAD_SERIAL: NORMAL
MDC_IDC_LEAD_SERIAL: NORMAL
MDC_IDC_MSMT_BATTERY_DTM: NORMAL
MDC_IDC_MSMT_BATTERY_REMAINING_LONGEVITY: 132 MO
MDC_IDC_MSMT_BATTERY_REMAINING_PERCENTAGE: 100 %
MDC_IDC_MSMT_BATTERY_STATUS: NORMAL
MDC_IDC_MSMT_LEADCHNL_RA_IMPEDANCE_VALUE: 686 OHM
MDC_IDC_MSMT_LEADCHNL_RA_PACING_THRESHOLD_AMPLITUDE: 0.5 V
MDC_IDC_MSMT_LEADCHNL_RA_PACING_THRESHOLD_PULSEWIDTH: 0.4 MS
MDC_IDC_MSMT_LEADCHNL_RV_IMPEDANCE_VALUE: 718 OHM
MDC_IDC_MSMT_LEADCHNL_RV_PACING_THRESHOLD_AMPLITUDE: 0.9 V
MDC_IDC_MSMT_LEADCHNL_RV_PACING_THRESHOLD_PULSEWIDTH: 0.4 MS
MDC_IDC_PG_IMPLANT_DTM: NORMAL
MDC_IDC_PG_MFG: NORMAL
MDC_IDC_PG_MODEL: NORMAL
MDC_IDC_PG_SERIAL: NORMAL
MDC_IDC_PG_TYPE: NORMAL
MDC_IDC_SESS_CLINIC_NAME: NORMAL
MDC_IDC_SESS_DTM: NORMAL
MDC_IDC_SESS_TYPE: NORMAL
MDC_IDC_SET_BRADY_AT_MODE_SWITCH_MODE: NORMAL
MDC_IDC_SET_BRADY_AT_MODE_SWITCH_RATE: 170 {BEATS}/MIN
MDC_IDC_SET_BRADY_LOWRATE: 60 {BEATS}/MIN
MDC_IDC_SET_BRADY_MAX_SENSOR_RATE: 130 {BEATS}/MIN
MDC_IDC_SET_BRADY_MAX_TRACKING_RATE: 130 {BEATS}/MIN
MDC_IDC_SET_BRADY_MODE: NORMAL
MDC_IDC_SET_BRADY_PAV_DELAY_HIGH: 200 MS
MDC_IDC_SET_BRADY_PAV_DELAY_LOW: 300 MS
MDC_IDC_SET_BRADY_SAV_DELAY_HIGH: 200 MS
MDC_IDC_SET_BRADY_SAV_DELAY_LOW: 300 MS
MDC_IDC_SET_LEADCHNL_RA_PACING_AMPLITUDE: 2 V
MDC_IDC_SET_LEADCHNL_RA_PACING_CAPTURE_MODE: NORMAL
MDC_IDC_SET_LEADCHNL_RA_PACING_POLARITY: NORMAL
MDC_IDC_SET_LEADCHNL_RA_PACING_PULSEWIDTH: 0.4 MS
MDC_IDC_SET_LEADCHNL_RA_SENSING_ADAPTATION_MODE: NORMAL
MDC_IDC_SET_LEADCHNL_RA_SENSING_POLARITY: NORMAL
MDC_IDC_SET_LEADCHNL_RA_SENSING_SENSITIVITY: 0.25 MV
MDC_IDC_SET_LEADCHNL_RV_PACING_AMPLITUDE: 1.4 V
MDC_IDC_SET_LEADCHNL_RV_PACING_CAPTURE_MODE: NORMAL
MDC_IDC_SET_LEADCHNL_RV_PACING_POLARITY: NORMAL
MDC_IDC_SET_LEADCHNL_RV_PACING_PULSEWIDTH: 0.4 MS
MDC_IDC_SET_LEADCHNL_RV_SENSING_ADAPTATION_MODE: NORMAL
MDC_IDC_SET_LEADCHNL_RV_SENSING_POLARITY: NORMAL
MDC_IDC_SET_LEADCHNL_RV_SENSING_SENSITIVITY: 1.5 MV
MDC_IDC_SET_ZONE_DETECTION_INTERVAL: 375 MS
MDC_IDC_SET_ZONE_TYPE: NORMAL
MDC_IDC_SET_ZONE_VENDOR_TYPE: NORMAL
MDC_IDC_STAT_AT_BURDEN_PERCENT: 0 %
MDC_IDC_STAT_AT_DTM_END: NORMAL
MDC_IDC_STAT_AT_DTM_START: NORMAL
MDC_IDC_STAT_BRADY_DTM_END: NORMAL
MDC_IDC_STAT_BRADY_DTM_START: NORMAL
MDC_IDC_STAT_BRADY_RA_PERCENT_PACED: 0 %
MDC_IDC_STAT_BRADY_RV_PERCENT_PACED: 66 %
MDC_IDC_STAT_EPISODE_RECENT_COUNT: 0
MDC_IDC_STAT_EPISODE_RECENT_COUNT_DTM_END: NORMAL
MDC_IDC_STAT_EPISODE_RECENT_COUNT_DTM_START: NORMAL
MDC_IDC_STAT_EPISODE_TYPE: NORMAL
MDC_IDC_STAT_EPISODE_VENDOR_TYPE: NORMAL

## 2019-12-15 ENCOUNTER — HEALTH MAINTENANCE LETTER (OUTPATIENT)
Age: 74
End: 2019-12-15

## 2019-12-16 RX ORDER — ACETAMINOPHEN 500 MG
500-1000 TABLET ORAL EVERY 6 HOURS PRN
COMMUNITY
End: 2020-05-27

## 2019-12-16 ASSESSMENT — MIFFLIN-ST. JEOR: SCORE: 1033.85

## 2019-12-27 NOTE — OR NURSING
Hgb done at clinic 12/18/19 - 11.8.  T & S to be done per [facility] guidelines for fusion surgery.  Dr. Bernardo' office notified of above.

## 2020-01-04 NOTE — PHARMACY-ADMISSION MEDICATION HISTORY
Admission medication history interview status for this patient is complete. See [facility] admission navigator for allergy information, prior to admission medications and immunization status.     Med rec completed by pre-admitting nurse,   Nurse Lenore Clemons RN Mon Dec 16, 2019 10:10 AM     Prior to Admission medications    Medication Sig Last Dose Taking? Auth Provider   acetaminophen (TYLENOL) 500 MG tablet Take 500-1,000 mg by mouth every 6 hours as needed for mild pain  Yes Reported, Patient   Ascorbic Acid (VITAMIN C PO) Take 500 mg by mouth daily  Yes Reported, Patient   atorvastatin (LIPITOR) 40 MG tablet Take 40 mg by mouth At Bedtime   Yes Meena Woo APRN CNP   BuPROPion HCl (WELLBUTRIN XL PO) Take 150 mg by mouth daily  Yes Reported, Patient   Celecoxib (CELEBREX PO) Take 200 mg by mouth daily  Yes Reported, Patient   Escitalopram Oxalate (LEXAPRO PO) Take 20 mg by mouth daily  Yes Reported, Patient   eszopiclone (LUNESTA) 2 MG tablet Take 3 mg by mouth At Bedtime   Yes Meena Woo APRN CNP   LORAZEPAM PO Take 0.5 mg by mouth At Bedtime (0.5 x 1 mg tablet = 0.5 mg dose)  Yes Reported, Patient   Multiple Minerals-Vitamins (SUE MAG ZINC +D3 PO) Take 1 tablet by mouth four times a week  Yes Reported, Patient   omeprazole 20 MG tablet Take 1 tablet (20 mg) by mouth daily Take 30-60 minutes before a meal.  Yes Meena Woo APRN CNP   polyethylene glycol (MIRALAX/GLYCOLAX) powder Take 17 g by mouth daily as needed for constipation  Yes Reported, Patient   Probiotic Product (PROBIOTIC & ACIDOPHILUS EX ST PO) Take 1 capsule by mouth daily  Yes Reported, Patient   SUMAtriptan Succinate (IMITREX PO) Take 100 mg by mouth daily as needed for migraine  Yes Reported, Patient   traMADol (ULTRAM) 50 MG tablet Take 1-2 tablets ( mg) by mouth every 6 hours as needed for moderate pain  Yes Meena Woo APRN CNP   VITAMIN D, CHOLECALCIFEROL, PO Take 5,000 Units by mouth three times a  week (Monday, Wednesday and Friday)  Yes Reported, Patient

## 2020-01-07 ENCOUNTER — HOSPITAL ENCOUNTER (OUTPATIENT)
Dept: LAB | Facility: CLINIC | Age: 75
Discharge: HOME | DRG: 455 | End: 2020-01-07
Attending: NEUROLOGICAL SURGERY | Admitting: NEUROLOGICAL SURGERY
Payer: MEDICARE

## 2020-01-07 DIAGNOSIS — Z01.812 PRE-OPERATIVE LABORATORY EXAMINATION: ICD-10-CM

## 2020-01-07 LAB
ABO + RH BLD: ABNORMAL
ABO + RH BLD: ABNORMAL
BLD GP AB SCN SERPL QL: ABNORMAL
BLD PROD TYP BPU: ABNORMAL
BLOOD BANK CMNT PATIENT-IMP: ABNORMAL
NUM BPU REQUESTED: 2
SPECIMEN EXP DATE BLD: ABNORMAL

## 2020-01-07 PROCEDURE — 36415 COLL VENOUS BLD VENIPUNCTURE: CPT | Performed by: NEUROLOGICAL SURGERY

## 2020-01-07 PROCEDURE — 86850 RBC ANTIBODY SCREEN: CPT | Performed by: NEUROLOGICAL SURGERY

## 2020-01-07 PROCEDURE — 86902 BLOOD TYPE ANTIGEN DONOR EA: CPT | Performed by: NEUROLOGICAL SURGERY

## 2020-01-07 PROCEDURE — 86901 BLOOD TYPING SEROLOGIC RH(D): CPT | Performed by: NEUROLOGICAL SURGERY

## 2020-01-07 PROCEDURE — 86922 COMPATIBILITY TEST ANTIGLOB: CPT | Performed by: NEUROLOGICAL SURGERY

## 2020-01-07 PROCEDURE — 86900 BLOOD TYPING SEROLOGIC ABO: CPT | Performed by: NEUROLOGICAL SURGERY

## 2020-01-07 ASSESSMENT — MIFFLIN-ST. JEOR: SCORE: 1036.13

## 2020-01-09 ENCOUNTER — HOSPITAL ENCOUNTER (INPATIENT)
Facility: CLINIC | Age: 75
LOS: 4 days | Discharge: HOME | DRG: 455 | End: 2020-01-13
Attending: NEUROLOGICAL SURGERY | Admitting: NEUROLOGICAL SURGERY
Payer: MEDICARE

## 2020-01-09 ENCOUNTER — ANESTHESIA EVENT (OUTPATIENT)
Dept: SURGERY | Facility: CLINIC | Age: 75
DRG: 455 | End: 2020-01-09
Payer: MEDICARE

## 2020-01-09 ENCOUNTER — APPOINTMENT (OUTPATIENT)
Dept: GENERAL RADIOLOGY | Facility: CLINIC | Age: 75
DRG: 455 | End: 2020-01-09
Attending: NEUROLOGICAL SURGERY
Payer: MEDICARE

## 2020-01-09 ENCOUNTER — ANESTHESIA (OUTPATIENT)
Dept: SURGERY | Facility: CLINIC | Age: 75
DRG: 455 | End: 2020-01-09
Payer: MEDICARE

## 2020-01-09 DIAGNOSIS — Z98.1 S/P LUMBAR FUSION: Primary | ICD-10-CM

## 2020-01-09 PROCEDURE — 40000277 XR SURGERY CARM FLUORO LESS THAN 5 MIN W STILLS: Mod: TC

## 2020-01-09 PROCEDURE — 25000128 H RX IP 250 OP 636: Performed by: NEUROLOGICAL SURGERY

## 2020-01-09 PROCEDURE — 25000300 ZZH OR RX SURGIFLO HEMOSTATIC MATRIX 10ML 199102S OPNP: Performed by: NEUROLOGICAL SURGERY

## 2020-01-09 PROCEDURE — 8E0WXBF COMPUTER ASSISTED PROCEDURE OF TRUNK REGION, WITH FLUOROSCOPY: ICD-10-PCS | Performed by: NEUROLOGICAL SURGERY

## 2020-01-09 PROCEDURE — 27210794 ZZH OR GENERAL SUPPLY STERILE: Performed by: NEUROLOGICAL SURGERY

## 2020-01-09 PROCEDURE — 25800030 ZZH RX IP 258 OP 636: Performed by: ANESTHESIOLOGY

## 2020-01-09 PROCEDURE — 71000012 ZZH RECOVERY PHASE 1 LEVEL 1 FIRST HR: Performed by: NEUROLOGICAL SURGERY

## 2020-01-09 PROCEDURE — 4A11X4G MONITORING OF PERIPHERAL NERVOUS ELECTRICAL ACTIVITY, INTRAOPERATIVE, EXTERNAL APPROACH: ICD-10-PCS | Performed by: NEUROLOGICAL SURGERY

## 2020-01-09 PROCEDURE — 37000009 ZZH ANESTHESIA TECHNICAL FEE, EACH ADDTL 15 MIN: Performed by: NEUROLOGICAL SURGERY

## 2020-01-09 PROCEDURE — 36000071 ZZH SURGERY LEVEL 5 W FLUORO 1ST 30 MIN: Performed by: NEUROLOGICAL SURGERY

## 2020-01-09 PROCEDURE — 25800025 ZZH RX 258: Performed by: NEUROLOGICAL SURGERY

## 2020-01-09 PROCEDURE — 71000013 ZZH RECOVERY PHASE 1 LEVEL 1 EA ADDTL HR: Performed by: NEUROLOGICAL SURGERY

## 2020-01-09 PROCEDURE — 0SB20ZZ EXCISION OF LUMBAR VERTEBRAL DISC, OPEN APPROACH: ICD-10-PCS | Performed by: NEUROLOGICAL SURGERY

## 2020-01-09 PROCEDURE — 25800030 ZZH RX IP 258 OP 636: Performed by: NURSE ANESTHETIST, CERTIFIED REGISTERED

## 2020-01-09 PROCEDURE — C1713 ANCHOR/SCREW BN/BN,TIS/BN: HCPCS | Performed by: NEUROLOGICAL SURGERY

## 2020-01-09 PROCEDURE — 25000125 ZZHC RX 250: Performed by: ANESTHESIOLOGY

## 2020-01-09 PROCEDURE — 25000125 ZZHC RX 250: Performed by: NURSE ANESTHETIST, CERTIFIED REGISTERED

## 2020-01-09 PROCEDURE — 25000128 H RX IP 250 OP 636: Performed by: ANESTHESIOLOGY

## 2020-01-09 PROCEDURE — 25000125 ZZHC RX 250: Performed by: NEUROLOGICAL SURGERY

## 2020-01-09 PROCEDURE — 40000985 XR LUMBAR SPINE PORT 1 VW: Mod: TC

## 2020-01-09 PROCEDURE — 25800030 ZZH RX IP 258 OP 636: Performed by: NEUROLOGICAL SURGERY

## 2020-01-09 PROCEDURE — 27210995 ZZH RX 272: Performed by: NEUROLOGICAL SURGERY

## 2020-01-09 PROCEDURE — 12000000 ZZH R&B MED SURG/OB

## 2020-01-09 PROCEDURE — 25000132 ZZH RX MED GY IP 250 OP 250 PS 637: Mod: GY | Performed by: NEUROLOGICAL SURGERY

## 2020-01-09 PROCEDURE — 25000128 H RX IP 250 OP 636: Performed by: NURSE ANESTHETIST, CERTIFIED REGISTERED

## 2020-01-09 PROCEDURE — 37000008 ZZH ANESTHESIA TECHNICAL FEE, 1ST 30 MIN: Performed by: NEUROLOGICAL SURGERY

## 2020-01-09 PROCEDURE — 36000069 ZZH SURGERY LEVEL 5 EA 15 ADDTL MIN: Performed by: NEUROLOGICAL SURGERY

## 2020-01-09 PROCEDURE — 0SG1071 FUSION OF 2 OR MORE LUMBAR VERTEBRAL JOINTS WITH AUTOLOGOUS TISSUE SUBSTITUTE, POSTERIOR APPROACH, POSTERIOR COLUMN, OPEN APPROACH: ICD-10-PCS | Performed by: NEUROLOGICAL SURGERY

## 2020-01-09 PROCEDURE — 40000306 ZZH STATISTIC PRE PROC ASSESS II: Performed by: NEUROLOGICAL SURGERY

## 2020-01-09 PROCEDURE — 0SG00AJ FUSION OF LUMBAR VERTEBRAL JOINT WITH INTERBODY FUSION DEVICE, POSTERIOR APPROACH, ANTERIOR COLUMN, OPEN APPROACH: ICD-10-PCS | Performed by: NEUROLOGICAL SURGERY

## 2020-01-09 DEVICE — IMPLANTABLE DEVICE: Type: IMPLANTABLE DEVICE | Site: SPINE LUMBAR | Status: FUNCTIONAL

## 2020-01-09 RX ORDER — CEFAZOLIN SODIUM 1 G/3ML
1 INJECTION, POWDER, FOR SOLUTION INTRAMUSCULAR; INTRAVENOUS SEE ADMIN INSTRUCTIONS
Status: DISCONTINUED | OUTPATIENT
Start: 2020-01-09 | End: 2020-01-09 | Stop reason: HOSPADM

## 2020-01-09 RX ORDER — KETAMINE HYDROCHLORIDE 10 MG/ML
INJECTION INTRAMUSCULAR; INTRAVENOUS PRN
Status: DISCONTINUED | OUTPATIENT
Start: 2020-01-09 | End: 2020-01-09

## 2020-01-09 RX ORDER — ESCITALOPRAM OXALATE 20 MG/1
20 TABLET ORAL EVERY 24 HOURS
Status: DISCONTINUED | OUTPATIENT
Start: 2020-01-09 | End: 2020-01-09

## 2020-01-09 RX ORDER — BUPROPION HYDROCHLORIDE 150 MG/1
150 TABLET ORAL DAILY
Status: DISCONTINUED | OUTPATIENT
Start: 2020-01-09 | End: 2020-01-13 | Stop reason: HOSPADM

## 2020-01-09 RX ORDER — MAGNESIUM HYDROXIDE 1200 MG/15ML
LIQUID ORAL PRN
Status: DISCONTINUED | OUTPATIENT
Start: 2020-01-09 | End: 2020-01-09 | Stop reason: HOSPADM

## 2020-01-09 RX ORDER — METOCLOPRAMIDE 5 MG/1
5 TABLET ORAL EVERY 6 HOURS PRN
Status: DISCONTINUED | OUTPATIENT
Start: 2020-01-09 | End: 2020-01-13 | Stop reason: HOSPADM

## 2020-01-09 RX ORDER — HYDROMORPHONE HYDROCHLORIDE 1 MG/ML
.3-.5 INJECTION, SOLUTION INTRAMUSCULAR; INTRAVENOUS; SUBCUTANEOUS EVERY 5 MIN PRN
Status: DISCONTINUED | OUTPATIENT
Start: 2020-01-09 | End: 2020-01-09 | Stop reason: HOSPADM

## 2020-01-09 RX ORDER — DEXAMETHASONE SODIUM PHOSPHATE 4 MG/ML
INJECTION, SOLUTION INTRA-ARTICULAR; INTRALESIONAL; INTRAMUSCULAR; INTRAVENOUS; SOFT TISSUE PRN
Status: DISCONTINUED | OUTPATIENT
Start: 2020-01-09 | End: 2020-01-09

## 2020-01-09 RX ORDER — PROPOFOL 10 MG/ML
INJECTION, EMULSION INTRAVENOUS CONTINUOUS PRN
Status: DISCONTINUED | OUTPATIENT
Start: 2020-01-09 | End: 2020-01-09

## 2020-01-09 RX ORDER — DIPHENHYDRAMINE HYDROCHLORIDE 50 MG/ML
12.5 INJECTION INTRAMUSCULAR; INTRAVENOUS EVERY 6 HOURS PRN
Status: DISCONTINUED | OUTPATIENT
Start: 2020-01-09 | End: 2020-01-13 | Stop reason: HOSPADM

## 2020-01-09 RX ORDER — DOCUSATE SODIUM 100 MG/1
100 CAPSULE, LIQUID FILLED ORAL 2 TIMES DAILY
Status: DISCONTINUED | OUTPATIENT
Start: 2020-01-09 | End: 2020-01-13 | Stop reason: HOSPADM

## 2020-01-09 RX ORDER — ACETAMINOPHEN 325 MG/1
975 TABLET ORAL EVERY 8 HOURS
Status: COMPLETED | OUTPATIENT
Start: 2020-01-09 | End: 2020-01-12

## 2020-01-09 RX ORDER — DIMENHYDRINATE 50 MG/ML
25 INJECTION, SOLUTION INTRAMUSCULAR; INTRAVENOUS
Status: DISCONTINUED | OUTPATIENT
Start: 2020-01-09 | End: 2020-01-09 | Stop reason: HOSPADM

## 2020-01-09 RX ORDER — PHENYLEPHRINE HCL IN 0.9% NACL 1 MG/10 ML
SYRINGE (ML) INTRAVENOUS CONTINUOUS PRN
Status: DISCONTINUED | OUTPATIENT
Start: 2020-01-09 | End: 2020-01-09

## 2020-01-09 RX ORDER — SODIUM CHLORIDE, SODIUM LACTATE, POTASSIUM CHLORIDE, CALCIUM CHLORIDE 600; 310; 30; 20 MG/100ML; MG/100ML; MG/100ML; MG/100ML
INJECTION, SOLUTION INTRAVENOUS CONTINUOUS
Status: DISCONTINUED | OUTPATIENT
Start: 2020-01-09 | End: 2020-01-09 | Stop reason: HOSPADM

## 2020-01-09 RX ORDER — ESZOPICLONE 3 MG/1
3 TABLET, FILM COATED ORAL AT BEDTIME
Status: DISCONTINUED | OUTPATIENT
Start: 2020-01-09 | End: 2020-01-13 | Stop reason: HOSPADM

## 2020-01-09 RX ORDER — LIDOCAINE HYDROCHLORIDE 10 MG/ML
INJECTION, SOLUTION INFILTRATION; PERINEURAL PRN
Status: DISCONTINUED | OUTPATIENT
Start: 2020-01-09 | End: 2020-01-09

## 2020-01-09 RX ORDER — ACETAMINOPHEN 325 MG/1
650 TABLET ORAL EVERY 4 HOURS PRN
Status: DISCONTINUED | OUTPATIENT
Start: 2020-01-12 | End: 2020-01-13 | Stop reason: HOSPADM

## 2020-01-09 RX ORDER — METOCLOPRAMIDE HYDROCHLORIDE 5 MG/ML
5 INJECTION INTRAMUSCULAR; INTRAVENOUS EVERY 6 HOURS PRN
Status: DISCONTINUED | OUTPATIENT
Start: 2020-01-09 | End: 2020-01-13 | Stop reason: HOSPADM

## 2020-01-09 RX ORDER — NEOSTIGMINE METHYLSULFATE 1 MG/ML
VIAL (ML) INJECTION PRN
Status: DISCONTINUED | OUTPATIENT
Start: 2020-01-09 | End: 2020-01-09

## 2020-01-09 RX ORDER — FENTANYL CITRATE 50 UG/ML
INJECTION, SOLUTION INTRAMUSCULAR; INTRAVENOUS PRN
Status: DISCONTINUED | OUTPATIENT
Start: 2020-01-09 | End: 2020-01-09

## 2020-01-09 RX ORDER — BUPIVACAINE HYDROCHLORIDE AND EPINEPHRINE 2.5; 5 MG/ML; UG/ML
INJECTION, SOLUTION INFILTRATION; PERINEURAL PRN
Status: DISCONTINUED | OUTPATIENT
Start: 2020-01-09 | End: 2020-01-09 | Stop reason: HOSPADM

## 2020-01-09 RX ORDER — METHOCARBAMOL 500 MG/1
500 TABLET, FILM COATED ORAL 4 TIMES DAILY PRN
Qty: 50 TABLET | Refills: 0 | Status: SHIPPED | OUTPATIENT
Start: 2020-01-09 | End: 2020-05-27

## 2020-01-09 RX ORDER — ONDANSETRON 2 MG/ML
4 INJECTION INTRAMUSCULAR; INTRAVENOUS EVERY 30 MIN PRN
Status: DISCONTINUED | OUTPATIENT
Start: 2020-01-09 | End: 2020-01-09 | Stop reason: HOSPADM

## 2020-01-09 RX ORDER — HYDRALAZINE HYDROCHLORIDE 20 MG/ML
2.5-5 INJECTION INTRAMUSCULAR; INTRAVENOUS EVERY 10 MIN PRN
Status: DISCONTINUED | OUTPATIENT
Start: 2020-01-09 | End: 2020-01-09 | Stop reason: HOSPADM

## 2020-01-09 RX ORDER — MEPERIDINE HYDROCHLORIDE 50 MG/ML
12.5 INJECTION INTRAMUSCULAR; INTRAVENOUS; SUBCUTANEOUS EVERY 5 MIN PRN
Status: DISCONTINUED | OUTPATIENT
Start: 2020-01-09 | End: 2020-01-09 | Stop reason: HOSPADM

## 2020-01-09 RX ORDER — ALBUTEROL SULFATE 0.83 MG/ML
2.5 SOLUTION RESPIRATORY (INHALATION) EVERY 4 HOURS PRN
Status: DISCONTINUED | OUTPATIENT
Start: 2020-01-09 | End: 2020-01-09 | Stop reason: HOSPADM

## 2020-01-09 RX ORDER — LACTOBACILLUS RHAMNOSUS GG 10B CELL
1 CAPSULE ORAL DAILY
Status: DISCONTINUED | OUTPATIENT
Start: 2020-01-09 | End: 2020-01-13 | Stop reason: HOSPADM

## 2020-01-09 RX ORDER — SODIUM CHLORIDE AND POTASSIUM CHLORIDE 150; 900 MG/100ML; MG/100ML
INJECTION, SOLUTION INTRAVENOUS CONTINUOUS
Status: DISCONTINUED | OUTPATIENT
Start: 2020-01-09 | End: 2020-01-13 | Stop reason: HOSPADM

## 2020-01-09 RX ORDER — FAMOTIDINE 20 MG/1
20 TABLET, FILM COATED ORAL 2 TIMES DAILY
Status: DISCONTINUED | OUTPATIENT
Start: 2020-01-09 | End: 2020-01-13 | Stop reason: HOSPADM

## 2020-01-09 RX ORDER — PROPOFOL 10 MG/ML
INJECTION, EMULSION INTRAVENOUS PRN
Status: DISCONTINUED | OUTPATIENT
Start: 2020-01-09 | End: 2020-01-09

## 2020-01-09 RX ORDER — TRAMADOL HYDROCHLORIDE 50 MG/1
50-100 TABLET ORAL EVERY 6 HOURS PRN
Status: DISCONTINUED | OUTPATIENT
Start: 2020-01-09 | End: 2020-01-09

## 2020-01-09 RX ORDER — EPHEDRINE SULFATE 50 MG/ML
INJECTION, SOLUTION INTRAMUSCULAR; INTRAVENOUS; SUBCUTANEOUS PRN
Status: DISCONTINUED | OUTPATIENT
Start: 2020-01-09 | End: 2020-01-09

## 2020-01-09 RX ORDER — CALCIUM CARBONATE 500 MG/1
1000 TABLET, CHEWABLE ORAL EVERY 4 HOURS PRN
Status: DISCONTINUED | OUTPATIENT
Start: 2020-01-09 | End: 2020-01-13 | Stop reason: HOSPADM

## 2020-01-09 RX ORDER — LIDOCAINE 40 MG/G
CREAM TOPICAL
Status: DISCONTINUED | OUTPATIENT
Start: 2020-01-09 | End: 2020-01-09 | Stop reason: HOSPADM

## 2020-01-09 RX ORDER — ONDANSETRON 2 MG/ML
INJECTION INTRAMUSCULAR; INTRAVENOUS PRN
Status: DISCONTINUED | OUTPATIENT
Start: 2020-01-09 | End: 2020-01-09

## 2020-01-09 RX ORDER — CEFAZOLIN SODIUM 2 G/100ML
2 INJECTION, SOLUTION INTRAVENOUS EVERY 8 HOURS
Status: DISCONTINUED | OUTPATIENT
Start: 2020-01-09 | End: 2020-01-09

## 2020-01-09 RX ORDER — OXYCODONE HYDROCHLORIDE 5 MG/1
5-10 TABLET ORAL EVERY 4 HOURS PRN
Qty: 56 TABLET | Refills: 0 | Status: SHIPPED | OUTPATIENT
Start: 2020-01-09 | End: 2020-05-27

## 2020-01-09 RX ORDER — PROCHLORPERAZINE MALEATE 5 MG
5 TABLET ORAL EVERY 6 HOURS PRN
Status: DISCONTINUED | OUTPATIENT
Start: 2020-01-09 | End: 2020-01-13 | Stop reason: HOSPADM

## 2020-01-09 RX ORDER — NALOXONE HYDROCHLORIDE 0.4 MG/ML
.1-.4 INJECTION, SOLUTION INTRAMUSCULAR; INTRAVENOUS; SUBCUTANEOUS
Status: DISCONTINUED | OUTPATIENT
Start: 2020-01-09 | End: 2020-01-13 | Stop reason: HOSPADM

## 2020-01-09 RX ORDER — LIDOCAINE 40 MG/G
CREAM TOPICAL
Status: DISCONTINUED | OUTPATIENT
Start: 2020-01-09 | End: 2020-01-13 | Stop reason: HOSPADM

## 2020-01-09 RX ORDER — ESCITALOPRAM OXALATE 20 MG/1
20 TABLET ORAL DAILY
Status: DISCONTINUED | OUTPATIENT
Start: 2020-01-09 | End: 2020-01-13 | Stop reason: HOSPADM

## 2020-01-09 RX ORDER — ATORVASTATIN CALCIUM 40 MG/1
40 TABLET, FILM COATED ORAL AT BEDTIME
Status: DISCONTINUED | OUTPATIENT
Start: 2020-01-09 | End: 2020-01-13 | Stop reason: HOSPADM

## 2020-01-09 RX ORDER — NALOXONE HYDROCHLORIDE 0.4 MG/ML
.1-.4 INJECTION, SOLUTION INTRAMUSCULAR; INTRAVENOUS; SUBCUTANEOUS
Status: ACTIVE | OUTPATIENT
Start: 2020-01-09 | End: 2020-01-10

## 2020-01-09 RX ORDER — ESCITALOPRAM OXALATE 20 MG/1
1 TABLET ORAL EVERY 24 HOURS
COMMUNITY
Start: 2008-09-10 | End: 2021-05-20

## 2020-01-09 RX ORDER — GLYCOPYRROLATE 0.2 MG/ML
INJECTION, SOLUTION INTRAMUSCULAR; INTRAVENOUS PRN
Status: DISCONTINUED | OUTPATIENT
Start: 2020-01-09 | End: 2020-01-09

## 2020-01-09 RX ORDER — METHOCARBAMOL 500 MG/1
500 TABLET, FILM COATED ORAL 4 TIMES DAILY PRN
Status: DISCONTINUED | OUTPATIENT
Start: 2020-01-09 | End: 2020-01-13 | Stop reason: HOSPADM

## 2020-01-09 RX ORDER — KETOROLAC TROMETHAMINE 30 MG/ML
INJECTION, SOLUTION INTRAMUSCULAR; INTRAVENOUS PRN
Status: DISCONTINUED | OUTPATIENT
Start: 2020-01-09 | End: 2020-01-09

## 2020-01-09 RX ORDER — POLYETHYLENE GLYCOL 3350 17 G/17G
17 POWDER, FOR SOLUTION ORAL DAILY PRN
Status: DISCONTINUED | OUTPATIENT
Start: 2020-01-09 | End: 2020-01-13 | Stop reason: HOSPADM

## 2020-01-09 RX ORDER — HYDROCODONE BITARTRATE AND ACETAMINOPHEN 5; 325 MG/1; MG/1
1-2 TABLET ORAL EVERY 4 HOURS PRN
Status: DISCONTINUED | OUTPATIENT
Start: 2020-01-09 | End: 2020-01-09

## 2020-01-09 RX ORDER — ONDANSETRON 4 MG/1
4 TABLET, ORALLY DISINTEGRATING ORAL EVERY 6 HOURS PRN
Status: DISCONTINUED | OUTPATIENT
Start: 2020-01-09 | End: 2020-01-13 | Stop reason: HOSPADM

## 2020-01-09 RX ORDER — FENTANYL CITRATE 50 UG/ML
25-50 INJECTION, SOLUTION INTRAMUSCULAR; INTRAVENOUS
Status: DISCONTINUED | OUTPATIENT
Start: 2020-01-09 | End: 2020-01-09 | Stop reason: HOSPADM

## 2020-01-09 RX ORDER — SCOLOPAMINE TRANSDERMAL SYSTEM 1 MG/1
1 PATCH, EXTENDED RELEASE TRANSDERMAL ONCE
Status: COMPLETED | OUTPATIENT
Start: 2020-01-09 | End: 2020-01-12

## 2020-01-09 RX ORDER — DIPHENHYDRAMINE HCL 12.5MG/5ML
12.5 LIQUID (ML) ORAL EVERY 6 HOURS PRN
Status: DISCONTINUED | OUTPATIENT
Start: 2020-01-09 | End: 2020-01-13 | Stop reason: HOSPADM

## 2020-01-09 RX ORDER — LABETALOL 20 MG/4 ML (5 MG/ML) INTRAVENOUS SYRINGE
10
Status: DISCONTINUED | OUTPATIENT
Start: 2020-01-09 | End: 2020-01-09 | Stop reason: HOSPADM

## 2020-01-09 RX ORDER — CEFAZOLIN SODIUM 1 G/50ML
2 SOLUTION INTRAVENOUS EVERY 8 HOURS
Status: DISCONTINUED | OUTPATIENT
Start: 2020-01-09 | End: 2020-01-13

## 2020-01-09 RX ORDER — ONDANSETRON 2 MG/ML
4 INJECTION INTRAMUSCULAR; INTRAVENOUS EVERY 6 HOURS PRN
Status: DISCONTINUED | OUTPATIENT
Start: 2020-01-09 | End: 2020-01-13 | Stop reason: HOSPADM

## 2020-01-09 RX ORDER — CEFAZOLIN SODIUM 2 G/100ML
2 INJECTION, SOLUTION INTRAVENOUS
Status: COMPLETED | OUTPATIENT
Start: 2020-01-09 | End: 2020-01-09

## 2020-01-09 RX ORDER — ONDANSETRON 4 MG/1
4 TABLET, ORALLY DISINTEGRATING ORAL EVERY 30 MIN PRN
Status: DISCONTINUED | OUTPATIENT
Start: 2020-01-09 | End: 2020-01-09 | Stop reason: HOSPADM

## 2020-01-09 RX ORDER — OXYCODONE HYDROCHLORIDE 5 MG/1
5-10 TABLET ORAL EVERY 4 HOURS PRN
Status: DISCONTINUED | OUTPATIENT
Start: 2020-01-09 | End: 2020-01-13 | Stop reason: HOSPADM

## 2020-01-09 RX ORDER — DIAZEPAM 10 MG/2ML
2.5 INJECTION, SOLUTION INTRAMUSCULAR; INTRAVENOUS
Status: DISCONTINUED | OUTPATIENT
Start: 2020-01-09 | End: 2020-01-09 | Stop reason: HOSPADM

## 2020-01-09 RX ADMIN — CEFAZOLIN SODIUM 2 G: 2 INJECTION, SOLUTION INTRAVENOUS at 07:45

## 2020-01-09 RX ADMIN — ACETAMINOPHEN 975 MG: 325 TABLET, FILM COATED ORAL at 16:12

## 2020-01-09 RX ADMIN — BUPROPION HYDROCHLORIDE 150 MG: 150 TABLET, EXTENDED RELEASE ORAL at 16:25

## 2020-01-09 RX ADMIN — PHENYLEPHRINE HYDROCHLORIDE 100 MCG: 10 INJECTION INTRAVENOUS at 08:24

## 2020-01-09 RX ADMIN — GLYCOPYRROLATE 0.4 MG: 0.2 INJECTION, SOLUTION INTRAMUSCULAR; INTRAVENOUS at 10:35

## 2020-01-09 RX ADMIN — PROPOFOL 150 MG: 10 INJECTION, EMULSION INTRAVENOUS at 07:51

## 2020-01-09 RX ADMIN — PHENYLEPHRINE HYDROCHLORIDE 100 MCG: 10 INJECTION INTRAVENOUS at 08:13

## 2020-01-09 RX ADMIN — KETOROLAC TROMETHAMINE 30 MG: 30 INJECTION, SOLUTION INTRAMUSCULAR at 07:51

## 2020-01-09 RX ADMIN — MIDAZOLAM 1 MG: 1 INJECTION INTRAMUSCULAR; INTRAVENOUS at 07:45

## 2020-01-09 RX ADMIN — CALCIUM CARBONATE (ANTACID) CHEW TAB 500 MG 1000 MG: 500 CHEW TAB at 21:55

## 2020-01-09 RX ADMIN — Medication 20 MG: at 08:32

## 2020-01-09 RX ADMIN — PHENYLEPHRINE HYDROCHLORIDE 100 MCG: 10 INJECTION INTRAVENOUS at 10:38

## 2020-01-09 RX ADMIN — ACETAMINOPHEN 975 MG: 325 TABLET, FILM COATED ORAL at 21:33

## 2020-01-09 RX ADMIN — SCOPALAMINE 1 PATCH: 1 PATCH, EXTENDED RELEASE TRANSDERMAL at 07:31

## 2020-01-09 RX ADMIN — Medication 5 MG: at 08:35

## 2020-01-09 RX ADMIN — ESCITALOPRAM OXALATE 20 MG: 20 TABLET ORAL at 18:02

## 2020-01-09 RX ADMIN — CEFAZOLIN 2 G: 10 INJECTION, POWDER, FOR SOLUTION INTRAVENOUS at 18:06

## 2020-01-09 RX ADMIN — SODIUM CHLORIDE, POTASSIUM CHLORIDE, SODIUM LACTATE AND CALCIUM CHLORIDE: 600; 310; 30; 20 INJECTION, SOLUTION INTRAVENOUS at 12:59

## 2020-01-09 RX ADMIN — CEFAZOLIN 1 G: 1 INJECTION, POWDER, FOR SOLUTION INTRAMUSCULAR; INTRAVENOUS at 09:52

## 2020-01-09 RX ADMIN — ATORVASTATIN CALCIUM 40 MG: 40 TABLET, FILM COATED ORAL at 21:33

## 2020-01-09 RX ADMIN — SODIUM CHLORIDE, POTASSIUM CHLORIDE, SODIUM LACTATE AND CALCIUM CHLORIDE: 600; 310; 30; 20 INJECTION, SOLUTION INTRAVENOUS at 07:45

## 2020-01-09 RX ADMIN — Medication 10 MG: at 09:50

## 2020-01-09 RX ADMIN — Medication 3 MG: at 10:35

## 2020-01-09 RX ADMIN — POTASSIUM CHLORIDE AND SODIUM CHLORIDE: 900; 150 INJECTION, SOLUTION INTRAVENOUS at 16:27

## 2020-01-09 RX ADMIN — PHENYLEPHRINE HYDROCHLORIDE 100 MCG: 10 INJECTION INTRAVENOUS at 08:45

## 2020-01-09 RX ADMIN — SODIUM CHLORIDE, POTASSIUM CHLORIDE, SODIUM LACTATE AND CALCIUM CHLORIDE: 600; 310; 30; 20 INJECTION, SOLUTION INTRAVENOUS at 09:05

## 2020-01-09 RX ADMIN — PHENYLEPHRINE HYDROCHLORIDE 100 MCG: 10 INJECTION INTRAVENOUS at 08:56

## 2020-01-09 RX ADMIN — PHENYLEPHRINE HYDROCHLORIDE 100 MCG: 10 INJECTION INTRAVENOUS at 08:05

## 2020-01-09 RX ADMIN — PROPOFOL: 10 INJECTION, EMULSION INTRAVENOUS at 10:03

## 2020-01-09 RX ADMIN — FENTANYL CITRATE 50 MCG: 50 INJECTION, SOLUTION INTRAMUSCULAR; INTRAVENOUS at 11:24

## 2020-01-09 RX ADMIN — GLYCOPYRROLATE 0.2 MG: 0.2 INJECTION, SOLUTION INTRAMUSCULAR; INTRAVENOUS at 07:51

## 2020-01-09 RX ADMIN — Medication: at 11:54

## 2020-01-09 RX ADMIN — Medication 0.2 MCG/MIN: at 09:06

## 2020-01-09 RX ADMIN — LIDOCAINE HYDROCHLORIDE 50 MG: 10 INJECTION, SOLUTION INFILTRATION; PERINEURAL at 07:51

## 2020-01-09 RX ADMIN — PROPOFOL 75 MCG/KG/MIN: 10 INJECTION, EMULSION INTRAVENOUS at 07:51

## 2020-01-09 RX ADMIN — PHENYLEPHRINE HYDROCHLORIDE 100 MCG: 10 INJECTION INTRAVENOUS at 08:27

## 2020-01-09 RX ADMIN — ONDANSETRON HYDROCHLORIDE 4 MG: 2 INJECTION, SOLUTION INTRAVENOUS at 07:51

## 2020-01-09 RX ADMIN — PHENYLEPHRINE HYDROCHLORIDE 100 MCG: 10 INJECTION INTRAVENOUS at 10:23

## 2020-01-09 RX ADMIN — FENTANYL CITRATE 50 MCG: 50 INJECTION, SOLUTION INTRAMUSCULAR; INTRAVENOUS at 11:36

## 2020-01-09 RX ADMIN — Medication 5 MG: at 08:38

## 2020-01-09 RX ADMIN — FENTANYL CITRATE 250 MCG: 50 INJECTION, SOLUTION INTRAMUSCULAR; INTRAVENOUS at 07:51

## 2020-01-09 RX ADMIN — Medication 1 CAPSULE: at 16:26

## 2020-01-09 RX ADMIN — METHOCARBAMOL TABLETS 500 MG: 500 TABLET, COATED ORAL at 16:13

## 2020-01-09 RX ADMIN — ESZOPICLONE 3 MG: 3 TABLET, FILM COATED OROPHARYNGEAL at 21:33

## 2020-01-09 RX ADMIN — ROCURONIUM BROMIDE 50 MG: 10 INJECTION INTRAVENOUS at 07:51

## 2020-01-09 RX ADMIN — FAMOTIDINE 20 MG: 20 TABLET, FILM COATED ORAL at 19:07

## 2020-01-09 RX ADMIN — Medication 5 MG: at 09:11

## 2020-01-09 RX ADMIN — OMEPRAZOLE 20 MG: 20 CAPSULE, DELAYED RELEASE ORAL at 16:13

## 2020-01-09 RX ADMIN — DOCUSATE SODIUM 100 MG: 100 CAPSULE, LIQUID FILLED ORAL at 19:07

## 2020-01-09 RX ADMIN — DEXAMETHASONE SODIUM PHOSPHATE 8 MG: 4 INJECTION, SOLUTION INTRA-ARTICULAR; INTRALESIONAL; INTRAMUSCULAR; INTRAVENOUS; SOFT TISSUE at 07:51

## 2020-01-09 RX ADMIN — FENTANYL CITRATE 50 MCG: 50 INJECTION, SOLUTION INTRAMUSCULAR; INTRAVENOUS at 12:05

## 2020-01-09 RX ADMIN — Medication 30 MG: at 08:50

## 2020-01-09 ASSESSMENT — ACTIVITIES OF DAILY LIVING (ADL)
SWALLOWING: 0-->SWALLOWS FOODS/LIQUIDS WITHOUT DIFFICULTY
WHICH_OF_THE_ABOVE_FUNCTIONAL_RISKS_HAD_A_RECENT_ONSET_OR_CHANGE?: AMBULATION;TRANSFERRING;TOILETING
RETIRED_COMMUNICATION: 0-->UNDERSTANDS/COMMUNICATES WITHOUT DIFFICULTY
FALL_HISTORY_WITHIN_LAST_SIX_MONTHS: NO
COGNITION: 0 - NO COGNITION ISSUES REPORTED
PRIOR_FUNCTIONAL_LEVEL_COMMENT: INDEPENDENT
AMBULATION: 0-->INDEPENDENT
ADLS_ACUITY_SCORE: 13
TRANSFERRING: 0-->INDEPENDENT
TOILETING: 0-->INDEPENDENT
DRESS: 0-->INDEPENDENT
ADLS_ACUITY_SCORE: 13
RETIRED_EATING: 0-->INDEPENDENT
BATHING: 0-->INDEPENDENT

## 2020-01-09 ASSESSMENT — MIFFLIN-ST. JEOR: SCORE: 1040.66

## 2020-01-09 NOTE — PLAN OF CARE
VS-stable, afebrile,, on capnography. Came to the floor at 1415.   Lung Sounds-clear, no cough, Using IS upto 750-1000  O2-on 2 L NC.   GI-+faint bs, -flatus, lbm was prior to surgery. Tolerating ice chips  -patel in and adequate.   IVF-at 75cc.   Dressings-spine drsg c/d/I. Along with 2 SAROJ   CMS-denies numbness and tingling. +pp, strong dorsi/planter flexion. Scds on while in bed. Warm.   Drain-2 Saroj. One on Left and one on right. L side is 70cc, R side is 30cc   Activity-bedrest, turning in bed.   Pain-rates pain level a 7, goal is a 3. On fent pca. Pharmacy paperwork in locked box in room. Dull ache in lower back and then to her R upper front of R leg    D/C Plan-home when able.  is present in the room

## 2020-01-09 NOTE — PROGRESS NOTES
SPIRITUAL HEALTH SERVICES  [facility]  PRE-SURGERY VISIT    Pre-surgical visit with pt. And spouse, Christiano. Provided spiritual support, prayer.   Pt is [place].  Oriented to Spiritual Health Services and availability for emotional/spiritual support during hospital stay.  Pt is open to follow up visit.    Plan:  Will follow while admitted.    Nikos Post MA  Staff   Pager: [phone]  Phone: [phone]

## 2020-01-09 NOTE — ANESTHESIA PREPROCEDURE EVALUATION
Anesthesia Pre-Procedure Evaluation    Patient: Anne Rangel   MRN: [id number] : 1945          Preoperative Diagnosis: Spondylolisthesis, lumbar region [M43.16]  Spinal stenosis of lumbar region with neurogenic claudication [M48.062]  Disc degeneration, lumbar [M51.36]  Lumbar radiculopathy [M54.16]    Procedure(s):  L3-5 decompression and instrumented fusion with L4-5 transforaminal lumbar interbody fusion    Past Medical History:   Diagnosis Date     Anemia      Carpal tunnel syndrome      Chronic fatigue syndrome      Complication of anesthesia      Decreased libido      Depression with anxiety      Fatigue      Gastroesophageal reflux disease      HA (headache)      History of blood transfusion      iamAFTERCARE FOLLOWING JOINT REPLACEMENT 2007     Irritable bowel syndrome      Myalgia and myositis, unspecified      Near syncope 2017     Neutropenia 00     Noninfectious ileitis      Osteoarthritis     rt knee, back     Osteoarthrosis, unspecified whether generalized or localized, hand      Osteoarthrosis, unspecified whether generalized or localized, lower leg      Other chronic pain     back     Pacemaker      PONV (postoperative nausea and vomiting)      Pure hypercholesterolemia      RBBB      Renal disease     mld     Uncomplicated asthma      Urticaria, unspecified 00     Past Surgical History:   Procedure Laterality Date     ARTHROPLASTY KNEE Left      ARTHROPLASTY KNEE Right     partial     ARTHROPLASTY REVISION HIP Left 2/3/2017    Procedure: ARTHROPLASTY REVISION HIP;  Surgeon: Juan Moncada MD;  Location:  OR      NONSPECIFIC PROCEDURE      s/p Rt. Carpal tunnel release     C NONSPECIFIC PROCEDURE      s/p bilat Tubal ligation     CARDIAC SURGERY  2017    pacemaker     COLONOSCOPY N/A 1/15/2019    Procedure: COLONOSCOPY;  Surgeon: Eliane Barone MD;  Location:  GI     EYE SURGERY      zoila cataract surgery     GI SURGERY       hemorrhoid repair     GYN SURGERY      tubal ligation     ORTHOPEDIC SURGERY      right sabine-arthroplasty, trigger finger repair, left knee arthroscopy and replacement, bilateral hip replacement,     Anesthesia Evaluation     . Pt has had prior anesthetic. Type: General    History of anesthetic complications   - PONV        ROS/MED HX    ENT/Pulmonary:     (+)Intermittent asthma Treatment: Inhaler prn,  , . .    Neurologic:     (+)neuropathy - lumbar radiculopathy,     Cardiovascular:     (+) Dyslipidemia, ----. : . . . pacemaker Reason placed: bradyarrhythmia:settings: ddd - Patient is dependent on pacemaker . .       METS/Exercise Tolerance:     Hematologic:  - neg hematologic  ROS       Musculoskeletal:   (+) arthritis,  -       GI/Hepatic:     (+) GERD       Renal/Genitourinary:     (+) chronic renal disease, type: CRI, Pt does not require dialysis, Pt has no history of transplant,       Endo:  - neg endo ROS       Psychiatric:     (+) psychiatric history anxiety and depression      Infectious Disease:  - neg infectious disease ROS       Malignancy:      - no malignancy   Other:    (+) H/O Chronic Pain,                        Physical Exam  Normal systems: cardiovascular, pulmonary and dental    Airway   Mallampati: II  TM distance: >3 FB  Neck ROM: full    Dental     Cardiovascular   Rhythm and rate: regular and normal      Pulmonary    breath sounds clear to auscultation    Other findings: Lab Test        02/06/17 02/05/17 02/04/17 02/03/17      --          12/20/16                       0713          0620          0640          1238           --           0810          WBC           --          4.9           --           --           --          3.5*          HGB           --          10.6*        11.2*        13.7           < >        13.3          MCV           --          97            --           --           --          94            PLT          132*         141*          --          195            --          172            < > = values in this interval not displayed.                  Lab Test        02/06/17 02/05/17 02/04/17 02/03/17                       0713          0620          0640          1238          NA            --          142           --           --           POTASSIUM     --          4.3           --           --           CHLORIDE      --          110*          --           --           CO2           --          28            --           --           BUN           --          15            --           --           CR           1.00         1.26*         --          1.25*         ANIONGAP      --          4             --           --           SUE           --          7.9*          --           --           GLC           --          86           131*          --                    ECG  Paced rhythm a sensed v paced        Lab Results   Component Value Date    WBC 4.9 02/05/2017    HGB 10.6 (L) 02/05/2017    HCT 32.5 (L) 02/05/2017     (L) 02/06/2017    CRP <2.9 12/20/2016    SED 8 12/20/2016     02/05/2017    POTASSIUM 4.3 02/05/2017    CHLORIDE 110 (H) 02/05/2017    CO2 28 02/05/2017    BUN 15 02/05/2017    CR 1.00 02/06/2017    GLC 86 02/05/2017    SUE 7.9 (L) 02/05/2017    PHOS 4.5 08/06/2003    ALBUMIN 3.8 07/12/2004    PROTTOTAL 6.8 07/12/2004    ALT 18 07/12/2004    AST 33 07/12/2004    ALKPHOS 61 07/12/2004    BILITOTAL 0.5 07/12/2004    PTT 26 07/17/2007    INR 0.96 07/17/2007    TSH 1.74 02/04/2017       Preop Vitals  BP Readings from Last 3 Encounters:   05/13/19 106/76   01/15/19 118/78   04/23/18 128/75    Pulse Readings from Last 3 Encounters:   05/13/19 75   01/15/19 74   04/23/18 87      Resp Readings from Last 3 Encounters:   01/15/19 28   02/07/17 16   07/12/04 12    SpO2 Readings from Last 3 Encounters:   11/25/19 96%   01/15/19 98%   02/07/17 92%      Temp Readings from Last 1 Encounters:   02/07/17 97.7  F (36.5  C) (Oral)    Ht  "Readings from Last 1 Encounters:   05/13/19 1.568 m (5' 1.75\")      Wt Readings from Last 1 Encounters:   05/13/19 57.1 kg (125 lb 12.8 oz)    Estimated body mass index is 24.94 kg/m  as calculated from the following:    Height as of this encounter: 1.549 m (5' 1\").    Weight as of this encounter: 59.9 kg (132 lb).       Anesthesia Plan      History & Physical Review  History and physical reviewed and following examination; no interval change.    ASA Status:  3 .    NPO Status:  > 8 hours    Plan for General and ETT with Intravenous induction. Maintenance will be Balanced (propofol infusion).    PONV prophylaxis:  Ondansetron (or other 5HT-3), Dexamethasone or Solumedrol and Scopolamine patch       Postoperative Care  Postoperative pain management:  IV analgesics, Oral pain medications and Multi-modal analgesia.      Consents  Anesthetic plan, risks, benefits and alternatives discussed with:  Patient.  Use of blood products discussed: No .   .                 Norbert Recinos MD                    .  "

## 2020-01-09 NOTE — ANESTHESIA POSTPROCEDURE EVALUATION
Patient: Anne Rangel    Procedure(s):  L3-5 decompression and instrumented fusion with L4-5 transforaminal lumbar interbody fusion    Diagnosis:Spondylolisthesis, lumbar region [M43.16]  Spinal stenosis of lumbar region with neurogenic claudication [M48.062]  Disc degeneration, lumbar [M51.36]  Lumbar radiculopathy [M54.16]  Diagnosis Additional Information: No value filed.    Anesthesia Type:  General, ETT    Note:  Anesthesia Post Evaluation    Patient location during evaluation: PACU  Patient participation: Able to fully participate in evaluation  Level of consciousness: awake and sleepy but conscious  Pain management: adequate  multimodal analgesia used between 6 hours prior to anesthesia start to PACU dischargeAirway patency: patent  Cardiovascular status: acceptable  Respiratory status: acceptable  Hydration status: acceptable  PONV: controlled             Last vitals:  Vitals:    01/09/20 1110 01/09/20 1115 01/09/20 1120   BP: 104/74 93/52 101/61   Pulse: 90 94 93   Resp: 13 20 17   Temp:      SpO2: 99% 97% 99%         Electronically Signed By: Norbert Recinos MD  January 9, 2020  11:27 AM

## 2020-01-09 NOTE — ANESTHESIA CARE TRANSFER NOTE
Patient: Anne Rangel    Procedure(s):  L3-5 decompression and instrumented fusion with L4-5 transforaminal lumbar interbody fusion    Diagnosis: Spondylolisthesis, lumbar region [M43.16]  Spinal stenosis of lumbar region with neurogenic claudication [M48.062]  Disc degeneration, lumbar [M51.36]  Lumbar radiculopathy [M54.16]  Diagnosis Additional Information: No value filed.    Anesthesia Type:   General, ETT     Note:  Airway :Face Mask  Patient transferred to:PACU  Handoff Report: Identifed the Patient, Identified the Reponsible Provider, Reviewed the pertinent medical history, Discussed the surgical course, Reviewed Intra-OP anesthesia mangement and issues during anesthesia, Set expectations for post-procedure period and Allowed opportunity for questions and acknowledgement of understanding      Vitals: (Last set prior to Anesthesia Care Transfer)    CRNA VITALS  1/9/2020 1028 - 1/9/2020 1103      1/9/2020             Pulse:  98    SpO2:  98 %    Resp Rate (observed):  8                Electronically Signed By: LEONIE Narayanan CRNA  January 9, 2020  11:03 AM

## 2020-01-09 NOTE — OR NURSING
"During the course of this RN's pre-op questions, RN asked \"Have you had any thoughts of harming yourself?\" Patient responded \"Well [pause] I mean nothing other than the usual anxiety and depression that comes with not being able to do what I want.\"  RN asked patient to expand on those thoughts.  \"I'm on two antidepressants, so I guess I'm thankful for whatever they do to help me.  But I don't know how much they're helping.  But I'm afraid to change them because I don't know if other ones will work at all.\"  RN asked patient if she had had any suicidal thoughts in the last 30 days.  Patient stated \"not suicidal, no.  I wish this was better.  I think a lot of it comes down to not being able to move.\"  \"I've just had so many surgeries.\"  Patient fidgeting with her hands and feet and not making eye contact.  RN asked patient about her support system.  \"I have lots of support.  We're a part of a very supportive Protestant, I have a lot of friends, and a lot of family.  I know they're praying for me.\"  RN offered social work or tamica services throughout her hospital stay.  Patient declined further services at this point.  "

## 2020-01-09 NOTE — OP NOTE
OPERATIVE REPORT       PREOPERATIVE DIAGNOSIS:   1. L3-4 bilateral stenosis   2. L4-5 grade 1 spondylolisthesis with severe stenosis and bilateral foraminal stenosis  3. Low back pain   4. Lumbar radiculopathy   5. Neurogenic claudication     POSTOPERATIVE DIAGNOSIS: Same    PROCEDURE: L3-5 decompressive laminectomies with L4-5 TLIF  1. L3-4 bilateral laminectomies with bilateral medial facetectomies and decompression and exposure of the L3 and L4 roots  2. L4-5 Transforaminal lumbar interbody fusion (bilateral laminectomy with bilateral facetectomies with discectomy, arthrodesis and transforaminal interbody fusion with placement of a 7 x 26 mm Nuvasive TLX expandable intrebody graft with autologous bone placed centrally and anteriorly)  3. Placement of Nuvasive TLX  pedicle screws from L3-5   4. Posterior lateral fusion from L3-5 with locally harvested autologous bone   5. Use of microscope and portable X-ray  6. Use of Stealth and O-arm intraoperative neuronavigation    Surgeon: Anjel Bernardo MD    ASSISTANT: Abbe Herring    INDICATION FOR PROCEDURE: The patient is a 74 year old female that presented with above symptoms. After reviewing the examination and imaging studies, the decision was made to proceed with the above procedure. The patient understood the risks of surgery to be infection, CSF leak, nerve root injury, failure of hardware, the need for recurrent surgery, epidural fibrosis, weakness, coma and death. The patient voiced understanding and wanted to proceed.     DESCRIPTION OF PROCEDURE: The patient was seen in the pre op area and the procedure was discussed with the patient and family once again and all questions were answered. The consent was then signed and the lumbar spine was marked with a marker. The patient was transported to the operating room on a stretcher and received general endotracheal anesthesia and a Kumar catheter was placed. The patient was placed on the operating table in the  prone position on the Boris frame with all pressure points padded. The back was then prepped and draped in a normal sterile fashion. C-arm used to identify the appropriate level. Local anesthesia was injected along the planned incision with 10 blade scalpel used to make a midline incision with dissection down through the subcutaneous tissue to the fascia. The fascia was opened bilaterally with the Bovie cautery. Subperiosteal dissection was used to expose the lamina facet joint and transverse processes from L3-5. The Versa trac retractor was then placed to retract the paraspinous muscles. The operating microscope was then brought into the field and attention then turned to the decompression where L3-4 bilateral laminectomies with bilateral medial facetectomies and decompression and exposure of the exiting L3 and traversing L4 roots and L4-5 bilateral laminectomies with complete facetectomies was performed with the Midas Jerzy drill, the Kerrison rongeur and the pituitary rongeur which were used to remove the spinous process, the lamina and facet joints. This completely decompressed and exposed the L4 exiting roots bilaterally and the L5 traversing roots bilaterally in Kambin's triangle. Following that, the L4-5 disk space was incised with the 11 blade knife after retracting the thecal sac and nerve root medially. The disks were removed with the pituitary rongeur and the endplate anshu from size 6-7 mm. The nerves were thoroughly decompressed. Next, a 7 x 26 mm Nuvasive TLX expandable interbody graft was placed in the L4-5 interspace with autologous bone placed both anteriorly and centrally inside the graft. The expandable interbody grafts were then expanded to the appropriate sizes. The Stealth and O-arm were brought in for intraoperative pedicle screw placement and the pedicle screws were placed using the normal technique of a  hole with the Midas Jerzy drill, the gear shift probe to penetrate the pedicle and  the pedicle screws were then navigated down the pedicles from L3-5 bilaterally. The connecting rods were secured from L3-5 and the locking caps were secured in place with the counter torque system. Copious irrigation was performed with saline and 5 ml of Evicel was placed over the dura. The wound was irrigated once again and the retractors were removed after decortication of the lateral facet and transverse process was performed from L3-5.  Locally harvested autologous bone was then placed out laterally for the posterolateral fusion. Two Boris-Williamson drains were left subfascially. The fascia was closed with 0 Vicryl, the subcutaneous tissue closed with 2 - 0 and 3-0 Vicryl, and the skin closed with staples. The patient was then transported back to the stretcher, extubated, and sent to recovery.     At the end of the case, all counts were correct    Complications: None    Estimated blood loss 100  ml     IV fluids: See Anesthesia    The patient received Ancef preoperatively and Vancomycin powder in the wound       Anjel Bernardo MD, MS, FAANS

## 2020-01-10 ENCOUNTER — APPOINTMENT (OUTPATIENT)
Dept: PHYSICAL THERAPY | Facility: CLINIC | Age: 75
DRG: 455 | End: 2020-01-10
Attending: NEUROLOGICAL SURGERY
Payer: MEDICARE

## 2020-01-10 ENCOUNTER — APPOINTMENT (OUTPATIENT)
Dept: OCCUPATIONAL THERAPY | Facility: CLINIC | Age: 75
DRG: 455 | End: 2020-01-10
Attending: NEUROLOGICAL SURGERY
Payer: MEDICARE

## 2020-01-10 PROCEDURE — 99207 ZZC CONSULT E&M CHANGED TO SUBSEQUENT LEVEL: CPT | Performed by: INTERNAL MEDICINE

## 2020-01-10 PROCEDURE — 12000000 ZZH R&B MED SURG/OB

## 2020-01-10 PROCEDURE — 99232 SBSQ HOSP IP/OBS MODERATE 35: CPT | Performed by: INTERNAL MEDICINE

## 2020-01-10 PROCEDURE — 97161 PT EVAL LOW COMPLEX 20 MIN: CPT | Mod: GP | Performed by: PHYSICAL THERAPIST

## 2020-01-10 PROCEDURE — 97116 GAIT TRAINING THERAPY: CPT | Mod: GP | Performed by: PHYSICAL THERAPIST

## 2020-01-10 PROCEDURE — 97530 THERAPEUTIC ACTIVITIES: CPT | Mod: GP | Performed by: PHYSICAL THERAPIST

## 2020-01-10 PROCEDURE — 25800030 ZZH RX IP 258 OP 636: Performed by: NEUROLOGICAL SURGERY

## 2020-01-10 PROCEDURE — 97165 OT EVAL LOW COMPLEX 30 MIN: CPT | Mod: GO

## 2020-01-10 PROCEDURE — 25000132 ZZH RX MED GY IP 250 OP 250 PS 637: Mod: GY | Performed by: NEUROLOGICAL SURGERY

## 2020-01-10 PROCEDURE — 25000128 H RX IP 250 OP 636: Performed by: NEUROLOGICAL SURGERY

## 2020-01-10 PROCEDURE — 97535 SELF CARE MNGMENT TRAINING: CPT | Mod: GO

## 2020-01-10 RX ORDER — TRAMADOL HYDROCHLORIDE 50 MG/1
50-100 TABLET ORAL EVERY 6 HOURS PRN
Status: DISCONTINUED | OUTPATIENT
Start: 2020-01-10 | End: 2020-01-13 | Stop reason: HOSPADM

## 2020-01-10 RX ADMIN — ONDANSETRON 4 MG: 4 TABLET, ORALLY DISINTEGRATING ORAL at 22:53

## 2020-01-10 RX ADMIN — ACETAMINOPHEN 975 MG: 325 TABLET, FILM COATED ORAL at 21:50

## 2020-01-10 RX ADMIN — Medication 1 CAPSULE: at 08:57

## 2020-01-10 RX ADMIN — DOCUSATE SODIUM 100 MG: 100 CAPSULE, LIQUID FILLED ORAL at 08:58

## 2020-01-10 RX ADMIN — POTASSIUM CHLORIDE AND SODIUM CHLORIDE: 900; 150 INJECTION, SOLUTION INTRAVENOUS at 05:18

## 2020-01-10 RX ADMIN — TRAMADOL HYDROCHLORIDE 100 MG: 50 TABLET, COATED ORAL at 19:44

## 2020-01-10 RX ADMIN — OMEPRAZOLE 20 MG: 20 CAPSULE, DELAYED RELEASE ORAL at 08:58

## 2020-01-10 RX ADMIN — BUPROPION HYDROCHLORIDE 150 MG: 150 TABLET, EXTENDED RELEASE ORAL at 08:58

## 2020-01-10 RX ADMIN — ESZOPICLONE 3 MG: 3 TABLET, FILM COATED OROPHARYNGEAL at 21:50

## 2020-01-10 RX ADMIN — CEFAZOLIN 2 G: 10 INJECTION, POWDER, FOR SOLUTION INTRAVENOUS at 09:34

## 2020-01-10 RX ADMIN — ATORVASTATIN CALCIUM 40 MG: 40 TABLET, FILM COATED ORAL at 21:50

## 2020-01-10 RX ADMIN — FAMOTIDINE 20 MG: 20 TABLET, FILM COATED ORAL at 08:57

## 2020-01-10 RX ADMIN — ACETAMINOPHEN 975 MG: 325 TABLET, FILM COATED ORAL at 14:14

## 2020-01-10 RX ADMIN — CEFAZOLIN 2 G: 10 INJECTION, POWDER, FOR SOLUTION INTRAVENOUS at 02:03

## 2020-01-10 RX ADMIN — CEFAZOLIN 2 G: 10 INJECTION, POWDER, FOR SOLUTION INTRAVENOUS at 17:35

## 2020-01-10 RX ADMIN — ACETAMINOPHEN 975 MG: 325 TABLET, FILM COATED ORAL at 06:17

## 2020-01-10 RX ADMIN — ESCITALOPRAM OXALATE 20 MG: 20 TABLET ORAL at 08:57

## 2020-01-10 RX ADMIN — METHOCARBAMOL TABLETS 500 MG: 500 TABLET, COATED ORAL at 21:08

## 2020-01-10 RX ADMIN — TRAMADOL HYDROCHLORIDE 50 MG: 50 TABLET, COATED ORAL at 14:15

## 2020-01-10 RX ADMIN — FAMOTIDINE 20 MG: 20 TABLET, FILM COATED ORAL at 19:45

## 2020-01-10 RX ADMIN — DOCUSATE SODIUM 100 MG: 100 CAPSULE, LIQUID FILLED ORAL at 19:44

## 2020-01-10 ASSESSMENT — ACTIVITIES OF DAILY LIVING (ADL)
PREVIOUS_RESPONSIBILITIES: MEAL PREP;HOUSEKEEPING;LAUNDRY;SHOPPING;MEDICATION MANAGEMENT;FINANCES;DRIVING
ADLS_ACUITY_SCORE: 15

## 2020-01-10 NOTE — PROGRESS NOTES
01/10/20 1050   Quick Adds   Type of Visit Initial PT Evaluation   Living Environment   Lives With spouse   Living Arrangements house   Home Accessibility stairs to enter home;stairs within home   Number of Stairs, Main Entrance 1   Stair Railings, Main Entrance none   Number of Stairs, Within Home, Primary 7   Stair Railings, Within Home, Primary railing on left side (ascending)   Transportation Anticipated family or friend will provide   Self-Care   Usual Activity Tolerance good   Current Activity Tolerance moderate   Regular Exercise No   Equipment Currently Used at Home none   Activity/Exercise/Self-Care Comment has a walker and cane that she borrowed from a friend   Functional Level Prior   Ambulation 0-->independent   Transferring 0-->independent   Toileting 0-->independent   Bathing 0-->independent   Fall history within last six months no   Which of the above functional risks had a recent onset or change? ambulation;transferring;toileting;bathing;dressing   General Information   Onset of Illness/Injury or Date of Surgery - Date 01/09/20   Referring Physician Anjel Bernardo MD   Patient/Family Goals Statement Discharge home   Pertinent History of Current Problem (include personal factors and/or comorbidities that impact the POC) L3-5 decompressive laminectomies with L4-5 TLIF POD1   Precautions/Limitations fall precautions;spinal precautions   Weight-Bearing Status - LLE full weight-bearing   Weight-Bearing Status - RLE full weight-bearing   General Observations Pt supine upon initiation, agreeable to session.    Cognitive Status Examination   Orientation orientation to person, place and time   Level of Consciousness alert   Follows Commands and Answers Questions 100% of the time   Personal Safety and Judgment impulsive   Memory intact   Pain Assessment   Patient Currently in Pain Yes, see Vital Sign flowsheet   Integumentary/Edema   Integumentary/Edema Comments Dressing intact to L spine  "  Posture    Posture Forward head position;Protracted shoulders   Range of Motion (ROM)   ROM Comment WFL   Strength   Strength Comments WFL   Bed Mobility   Bed Mobility Comments sit<>supine SBA   Transfer Skills   Transfer Comments sit<>stand CGA   Gait   Gait Comments CGA with FWW   Balance   Balance Comments Requires B UE support for safe dynamic mobility   Sensory Examination   Sensory Perception no deficits were identified   Coordination   Coordination no deficits were identified   Muscle Tone   Muscle Tone no deficits were identified   Modality Interventions   Planned Modality Interventions Cryotherapy   Planned Modality Interventions Comments Ice PRN   General Therapy Interventions   Planned Therapy Interventions bed mobility training;gait training;ROM;strengthening;stretching;transfer training;home program guidelines;progressive activity/exercise   Clinical Impression   Criteria for Skilled Therapeutic Intervention yes, treatment indicated   PT Diagnosis Impaired functional mobility   Influenced by the following impairments Pain, spinal precautions, weakness   Functional limitations due to impairments Difficulty with bed mobility, transfers, ambulation, stairs   Clinical Presentation Stable/Uncomplicated   Clinical Presentation Rationale medically stable   Clinical Decision Making (Complexity) Low complexity   Therapy Frequency 2x/day   Predicted Duration of Therapy Intervention (days/wks) 3 days   Anticipated Discharge Disposition Home with Assist   Risk & Benefits of therapy have been explained Yes   Patient, Family & other staff in agreement with plan of care Yes   [facility] TM \"6 Clicks\"   2016, Trustees of [facility], under license to [employer].  All rights reserved.   6 Clicks Short Forms Basic Mobility Inpatient Short Form   [facility] [employer]  \"6 Clicks\" V.2 Basic Mobility Inpatient Short Form   1. Turning from your back to your side while in a flat bed without using " bedrails? 3 - A Little   2. Moving from lying on your back to sitting on the side of a flat bed without using bedrails? 3 - A Little   3. Moving to and from a bed to a chair (including a wheelchair)? 3 - A Little   4. Standing up from a chair using your arms (e.g., wheelchair, or bedside chair)? 3 - A Little   5. To walk in hospital room? 3 - A Little   6. Climbing 3-5 steps with a railing? 3 - A Little   Basic Mobility Raw Score (Score out of 24.Lower scores equate to lower levels of function) 18   Total Evaluation Time   Total Evaluation Time (Minutes) 8

## 2020-01-10 NOTE — PROGRESS NOTES
"[facility]  Neurosurgery Progress Note  Anjel Bernardo MD       1 Day Post-Op  Procedure(s):  L3-5 decompressive laminectomies with L4-5 Tranforaminal Lumbar Interbody Fusion with L3-5 posterior lateral fusion    Interval history: Doing well this am. She is ready to mobilize. Pain controlled and has been up in room      Vital signs:   Blood pressure 127/77, pulse 72, temperature 97.1  F (36.2  C), temperature source Oral, resp. rate 20, height 1.549 m (5' 1\"), weight 60.3 kg (133 lb), SpO2 98 %.    Date 01/10/20 0700 - 01/11/20 0659   Shift 7483-9404 0188-1558 9098-3638 24 Hour Total   INTAKE   P.O. 360   360   I.V. 1585   1585   Shift Total(mL/kg) 1945(32.24)   1945(32.24)   OUTPUT   Urine 400   400   Drains 55   55   Shift Total(mL/kg) 455(7.54)   455(7.54)   Weight (kg) 60.33 60.33 60.33 60.33       Exam:   Stable         Active Problems:    S/P lumbar fusion      Plan:    Doing well  Mobilize with PT  SANJUANA drains out tomorrow if output < 30 ml per shift  Wean PCA  Home likely Monday      Anjel Bernardo MD, MS, FAANS  [facility]  [phone] (office)  [phone] (Care coordinator)    "

## 2020-01-10 NOTE — PLAN OF CARE
PT: Orders received. PT evaluation completed and treatment initiated. Pt reports living in a multi level home with 1 step to enter. Pt lives with a spouse who can assist as needed after discharge. Pt is typically indep with all mobility. Has borrowed a walker and cane for use after discharge.     Discharge Planner PT   Patient plan for discharge: Home  Current status: Pt supine upon initiation, agreeable to session. Pt completes supine>sit with SBA and significant cuing for spine precautions. Pt requires maxA to venu LSO, and multiple verbal cues. Pt completes sit<>stand with CGA and cues for technique. When going to sit on the toilet the pt does not line self up properly impulsively sits-cuing provided to improve safety of toilet transfers. Pt responds well to cuing for additional sit<>stand transfers. Able to complete pericares with SBA and cues for precautions. Requires significant cuing for safety while attempting to navigate bathroom. Cues for continued walker use.  Pt ambulates 200' with use of FWW and CGA progressing to SBA. Significant cuing throughout for pacing, and spinal precautions with turning. Impulsive throughout. Pt sitting in bedside chair at end of session, all needs in reach.   Barriers to return to prior living situation: Stairs, impulsive  Recommendations for discharge: home with supervision for stair management  Rationale for recommendations: Anticipate that with continued IPPT the pt will be able to complete all mobility skills required for safe discharge home.       Entered by: Earlene Pemberton 01/10/2020 11:04 AM

## 2020-01-10 NOTE — PROGRESS NOTES
01/10/20 1400   Quick Adds   Type of Visit Initial Occupational Therapy Evaluation   Living Environment   Lives With spouse   Living Arrangements house   Home Accessibility stairs to enter home;stairs within home   Number of Stairs, Main Entrance 5   Number of Stairs, Within Home, Primary 7   Stair Railings, Within Home, Primary railings safe and in good condition   Transportation Anticipated family or friend will provide   Living Environment Comment Pt lives with spouse in a 3 story house. BR has walk in shower and raised toilet seat.   Home Main Entrance   Stairs Comment, Main Entrance 5   Stairs Within Home, Primary   Stairs Comment, Within Home, Primary 7   Self-Care   Usual Activity Tolerance good   Current Activity Tolerance good   Regular Exercise No   Equipment Currently Used at Home none   Activity/Exercise/Self-Care Comment Pt has access to a walker and cane. Prior to surgery Pt was attending watter aerobics weekly.    Functional Level   Ambulation 0-->independent   Transferring 0-->independent   Toileting 0-->independent   Bathing 0-->independent   Dressing 0-->independent   Eating 0-->independent   Communication 0-->understands/communicates without difficulty   Swallowing 0-->swallows foods/liquids without difficulty   Cognition 0 - no cognition issues reported   Fall history within last six months no   Which of the above functional risks had a recent onset or change? ambulation   Prior Functional Level Comment I with ADLs/ IADLs       Present no   General Information   Onset of Illness/Injury or Date of Surgery - Date 01/09/20   Referring Physician  Anjel Bernardo MD   Patient/Family Goals Statement Return to home   Additional Occupational Profile Info/Pertinent History of Current Problem Per chart: Pt is a 74 year old female s/p L3-5 decompression and laminectomy and L4-5 fusion. Per patient 2 prior hip surgeries    Precautions/Limitations fall precautions;spinal  precautions   General Info Comments Pt was agreeable to OT session    Cognitive Status Examination   Orientation orientation to person, place and time   Visual Perception   Visual Perception Wears glasses   Pain Assessment   Patient Currently in Pain Yes, see Vital Sign flowsheet  (3/10)   Posture   Posture not impaired   Hand Strength   Hand Strength Comments WFL   Coordination   Coordination Comments WFL   Mobility   Bed Mobility Bed mobility skill: Sit to supine   Bed Mobility Skill: Sit to Supine   Level of [place]: Sit/Supine stand-by assist   Physical Assist/Nonphysical Assist: Sit/Supine supervision;1 person assist   Transfer Skill: Sit to Stand   Level of [place]: Sit/Stand contact guard   Physical Assist/Nonphysical Assist: Sit/Stand supervision;1 person assist   Transfer Skill: Sit to Stand full weight-bearing   Assistive Device for Transfer: Sit/Stand rolling walker   Transfer Skill: Toilet Transfer   Level of [place]: Toilet contact guard   Physical Assist/Nonphysical Assist: Toilet supervision;1 person assist   Weight-Bearing Restrictions: Toilet full weight-bearing   Assistive Device rolling walker   Balance   Balance Quick Add No deficits identified   Balance Comments CGA provided while in stance with use of FWW   Upper Body Dressing   Level of [place]: Dress Upper Body minimum assist (75% patients effort)   Physical Assist/Nonphysical Assist: Dress Upper Body 1 person assist   Lower Body Dressing   Level of [place]: Dress Lower Body minimum assist (75% patients effort)   Physical Assist/Nonphysical Assist: Dress Lower Body 1 person assist   Toileting   Level of [place]: Toilet contact guard   Physical Assist/Nonphysical Assist: Toilet supervision;1 person assist   Assistive Device rolling walker   Grooming   Level of [place]: Grooming contact guard   Physical Assist/Nonphysical Assist: Grooming 1 person assist   Eating/Self Feeding   Level of [place]: Eating  "independent   Instrumental Activities of Daily Living (IADL)   Previous Responsibilities meal prep;housekeeping;laundry;shopping;medication management;finances;driving   IADL Comments Pt will have support in home envrionmnet for IADLs   Activities of Daily Living Analysis   Impairments Contributing to Impaired Activities of Daily Living pain;post surgical precautions   ADL Comments Pt will have support in home envrionment for ADLs   General Therapy Interventions   Planned Therapy Interventions ADL retraining;IADL retraining;bed mobility training   Clinical Impression   Criteria for Skilled Therapeutic Interventions Met yes, treatment indicated   OT Diagnosis Impaired ADLs, IADLs, mobility, and transferws   Influenced by the following impairments pain and post-surgical precautions    Assessment of Occupational Performance 5 or more Performance Deficits   Identified Performance Deficits Dressing, grooming/hygeine, mobility, home maintenance   Clinical Decision Making (Complexity) Low complexity   Therapy Frequency Daily   Predicted Duration of Therapy Intervention (days/wks) 4   Anticipated Discharge Disposition Home with Assist   Risks and Benefits of Treatment have been explained. Yes   Patient, Family & other staff in agreement with plan of care Yes   Clinical Impression Comments Skilled OT services were needed for safety/ independence in ADLs, IADLs, mobility, and transfering    [facility] AM-PAC  \"6 Clicks\" Daily Activity Inpatient Short Form   1. Putting on and taking off regular lower body clothing? 3 - A Little   2. Bathing (including washing, rinsing, drying)? 3 - A Little   3. Toileting, which includes using toilet, bedpan or urinal? 3 - A Little   4. Putting on and taking off regular upper body clothing? 3 - A Little   5. Taking care of personal grooming such as brushing teeth? 4 - None   6. Eating meals? 4 - None   Daily Activity Raw Score (Score out of 24.Lower scores equate to lower levels of " function) 20   Total Evaluation Time   Total Evaluation Time (Minutes) 10

## 2020-01-10 NOTE — PLAN OF CARE
Pt A&Ox4. VSS. Afebrile. Saline locked at end of shift. PCA of fentanyl discontinued and started on Ultram to manage her pain along with scheduled Tylenol. Cms intact. Dressing c/d/I. Left SANJUANA 70 ml out and right SANJUANA 65 ml out. Voiding adequate amounts. Scope patch behind left ear. Tolerated regular diet with no nausea. Up with assist of 1 gait belt and walker and brace. LS clear. IV Ancef. Plan to discharge home with  possibly Monday. Will continue to monitor.

## 2020-01-10 NOTE — PLAN OF CARE
PT: Pt attempted for PM session. Busy with nursing cares, then MD visit. Will attempt back as time/schedule allows. Encourage nursing to ambulate pt in halls this PM.

## 2020-01-10 NOTE — CONSULTS
Hospitalist Consultation      Anne Rangel MRN# [id number]   YOB: 1945 Age: 74 year old   Date of Admission: 1/9/2020     Requesting Physician:  Dr. Bernardo  Reason for consult: Post-op medical management            Assessment and Plan:   This patient is a 74 year old female who is POD #1 s/p L3-5 decompression and laminectomy and L4-5 fusion.   Overall doing well, no complaints. Pain controlled.    1. L3-5 decompression and laminectomy and L4-5 fusion - pain, prophylaxis, diet and PT/OT per neurosurgery.  2. CKD - baseline Cr appears to be 1.0-1.2, last Cr was 1.19 on 12/19/19. Avoid nephrotoxins as able.   3. GERD - resume PPI  4. Depression and anxiety - resume home Lexapro and Wellbutrin.   5. HLP - resume statin  6. 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 She has severe lumbar stenosis and DDD and has failed conservative outpatient management so presents here for elective L3-5 decompression and laminectomy and L4-5 fusion. She tolerated the surgery well, pain is well controlled, has had adequate I&Os, and she is tolerating PO intake. Kumar is out and is voiding well. She is passing gas but no BM yet. She denies fever, chills, chest pain, SOB, cough, abdominal pain, nausea, vomiting, or diarrhea. She also has a PMHx of CKD, GERD, HLP, pacemaker placement for 2:1 AV block, depression and anxiety.              Past Medical History:     Past Medical History:   Diagnosis Date     Anemia      Carpal tunnel syndrome      Chronic fatigue syndrome 11/00     Complication of anesthesia      Decreased libido      Depression with anxiety      Fatigue      Gastroesophageal reflux disease      HA (headache)      History of blood transfusion      iamAFTERCARE FOLLOWING JOINT REPLACEMENT 7/23/2007     Irritable bowel syndrome 11/00     Myalgia and  myositis, unspecified 11/00     Near syncope 5/17/2017     Neutropenia 00     Noninfectious ileitis      Osteoarthritis     rt knee, back     Osteoarthrosis, unspecified whether generalized or localized, hand 11/00     Osteoarthrosis, unspecified whether generalized or localized, lower leg 11/00     Other chronic pain     back     Pacemaker      PONV (postoperative nausea and vomiting)      Pure hypercholesterolemia 11/00     RBBB      Renal disease     mld     Uncomplicated asthma      Urticaria, unspecified 00               Past Surgical History:     Past Surgical History:   Procedure Laterality Date     ARTHROPLASTY KNEE Left      ARTHROPLASTY KNEE Right     partial     ARTHROPLASTY REVISION HIP Left 2/3/2017    Procedure: ARTHROPLASTY REVISION HIP;  Surgeon: Juan Moncada MD;  Location:  OR     C NONSPECIFIC PROCEDURE      s/p Rt. Carpal tunnel release     C NONSPECIFIC PROCEDURE      s/p bilat Tubal ligation     CARDIAC SURGERY  2017    pacemaker     COLONOSCOPY N/A 1/15/2019    Procedure: COLONOSCOPY;  Surgeon: Eliane Barone MD;  Location:  GI     EYE SURGERY      zoila cataract surgery     GI SURGERY      hemorrhoid repair     GYN SURGERY      tubal ligation     ORTHOPEDIC SURGERY      right sabine-arthroplasty, trigger finger repair, left knee arthroscopy and replacement, bilateral hip replacement,                 Social History:     Social History     Tobacco Use     Smoking status: Never Smoker     Smokeless tobacco: Never Used   Substance Use Topics     Alcohol use: Yes     Comment: 1 Drink with dinner     Drug use: No                 Family History:   I have reviewed this patient's family history  family history includes Allergies in her father; Alzheimer Disease in her mother; Arthritis in her mother; C.A.D. in her paternal grandmother; Cancer in her maternal grandfather; Cerebrovascular Disease in her maternal grandmother; Depression in her daughter and mother; Diabetes in her maternal  grandfather; Gastrointestinal Disease in her mother; Hypertension in her mother; Neurologic Disorder in her father and sister; Respiratory in her father.            Allergies:     Allergies   Allergen Reactions     Blood-Group Specific Substance Other (See Comments)     Patient has anti-K antibody.  Blood for the transfusion might be delayed.       Dilaudid [Hydromorphone] Nausea and Vomiting     Doxepin Unknown     Other reaction(s): Intolerance-Can't Take  Not effective for sleep     Meperidine Nausea and Vomiting     Demerol     Meperidine Nausea and Vomiting     Morphine      PN: LW Reaction: Vomiting,Nausea     No Clinical Screening - See Comments      PN: LW Reaction: all opiates     Pregabalin Other (See Comments)     Not effective             Medications:     Prior to Admission medications    Medication Sig Last Dose Taking? Auth Provider   acetaminophen (TYLENOL) 500 MG tablet Take 500-1,000 mg by mouth every 6 hours as needed for mild pain 1/8/2020 at Unknown time Yes Reported, Patient   Ascorbic Acid (VITAMIN C PO) Take 500 mg by mouth daily 1/8/2020 at Unknown time Yes Reported, Patient   atorvastatin (LIPITOR) 40 MG tablet Take 40 mg by mouth At Bedtime  1/8/2020 at Unknown time Yes Meena Woo APRN CNP   BuPROPion HCl (WELLBUTRIN XL PO) Take 150 mg by mouth daily 1/8/2020 at Unknown time Yes Reported, Patient   escitalopram (LEXAPRO) 20 MG tablet Take 1 tablet by mouth every 24 hours 1/8/2020 at Unknown time Yes Reported, Patient   eszopiclone (LUNESTA) 2 MG tablet Take 3 mg by mouth At Bedtime  1/8/2020 at Unknown time Yes Meena Woo APRN CNP   LORAZEPAM PO Take 0.5 mg by mouth At Bedtime (0.5 x 1 mg tablet = 0.5 mg dose) 1/8/2020 at Unknown time Yes Reported, Patient   methocarbamol (ROBAXIN) 500 MG tablet Take 1 tablet (500 mg) by mouth 4 times daily as needed for muscle spasms  Yes Madiha Jett APRN CNP   Multiple Minerals-Vitamins (SUE MAG ZINC +D3 PO) Take 1 tablet by  "mouth four times a week  Yes Reported, Patient   omeprazole 20 MG tablet Take 1 tablet (20 mg) by mouth daily Take 30-60 minutes before a meal. 1/8/2020 at Unknown time Yes Meena Woo APRN CNP   oxyCODONE (ROXICODONE) 5 MG tablet Take 1-2 tablets (5-10 mg) by mouth every 4 hours as needed for moderate to severe pain  Yes Madiha Jett APRN CNP   polyethylene glycol (MIRALAX/GLYCOLAX) powder Take 17 g by mouth daily as needed for constipation 1/8/2020 at Unknown time Yes Reported, Patient   Probiotic Product (PROBIOTIC & ACIDOPHILUS EX ST PO) Take 1 capsule by mouth daily 1/8/2020 at Unknown time Yes Reported, Patient   VITAMIN D, CHOLECALCIFEROL, PO Take 5,000 Units by mouth three times a week (Monday, Wednesday and Friday)  Yes Reported, Patient   SUMAtriptan Succinate (IMITREX PO) Take 100 mg by mouth daily as needed for migraine   Reported, Patient               Review of Systems:   A comprehensive greater than 10 system review of systems was carried out.  Pertinent positives and negatives are noted above.  Otherwise negative for contributory info.            Physical Exam:   Vitals were reviewed  Blood pressure 127/77, pulse 72, temperature 97.1  F (36.2  C), temperature source Oral, resp. rate 20, height 1.549 m (5' 1\"), weight 60.3 kg (133 lb), SpO2 98 %.  Exam:    GENERAL:  Comfortable.  PSYCH: pleasant, oriented, No acute distress.  HEENT:  Normal conjunctiva, normal hearing, nasal mucosa and Oropharynx are normal.  NECK:  Supple, no neck vein distention.  HEART:  Normal S1, S2 with no murmur, no pericardial rub, S3 or S4.  LUNGS:  Clear to auscultation, normal Respiratory effort.  GI:  Soft, normal bowel sounds.  EXTREMITIES:  No pedal edema, +2 pulses bilateral and equal.  Back dressing c/d/i  SKIN:  Dry to touch, No rash, wound or ulcerations.  NEUROLOGIC:  Nonfocal with normal cranial nerve and motor power and sensation.            Data:   Past 24 hours labs, studies, and imaging were " reviewed.  No results for input(s): WBC, HGB, HCT, MCV, PLT in the last 168 hours.  No results for input(s): NA, POTASSIUM, CHLORIDE, CO2, ANIONGAP, GLC, BUN, CR, GFRESTIMATED, GFRESTBLACK, SUE, MAG, PHOS, PROTTOTAL, ALBUMIN, BILITOTAL, ALKPHOS, AST, ALT in the last 168 hours.    Aster Aguilera PA-C    Pt discussed with Dr. Carballo who agrees with the care as discussed above.

## 2020-01-10 NOTE — PLAN OF CARE
OT: Order received, eval completed and treatment initiated. Pt is a 74 year old female s/p L3-5 decompression and laminectomy and L4-5 fusion. Per patient 2 prior hip surgeries. Pt lives with spouse in a 3 story house. BR has walk in shower and raised toilet seat. I with ADLs/ IADLs at baseline.     Discharge Planner OT   Patient plan for discharge: Home w/ spouse  Current status: Pt completed sit > stand from chair to FWW with CGA/SBA, ambulated in room, to/from BR FWW level with CGA, pt completed toilet transfer on/off with use of grab bar and CGA. Pt completed functional mobility task in hallway FWW level with CGA, vcs provided for safety with FWW. Pt able to complete bed mobility sit > supine using log roll technique with SBA, good return demo noted. Pt educated on spinal precautions, was able to verbalize 2/3, educated on 3/3 and ways to implement/maintain during ADLs. Pt educated on compensatory technique for LB dressing and LH AE, was able to utilize sock aide to don sock with min A. Pt educated on back brace, required min A for donning/doffing and optimal position. Pt and spouse educated on home safety modifications/recommendations, DME/AE and resources, car transfer technique and safe activities post spinal surgery, verbalized understanding. Pt with report of 3/10 pain in back during session.   Barriers to return to prior living situation: Pain, post-surgical precautions  Recommendations for discharge: Home w/ spouse assist for LB dressing, bathing and assist with IADLs (driving, homemaking)  Rationale for recommendations: Pt is below baseline level of functioning with regards to ADLs, IADLs and mobility tasks. Anticipate with continued skilled OT while IP, pt will progress to meet goals for safe return home.        Entered by: Alisa Meade 01/10/2020 4:23 PM

## 2020-01-10 NOTE — PLAN OF CARE
A&OX4. VSS: Capno RA. Up with Ax2,, gbelt, walker and brace. CMS: intact. Drsg: CDI. SANJUANA's patent: L 60 ml, R: 35 ml.  Pain managed with Scheduled Tylenol and PCA Pumb. José dillon'fanny this am. Due to void. IS and fluids encouraged. Nursing continue to monitor.

## 2020-01-10 NOTE — PLAN OF CARE
Pt is A&Ox4, VSS on RA, and pain managed with PCA pump and prn robaxin. IV patent and infusing. Capnography in place. CMS intact. No numbness or tingling. Dressing CDI. Ambulating A-1 with walker and gait belt; brace on when out of bed. Kumar catheter patent and catheter care done. SANJUANA drains to bulb suction and draining well. Tolerating full liquids and denying nausea. Plan of care reviewed with patient and spouse.

## 2020-01-11 ENCOUNTER — APPOINTMENT (OUTPATIENT)
Dept: PHYSICAL THERAPY | Facility: CLINIC | Age: 75
DRG: 455 | End: 2020-01-11
Attending: NEUROLOGICAL SURGERY
Payer: MEDICARE

## 2020-01-11 ENCOUNTER — APPOINTMENT (OUTPATIENT)
Dept: OCCUPATIONAL THERAPY | Facility: CLINIC | Age: 75
DRG: 455 | End: 2020-01-11
Attending: NEUROLOGICAL SURGERY
Payer: MEDICARE

## 2020-01-11 LAB
ANION GAP SERPL CALCULATED.3IONS-SCNC: 3 MMOL/L (ref 3–14)
BLD PROD TYP BPU: NORMAL
BLD PROD TYP BPU: NORMAL
BLD UNIT ID BPU: 0
BLD UNIT ID BPU: 0
BLOOD PRODUCT CODE: NORMAL
BLOOD PRODUCT CODE: NORMAL
BPU ID: NORMAL
BPU ID: NORMAL
BUN SERPL-MCNC: 11 MG/DL (ref 7–30)
CALCIUM SERPL-MCNC: 8.3 MG/DL (ref 8.5–10.1)
CHLORIDE SERPL-SCNC: 110 MMOL/L (ref 94–109)
CO2 SERPL-SCNC: 29 MMOL/L (ref 20–32)
CREAT SERPL-MCNC: 1.01 MG/DL (ref 0.52–1.04)
GFR SERPL CREATININE-BSD FRML MDRD: 55 ML/MIN/{1.73_M2}
GLUCOSE SERPL-MCNC: 89 MG/DL (ref 70–99)
POTASSIUM SERPL-SCNC: 4.2 MMOL/L (ref 3.4–5.3)
SODIUM SERPL-SCNC: 142 MMOL/L (ref 133–144)
TRANSFUSION STATUS PATIENT QL: NORMAL

## 2020-01-11 PROCEDURE — 99232 SBSQ HOSP IP/OBS MODERATE 35: CPT | Performed by: INTERNAL MEDICINE

## 2020-01-11 PROCEDURE — 80048 BASIC METABOLIC PNL TOTAL CA: CPT | Performed by: INTERNAL MEDICINE

## 2020-01-11 PROCEDURE — 97530 THERAPEUTIC ACTIVITIES: CPT | Mod: GP

## 2020-01-11 PROCEDURE — 36415 COLL VENOUS BLD VENIPUNCTURE: CPT | Performed by: INTERNAL MEDICINE

## 2020-01-11 PROCEDURE — 97530 THERAPEUTIC ACTIVITIES: CPT | Mod: GO

## 2020-01-11 PROCEDURE — 25800030 ZZH RX IP 258 OP 636: Performed by: NEUROLOGICAL SURGERY

## 2020-01-11 PROCEDURE — 25000132 ZZH RX MED GY IP 250 OP 250 PS 637: Mod: GY | Performed by: NEUROLOGICAL SURGERY

## 2020-01-11 PROCEDURE — 97535 SELF CARE MNGMENT TRAINING: CPT | Mod: GO

## 2020-01-11 PROCEDURE — 25000128 H RX IP 250 OP 636: Performed by: NEUROLOGICAL SURGERY

## 2020-01-11 PROCEDURE — 12000000 ZZH R&B MED SURG/OB

## 2020-01-11 PROCEDURE — 97116 GAIT TRAINING THERAPY: CPT | Mod: GP

## 2020-01-11 PROCEDURE — 25000132 ZZH RX MED GY IP 250 OP 250 PS 637: Mod: GY | Performed by: INTERNAL MEDICINE

## 2020-01-11 RX ORDER — LORAZEPAM 0.5 MG/1
0.5 TABLET ORAL AT BEDTIME
Status: DISCONTINUED | OUTPATIENT
Start: 2020-01-11 | End: 2020-01-13 | Stop reason: HOSPADM

## 2020-01-11 RX ADMIN — FAMOTIDINE 20 MG: 20 TABLET, FILM COATED ORAL at 08:14

## 2020-01-11 RX ADMIN — ATORVASTATIN CALCIUM 40 MG: 40 TABLET, FILM COATED ORAL at 21:53

## 2020-01-11 RX ADMIN — OMEPRAZOLE 20 MG: 20 CAPSULE, DELAYED RELEASE ORAL at 08:14

## 2020-01-11 RX ADMIN — ACETAMINOPHEN 975 MG: 325 TABLET, FILM COATED ORAL at 14:47

## 2020-01-11 RX ADMIN — ESCITALOPRAM OXALATE 20 MG: 20 TABLET ORAL at 08:14

## 2020-01-11 RX ADMIN — CEFAZOLIN 2 G: 10 INJECTION, POWDER, FOR SOLUTION INTRAVENOUS at 17:47

## 2020-01-11 RX ADMIN — BUPROPION HYDROCHLORIDE 150 MG: 150 TABLET, EXTENDED RELEASE ORAL at 08:14

## 2020-01-11 RX ADMIN — DOCUSATE SODIUM 100 MG: 100 CAPSULE, LIQUID FILLED ORAL at 08:14

## 2020-01-11 RX ADMIN — LORAZEPAM 0.5 MG: 0.5 TABLET ORAL at 21:53

## 2020-01-11 RX ADMIN — OXYCODONE HYDROCHLORIDE 5 MG: 5 TABLET ORAL at 00:26

## 2020-01-11 RX ADMIN — ONDANSETRON 4 MG: 4 TABLET, ORALLY DISINTEGRATING ORAL at 17:43

## 2020-01-11 RX ADMIN — METHOCARBAMOL TABLETS 500 MG: 500 TABLET, COATED ORAL at 19:50

## 2020-01-11 RX ADMIN — CEFAZOLIN 2 G: 10 INJECTION, POWDER, FOR SOLUTION INTRAVENOUS at 02:11

## 2020-01-11 RX ADMIN — OXYCODONE HYDROCHLORIDE 10 MG: 5 TABLET ORAL at 21:52

## 2020-01-11 RX ADMIN — DOCUSATE SODIUM 100 MG: 100 CAPSULE, LIQUID FILLED ORAL at 19:50

## 2020-01-11 RX ADMIN — OXYCODONE HYDROCHLORIDE 5 MG: 5 TABLET ORAL at 17:47

## 2020-01-11 RX ADMIN — TRAMADOL HYDROCHLORIDE 100 MG: 50 TABLET, COATED ORAL at 05:54

## 2020-01-11 RX ADMIN — Medication 1 CAPSULE: at 08:14

## 2020-01-11 RX ADMIN — TRAMADOL HYDROCHLORIDE 100 MG: 50 TABLET, COATED ORAL at 11:49

## 2020-01-11 RX ADMIN — ACETAMINOPHEN 975 MG: 325 TABLET, FILM COATED ORAL at 05:54

## 2020-01-11 RX ADMIN — ACETAMINOPHEN 975 MG: 325 TABLET, FILM COATED ORAL at 21:52

## 2020-01-11 RX ADMIN — POLYETHYLENE GLYCOL 3350 17 G: 17 POWDER, FOR SOLUTION ORAL at 14:49

## 2020-01-11 RX ADMIN — PROCHLORPERAZINE EDISYLATE 5 MG: 5 INJECTION INTRAMUSCULAR; INTRAVENOUS at 21:53

## 2020-01-11 RX ADMIN — CEFAZOLIN 2 G: 10 INJECTION, POWDER, FOR SOLUTION INTRAVENOUS at 10:06

## 2020-01-11 RX ADMIN — ESZOPICLONE 3 MG: 3 TABLET, FILM COATED OROPHARYNGEAL at 21:52

## 2020-01-11 RX ADMIN — FAMOTIDINE 20 MG: 20 TABLET, FILM COATED ORAL at 19:50

## 2020-01-11 ASSESSMENT — ACTIVITIES OF DAILY LIVING (ADL)
ADLS_ACUITY_SCORE: 15
ADLS_ACUITY_SCORE: 13

## 2020-01-11 NOTE — PLAN OF CARE
Discharge Planner PT   Patient plan for discharge: Home  Current status:        present and supportive. Assisted in adjusting FWW to appropriate height. Pt supine, reviewed spine precautions provided with proper posture handout. Pt needs cues throughout session to maintain precatuiosn specifically twisting and bending fwd. Supine > sit using logroll and SBA. STS with FWW and CGA cues for hand placement. In standing pt requires min assist to venu brace,  present for family training on assisting pt in donning brace. Assisted pt into BR, cues to not twist with toilet transfer. No LOB. Practiced approaching low chair and proper transfer technique within precautions, SBA with use of FWW for education and cues.     Pt ambulated 200' with FWW and SBA. Pt requires SBA for cues to maintian precautions. Continues to require frequent cues and education on this, no LOB.     End of session sit > Supine using logroll, min A x 1 required, difficulty not twisting with though. End of session pt supine with nursing present     Barriers to return to prior living situation: Impulsive     Recommendations for discharge: HOme with SBA for stairs, min A with bed mobility   Rationale for recommendations: Pt overall mobilizing well, has difficulty maintaining precautions. Pt is SBA for mobility, requires min A with sit > supine at this time. Will continue IP PT          Entered by: Nohelia Baires 01/11/2020 12:20 PM

## 2020-01-11 NOTE — PROGRESS NOTES
"[facility]  Hospitalist Consult Progress Note  José Luis Barton MD 01/11/20    Reason for Stay (Diagnosis): spinal fusion         Assessment and Plan:      Summary of Stay: Anne Rangel is a 74 year old female who is POD #1 s/p L3-5 decompression and laminectomy and L4-5 fusion.   Overall doing well, no complaints. Pain controlled.       1. L3-5 decompression and laminectomy and L4-5 fusion - pain, prophylaxis, diet and PT/OT per neurosurgery.    2. CKD - baseline Cr appears to be 1.0-1.2, last Cr was 1.19 on 12/19/19. Avoid nephrotoxins as able.     3. GERD - resume PPI    4. Depression and anxiety - resume home Lexapro and Wellbutrin.     5. HLP - resume statin    6. 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                  Physical Exam:      Last Vital Signs:  /63 (BP Location: Right arm)   Pulse 72   Temp 97.5  F (36.4  C) (Oral)   Resp 20   Ht 1.549 m (5' 1\")   Wt 60.3 kg (133 lb)   SpO2 94%   BMI 25.13 kg/m      I/O last 3 completed shifts:  In: 3040 [P.O.:1010; I.V.:2030]  Out: 2350 [Urine:2050; Drains:300]    General: Alert, awake, no acute distress.  HEENT: NC/AT, eyes anicteric, external occular movements intact, face symmetric.  Dentition WNL, MM moist.  Cardiac: RRR, S1, S2.  No murmurs appreciated.  Pulmonary: Normal chest rise, normal work of breathing.  Lungs CTA BL  Abdomen: soft, non-tender, non-distended.  Bowel Sounds Present.  No guarding.  Extremities: no deformities.  Warm, well perfused.  Skin: no rashes or lesions noted.  Warm and Dry.  Neuro: No focal deficits noted.  Speech clear.  Coordination and strength grossly normal.  Psych: Appropriate affect.         Medications:      All current medications were reviewed with changes reflected in problem list.         Data:      All " new lab and imaging data was reviewed.   Labs:  Recent Labs   Lab 01/11/20  0613      POTASSIUM 4.2   CHLORIDE 110*   CO2 29   ANIONGAP 3   GLC 89   BUN 11   CR 1.01   GFRESTIMATED 55*   GFRESTBLACK 63   SUE 8.3*     No results for input(s): WBC, HGB, HCT, MCV, PLT in the last 168 hours.   Imaging:   No results found for this or any previous visit (from the past 48 hour(s)).      José Luis Barton MD , MD.

## 2020-01-11 NOTE — PLAN OF CARE
POD2, VSS, room air, thong reg diet, up Ax1 walker, Gb (back brace when out of room), IV Ancef, saline locked, scheduled Tylenol, prn Tramadol, and prn Oxy 5mg managing pain. Pt reports severe nausea in the past when taking Oxy so is hesitant about taking too much. 2 SANJUANA drains remain, output was 50ml each side overnight. Gauze dressing CDI, LS clear, BS active, voided multiple times overnight. Plan is to discharge to home Monday.

## 2020-01-11 NOTE — PLAN OF CARE
Discharge Planner OT   Patient plan for discharge: Home w/ spouse  Current status: Pt completed bed mobility supine > sit EOB with log roll technique, vcs and CGA, good demo of technique. Pt completed sit > stand from EOB to FWW with CGA, vcs provided for hand placement. Pt ambulated to/from BR FWW level with SBA. Pt completed toilet transfer on/off with use of grab bar and SBA. Pt able to tolerate standing at sink for increased time, SBA with no LOB. Pt continues to require vcs for safe hand placement with transfers, including prior to sitting for safe controlled descent. Pt able to verbalize 3/3 spinal precautions, ongoing education provided. Pt completed grooming/hygiene tasks of oral cares, face washing and light bathing in stance at sink with SBA. Pt completed toileting tasks with SBA. Pt able to return demo of use of reacher/sock aide for LB dressing task. Able to utilize reacher to doff socks and sock aide to don B socks while seated with SBA and vcs, good return demo noted. Pt with report of 2-3/10 pain in back during session.  Pt required min A for donning back brace for optimal positioning/adjustment while seated EOB prior to OOB activity.   Barriers to return to prior living situation: Pain, post-surgical precautions  Recommendations for discharge: Home w/ spouse assist for LB dressing, bathing and assist with IADLs (driving, homemaking)  Rationale for recommendations: Pt is below baseline level of functioning with regards to ADLs, IADLs and mobility tasks. Anticipate with continued skilled OT while IP, pt will progress to meet goals for safe return home.        Entered by: Alisa Meade 01/11/2020 8:22 AM

## 2020-01-11 NOTE — PROGRESS NOTES
"[facility]       2 Day Post-Op  Procedure(s):  L3-5 decompressive laminectomies with L4-5 Tranforaminal Lumbar Interbody Fusion with L3-5 posterior lateral fusion     Interval history: Doing well this am. Yesterday was painful, but is better controlled with oxycodone at night and ultram during the day. SANJUANA drains remain in place with output greater than 30/shift.  Tolerating po.  Voiding.  +flatus, negative BM.        Vital signs:   Vital signs:  Temp: 97.5  F (36.4  C) Temp src: Oral BP: 108/63   Heart Rate: 88 Resp: 20 SpO2: 94 % O2 Device: None (Room air) Oxygen Delivery: 4 LPM Height: 154.9 cm (5' 1\") Weight: 60.3 kg (133 lb)  Estimated body mass index is 25.13 kg/m  as calculated from the following:    Height as of this encounter: 1.549 m (5' 1\").    Weight as of this encounter: 60.3 kg (133 lb).      Intake/Output Summary (Last 24 hours) at 1/11/2020 0906  Last data filed at 1/11/2020 0823  Gross per 24 hour   Intake 3030 ml   Output 2740 ml   Net 290 ml       Exam:   Alert, sitting in chair eating breakfast.  Resp: Breathing is regular and non-labored on room air.  Back: Brace in place.  BLE: 5/5 TA/GCS/EHL.  Sensation intact to light touch in all dermatomes.  DP palpable.     Active Problems:    S/P lumbar fusion        Plan:    Doing well  Mobilize with PT  SANJUANA drains out tomorrow if output < 30 ml per shift  Continue IV abx until drains discontinued.  Continue oral pain medications.  Discussed bowel regimen.  Home likely Monday    Kate Killian MD    "

## 2020-01-11 NOTE — PLAN OF CARE
Pt A&Ox4. VSS. Afebrile. Saline locked. IV Ancef still will SANJUANA drains in. SANJUANA drains had over 30 ml for shift so not discontinued yet. Cms intact. LS clear. Miralax given. No BM today. Tolerated regular diet. Up with assist of 1 gait belt and walker and brace. Pain managed with Ultram, scheduled tylenol and Oxycodone. Working on what will work best for her. Plan to discharge Monday home with . Will continue to monitor.

## 2020-01-11 NOTE — PLAN OF CARE
Evening RN  Pt A/O x4.  VSS and afebrile.  Pain managed adequately with scheduled PO Tylenol and PRN PO tramadol (100mg), robaxin - may utilize some oxycodone tonight for increased pain relief.  CMS intact.  Dressing CDI - small amount of serosanguinous drainage from around R SANJUANA site and this nurse applied 4x4 and tape as reinforcement.  SANJUANA drains in place and WDL - will be removed PO day 2 with less than 30ml output a shift.  Up A1 with walker, back brace, and gait belt.  Voiding adequately.  Tolerating regular diet well.  Scopolamine patch behind L ear.  Plan is home on discharge.  Will continue to monitor.

## 2020-01-12 ENCOUNTER — APPOINTMENT (OUTPATIENT)
Dept: OCCUPATIONAL THERAPY | Facility: CLINIC | Age: 75
DRG: 455 | End: 2020-01-12
Attending: NEUROLOGICAL SURGERY
Payer: MEDICARE

## 2020-01-12 ENCOUNTER — APPOINTMENT (OUTPATIENT)
Dept: PHYSICAL THERAPY | Facility: CLINIC | Age: 75
DRG: 455 | End: 2020-01-12
Attending: NEUROLOGICAL SURGERY
Payer: MEDICARE

## 2020-01-12 PROCEDURE — 25800030 ZZH RX IP 258 OP 636: Performed by: NEUROLOGICAL SURGERY

## 2020-01-12 PROCEDURE — 25000128 H RX IP 250 OP 636: Performed by: NEUROLOGICAL SURGERY

## 2020-01-12 PROCEDURE — 25000132 ZZH RX MED GY IP 250 OP 250 PS 637: Mod: GY | Performed by: INTERNAL MEDICINE

## 2020-01-12 PROCEDURE — 12000000 ZZH R&B MED SURG/OB

## 2020-01-12 PROCEDURE — 97535 SELF CARE MNGMENT TRAINING: CPT | Mod: GO

## 2020-01-12 PROCEDURE — 97530 THERAPEUTIC ACTIVITIES: CPT | Mod: GP

## 2020-01-12 PROCEDURE — 25000132 ZZH RX MED GY IP 250 OP 250 PS 637: Mod: GY | Performed by: NEUROLOGICAL SURGERY

## 2020-01-12 PROCEDURE — 97530 THERAPEUTIC ACTIVITIES: CPT | Mod: GO

## 2020-01-12 PROCEDURE — 97116 GAIT TRAINING THERAPY: CPT | Mod: GP

## 2020-01-12 PROCEDURE — 99232 SBSQ HOSP IP/OBS MODERATE 35: CPT | Performed by: INTERNAL MEDICINE

## 2020-01-12 RX ADMIN — CEFAZOLIN 2 G: 10 INJECTION, POWDER, FOR SOLUTION INTRAVENOUS at 18:05

## 2020-01-12 RX ADMIN — CEFAZOLIN 2 G: 10 INJECTION, POWDER, FOR SOLUTION INTRAVENOUS at 10:24

## 2020-01-12 RX ADMIN — Medication 1 CAPSULE: at 08:42

## 2020-01-12 RX ADMIN — ESCITALOPRAM OXALATE 20 MG: 20 TABLET ORAL at 08:42

## 2020-01-12 RX ADMIN — DICLOFENAC SODIUM 4 G: 10 GEL TOPICAL at 12:50

## 2020-01-12 RX ADMIN — TRAMADOL HYDROCHLORIDE 100 MG: 50 TABLET, COATED ORAL at 08:44

## 2020-01-12 RX ADMIN — ACETAMINOPHEN 650 MG: 325 TABLET, FILM COATED ORAL at 18:04

## 2020-01-12 RX ADMIN — FAMOTIDINE 20 MG: 20 TABLET, FILM COATED ORAL at 08:43

## 2020-01-12 RX ADMIN — CEFAZOLIN 2 G: 10 INJECTION, POWDER, FOR SOLUTION INTRAVENOUS at 01:35

## 2020-01-12 RX ADMIN — DICLOFENAC SODIUM 4 G: 10 GEL TOPICAL at 21:03

## 2020-01-12 RX ADMIN — ONDANSETRON 4 MG: 4 TABLET, ORALLY DISINTEGRATING ORAL at 20:59

## 2020-01-12 RX ADMIN — TRAMADOL HYDROCHLORIDE 100 MG: 50 TABLET, COATED ORAL at 14:40

## 2020-01-12 RX ADMIN — DOCUSATE SODIUM 100 MG: 100 CAPSULE, LIQUID FILLED ORAL at 21:07

## 2020-01-12 RX ADMIN — DICLOFENAC SODIUM 4 G: 10 GEL TOPICAL at 16:11

## 2020-01-12 RX ADMIN — BUPROPION HYDROCHLORIDE 150 MG: 150 TABLET, EXTENDED RELEASE ORAL at 08:43

## 2020-01-12 RX ADMIN — LORAZEPAM 0.5 MG: 0.5 TABLET ORAL at 21:07

## 2020-01-12 RX ADMIN — ESZOPICLONE 3 MG: 3 TABLET, FILM COATED OROPHARYNGEAL at 21:07

## 2020-01-12 RX ADMIN — POLYETHYLENE GLYCOL 3350 17 G: 17 POWDER, FOR SOLUTION ORAL at 16:28

## 2020-01-12 RX ADMIN — FAMOTIDINE 20 MG: 20 TABLET, FILM COATED ORAL at 21:07

## 2020-01-12 RX ADMIN — DOCUSATE SODIUM 100 MG: 100 CAPSULE, LIQUID FILLED ORAL at 08:43

## 2020-01-12 RX ADMIN — OXYCODONE HYDROCHLORIDE 10 MG: 5 TABLET ORAL at 21:28

## 2020-01-12 RX ADMIN — OMEPRAZOLE 20 MG: 20 CAPSULE, DELAYED RELEASE ORAL at 08:43

## 2020-01-12 RX ADMIN — ACETAMINOPHEN 975 MG: 325 TABLET, FILM COATED ORAL at 07:00

## 2020-01-12 RX ADMIN — ATORVASTATIN CALCIUM 40 MG: 40 TABLET, FILM COATED ORAL at 21:07

## 2020-01-12 ASSESSMENT — ACTIVITIES OF DAILY LIVING (ADL)
ADLS_ACUITY_SCORE: 13

## 2020-01-12 NOTE — PLAN OF CARE
VSS ex tachy, up Ax1 walker Gb back brace to bathroom, voiding in good amnts, thong reg diet denies nausea, room air. Taking scheduled Tylenol and has oxy available; please pre-treat with Zofran if going to give Oxycodone. 2 JPs continuing to drain. Saline locked, IV Ancef, plan is to discharge to home.

## 2020-01-12 NOTE — PLAN OF CARE
Discharge Planner PT   Patient plan for discharge: home with assist from    Current status:     Reviewed spine precautions, pt continues to require cues not to bend and twist with functional mobility. Reviewed importance of slowing down, focus on maintaining precautions, quality of movement. SBA bed mobility using logroll HOB flat, rail down. Min A to venu brace. STS with FWW and SBA. Pt ambulated 300' with and without assistive device, SBA, cues for improved mechanics. No LOB. Stair training up/down 4 steps  x3 trials LHR, CGA, step to pattern. Overall pt moblizing well, difficulty maintaining precautions, cont to require cues     Barriers to return to prior living situation: none  Recommendations for discharge: home when medically stable,  to assist with cues for precautions at home, CGA on stairs   Rationale for recommendations: Pt progressing well. Predict will progress to IND after next PT session, may continue to require some cues from  at home regarding mobility within precautions, slowing down movement, SBA stairs, min A to venu brace          Entered by: Nohelia Baires 01/12/2020 11:56 AM

## 2020-01-12 NOTE — PLAN OF CARE
Evening RN  Pt A/O x4.  VSS and afebrile.  Pain managed adequately with scheduled PO Tylenol and PRN PO oxycodone (continuing to pre-treat for nausea utilizing nausea medications) - pt agreeable to trying to switch to PO Oxycodone because Tramadol not providing adequate pain relief.  CMS intact.  Dressing has small amount of dried drainage.  SANJUANA drains are still in place and WDL - L drain put out total of 35ml, R drain put out total of 35ml.  Up A1 with walker, back brace, and gait belt - continuing to cue and encourage not bending back.  Voiding adequately.  No bm yet and bowel meds given.  Tolerating regular diet well.  Plan is home on discharge likely Monday.  Will continue to monitor.

## 2020-01-12 NOTE — PROGRESS NOTES
"[facility]  Hospitalist Consult Progress Note  José Luis Barton MD 01/12/2020    Reason for Stay (Diagnosis): spinal fusion         Assessment and Plan:      Summary of Stay: Anne Rangel is a 74 year old female who is POD #2 s/p L3-5 decompression and laminectomy and L4-5 fusion.   Overall doing well, working on mobility and pain control.     1. L3-5 decompression and laminectomy and L4-5 fusion - pain, prophylaxis, diet and PT/OT per neurosurgery.    2. CKD - baseline Cr appears to be 1.0-1.2, last Cr was 1.19 on 12/19/19. Avoid nephrotoxins as able.     3. GERD - resume PPI    4. Depression and anxiety - resume home Lexapro and Wellbutrin.     5. HLP - resume statin    6. 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  Right hip flexor pain: ?strain.  Trial of topoical ketoprofen and/or icy hot.  Also ok to try heating pad.          Interval History (Subjective):      Overall feels ok, no cardiopulmonary complaints  Drain still in  Right hip flexor pain since surgery.  Trying topical treatments and heat.  Tentative plan home w/ family on Monday.                  Physical Exam:      Last Vital Signs:  /80 (BP Location: Left arm)   Pulse 72   Temp 97.8  F (36.6  C) (Oral)   Resp 16   Ht 1.549 m (5' 1\")   Wt 60.3 kg (133 lb)   SpO2 96%   BMI 25.13 kg/m      I/O last 3 completed shifts:  In: 1330 [P.O.:1330]  Out: 3101 [Urine:2890; Drains:211]    General: Alert, awake, no acute distress.  HEENT: NC/AT, eyes anicteric, external occular movements intact, face symmetric.   Cardiac: RRR, S1, S2.    Pulmonary: Normal chest rise, normal work of breathing.  Lungs CTA BL  Abdomen: soft, non-tender, non-distended.  Bowel Sounds Present.  No guarding.  Extremities: no deformities.  Warm, well perfused.  Skin: no rashes or lesions noted.  Warm and Dry.  Neuro: No focal deficits noted.  Speech clear.  Coordination and strength grossly normal.  Psych: Appropriate affect.         " Medications:      All current medications were reviewed with changes reflected in problem list.         Data:      All new lab and imaging data was reviewed.   Labs:  Recent Labs   Lab 01/11/20  0613      POTASSIUM 4.2   CHLORIDE 110*   CO2 29   ANIONGAP 3   GLC 89   BUN 11   CR 1.01   GFRESTIMATED 55*   GFRESTBLACK 63   SUE 8.3*     No results for input(s): WBC, HGB, HCT, MCV, PLT in the last 168 hours.   Imaging:   No results found for this or any previous visit (from the past 48 hour(s)).      José Luis Barton MD , MD.

## 2020-01-12 NOTE — PLAN OF CARE
Pt A&Ox4. VSS. Afebrile. RA. LS clear. +BS passing flatus. No BM yet. Tolerated regular diet. Pain managed with Ultram. Pt taking Oxycodone at hs to help with pain and sleeps better with it she said. MD aware pt is alternating between the two. Right SANJUANA discontinued. Left SANJUANA 40 ml out. Will continue IV Ancef. Cms intact. Dressing c/d/I. Up with assist of 1 gait belt, walker and brace. Plan to discharge home tomorrow with . Will continue to monitor.

## 2020-01-12 NOTE — PROVIDER NOTIFICATION
Paged admitting pager at [phone]:  Pt reports taking 0.5 mg of PO Ativan PRN at bedtime for anxiety and it is listed in her current home medication list.  Is this something we could have ordered for hospital use?  Thank you   - - -

## 2020-01-12 NOTE — PLAN OF CARE
Discharge Planner OT   Patient plan for discharge: Home w/ spouse  Current status: Pt completed bed mobility supine > sit EOB with log roll technique, vcs and CGA, good demo of technique, however cues needed. Pt completed sit > stand from EOB to FWW with CGA, vcs provided for hand placement. Pt ambulated to/from BR FWW level with SBA. Pt completed toilet transfer on/off with use of grab bar and SBA. Pt able to tolerate standing at sink for increased time, SBA with no LOB. Pt educated on walk in shower transfer technique, able to step in/out over edge of shower with CGA and use of grab as well as vcs, demo provided. Pt able to verbalize 3/3 spinal precautions, however continues to require cueing to maintain during functional tasks. Pt attempted pants mgmt in figure 4 position, required use of reacher to avoid flexing forward. Pt continues to require min A for donning back brace in proper position. Pt completed functional ambulation FWW level with CGA. Pt reporting 4/10 pain in back with activity.   Barriers to return to prior living situation: Pain, post-surgical precautions  Recommendations for discharge: Home w/ spouse assist for LB dressing, back brace donning, bathing and assist with IADLs (driving, homemaking)  Rationale for recommendations: Pt is below baseline level of functioning with regards to ADLs, IADLs and mobility tasks. Anticipate with continued skilled OT while IP, pt will progress to meet goals for safe return home.        Entered by: Alisa Meade 01/12/2020 8:30 AM

## 2020-01-12 NOTE — PROGRESS NOTES
"[facility]       3 Day Post-Op  Procedure(s):  L3-5 decompressive laminectomies with L4-5 Tranforaminal Lumbar Interbody Fusion with L3-5 posterior lateral fusion     Interval history: Doing well this am. Pain well controlled. SANJUANA drains remain in place.  Tolerating po.  Voiding.  +flatus, negative BM.        Vital signs:   Vital signs:  Temp: 97.2  F (36.2  C) Temp src: Oral BP: 136/80   Heart Rate: 73 Resp: 16 SpO2: 97 % O2 Device: None (Room air) Oxygen Delivery: 4 LPM Height: 154.9 cm (5' 1\") Weight: 60.3 kg (133 lb)  Estimated body mass index is 25.13 kg/m  as calculated from the following:    Height as of this encounter: 1.549 m (5' 1\").    Weight as of this encounter: 60.3 kg (133 lb).      Intake/Output Summary (Last 24 hours) at 1/12/2020 0849  Last data filed at 1/12/2020 0654  Gross per 24 hour   Intake 980 ml   Output 2711 ml   Net -1731 ml       Exam:   Alert, sitting in chair eating breakfast.  Resp: Breathing is regular and non-labored on room air.  Back: Brace in place.  BLE: 5/5 TA/GCS/EHL.  Sensation intact to light touch in all dermatomes.  DP palpable.     Active Problems:    S/P lumbar fusion        Plan:    Doing well  Mobilize with PT  SANJUANA drains out if output < 30 ml per shift  Continue IV abx until drains discontinued.  Continue oral pain medications.  Discussed bowel regimen.  Home likely Monday    Kate Killian MD    "

## 2020-01-13 ENCOUNTER — APPOINTMENT (OUTPATIENT)
Dept: PHYSICAL THERAPY | Facility: CLINIC | Age: 75
DRG: 455 | End: 2020-01-13
Attending: NEUROLOGICAL SURGERY
Payer: MEDICARE

## 2020-01-13 ENCOUNTER — APPOINTMENT (OUTPATIENT)
Dept: OCCUPATIONAL THERAPY | Facility: CLINIC | Age: 75
DRG: 455 | End: 2020-01-13
Attending: NEUROLOGICAL SURGERY
Payer: MEDICARE

## 2020-01-13 VITALS
BODY MASS INDEX: 25.11 KG/M2 | HEART RATE: 85 BPM | OXYGEN SATURATION: 96 % | TEMPERATURE: 97.5 F | SYSTOLIC BLOOD PRESSURE: 121 MMHG | RESPIRATION RATE: 16 BRPM | WEIGHT: 133 LBS | DIASTOLIC BLOOD PRESSURE: 79 MMHG | HEIGHT: 61 IN

## 2020-01-13 PROCEDURE — 25000132 ZZH RX MED GY IP 250 OP 250 PS 637: Mod: GY | Performed by: NEUROLOGICAL SURGERY

## 2020-01-13 PROCEDURE — 25000128 H RX IP 250 OP 636: Performed by: NEUROLOGICAL SURGERY

## 2020-01-13 PROCEDURE — 97530 THERAPEUTIC ACTIVITIES: CPT | Mod: GP

## 2020-01-13 PROCEDURE — 97535 SELF CARE MNGMENT TRAINING: CPT | Mod: GO | Performed by: STUDENT IN AN ORGANIZED HEALTH CARE EDUCATION/TRAINING PROGRAM

## 2020-01-13 PROCEDURE — 99232 SBSQ HOSP IP/OBS MODERATE 35: CPT | Performed by: INTERNAL MEDICINE

## 2020-01-13 PROCEDURE — 97116 GAIT TRAINING THERAPY: CPT | Mod: GP

## 2020-01-13 PROCEDURE — 25800030 ZZH RX IP 258 OP 636: Performed by: NEUROLOGICAL SURGERY

## 2020-01-13 RX ORDER — DEXAMETHASONE SODIUM PHOSPHATE 4 MG/ML
10 INJECTION, SOLUTION INTRA-ARTICULAR; INTRALESIONAL; INTRAMUSCULAR; INTRAVENOUS; SOFT TISSUE ONCE
Status: COMPLETED | OUTPATIENT
Start: 2020-01-13 | End: 2020-01-13

## 2020-01-13 RX ORDER — ONDANSETRON 4 MG/1
4 TABLET, FILM COATED ORAL EVERY 6 HOURS PRN
Qty: 30 TABLET | Refills: 0 | Status: SHIPPED | OUTPATIENT
Start: 2020-01-13 | End: 2020-05-27

## 2020-01-13 RX ADMIN — FAMOTIDINE 20 MG: 20 TABLET, FILM COATED ORAL at 08:10

## 2020-01-13 RX ADMIN — DICLOFENAC SODIUM 4 G: 10 GEL TOPICAL at 08:14

## 2020-01-13 RX ADMIN — DOCUSATE SODIUM 100 MG: 100 CAPSULE, LIQUID FILLED ORAL at 08:10

## 2020-01-13 RX ADMIN — CEFAZOLIN 2 G: 10 INJECTION, POWDER, FOR SOLUTION INTRAVENOUS at 02:32

## 2020-01-13 RX ADMIN — ONDANSETRON 4 MG: 4 TABLET, ORALLY DISINTEGRATING ORAL at 12:19

## 2020-01-13 RX ADMIN — OMEPRAZOLE 20 MG: 20 CAPSULE, DELAYED RELEASE ORAL at 08:10

## 2020-01-13 RX ADMIN — OXYCODONE HYDROCHLORIDE 10 MG: 5 TABLET ORAL at 03:13

## 2020-01-13 RX ADMIN — ESCITALOPRAM OXALATE 20 MG: 20 TABLET ORAL at 08:10

## 2020-01-13 RX ADMIN — DEXAMETHASONE SODIUM PHOSPHATE 10 MG: 4 INJECTION, SOLUTION INTRAMUSCULAR; INTRAVENOUS at 09:07

## 2020-01-13 RX ADMIN — OXYCODONE HYDROCHLORIDE 10 MG: 5 TABLET ORAL at 09:07

## 2020-01-13 RX ADMIN — Medication 1 CAPSULE: at 08:10

## 2020-01-13 RX ADMIN — BUPROPION HYDROCHLORIDE 150 MG: 150 TABLET, EXTENDED RELEASE ORAL at 08:10

## 2020-01-13 RX ADMIN — ONDANSETRON 4 MG: 4 TABLET, ORALLY DISINTEGRATING ORAL at 03:13

## 2020-01-13 ASSESSMENT — ACTIVITIES OF DAILY LIVING (ADL)
ADLS_ACUITY_SCORE: 13

## 2020-01-13 NOTE — PLAN OF CARE
Pt A/O x4. Small dried drainage on dressing, CMS intact. Pain managed with PO oxycodone 10 mg x1. PO Zofran given for nausea x1. brina removed, output 25 ml. Tolerating regular diet, voiding and drinking adequate amounts. Up with 1 with walker and gait belt- balance still unsteady. Possible plan for discharge today.

## 2020-01-13 NOTE — DISCHARGE SUMMARY
[facility] Discharge Summary    Anne Rangel MRN# [id number]   Age: 74 year old YOB: 1945     Date of Admission:  1/9/2020  Date of Discharge::  1/13/2020   Admitting Physician:  Anjel Bernardo MD  Discharge Physician:  Anjel Bernardo MD    Home clinic: [facility]          Admission Diagnoses:   Spondylolisthesis, lumbar region [M43.16]  Spinal stenosis of lumbar region with neurogenic claudication [M48.062]  Disc degeneration, lumbar [M51.36]  Lumbar radiculopathy [M54.16]          Discharge Diagnosis:     Patient Active Problem List   Diagnosis     Myalgia and myositis     Chronic fatigue syndrome     Pure hypercholesterolemia     Osteoarthrosis, hand     Irritable bowel syndrome     Urticaria     Neutropenia (H)     Left hip pain     S/P revision of total hip     ACP (advance care planning)     RBBB     Atrioventricular block 2:1     Near syncope     Cardiac pacemaker in situ     Lumbar radiculopathy     S/P lumbar fusion             Procedures:   Procedure(s):  L3-5 decompressive laminectomies with L4-5 Tranforaminal Lumbar Interbody Fusion with L3-5 posterior lateral fusion          Medications Prior to Admission:     Medications Prior to Admission   Medication Sig Dispense Refill Last Dose     acetaminophen (TYLENOL) 500 MG tablet Take 500-1,000 mg by mouth every 6 hours as needed for mild pain   1/8/2020 at Unknown time     Ascorbic Acid (VITAMIN C PO) Take 500 mg by mouth daily   1/8/2020 at Unknown time     atorvastatin (LIPITOR) 40 MG tablet Take 40 mg by mouth At Bedtime  30 tablet 1 1/8/2020 at Unknown time     BuPROPion HCl (WELLBUTRIN XL PO) Take 150 mg by mouth daily   1/8/2020 at Unknown time     escitalopram (LEXAPRO) 20 MG tablet Take 1 tablet by mouth every 24 hours   1/8/2020 at Unknown time     eszopiclone (LUNESTA) 2 MG tablet Take 3 mg by mouth At Bedtime  30 tablet 5 1/8/2020 at Unknown time     LORAZEPAM PO Take 0.5 mg  by mouth At Bedtime (0.5 x 1 mg tablet = 0.5 mg dose)   1/8/2020 at Unknown time     Multiple Minerals-Vitamins (SUE MAG ZINC +D3 PO) Take 1 tablet by mouth four times a week        omeprazole 20 MG tablet Take 1 tablet (20 mg) by mouth daily Take 30-60 minutes before a meal. 30 tablet 1 1/8/2020 at Unknown time     polyethylene glycol (MIRALAX/GLYCOLAX) powder Take 17 g by mouth daily as needed for constipation   1/8/2020 at Unknown time     Probiotic Product (PROBIOTIC & ACIDOPHILUS EX ST PO) Take 1 capsule by mouth daily   1/8/2020 at Unknown time     VITAMIN D, CHOLECALCIFEROL, PO Take 5,000 Units by mouth three times a week (Monday, Wednesday and Friday)   Taking     SUMAtriptan Succinate (IMITREX PO) Take 100 mg by mouth daily as needed for migraine   Taking     [DISCONTINUED] Celecoxib (CELEBREX PO) Take 200 mg by mouth daily   More than a month at Unknown time     [DISCONTINUED] traMADol (ULTRAM) 50 MG tablet Take 1-2 tablets ( mg) by mouth every 6 hours as needed for moderate pain 20 tablet 0 Taking             Discharge Medications:     Current Discharge Medication List      START taking these medications    Details   methocarbamol (ROBAXIN) 500 MG tablet Take 1 tablet (500 mg) by mouth 4 times daily as needed for muscle spasms  Qty: 50 tablet, Refills: 0    Associated Diagnoses: S/P lumbar fusion      oxyCODONE (ROXICODONE) 5 MG tablet Take 1-2 tablets (5-10 mg) by mouth every 4 hours as needed for moderate to severe pain  Qty: 56 tablet, Refills: 0    Associated Diagnoses: S/P lumbar fusion         CONTINUE these medications which have NOT CHANGED    Details   acetaminophen (TYLENOL) 500 MG tablet Take 500-1,000 mg by mouth every 6 hours as needed for mild pain      Ascorbic Acid (VITAMIN C PO) Take 500 mg by mouth daily      atorvastatin (LIPITOR) 40 MG tablet Take 40 mg by mouth At Bedtime   Qty: 30 tablet, Refills: 1      BuPROPion HCl (WELLBUTRIN XL PO) Take 150 mg by mouth daily       escitalopram (LEXAPRO) 20 MG tablet Take 1 tablet by mouth every 24 hours      eszopiclone (LUNESTA) 2 MG tablet Take 3 mg by mouth At Bedtime   Qty: 30 tablet, Refills: 5      LORAZEPAM PO Take 0.5 mg by mouth At Bedtime (0.5 x 1 mg tablet = 0.5 mg dose)      Multiple Minerals-Vitamins (SUE MAG ZINC +D3 PO) Take 1 tablet by mouth four times a week      omeprazole 20 MG tablet Take 1 tablet (20 mg) by mouth daily Take 30-60 minutes before a meal.  Qty: 30 tablet, Refills: 1      polyethylene glycol (MIRALAX/GLYCOLAX) powder Take 17 g by mouth daily as needed for constipation      Probiotic Product (PROBIOTIC & ACIDOPHILUS EX ST PO) Take 1 capsule by mouth daily      VITAMIN D, CHOLECALCIFEROL, PO Take 5,000 Units by mouth three times a week (Monday, Wednesday and Friday)      SUMAtriptan Succinate (IMITREX PO) Take 100 mg by mouth daily as needed for migraine         STOP taking these medications       Celecoxib (CELEBREX PO) Comments:   Reason for Stopping:         traMADol (ULTRAM) 50 MG tablet Comments:   Reason for Stopping:                     Consultations:   PT  OT  Hospitalist           Brief History of Illness:    Anne Rangel is a 74 year old female that is 3 Days Post-Op s/p Procedure(s):  L3-5 decompressive laminectomies with L4-5 Tranforaminal Lumbar Interbody Fusion with L3-5 posterior lateral fusion.          Hospital Course:   The patient did well post op with PT and OT. Pain was controlled and preoperative issues significantly improved.          Discharge Instructions and Follow-Up:     Discharge diet: Regular normal diet   Discharge activity: Lifting restricted to 10 pounds until follow up  No lifting or strenuous exercise for 6 week(s)  No heavy lifting, pushing, pulling for 6 week(s)  Do not submerge your incision underwater as in hot tub, pool or lake water for 6 weeks. Ok to take showers and bathe in water below your incision.   Discharge follow-up:   Schedule Neurosurgery follow up  appointment with either Madiha Jett CNP and Dr. Anjel Bernardo at [facility] by calling [phone] for the Care Coordinator Suni or [phone] for [facility] general number in 4-6 weeks.    If sutures or staples were placed, please schedule an appointment for wound check and staple/suture removal in 10-14 days by calling [phone] for the Care Coordinator Suni or by calling the general [facility] line at [phone].            Discharge Disposition:     Discharged to home      Attestation:  I have reviewed today's vital signs, notes, medications, labs and imaging.  Amount of time performed on this discharge summary: 10 minutes.    Anjel Bernardo MD

## 2020-01-13 NOTE — PROGRESS NOTES
"[facility]  Hospitalist Consult Progress Note  José Luis Barton MD 01/13/2020    Reason for Stay (Diagnosis): spinal fusion         Assessment and Plan:      Summary of Stay: Anne Rangel is a 74 year old female who is POD #2 s/p L3-5 decompression and laminectomy and L4-5 fusion.   Overall doing well, working on mobility and pain control.     1. L3-5 decompression and laminectomy and L4-5 fusion - pain, prophylaxis, diet and PT/OT per neurosurgery.    2. CKD - baseline Cr appears to be 1.0-1.2, last Cr was 1.19 on 12/19/19. Avoid nephrotoxins as able.     3. GERD - resume PPI    4. Depression and anxiety - resume home Lexapro and Wellbutrin.     5. HLP - resume statin    6. 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  No acute issues.    7.  Right hip flexor pain: ?strain.  Trial of topoical ketoprofen and/or icy hot seem to have helped.  Also ok to try heating pad.          Interval History (Subjective):      Hip flexor feels much better today  Had a bowel movement, showered  She plans to discharge home today with her   No cardiopulmonary complaints, no non-orthopedic complaints in general.  Okay disposition from a medicine standpoint.                  Physical Exam:      Last Vital Signs:  /79 (BP Location: Left arm)   Pulse 85   Temp 97.5  F (36.4  C) (Oral)   Resp 16   Ht 1.549 m (5' 1\")   Wt 60.3 kg (133 lb)   SpO2 96%   BMI 25.13 kg/m      I/O last 3 completed shifts:  In: 1782 [P.O.:840; I.V.:942]  Out: 2105 [Urine:2000; Drains:105]    General: Alert, awake, no acute distress.  HEENT: NC/AT, eyes anicteric, external occular movements intact, face symmetric.   Cardiac: RRR, S1, S2.    Pulmonary: Normal chest rise, normal work of breathing.  Lungs CTA BL  Abdomen: soft, non-tender, non-distended.  Bowel Sounds Present.  No guarding.  Extremities: no deformities.  Warm, well perfused.  Skin: no rashes or lesions noted.  Warm and Dry.  Neuro: No focal " deficits noted.  Speech clear.  Coordination and strength grossly normal.  Psych: Appropriate affect.         Medications:      All current medications were reviewed with changes reflected in problem list.         Data:      All new lab and imaging data was reviewed.   Labs:  Recent Labs   Lab 01/11/20  0613      POTASSIUM 4.2   CHLORIDE 110*   CO2 29   ANIONGAP 3   GLC 89   BUN 11   CR 1.01   GFRESTIMATED 55*   GFRESTBLACK 63   SUE 8.3*     No results for input(s): WBC, HGB, HCT, MCV, PLT in the last 168 hours.   Imaging:   No results found for this or any previous visit (from the past 48 hour(s)).      José Luis Barton MD , MD.

## 2020-01-13 NOTE — PLAN OF CARE
Evening RN  Pt A/O x4.  VSS and afebrile.  Pain managed adequately with PRN PO Tylenol, scheduled voltaren gel, and PRN PO tramadol (100mg) was given during the day and pt requested PRN PO oxycodone (10mg) tonight.  Pain seems to increase in intensity in the evening hours.  CMS intact.  Dressing CDI. L SANJUANA drain still in place, put out >30ml this shift.  Up A1 with walker, back brace and gait belt.  Voiding adequately.  Had a bm this shift.  Tolerating regular diet well.  Plan is home on discharge.  Will continue to monitor.

## 2020-01-13 NOTE — PLAN OF CARE
Discharge Planner OT   Patient plan for discharge: Home w/ spouse  Current status: Pt completed bed mobility supine > sit EOB with log roll technique, SBA, good demo of technique; pt completed sit > stand from EOB to FWW with SBA, pt completed functional mobility in room with FWW, SBA and stand>sit at chair, FWW, SBA; pt's supportive  present during session, reviewed shower safety including DME and AE to improve safety and ease of task; pt declined to complete trial of transfer again as completed previously with therapy and with nursing this morning when pt had a shower; pt and  re-educated on use of AE for lower body dressing tasks, receptive to suggestions to improve independence and provided handout with written instructions for reminders; pt and  educated on how and where to obtain certain items from community to improve pt's safety and independence within precautions  Barriers to return to prior living situation: Pain, post-surgical precautions  Recommendations for discharge: Home w/ spouse supervision and assist for dressing and bathing tasks and assist with IADLs such as medication and meal management  Rationale for recommendations: Pt is below baseline level of functioning with regards to ADLs, IADLs and mobility tasks however appears to have made progress and anticipate safe return home today with  support and AE/DME as needed.        Entered by: Lyric Odonnell 01/13/2020 12:14 PM       Occupational Therapy Discharge Summary    Reason for therapy discharge:    Discharged to home.    Progress towards therapy goal(s). See goals on Care Plan in [facility] electronic health record for goal details.  Goals partially met.  Barriers to achieving goals:   discharge from facility and pt did not demonstrate 100% safety with previously set level of assist however pt and  seem to be able to manage pt's ADLs safely at home.    Therapy recommendation(s):    No further therapy is  recommended.

## 2020-01-13 NOTE — DISCHARGE INSTRUCTIONS
Call suregon if any of these issues occur:  1) Increased/persistent redness,bleeding, localized warmth, increased swelling, and/or drainage (yellow/clear/odorous) incision site. Or the incision is pulling apart.  2) Increased pain not controlled with oral pain medications.  3) Persistent headache, dizziness, lightheaded    4)Persistent constipation despite taking OTC stool softners as directed   5) calf pain/swollen/hard/warm area,swelling chest pain or shortness of breath  6)increased/persistent numbness or tingling in arms or legs, weakness in extremities or falls  7) Generalized feeling of illness.  8) persistent fever, chills, sweats. Temp 101 or greater  9) trouble voiding, incontinence of bowel and/or bladder  10) too sleepy-could be amount of pain medication.    Other instructions:  1) change dressing daily or more often for moist drainage. Wash hands before and after changing dressing. Avoid touching incision   2) )no heavy lifting, nothing more then 6-10lbs. No bending, lifiting or twisting, no sitting for long periods of time. Follow physical therapy restrictions and exercises-Slowly increase  activity  3) avoid sitting or laying in one position too long, walk as tolerated, log roll  4) wear brace when up per physical therapy, inspect skin under brace daily and call md if sore area starts.  5) take an over the counter stool softener as directed while on narcotics to prevent constipation or to stay regular. Take a suppository or a laxative if no bowel movement in 2 days despite taking stool softener    Follow up with Dr Bernardo 10-14 days from the day of surgery to have your staples removed.     Thank you for using [facility]!!

## 2020-01-13 NOTE — PLAN OF CARE
Discharge Planner PT   Patient plan for discharge: HOme   Current status:     Time spent on bed mobility,  present for family training. Pt demonstrates improved adherence to precautions. Supine > sit x 2 use of logroll, demo IND. Pt requires min A x 1 sit > supine to elevate BLE into bed    STS modified IND with FWW    Pt progressed to mod I with gait using FWW, brace on.     Stair training up/down 4 steps x 3 trials. Pt not ready to progress to step over step yet, trialed, LOB. SBA with step to up with LLE down with RLE     End of session pt supine with needs in reach and  present     Barriers to return to prior living situation: none  Recommendations for discharge: home with assist from  to venu brace, SBA stairs, min A sit > supine  Rationale for recommendations: Pt has met all PT goals. Safe to discharge when medically stable          Entered by: Nohelia Baires 01/13/2020 10:49 AM         Physical Therapy Discharge Summary    Reason for therapy discharge:    All goals and outcomes met, no further needs identified.    Progress towards therapy goal(s). See goals on Care Plan in [facility] electronic health record for goal details.  Goals met    Therapy recommendation(s):    Continue home exercise program.

## 2020-01-13 NOTE — PLAN OF CARE
Pt is A/O, VSS, pain managed with PO Oxycodone. Tolerating regular diet- intermittent nausea- gave PO Zofran. Up with SBA walker and brace. CMS intact, dressing removed incision ALLISON.     Reviewed discharge instructions and medications with patient and . Questions answered. Patient discharged to home with , discharge instructions, medications (oxycodone, robaxin, & zofran ), and belongings at this time.

## 2020-02-24 ENCOUNTER — THERAPY VISIT (OUTPATIENT)
Dept: PHYSICAL THERAPY | Facility: CLINIC | Age: 75
End: 2020-02-24
Payer: MEDICARE

## 2020-02-24 DIAGNOSIS — M54.50 ACUTE BILATERAL LOW BACK PAIN WITHOUT SCIATICA: ICD-10-CM

## 2020-02-24 PROCEDURE — 97161 PT EVAL LOW COMPLEX 20 MIN: CPT | Mod: GP | Performed by: PHYSICAL THERAPIST

## 2020-02-24 PROCEDURE — 97110 THERAPEUTIC EXERCISES: CPT | Mod: GP | Performed by: PHYSICAL THERAPIST

## 2020-02-24 PROCEDURE — 97112 NEUROMUSCULAR REEDUCATION: CPT | Mod: GP | Performed by: PHYSICAL THERAPIST

## 2020-02-24 NOTE — LETTER
DEPARTMENT OF HEALTH AND HUMAN SERVICES  CENTERS FOR MEDICARE & MEDICAID SERVICES    PLAN/UPDATED PLAN OF PROGRESS FOR OUTPATIENT REHABILITATION    PATIENTS NAME:  Anne Rangel   : 1945  PROVIDER NUMBER:    [id number]    HICN: [id number]      PROVIDER NAME: [facility] [place] [facility]    MEDICAL RECORD NUMBER: [id number]     START OF CARE DATE:  SOC Date: 20   TYPE:  PT    PRIMARY/TREATMENT DIAGNOSIS: (Pertinent Medical Diagnosis)  Acute bilateral low back pain without sciatica    VISITS FROM START OF CARE:  Rxs Used: 1     [facility] Initial Evaluation  Subjective:  The history is provided by the patient. No  was used.     Therapist Generated HPI Evaluation  Problem details: I had back surgery L3-L5 decompressive laminectomies with L4-L5 TLIF on 2020.  I had a lot of pain in the legs especially in the mornings before surgery.  Overall the pain is doing better.  I still have some pain in the mid and upper back.  I use to do water and land aerobics before surgery.  I had a lot of leg weakness before surgery.  I have had increase in leg strength since surgery.  I have right knee soreness which could be from the knee and not the back.  I am suppose to wean myself off the brace.         Type of problem:  Lumbar.  This is a new condition.  Condition occurred with:  Degenerative joint disease.  Where condition occurred: for unknown reasons.  Patient reports pain:  Upper thoracic spine, mid thoracic spine, lower thoracic spine, thoracic spine left, thoracic spine right, upper lumbar spine, mid lumbar spine and lower lumbar spine (neck tightness).  Pain is described as aching and is constant.  Pain radiates to:  No radiation. Pain is worse in the A.M. and worse in the P.M..  Since onset symptoms are gradually improving.  Associated symptoms:  Fatigue. Symptoms are exacerbated by bending, standing, walking, lifting and  sitting (limited due to surgery with bending, lifting, and twisting)  and relieved by ice, heat and bracing/immobilizing.  Special tests included:  X-ray (after surgery).  Past treatment: before surgery    Work activity restrictions: limiting house work and ADL's.  Barriers include:  Requires assistance with ADL's and lives alone.    Patient Entered Health History - See Therapist Evaluation below  Anne Rangel being seen for low back pain and surgery.   PATIENTS NAME:  Anne Rangel   : 1945      Problem began: 2020.   Problem occurred: degenerative changes   and reported as 3/10 on pain scale.  General health as reported by patient is good.  Pertinent medical history includes: depression, fibromyalgia, implanted device, migraines/headaches and osteoarthritis (irritable bowel with constipation concerns).   Red flags:  None as reported by patient.  Other medical allergies details: opoids.   Surgeries include:  Orthopedic surgery and heart surgery. Other surgery history details: bilateral TKA, bilateral BEATRICE .    Current medications:  Anti-depressants, pain medication and sleep medication. Other medications details: B12 supplement.    Current occupation is retired , house and yard work .      Objective:  Standing Alignment:    Cervical/thoracic deviations alignment: sitting fair with back brace.  Gait:  With brace, decrease in step length, slight lean forward  Flexibility/Screens:   Positive screens:  Lumbar  Lower Extremity:  Decreased left lower extremity flexibility:Hamstrings and Gastroc  Decreased right lower extremity flexibility:  Hamstrings and Gastroc      Lumbar/SI Evaluation  ROM:  Arom wnl lumbar: not asssesed at this time   Strength: TA 1/5, fatigues quickly,  upper back and shoulder fatigue  Lumbar Myotomes:    T12-L3 (Hip Flex):  Left: 5-    Right: 5-  L2-4 (Quads):  Left:  5-    Right:  5-  L4 (Ankle DF):  Left:  5-    Right:  5-  L5 (Great Toe Ext): Left: 5-    Right: 5-    Neural Tension/Mobility:    Left side:Slump (tightness)  negative.   Right side:   Slump (tightness)  negative.   Lumbar Provocation:  not assessed    Assessment/Plan:    Patient is a 74 year old female with lumbar complaints.    Patient has the following significant findings with corresponding treatment plan.                Diagnosis 1:  Lumbar fusion   Pain -  manual therapy, self management, education and home program  Decreased strength - therapeutic exercise, therapeutic activities and home program  Impaired gait - gait training, assistive devices and home program  Impaired muscle performance - neuro re-education and home program  Decreased function - therapeutic activities and home program  Impaired posture - neuro re-education, therapeutic activities and home program  Evaluation ongoing     Therapy Evaluation Codes:   Cumulative Therapy Evaluation is: Low complexity.    Previous and current functional limitations:  (See Goal Flow Sheet for this information)    Short term and Long term goals: (See Goal Flow Sheet for this information)     Communication ability:  Patient appears to be able to clearly communicate and understand verbal and written communication and follow directions correctly.  Treatment Explanation - The following has been discussed with the patient:   RX ordered/plan of care  This patient would benefit from PT intervention to resume normal activities.   Rehab potential is good.    Frequency:  2 X week, once daily  Duration:  for 1 weeks tapering to 1 X a week over 10 weeks  Discharge Plan:  Achieve all LTG.  Independent in home treatment program.    Caregiver Signature/Credentials _____________________________ Date ________       Treating Provider: Sasha Mckay, PT      I have reviewed and certified the need for these services and plan of treatment while under my care.        PHYSICIAN'S SIGNATURE:   _________________________________________  Date___________   Anjel Bernardo  "MD    Certification period:  Beginning of Cert date period: 02/24/20 to  End of Cert period date: 05/24/20     Functional Level Progress Report: Please see attached \"Goal Flow sheet for Functional level.\"    ____X____ Continue Services or       ________ DC Services                Service dates: From  SOC Date: 02/24/20 date to present                         "

## 2020-02-25 PROBLEM — M54.50 ACUTE BILATERAL LOW BACK PAIN WITHOUT SCIATICA: Status: ACTIVE | Noted: 2020-02-25

## 2020-02-25 NOTE — PROGRESS NOTES
[facility] Initial Evaluation  Subjective:  The history is provided by the patient. No  was used.   Therapist Generated HPI Evaluation  Problem details: I had back surgery L3-L5 decompressive laminectomies with L4-L5 TLIF on 1/9/2020.  I had a lot of pain in the legs especially in the mornings before surgery.  Overall the pain is doing better.  I still have some pain in the mid and upper back.  I use to do water and land aerobics before surgery.  I had a lot of leg weakness before surgery.  I have had increase in leg strength since surgery.  I have right knee soreness which could be from the knee and not the back.  I am suppose to wean myself off the brace.         Type of problem:  Lumbar.    This is a new condition.  Condition occurred with:  Degenerative joint disease.  Where condition occurred: for unknown reasons.  Patient reports pain:  Upper thoracic spine, mid thoracic spine, lower thoracic spine, thoracic spine left, thoracic spine right, upper lumbar spine, mid lumbar spine and lower lumbar spine (neck tightness).  Pain is described as aching and is constant.  Pain radiates to:  No radiation. Pain is worse in the A.M. and worse in the P.M..  Since onset symptoms are gradually improving.  Associated symptoms:  Fatigue. Symptoms are exacerbated by bending, standing, walking, lifting and sitting (limited due to surgery with bending, lifting, and twisting)  and relieved by ice, heat and bracing/immobilizing.  Special tests included:  X-ray (after surgery).  Past treatment: before surgery    Work activity restrictions: limiting house work and ADL's.  Barriers include:  Requires assistance with ADL's and lives alone.    Patient Entered Health History - See Therapist Evaluation below  Anne Rangel being seen for low back pain and surgery.     Problem began: 1/9/2020.   Problem occurred: degenerative changes   and reported as 3/10 on pain scale.  General health as  reported by patient is good.  Pertinent medical history includes: depression, fibromyalgia, implanted device, migraines/headaches and osteoarthritis (irritable bowel with constipation concerns).   Red flags:  None as reported by patient.   Other medical allergies details: opoids.   Surgeries include:  Orthopedic surgery and heart surgery. Other surgery history details: bilateral TKA, bilateral BEATRICE .    Current medications:  Anti-depressants, pain medication and sleep medication. Other medications details: B12 supplement.    Current occupation is retired , house and yard work .                     Physical Exam                    Objective:  Standing Alignment:    Cervical/thoracic deviations alignment: sitting fair with back brace.                Gait:  With brace, decrease in step length, slight lean forward        Flexibility/Screens:   Positive screens:  Lumbar    Lower Extremity:  Decreased left lower extremity flexibility:Hamstrings and Gastroc    Decreased right lower extremity flexibility:  Hamstrings and Gastroc               Lumbar/SI Evaluation  ROM:  Arom wnl lumbar: not asssesed at this time     Strength: TA 1/5, fatigues quickly,  upper back and shoulder fatigue  Lumbar Myotomes:    T12-L3 (Hip Flex):  Left: 5-    Right: 5-  L2-4 (Quads):  Left:  5-    Right:  5-  L4 (Ankle DF):  Left:  5-    Right:  5-  L5 (Great Toe Ext): Left: 5-    Right: 5-           Neural Tension/Mobility:      Left side:Slump (tightness)  negative.     Right side:   Slump (tightness)  negative.       Lumbar Provocation:  not assessed                                                     General     ROS    Assessment/Plan:    Patient is a 74 year old female with lumbar complaints.    Patient has the following significant findings with corresponding treatment plan.                Diagnosis 1:  Lumbar fusion   Pain -  manual therapy, self management, education and home program  Decreased strength - therapeutic exercise,  therapeutic activities and home program  Impaired gait - gait training, assistive devices and home program  Impaired muscle performance - neuro re-education and home program  Decreased function - therapeutic activities and home program  Impaired posture - neuro re-education, therapeutic activities and home program  Evaluation ongoing     Therapy Evaluation Codes:   Cumulative Therapy Evaluation is: Low complexity.    Previous and current functional limitations:  (See Goal Flow Sheet for this information)    Short term and Long term goals: (See Goal Flow Sheet for this information)     Communication ability:  Patient appears to be able to clearly communicate and understand verbal and written communication and follow directions correctly.  Treatment Explanation - The following has been discussed with the patient:   RX ordered/plan of care  This patient would benefit from PT intervention to resume normal activities.   Rehab potential is good.    Frequency:  2 X week, once daily  Duration:  for 1 weeks tapering to 1 X a week over 10 weeks  Discharge Plan:  Achieve all LTG.  Independent in home treatment program.    Please refer to the daily flowsheet for treatment today, total treatment time and time spent performing 1:1 timed codes.

## 2020-02-27 ENCOUNTER — THERAPY VISIT (OUTPATIENT)
Dept: PHYSICAL THERAPY | Facility: CLINIC | Age: 75
End: 2020-02-27
Payer: MEDICARE

## 2020-02-27 DIAGNOSIS — M54.50 ACUTE BILATERAL LOW BACK PAIN WITHOUT SCIATICA: ICD-10-CM

## 2020-02-27 DIAGNOSIS — Z98.1 S/P LUMBAR FUSION: ICD-10-CM

## 2020-02-27 PROCEDURE — 97110 THERAPEUTIC EXERCISES: CPT | Mod: GP | Performed by: PHYSICAL THERAPIST

## 2020-02-27 PROCEDURE — 97112 NEUROMUSCULAR REEDUCATION: CPT | Mod: GP | Performed by: PHYSICAL THERAPIST

## 2020-03-03 ENCOUNTER — ANCILLARY PROCEDURE (OUTPATIENT)
Dept: CARDIOLOGY | Facility: CLINIC | Age: 75
End: 2020-03-03
Attending: INTERNAL MEDICINE
Payer: MEDICARE

## 2020-03-03 DIAGNOSIS — Z95.0 CARDIAC PACEMAKER IN SITU: ICD-10-CM

## 2020-03-03 PROCEDURE — 93296 REM INTERROG EVL PM/IDS: CPT | Performed by: INTERNAL MEDICINE

## 2020-03-03 PROCEDURE — 93294 REM INTERROG EVL PM/LDLS PM: CPT | Performed by: INTERNAL MEDICINE

## 2020-03-04 ENCOUNTER — THERAPY VISIT (OUTPATIENT)
Dept: PHYSICAL THERAPY | Facility: CLINIC | Age: 75
End: 2020-03-04
Payer: MEDICARE

## 2020-03-04 DIAGNOSIS — M54.50 ACUTE BILATERAL LOW BACK PAIN WITHOUT SCIATICA: ICD-10-CM

## 2020-03-04 DIAGNOSIS — Z98.1 S/P LUMBAR FUSION: ICD-10-CM

## 2020-03-04 LAB
MDC_IDC_EPISODE_DTM: NORMAL
MDC_IDC_EPISODE_ID: NORMAL
MDC_IDC_EPISODE_TYPE: NORMAL
MDC_IDC_LEAD_IMPLANT_DT: NORMAL
MDC_IDC_LEAD_IMPLANT_DT: NORMAL
MDC_IDC_LEAD_LOCATION: NORMAL
MDC_IDC_LEAD_LOCATION: NORMAL
MDC_IDC_LEAD_LOCATION_DETAIL_1: NORMAL
MDC_IDC_LEAD_LOCATION_DETAIL_1: NORMAL
MDC_IDC_LEAD_MFG: NORMAL
MDC_IDC_LEAD_MFG: NORMAL
MDC_IDC_LEAD_MODEL: NORMAL
MDC_IDC_LEAD_MODEL: NORMAL
MDC_IDC_LEAD_POLARITY_TYPE: NORMAL
MDC_IDC_LEAD_POLARITY_TYPE: NORMAL
MDC_IDC_LEAD_SERIAL: NORMAL
MDC_IDC_LEAD_SERIAL: NORMAL
MDC_IDC_MSMT_BATTERY_DTM: NORMAL
MDC_IDC_MSMT_BATTERY_REMAINING_LONGEVITY: 126 MO
MDC_IDC_MSMT_BATTERY_REMAINING_PERCENTAGE: 100 %
MDC_IDC_MSMT_BATTERY_STATUS: NORMAL
MDC_IDC_MSMT_LEADCHNL_RA_IMPEDANCE_VALUE: 776 OHM
MDC_IDC_MSMT_LEADCHNL_RA_PACING_THRESHOLD_AMPLITUDE: 0.5 V
MDC_IDC_MSMT_LEADCHNL_RA_PACING_THRESHOLD_PULSEWIDTH: 0.4 MS
MDC_IDC_MSMT_LEADCHNL_RV_IMPEDANCE_VALUE: 691 OHM
MDC_IDC_MSMT_LEADCHNL_RV_PACING_THRESHOLD_AMPLITUDE: 0.9 V
MDC_IDC_MSMT_LEADCHNL_RV_PACING_THRESHOLD_PULSEWIDTH: 0.4 MS
MDC_IDC_PG_IMPLANT_DTM: NORMAL
MDC_IDC_PG_MFG: NORMAL
MDC_IDC_PG_MODEL: NORMAL
MDC_IDC_PG_SERIAL: NORMAL
MDC_IDC_PG_TYPE: NORMAL
MDC_IDC_SESS_CLINIC_NAME: NORMAL
MDC_IDC_SESS_DTM: NORMAL
MDC_IDC_SESS_TYPE: NORMAL
MDC_IDC_SET_BRADY_AT_MODE_SWITCH_MODE: NORMAL
MDC_IDC_SET_BRADY_AT_MODE_SWITCH_RATE: 170 {BEATS}/MIN
MDC_IDC_SET_BRADY_LOWRATE: 60 {BEATS}/MIN
MDC_IDC_SET_BRADY_MAX_SENSOR_RATE: 130 {BEATS}/MIN
MDC_IDC_SET_BRADY_MAX_TRACKING_RATE: 130 {BEATS}/MIN
MDC_IDC_SET_BRADY_MODE: NORMAL
MDC_IDC_SET_BRADY_PAV_DELAY_HIGH: 200 MS
MDC_IDC_SET_BRADY_PAV_DELAY_LOW: 300 MS
MDC_IDC_SET_BRADY_SAV_DELAY_HIGH: 200 MS
MDC_IDC_SET_BRADY_SAV_DELAY_LOW: 300 MS
MDC_IDC_SET_LEADCHNL_RA_PACING_AMPLITUDE: 2 V
MDC_IDC_SET_LEADCHNL_RA_PACING_CAPTURE_MODE: NORMAL
MDC_IDC_SET_LEADCHNL_RA_PACING_POLARITY: NORMAL
MDC_IDC_SET_LEADCHNL_RA_PACING_PULSEWIDTH: 0.4 MS
MDC_IDC_SET_LEADCHNL_RA_SENSING_ADAPTATION_MODE: NORMAL
MDC_IDC_SET_LEADCHNL_RA_SENSING_POLARITY: NORMAL
MDC_IDC_SET_LEADCHNL_RA_SENSING_SENSITIVITY: 0.25 MV
MDC_IDC_SET_LEADCHNL_RV_PACING_AMPLITUDE: 1.4 V
MDC_IDC_SET_LEADCHNL_RV_PACING_CAPTURE_MODE: NORMAL
MDC_IDC_SET_LEADCHNL_RV_PACING_POLARITY: NORMAL
MDC_IDC_SET_LEADCHNL_RV_PACING_PULSEWIDTH: 0.4 MS
MDC_IDC_SET_LEADCHNL_RV_SENSING_ADAPTATION_MODE: NORMAL
MDC_IDC_SET_LEADCHNL_RV_SENSING_POLARITY: NORMAL
MDC_IDC_SET_LEADCHNL_RV_SENSING_SENSITIVITY: 1.5 MV
MDC_IDC_SET_ZONE_DETECTION_INTERVAL: 375 MS
MDC_IDC_SET_ZONE_TYPE: NORMAL
MDC_IDC_SET_ZONE_VENDOR_TYPE: NORMAL
MDC_IDC_STAT_AT_BURDEN_PERCENT: 0 %
MDC_IDC_STAT_AT_DTM_END: NORMAL
MDC_IDC_STAT_AT_DTM_START: NORMAL
MDC_IDC_STAT_BRADY_DTM_END: NORMAL
MDC_IDC_STAT_BRADY_DTM_START: NORMAL
MDC_IDC_STAT_BRADY_RA_PERCENT_PACED: 0 %
MDC_IDC_STAT_BRADY_RV_PERCENT_PACED: 77 %
MDC_IDC_STAT_EPISODE_RECENT_COUNT: 0
MDC_IDC_STAT_EPISODE_RECENT_COUNT: 1
MDC_IDC_STAT_EPISODE_RECENT_COUNT_DTM_END: NORMAL
MDC_IDC_STAT_EPISODE_RECENT_COUNT_DTM_START: NORMAL
MDC_IDC_STAT_EPISODE_TYPE: NORMAL
MDC_IDC_STAT_EPISODE_VENDOR_TYPE: NORMAL

## 2020-03-04 PROCEDURE — 97140 MANUAL THERAPY 1/> REGIONS: CPT | Mod: GP | Performed by: PHYSICAL THERAPIST

## 2020-03-04 PROCEDURE — 97112 NEUROMUSCULAR REEDUCATION: CPT | Mod: GP | Performed by: PHYSICAL THERAPIST

## 2020-03-04 PROCEDURE — 97110 THERAPEUTIC EXERCISES: CPT | Mod: GP | Performed by: PHYSICAL THERAPIST

## 2020-03-13 ENCOUNTER — THERAPY VISIT (OUTPATIENT)
Dept: PHYSICAL THERAPY | Facility: CLINIC | Age: 75
End: 2020-03-13
Payer: MEDICARE

## 2020-03-13 DIAGNOSIS — M54.50 ACUTE BILATERAL LOW BACK PAIN WITHOUT SCIATICA: ICD-10-CM

## 2020-03-13 DIAGNOSIS — Z98.1 S/P LUMBAR FUSION: ICD-10-CM

## 2020-03-13 PROCEDURE — 97110 THERAPEUTIC EXERCISES: CPT | Mod: GP | Performed by: PHYSICAL THERAPIST

## 2020-03-13 PROCEDURE — 97112 NEUROMUSCULAR REEDUCATION: CPT | Mod: GP | Performed by: PHYSICAL THERAPIST

## 2020-03-13 PROCEDURE — 97140 MANUAL THERAPY 1/> REGIONS: CPT | Mod: GP | Performed by: PHYSICAL THERAPIST

## 2020-03-18 ENCOUNTER — THERAPY VISIT (OUTPATIENT)
Dept: PHYSICAL THERAPY | Facility: CLINIC | Age: 75
End: 2020-03-18
Payer: MEDICARE

## 2020-03-18 DIAGNOSIS — M54.50 ACUTE BILATERAL LOW BACK PAIN WITHOUT SCIATICA: ICD-10-CM

## 2020-03-18 DIAGNOSIS — Z98.1 S/P LUMBAR FUSION: ICD-10-CM

## 2020-03-18 PROCEDURE — 97110 THERAPEUTIC EXERCISES: CPT | Mod: GP | Performed by: PHYSICAL THERAPIST

## 2020-03-18 PROCEDURE — 97112 NEUROMUSCULAR REEDUCATION: CPT | Mod: GP | Performed by: PHYSICAL THERAPIST

## 2020-03-18 PROCEDURE — 97530 THERAPEUTIC ACTIVITIES: CPT | Mod: GP | Performed by: PHYSICAL THERAPIST

## 2020-04-27 DIAGNOSIS — I44.2 ATRIOVENTRICULAR BLOCK, COMPLETE (H): ICD-10-CM

## 2020-04-27 DIAGNOSIS — Z95.0 CARDIAC PACEMAKER IN SITU: Primary | ICD-10-CM

## 2020-05-18 ENCOUNTER — TELEPHONE (OUTPATIENT)
Dept: PHYSICAL THERAPY | Facility: CLINIC | Age: 75
End: 2020-05-18

## 2020-05-18 DIAGNOSIS — M54.50 ACUTE BILATERAL LOW BACK PAIN WITHOUT SCIATICA: ICD-10-CM

## 2020-05-18 DIAGNOSIS — Z98.1 S/P LUMBAR FUSION: ICD-10-CM

## 2020-05-27 ENCOUNTER — VIRTUAL VISIT (OUTPATIENT)
Dept: CARDIOLOGY | Facility: CLINIC | Age: 75
End: 2020-05-27
Attending: PHYSICIAN ASSISTANT
Payer: MEDICARE

## 2020-05-27 DIAGNOSIS — I44.2 ATRIOVENTRICULAR BLOCK, COMPLETE (H): ICD-10-CM

## 2020-05-27 PROCEDURE — 99441 ZZC PHYSICIAN TELEPHONE EVALUATION 5-10 MIN GOVT: CPT | Performed by: INTERNAL MEDICINE

## 2020-05-27 RX ORDER — CELECOXIB 200 MG/1
200 CAPSULE ORAL DAILY
COMMUNITY
End: 2021-05-20

## 2020-05-27 NOTE — LETTER
5/27/2020    MD Osvaldo Hay [address]  [facility] [address]    RE: Anne Rangel       Dear Colleague,    I had the pleasure of seeing Anne Rangel in the [facility].    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  Her pacemaker was checked a few months ago with ventriculart pacing 77% of the time.     The patient is doing quite well slowly recovering from her knee surgery several months ago. Her sbp was 138 this am whereas in the past it would be in 120 range. She was somewhat in pain from her knee when bp was taken. Additionally, she has not been able to do her water aerobics because fitness center is closed. I encouraged her to find another form of exercises such as walking for 30 min daily which can be helpful for bp. In the meantime I asked her to measure her bp twice daily a few minutes apart for the next few weeks. If the sbp avg is above 140 she can call us or your office for instructions. RCT  to see Nichelle Curtis, one of our advanced practice providers, in a year and see me every other year.    Thank you for allowing me to participate in the care of your patient.    Sincerely,     Eliud Shaffer MD     [facility]

## 2020-05-27 NOTE — PROGRESS NOTES
"Anne Rangel is a 74 year old female who is being evaluated via a billable telephone visit.      The patient has been notified of following:     \"This telephone visit will be conducted via a call between you and your physician/provider. We have found that certain health care needs can be provided without the need for a physical exam.  This service lets us provide the care you need with a short phone conversation.  If a prescription is necessary we can send it directly to your pharmacy.  If lab work is needed we can place an order for that and you can then stop by our lab to have the test done at a later time.    Telephone visits are billed at different rates depending on your insurance coverage. During this emergency period, for some insurers they may be billed the same as an in-person visit.  Please reach out to your insurance provider with any questions.    If during the course of the call the physician/provider feels a telephone visit is not appropriate, you will not be charged for this service.\"    Patient has given verbal consent for Telephone visit?  Yes    What phone number would you like to be contacted at? [phone]  CASEY David      Phone duration 5 mintues  How would you like to obtain your AVS? Itzel Rodríguez is a 74-year-old female with a history of AV conduction disease manifested with right bundle branch block and 2:1 AV conduction associated with dyspnea on exertion and lightheadedness requiring a dual-chamber pacemaker several years ago.  Her pacemaker was checked a few months ago with ventriculart pacing 77% of the time.     The patient is doing quite well slowly recovering from her knee surgery several months ago. Her sbp was 138 this am whereas in the past it would be in 120 range. She was somewhat in pain from her knee when bp was taken. Additionally, she has not been able to do her water aerobics because fitness center is closed. I encouraged her to find " another form of exercises such as walking for 30 min daily which can be helpful for bp. In the meantime I asked her to measure her bp twice daily a few minutes apart for the next few weeks. If the sbp avg is above 140 she can call us or your office for instructions. RCT  to see Nichelle Curtis, one of our advanced practice providers, in a year and see me every other year.

## 2020-06-04 ENCOUNTER — ANCILLARY PROCEDURE (OUTPATIENT)
Dept: CARDIOLOGY | Facility: CLINIC | Age: 75
End: 2020-06-04
Attending: INTERNAL MEDICINE
Payer: MEDICARE

## 2020-06-04 DIAGNOSIS — Z95.0 CARDIAC PACEMAKER IN SITU: ICD-10-CM

## 2020-06-04 DIAGNOSIS — I44.2 ATRIOVENTRICULAR BLOCK, COMPLETE (H): ICD-10-CM

## 2020-06-04 PROCEDURE — 93296 REM INTERROG EVL PM/IDS: CPT | Performed by: INTERNAL MEDICINE

## 2020-06-04 PROCEDURE — 93294 REM INTERROG EVL PM/LDLS PM: CPT | Performed by: INTERNAL MEDICINE

## 2020-06-22 LAB
MDC_IDC_EPISODE_DTM: NORMAL
MDC_IDC_EPISODE_ID: NORMAL
MDC_IDC_EPISODE_TYPE: NORMAL
MDC_IDC_LEAD_IMPLANT_DT: NORMAL
MDC_IDC_LEAD_IMPLANT_DT: NORMAL
MDC_IDC_LEAD_LOCATION: NORMAL
MDC_IDC_LEAD_LOCATION: NORMAL
MDC_IDC_LEAD_LOCATION_DETAIL_1: NORMAL
MDC_IDC_LEAD_LOCATION_DETAIL_1: NORMAL
MDC_IDC_LEAD_MFG: NORMAL
MDC_IDC_LEAD_MFG: NORMAL
MDC_IDC_LEAD_MODEL: NORMAL
MDC_IDC_LEAD_MODEL: NORMAL
MDC_IDC_LEAD_POLARITY_TYPE: NORMAL
MDC_IDC_LEAD_POLARITY_TYPE: NORMAL
MDC_IDC_LEAD_SERIAL: NORMAL
MDC_IDC_LEAD_SERIAL: NORMAL
MDC_IDC_MSMT_BATTERY_DTM: NORMAL
MDC_IDC_MSMT_BATTERY_REMAINING_LONGEVITY: 126 MO
MDC_IDC_MSMT_BATTERY_REMAINING_PERCENTAGE: 100 %
MDC_IDC_MSMT_BATTERY_STATUS: NORMAL
MDC_IDC_MSMT_LEADCHNL_RA_IMPEDANCE_VALUE: 739 OHM
MDC_IDC_MSMT_LEADCHNL_RA_PACING_THRESHOLD_AMPLITUDE: 0.6 V
MDC_IDC_MSMT_LEADCHNL_RA_PACING_THRESHOLD_PULSEWIDTH: 0.4 MS
MDC_IDC_MSMT_LEADCHNL_RV_IMPEDANCE_VALUE: 619 OHM
MDC_IDC_MSMT_LEADCHNL_RV_PACING_THRESHOLD_AMPLITUDE: 1 V
MDC_IDC_MSMT_LEADCHNL_RV_PACING_THRESHOLD_PULSEWIDTH: 0.4 MS
MDC_IDC_PG_IMPLANT_DTM: NORMAL
MDC_IDC_PG_MFG: NORMAL
MDC_IDC_PG_MODEL: NORMAL
MDC_IDC_PG_SERIAL: NORMAL
MDC_IDC_PG_TYPE: NORMAL
MDC_IDC_SESS_CLINIC_NAME: NORMAL
MDC_IDC_SESS_DTM: NORMAL
MDC_IDC_SESS_TYPE: NORMAL
MDC_IDC_SET_BRADY_AT_MODE_SWITCH_MODE: NORMAL
MDC_IDC_SET_BRADY_AT_MODE_SWITCH_RATE: 170 {BEATS}/MIN
MDC_IDC_SET_BRADY_LOWRATE: 60 {BEATS}/MIN
MDC_IDC_SET_BRADY_MAX_SENSOR_RATE: 130 {BEATS}/MIN
MDC_IDC_SET_BRADY_MAX_TRACKING_RATE: 130 {BEATS}/MIN
MDC_IDC_SET_BRADY_MODE: NORMAL
MDC_IDC_SET_BRADY_PAV_DELAY_HIGH: 200 MS
MDC_IDC_SET_BRADY_PAV_DELAY_LOW: 300 MS
MDC_IDC_SET_BRADY_SAV_DELAY_HIGH: 200 MS
MDC_IDC_SET_BRADY_SAV_DELAY_LOW: 300 MS
MDC_IDC_SET_LEADCHNL_RA_PACING_AMPLITUDE: 2 V
MDC_IDC_SET_LEADCHNL_RA_PACING_CAPTURE_MODE: NORMAL
MDC_IDC_SET_LEADCHNL_RA_PACING_POLARITY: NORMAL
MDC_IDC_SET_LEADCHNL_RA_PACING_PULSEWIDTH: 0.4 MS
MDC_IDC_SET_LEADCHNL_RA_SENSING_ADAPTATION_MODE: NORMAL
MDC_IDC_SET_LEADCHNL_RA_SENSING_POLARITY: NORMAL
MDC_IDC_SET_LEADCHNL_RA_SENSING_SENSITIVITY: 0.25 MV
MDC_IDC_SET_LEADCHNL_RV_PACING_AMPLITUDE: 1.5 V
MDC_IDC_SET_LEADCHNL_RV_PACING_CAPTURE_MODE: NORMAL
MDC_IDC_SET_LEADCHNL_RV_PACING_POLARITY: NORMAL
MDC_IDC_SET_LEADCHNL_RV_PACING_PULSEWIDTH: 0.4 MS
MDC_IDC_SET_LEADCHNL_RV_SENSING_ADAPTATION_MODE: NORMAL
MDC_IDC_SET_LEADCHNL_RV_SENSING_POLARITY: NORMAL
MDC_IDC_SET_LEADCHNL_RV_SENSING_SENSITIVITY: 1.5 MV
MDC_IDC_SET_ZONE_DETECTION_INTERVAL: 375 MS
MDC_IDC_SET_ZONE_TYPE: NORMAL
MDC_IDC_SET_ZONE_VENDOR_TYPE: NORMAL
MDC_IDC_STAT_AT_BURDEN_PERCENT: 0 %
MDC_IDC_STAT_AT_DTM_END: NORMAL
MDC_IDC_STAT_AT_DTM_START: NORMAL
MDC_IDC_STAT_BRADY_DTM_END: NORMAL
MDC_IDC_STAT_BRADY_DTM_START: NORMAL
MDC_IDC_STAT_BRADY_RA_PERCENT_PACED: 0 %
MDC_IDC_STAT_BRADY_RV_PERCENT_PACED: 83 %
MDC_IDC_STAT_EPISODE_RECENT_COUNT: 0
MDC_IDC_STAT_EPISODE_RECENT_COUNT: 1
MDC_IDC_STAT_EPISODE_RECENT_COUNT_DTM_END: NORMAL
MDC_IDC_STAT_EPISODE_RECENT_COUNT_DTM_START: NORMAL
MDC_IDC_STAT_EPISODE_TYPE: NORMAL
MDC_IDC_STAT_EPISODE_VENDOR_TYPE: NORMAL

## 2020-09-10 ENCOUNTER — TELEPHONE (OUTPATIENT)
Dept: CARDIOLOGY | Facility: CLINIC | Age: 75
End: 2020-09-10

## 2020-09-10 NOTE — TELEPHONE ENCOUNTER
Wellness Screening Tool    Symptom Screening:    Do you have one of the following NEW symptoms:      Fever (subjective or >100.0)?  No    New cough? No    Shortness of breath? No    Chills? No    New loss of taste or smell? No    Generalized body aches? No    New persistent headache? No    New sore throat? No    Nausea, vomiting or diarrhea? No    Within the past 2 weeks, have you been exposed to someone with a known positive illness below?      COVID - 19 (known or suspected) No    Chicken pox?  No    Measles? No    Pertussis? No    Have you had a positive COVID-19 diagnostic test (nasal swab test) in the last 14 days or are you currently   on self-quarantine restrictions (i.e.travel restriction, exposure, etc?) No        Patient notified of visitor restriction: Yes  Patient informed to wear a mask: Yes    Patient's appointment status: Patient will be seen in clinic as scheduled on 9/11/20

## 2020-09-11 ENCOUNTER — ANCILLARY PROCEDURE (OUTPATIENT)
Dept: CARDIOLOGY | Facility: CLINIC | Age: 75
End: 2020-09-11
Attending: PHYSICIAN ASSISTANT
Payer: MEDICARE

## 2020-09-11 DIAGNOSIS — Z95.0 CARDIAC PACEMAKER IN SITU: Primary | ICD-10-CM

## 2020-09-11 DIAGNOSIS — I44.2 ATRIOVENTRICULAR BLOCK, COMPLETE (H): ICD-10-CM

## 2020-09-11 PROCEDURE — 93280 PM DEVICE PROGR EVAL DUAL: CPT | Performed by: INTERNAL MEDICINE

## 2020-09-16 LAB
MDC_IDC_LEAD_IMPLANT_DT: NORMAL
MDC_IDC_LEAD_IMPLANT_DT: NORMAL
MDC_IDC_LEAD_LOCATION: NORMAL
MDC_IDC_LEAD_LOCATION: NORMAL
MDC_IDC_LEAD_LOCATION_DETAIL_1: NORMAL
MDC_IDC_LEAD_LOCATION_DETAIL_1: NORMAL
MDC_IDC_LEAD_MFG: NORMAL
MDC_IDC_LEAD_MFG: NORMAL
MDC_IDC_LEAD_MODEL: NORMAL
MDC_IDC_LEAD_MODEL: NORMAL
MDC_IDC_LEAD_POLARITY_TYPE: NORMAL
MDC_IDC_LEAD_POLARITY_TYPE: NORMAL
MDC_IDC_LEAD_SERIAL: NORMAL
MDC_IDC_LEAD_SERIAL: NORMAL
MDC_IDC_MSMT_BATTERY_STATUS: NORMAL
MDC_IDC_MSMT_LEADCHNL_RA_IMPEDANCE_VALUE: 687 OHM
MDC_IDC_MSMT_LEADCHNL_RA_PACING_THRESHOLD_AMPLITUDE: 0.7 V
MDC_IDC_MSMT_LEADCHNL_RA_PACING_THRESHOLD_PULSEWIDTH: 0.4 MS
MDC_IDC_MSMT_LEADCHNL_RA_SENSING_INTR_AMPL: 5.7 MV
MDC_IDC_MSMT_LEADCHNL_RV_IMPEDANCE_VALUE: 699 OHM
MDC_IDC_MSMT_LEADCHNL_RV_PACING_THRESHOLD_AMPLITUDE: 0.8 V
MDC_IDC_MSMT_LEADCHNL_RV_PACING_THRESHOLD_PULSEWIDTH: 0.4 MS
MDC_IDC_MSMT_LEADCHNL_RV_SENSING_INTR_AMPL: 6.1 MV
MDC_IDC_PG_IMPLANT_DTM: NORMAL
MDC_IDC_PG_MFG: NORMAL
MDC_IDC_PG_MODEL: NORMAL
MDC_IDC_PG_SERIAL: NORMAL
MDC_IDC_PG_TYPE: NORMAL
MDC_IDC_SESS_CLINIC_NAME: NORMAL
MDC_IDC_SESS_DTM: NORMAL
MDC_IDC_SESS_TYPE: NORMAL
MDC_IDC_SET_BRADY_AT_MODE_SWITCH_MODE: NORMAL
MDC_IDC_SET_BRADY_AT_MODE_SWITCH_RATE: 170 {BEATS}/MIN
MDC_IDC_SET_BRADY_HYSTRATE: NORMAL
MDC_IDC_SET_BRADY_LOWRATE: 60 {BEATS}/MIN
MDC_IDC_SET_BRADY_MAX_SENSOR_RATE: 130 {BEATS}/MIN
MDC_IDC_SET_BRADY_MAX_TRACKING_RATE: 130 {BEATS}/MIN
MDC_IDC_SET_BRADY_MODE: NORMAL
MDC_IDC_SET_BRADY_PAV_DELAY_HIGH: 200 MS
MDC_IDC_SET_BRADY_PAV_DELAY_LOW: 300 MS
MDC_IDC_SET_BRADY_SAV_DELAY_HIGH: 200 MS
MDC_IDC_SET_BRADY_SAV_DELAY_LOW: 300 MS
MDC_IDC_SET_LEADCHNL_RA_PACING_AMPLITUDE: 2 V
MDC_IDC_SET_LEADCHNL_RA_PACING_CAPTURE_MODE: NORMAL
MDC_IDC_SET_LEADCHNL_RA_PACING_POLARITY: NORMAL
MDC_IDC_SET_LEADCHNL_RA_PACING_PULSEWIDTH: 0.4 MS
MDC_IDC_SET_LEADCHNL_RA_SENSING_ADAPTATION_MODE: NORMAL
MDC_IDC_SET_LEADCHNL_RA_SENSING_POLARITY: NORMAL
MDC_IDC_SET_LEADCHNL_RA_SENSING_SENSITIVITY: 0.25 MV
MDC_IDC_SET_LEADCHNL_RV_PACING_AMPLITUDE: 1.5 V
MDC_IDC_SET_LEADCHNL_RV_PACING_CAPTURE_MODE: NORMAL
MDC_IDC_SET_LEADCHNL_RV_PACING_POLARITY: NORMAL
MDC_IDC_SET_LEADCHNL_RV_PACING_PULSEWIDTH: 0.4 MS
MDC_IDC_SET_LEADCHNL_RV_SENSING_ADAPTATION_MODE: NORMAL
MDC_IDC_SET_LEADCHNL_RV_SENSING_POLARITY: NORMAL
MDC_IDC_SET_LEADCHNL_RV_SENSING_SENSITIVITY: 1.5 MV
MDC_IDC_SET_ZONE_DETECTION_INTERVAL: 375 MS
MDC_IDC_SET_ZONE_TYPE: NORMAL
MDC_IDC_SET_ZONE_VENDOR_TYPE: NORMAL
MDC_IDC_STAT_EPISODE_RECENT_COUNT: 0
MDC_IDC_STAT_EPISODE_RECENT_COUNT_DTM_END: NORMAL
MDC_IDC_STAT_EPISODE_RECENT_COUNT_DTM_START: NORMAL
MDC_IDC_STAT_EPISODE_TOTAL_COUNT: 0
MDC_IDC_STAT_EPISODE_TOTAL_COUNT_DTM_END: NORMAL
MDC_IDC_STAT_EPISODE_TYPE: NORMAL
MDC_IDC_STAT_EPISODE_TYPE: NORMAL
MDC_IDC_STAT_EPISODE_VENDOR_TYPE: NORMAL
MDC_IDC_STAT_EPISODE_VENDOR_TYPE: NORMAL

## 2020-10-22 PROBLEM — M54.50 ACUTE BILATERAL LOW BACK PAIN WITHOUT SCIATICA: Status: RESOLVED | Noted: 2020-02-25 | Resolved: 2020-10-22

## 2020-10-22 PROBLEM — Z98.1 S/P LUMBAR FUSION: Status: RESOLVED | Noted: 2020-01-09 | Resolved: 2020-10-22

## 2020-10-22 PROBLEM — M54.16 LUMBAR RADICULOPATHY: Status: RESOLVED | Noted: 2019-11-13 | Resolved: 2020-10-22

## 2020-10-22 NOTE — PROGRESS NOTES
Discharge Note    Progress reporting period is from initial evaluation date (please see noted date below) to Mar 18, 2020.  Linked Episodes   Type: Episode: Status: Noted: Resolved: Last update: Updated by:   PHYSICAL THERAPY low back surgery 2/24/2020 Active 2/24/2020 5/18/2020  1:59 PM Sasha Mckay, PT      Comments:       Anne failed to follow up and current status is unknown.  Please see information below for last relevant information on current status.  Patient seen for 5 visits.    SUBJECTIVE  Subjective changes noted by patient:  I am not wearing the belt  .  Current pain level is 1/10.     Previous pain level was   .   Changes in function:  Yes (See Goal flowsheet attached for changes in current functional level)  Adverse reaction to treatment or activity: None    OBJECTIVE  Changes noted in objective findings: balance right 3 sec, left 5 sec.       ASSESSMENT/PLAN  Diagnosis: S/P lumbar fusion   Updated problem list and treatment plan:   Pain - HEP  Decreased ROM/flexibility - HEP  Impaired gait - HEP  Impaired balance - HEP  STG/LTGs have been met or progress has been made towards goals:  Yes, please see goal flowsheet for most current information  Assessment of Progress: current status is unknown.    Last current status: Pt is progressing as expected   Self Management Plans:  HEP  I have re-evaluated this patient and find that the nature, scope, duration and intensity of the therapy is appropriate for the medical condition of the patient.  Anne continues to require the following intervention to meet STG and LTG's:  HEP.    Recommendations:  Discharge with current home program.  Patient to follow up with MD as needed.    Please refer to the daily flowsheet for treatment today, total treatment time and time spent performing 1:1 timed codes.

## 2020-12-01 ENCOUNTER — HOSPITAL ENCOUNTER (OUTPATIENT)
Dept: MRI IMAGING | Facility: CLINIC | Age: 75
End: 2020-12-01
Attending: NEUROLOGICAL SURGERY
Payer: MEDICARE

## 2020-12-01 ENCOUNTER — HOSPITAL ENCOUNTER (OUTPATIENT)
Facility: CLINIC | Age: 75
Discharge: HOME | End: 2020-12-01
Admitting: RADIOLOGY
Payer: MEDICARE

## 2020-12-01 VITALS — OXYGEN SATURATION: 96 %

## 2020-12-01 DIAGNOSIS — Z98.1 S/P LUMBAR FUSION: ICD-10-CM

## 2020-12-01 PROCEDURE — 999N000154 HC STATISTIC RADIOLOGY XRAY, US, CT, MAR, NM

## 2020-12-01 PROCEDURE — 72148 MRI LUMBAR SPINE W/O DYE: CPT

## 2020-12-23 ENCOUNTER — ANCILLARY PROCEDURE (OUTPATIENT)
Dept: CARDIOLOGY | Facility: CLINIC | Age: 75
End: 2020-12-23
Attending: INTERNAL MEDICINE
Payer: MEDICARE

## 2020-12-23 DIAGNOSIS — Z95.0 CARDIAC PACEMAKER IN SITU: ICD-10-CM

## 2020-12-23 DIAGNOSIS — I49.5 SICK SINUS SYNDROME (H): Primary | ICD-10-CM

## 2020-12-23 DIAGNOSIS — I44.2 ATRIOVENTRICULAR BLOCK, COMPLETE (H): ICD-10-CM

## 2020-12-23 LAB
MDC_IDC_EPISODE_DTM: NORMAL
MDC_IDC_EPISODE_ID: NORMAL
MDC_IDC_EPISODE_TYPE: NORMAL
MDC_IDC_LEAD_IMPLANT_DT: NORMAL
MDC_IDC_LEAD_IMPLANT_DT: NORMAL
MDC_IDC_LEAD_LOCATION: NORMAL
MDC_IDC_LEAD_LOCATION: NORMAL
MDC_IDC_LEAD_LOCATION_DETAIL_1: NORMAL
MDC_IDC_LEAD_LOCATION_DETAIL_1: NORMAL
MDC_IDC_LEAD_MFG: NORMAL
MDC_IDC_LEAD_MFG: NORMAL
MDC_IDC_LEAD_MODEL: NORMAL
MDC_IDC_LEAD_MODEL: NORMAL
MDC_IDC_LEAD_POLARITY_TYPE: NORMAL
MDC_IDC_LEAD_POLARITY_TYPE: NORMAL
MDC_IDC_LEAD_SERIAL: NORMAL
MDC_IDC_LEAD_SERIAL: NORMAL
MDC_IDC_MSMT_BATTERY_DTM: NORMAL
MDC_IDC_MSMT_BATTERY_REMAINING_LONGEVITY: 108 MO
MDC_IDC_MSMT_BATTERY_REMAINING_PERCENTAGE: 100 %
MDC_IDC_MSMT_BATTERY_STATUS: NORMAL
MDC_IDC_MSMT_LEADCHNL_RA_IMPEDANCE_VALUE: 659 OHM
MDC_IDC_MSMT_LEADCHNL_RA_PACING_THRESHOLD_AMPLITUDE: 0.5 V
MDC_IDC_MSMT_LEADCHNL_RA_PACING_THRESHOLD_PULSEWIDTH: 0.4 MS
MDC_IDC_MSMT_LEADCHNL_RV_IMPEDANCE_VALUE: 623 OHM
MDC_IDC_MSMT_LEADCHNL_RV_PACING_THRESHOLD_AMPLITUDE: 1 V
MDC_IDC_MSMT_LEADCHNL_RV_PACING_THRESHOLD_PULSEWIDTH: 0.4 MS
MDC_IDC_PG_IMPLANT_DTM: NORMAL
MDC_IDC_PG_MFG: NORMAL
MDC_IDC_PG_MODEL: NORMAL
MDC_IDC_PG_SERIAL: NORMAL
MDC_IDC_PG_TYPE: NORMAL
MDC_IDC_SESS_CLINIC_NAME: NORMAL
MDC_IDC_SESS_DTM: NORMAL
MDC_IDC_SESS_TYPE: NORMAL
MDC_IDC_SET_BRADY_AT_MODE_SWITCH_MODE: NORMAL
MDC_IDC_SET_BRADY_AT_MODE_SWITCH_RATE: 170 {BEATS}/MIN
MDC_IDC_SET_BRADY_LOWRATE: 60 {BEATS}/MIN
MDC_IDC_SET_BRADY_MAX_SENSOR_RATE: 130 {BEATS}/MIN
MDC_IDC_SET_BRADY_MAX_TRACKING_RATE: 130 {BEATS}/MIN
MDC_IDC_SET_BRADY_MODE: NORMAL
MDC_IDC_SET_BRADY_PAV_DELAY_HIGH: 200 MS
MDC_IDC_SET_BRADY_PAV_DELAY_LOW: 300 MS
MDC_IDC_SET_BRADY_SAV_DELAY_HIGH: 200 MS
MDC_IDC_SET_BRADY_SAV_DELAY_LOW: 300 MS
MDC_IDC_SET_LEADCHNL_RA_PACING_AMPLITUDE: 2 V
MDC_IDC_SET_LEADCHNL_RA_PACING_CAPTURE_MODE: NORMAL
MDC_IDC_SET_LEADCHNL_RA_PACING_POLARITY: NORMAL
MDC_IDC_SET_LEADCHNL_RA_PACING_PULSEWIDTH: 0.4 MS
MDC_IDC_SET_LEADCHNL_RA_SENSING_ADAPTATION_MODE: NORMAL
MDC_IDC_SET_LEADCHNL_RA_SENSING_POLARITY: NORMAL
MDC_IDC_SET_LEADCHNL_RA_SENSING_SENSITIVITY: 0.25 MV
MDC_IDC_SET_LEADCHNL_RV_PACING_AMPLITUDE: 3.5 V
MDC_IDC_SET_LEADCHNL_RV_PACING_CAPTURE_MODE: NORMAL
MDC_IDC_SET_LEADCHNL_RV_PACING_POLARITY: NORMAL
MDC_IDC_SET_LEADCHNL_RV_PACING_PULSEWIDTH: 0.4 MS
MDC_IDC_SET_LEADCHNL_RV_SENSING_ADAPTATION_MODE: NORMAL
MDC_IDC_SET_LEADCHNL_RV_SENSING_POLARITY: NORMAL
MDC_IDC_SET_LEADCHNL_RV_SENSING_SENSITIVITY: 1.5 MV
MDC_IDC_SET_ZONE_DETECTION_INTERVAL: 375 MS
MDC_IDC_SET_ZONE_TYPE: NORMAL
MDC_IDC_SET_ZONE_VENDOR_TYPE: NORMAL
MDC_IDC_STAT_AT_BURDEN_PERCENT: 0 %
MDC_IDC_STAT_AT_DTM_END: NORMAL
MDC_IDC_STAT_AT_DTM_START: NORMAL
MDC_IDC_STAT_BRADY_DTM_END: NORMAL
MDC_IDC_STAT_BRADY_DTM_START: NORMAL
MDC_IDC_STAT_BRADY_RA_PERCENT_PACED: 0 %
MDC_IDC_STAT_BRADY_RV_PERCENT_PACED: 88 %
MDC_IDC_STAT_EPISODE_RECENT_COUNT: 0
MDC_IDC_STAT_EPISODE_RECENT_COUNT_DTM_END: NORMAL
MDC_IDC_STAT_EPISODE_RECENT_COUNT_DTM_START: NORMAL
MDC_IDC_STAT_EPISODE_TYPE: NORMAL
MDC_IDC_STAT_EPISODE_VENDOR_TYPE: NORMAL

## 2020-12-23 PROCEDURE — 93294 REM INTERROG EVL PM/LDLS PM: CPT | Performed by: INTERNAL MEDICINE

## 2020-12-23 PROCEDURE — 93296 REM INTERROG EVL PM/IDS: CPT | Performed by: INTERNAL MEDICINE

## 2021-01-15 ENCOUNTER — HEALTH MAINTENANCE LETTER (OUTPATIENT)
Age: 76
End: 2021-01-15

## 2021-01-28 ENCOUNTER — IMMUNIZATION (OUTPATIENT)
Dept: PEDIATRICS | Facility: CLINIC | Age: 76
End: 2021-01-28
Payer: MEDICARE

## 2021-01-28 PROCEDURE — 91300 PR COVID VAC PFIZER DIL RECON 30 MCG/0.3 ML IM: CPT

## 2021-01-28 PROCEDURE — 0001A PR COVID VAC PFIZER DIL RECON 30 MCG/0.3 ML IM: CPT

## 2021-02-18 ENCOUNTER — IMMUNIZATION (OUTPATIENT)
Dept: PEDIATRICS | Facility: CLINIC | Age: 76
End: 2021-02-18
Attending: INTERNAL MEDICINE
Payer: MEDICARE

## 2021-02-18 PROCEDURE — 0002A PR COVID VAC PFIZER DIL RECON 30 MCG/0.3 ML IM: CPT

## 2021-02-18 PROCEDURE — 91300 PR COVID VAC PFIZER DIL RECON 30 MCG/0.3 ML IM: CPT

## 2021-05-10 ENCOUNTER — ANCILLARY PROCEDURE (OUTPATIENT)
Dept: CARDIOLOGY | Facility: CLINIC | Age: 76
End: 2021-05-10
Attending: INTERNAL MEDICINE
Payer: MEDICARE

## 2021-05-10 DIAGNOSIS — I49.5 SICK SINUS SYNDROME (H): Primary | ICD-10-CM

## 2021-05-10 DIAGNOSIS — I49.5 SICK SINUS SYNDROME (H): ICD-10-CM

## 2021-05-10 PROCEDURE — 93296 REM INTERROG EVL PM/IDS: CPT | Performed by: INTERNAL MEDICINE

## 2021-05-10 PROCEDURE — 93294 REM INTERROG EVL PM/LDLS PM: CPT | Performed by: INTERNAL MEDICINE

## 2021-05-11 LAB
MDC_IDC_EPISODE_DTM: NORMAL
MDC_IDC_EPISODE_ID: NORMAL
MDC_IDC_EPISODE_TYPE: NORMAL
MDC_IDC_LEAD_IMPLANT_DT: NORMAL
MDC_IDC_LEAD_IMPLANT_DT: NORMAL
MDC_IDC_LEAD_LOCATION: NORMAL
MDC_IDC_LEAD_LOCATION: NORMAL
MDC_IDC_LEAD_LOCATION_DETAIL_1: NORMAL
MDC_IDC_LEAD_LOCATION_DETAIL_1: NORMAL
MDC_IDC_LEAD_MFG: NORMAL
MDC_IDC_LEAD_MFG: NORMAL
MDC_IDC_LEAD_MODEL: NORMAL
MDC_IDC_LEAD_MODEL: NORMAL
MDC_IDC_LEAD_POLARITY_TYPE: NORMAL
MDC_IDC_LEAD_POLARITY_TYPE: NORMAL
MDC_IDC_LEAD_SERIAL: NORMAL
MDC_IDC_LEAD_SERIAL: NORMAL
MDC_IDC_MSMT_BATTERY_DTM: NORMAL
MDC_IDC_MSMT_BATTERY_REMAINING_LONGEVITY: 102 MO
MDC_IDC_MSMT_BATTERY_REMAINING_PERCENTAGE: 100 %
MDC_IDC_MSMT_BATTERY_STATUS: NORMAL
MDC_IDC_MSMT_LEADCHNL_RA_IMPEDANCE_VALUE: 625 OHM
MDC_IDC_MSMT_LEADCHNL_RA_PACING_THRESHOLD_AMPLITUDE: 0.5 V
MDC_IDC_MSMT_LEADCHNL_RA_PACING_THRESHOLD_PULSEWIDTH: 0.4 MS
MDC_IDC_MSMT_LEADCHNL_RV_IMPEDANCE_VALUE: 642 OHM
MDC_IDC_MSMT_LEADCHNL_RV_PACING_THRESHOLD_AMPLITUDE: 1.2 V
MDC_IDC_MSMT_LEADCHNL_RV_PACING_THRESHOLD_PULSEWIDTH: 0.4 MS
MDC_IDC_PG_IMPLANT_DTM: NORMAL
MDC_IDC_PG_MFG: NORMAL
MDC_IDC_PG_MODEL: NORMAL
MDC_IDC_PG_SERIAL: NORMAL
MDC_IDC_PG_TYPE: NORMAL
MDC_IDC_SESS_CLINIC_NAME: NORMAL
MDC_IDC_SESS_DTM: NORMAL
MDC_IDC_SESS_TYPE: NORMAL
MDC_IDC_SET_BRADY_AT_MODE_SWITCH_MODE: NORMAL
MDC_IDC_SET_BRADY_AT_MODE_SWITCH_RATE: 170 {BEATS}/MIN
MDC_IDC_SET_BRADY_LOWRATE: 60 {BEATS}/MIN
MDC_IDC_SET_BRADY_MAX_SENSOR_RATE: 130 {BEATS}/MIN
MDC_IDC_SET_BRADY_MAX_TRACKING_RATE: 130 {BEATS}/MIN
MDC_IDC_SET_BRADY_MODE: NORMAL
MDC_IDC_SET_BRADY_PAV_DELAY_HIGH: 200 MS
MDC_IDC_SET_BRADY_PAV_DELAY_LOW: 300 MS
MDC_IDC_SET_BRADY_SAV_DELAY_HIGH: 200 MS
MDC_IDC_SET_BRADY_SAV_DELAY_LOW: 300 MS
MDC_IDC_SET_LEADCHNL_RA_PACING_AMPLITUDE: 2 V
MDC_IDC_SET_LEADCHNL_RA_PACING_CAPTURE_MODE: NORMAL
MDC_IDC_SET_LEADCHNL_RA_PACING_POLARITY: NORMAL
MDC_IDC_SET_LEADCHNL_RA_PACING_PULSEWIDTH: 0.4 MS
MDC_IDC_SET_LEADCHNL_RA_SENSING_ADAPTATION_MODE: NORMAL
MDC_IDC_SET_LEADCHNL_RA_SENSING_POLARITY: NORMAL
MDC_IDC_SET_LEADCHNL_RA_SENSING_SENSITIVITY: 0.25 MV
MDC_IDC_SET_LEADCHNL_RV_PACING_AMPLITUDE: 1.5 V
MDC_IDC_SET_LEADCHNL_RV_PACING_CAPTURE_MODE: NORMAL
MDC_IDC_SET_LEADCHNL_RV_PACING_POLARITY: NORMAL
MDC_IDC_SET_LEADCHNL_RV_PACING_PULSEWIDTH: 0.4 MS
MDC_IDC_SET_LEADCHNL_RV_SENSING_ADAPTATION_MODE: NORMAL
MDC_IDC_SET_LEADCHNL_RV_SENSING_POLARITY: NORMAL
MDC_IDC_SET_LEADCHNL_RV_SENSING_SENSITIVITY: 1.5 MV
MDC_IDC_SET_ZONE_DETECTION_INTERVAL: 375 MS
MDC_IDC_SET_ZONE_TYPE: NORMAL
MDC_IDC_SET_ZONE_VENDOR_TYPE: NORMAL
MDC_IDC_STAT_AT_BURDEN_PERCENT: 0 %
MDC_IDC_STAT_AT_DTM_END: NORMAL
MDC_IDC_STAT_AT_DTM_START: NORMAL
MDC_IDC_STAT_BRADY_DTM_END: NORMAL
MDC_IDC_STAT_BRADY_DTM_START: NORMAL
MDC_IDC_STAT_BRADY_RA_PERCENT_PACED: 0 %
MDC_IDC_STAT_BRADY_RV_PERCENT_PACED: 80 %
MDC_IDC_STAT_EPISODE_RECENT_COUNT: 0
MDC_IDC_STAT_EPISODE_RECENT_COUNT_DTM_END: NORMAL
MDC_IDC_STAT_EPISODE_RECENT_COUNT_DTM_START: NORMAL
MDC_IDC_STAT_EPISODE_TYPE: NORMAL
MDC_IDC_STAT_EPISODE_VENDOR_TYPE: NORMAL

## 2021-05-17 NOTE — PROGRESS NOTES
"[facility]    I had the pleasure of seeing Anne when she came for annual follow-up.  This 75 year old sees Dr. Monson for her history of:    1. 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  S/p dual-chamber [employer] pacemaker 2/2017  2. Chronic Pain - sees integrative Medicine Team for this.     I saw Anne 5/2019 at which time she was doing well, doing water aerobics and dance classes. She'd recently been in [facility] on a River Cruise. She then saw Dr. Monson virtually 5/2020 at which time she was recovering from back surgery done 1/2020. Annual follow-up rec'd.     Interval History:  Thinks she's done well from a cardiac perspective but admits she didn't do any exercise over the winter d/t her back surgery from 1/2020 and fear of falling on the ice.  She does do some water aerobics and with that, no c/o CP.    Feels she's \"gasping for air\" at times, along with fatigue. She'll do the water aerobics for 1 hour and still feel winded/fatigued for some time after this (can't quantify). She thinks this might be d/t her significant issues with sleep and the meds she's on for this. She's wondering if things might be better if her LRL on PPM was increased to 65 or 70 bpm. Of note, she's VPacing 80% of the time.    She's seeing her Integrative Medication Team for her chronic pain and we updated her medication list.     VITALS:  Vitals: /87   Pulse 77   Ht 1.575 m (5' 2\")   Wt 59.4 kg (131 lb)   BMI 23.96 kg/m      Diagnostic Testing:  Device interrogation 5/10/2021, showed 80%   Echocardiogram 2/2017 showed EF 60 to 65%.  Left atrium was moderately dilated.  No significant valvular abnormalities.    Plan:  1. Echo and annual follow-up with PPM check    Assessment/Plan:    1. H/o 2:1 Heart Block; nl EF    S/p [employer] PPM placement in " 2/2017    Interrogations as above    PLAN:    Continue routine PPM interrogations      2. Fatigue/SOB    Reviewed concerns that she has that this could be related to LRL 60 bpm vs deconditioning vs poor sleep. She really thinks that continued water aerobics may be helpful given more conditioning    Reviewed she's only  80% of the time so I don't think increasing LRL will help too [facility]    Reviewed no CP    PLAN:    She would like to monitor how she does. If continued water aerobics/better sleep aren't helping, I would like to do an echocardiogram and we can contact with results    If she does feel better, we will plan routine echo 1 year with annual follow-up     Nichelle Curtis PA-C, MSPAS      Orders Placed This Encounter   Procedures     Follow-Up with Cardiac Advanced Practice Provider     EKG 12-lead complete w/read - Clinics (to be scheduled)     Echocardiogram Complete     Orders Placed This Encounter   Medications     DULoxetine (CYMBALTA) 60 MG capsule     Sig: Take 60 mg by mouth daily     eszopiclone (LUNESTA) 3 MG tablet     Sig: Take 0.5 tablets (1.5 mg) by mouth At Bedtime     mirtazapine (REMERON) 15 MG tablet     Sig: Take 1 tablet (15 mg) by mouth At Bedtime     Dispense:        Medications Discontinued During This Encounter   Medication Reason     escitalopram (LEXAPRO) 20 MG tablet Stopped by Patient     celecoxib (CELEBREX) 200 MG capsule Stopped by Patient     BuPROPion HCl (WELLBUTRIN XL PO) Stopped by Patient     eszopiclone (LUNESTA) 2 MG tablet Reorder         Encounter Diagnoses   Name Primary?     Atrioventricular block 2:1      Cardiac pacemaker in situ Yes       CURRENT MEDICATIONS:  Current Outpatient Medications   Medication Sig Dispense Refill     atorvastatin (LIPITOR) 40 MG tablet Take 40 mg by mouth At Bedtime  30 tablet 1     DULoxetine (CYMBALTA) 60 MG capsule Take 60 mg by mouth daily       eszopiclone (LUNESTA) 3 MG tablet Take 0.5 tablets (1.5 mg) by mouth At Bedtime        "LORAZEPAM PO Take 0.5 mg by mouth At Bedtime (0.5 x 1 mg tablet = 0.5 mg dose)       mirtazapine (REMERON) 15 MG tablet Take 1 tablet (15 mg) by mouth At Bedtime       polyethylene glycol (MIRALAX/GLYCOLAX) powder Take 17 g by mouth daily as needed for constipation       Probiotic Product (PROBIOTIC & ACIDOPHILUS EX ST PO) Take 1 capsule by mouth daily       VITAMIN D, CHOLECALCIFEROL, PO Take 5,000 Units by mouth three times a week (Monday, Wednesday and Friday)         ALLERGIES     Allergies   Allergen Reactions     Blood-Group Specific Substance Other (See Comments)     Patient has anti-K antibody.  Blood for the transfusion might be delayed.       Dilaudid [Hydromorphone] Nausea and Vomiting     Doxepin Unknown     Other reaction(s): Intolerance-Can't Take  Not effective for sleep     Meperidine Nausea and Vomiting     Demerol     Meperidine Nausea and Vomiting     Morphine      PN: LW Reaction: Vomiting,Nausea     No Clinical Screening - See Comments      PN: LW Reaction: all opiates     Pregabalin Other (See Comments)     Not effective         Review of Systems:  Skin:  Negative     Eyes:  Positive for glasses  ENT:  Negative    Respiratory:  Positive for dyspnea on exertion  Cardiovascular:  Negative for;palpitations;chest pain;edema;syncope or near-syncope;fatigue;lightheadedness;dizziness Positive for;exercise intolerance  Gastroenterology: Negative    Genitourinary:  Negative    Musculoskeletal:  Positive for arthritis;fibromyalgia  Neurologic:  Negative    Psychiatric:  Negative    Heme/Lymph/Imm:  Negative    Endocrine:  Negative      Physical Exam:  Vitals: /87   Pulse 77   Ht 1.575 m (5' 2\")   Wt 59.4 kg (131 lb)   BMI 23.96 kg/m      Constitutional:  cooperative, alert and oriented, well developed, well nourished, in no acute distress        Skin:  warm and dry to the touch, no apparent skin lesions or masses noted        Head:  normocephalic, no masses or lesions        Eyes:       "     ENT:  no pallor or cyanosis, dentition good        Neck:  JVP normal;no carotid bruit        Chest:  normal breath sounds, clear to auscultation, normal A-P diameter, normal symmetry, normal respiratory excursion, no use of accessory muscles        Cardiac: regular rhythm, normal S1/S2, no S3 or S4, apical impulse not displaced, no murmurs, gallops or rubs                  Abdomen:  abdomen soft        Vascular: pulses full and equal                                      Extremities and Back:  no deformities, clubbing, cyanosis, erythema observed;no edema        Neurological:  no gross motor deficits            PAST MEDICAL HISTORY:  Past Medical History:   Diagnosis Date     Anemia      Carpal tunnel syndrome      Chronic fatigue syndrome 11/00     Complication of anesthesia      Decreased libido      Depression with anxiety      Fatigue      Gastroesophageal reflux disease      HA (headache)      History of blood transfusion      iamAFTERCARE FOLLOWING JOINT REPLACEMENT 7/23/2007     Irritable bowel syndrome 11/00     Myalgia and myositis, unspecified 11/00     Near syncope 5/17/2017     Neutropenia 00     Noninfectious ileitis      Osteoarthritis     rt knee, back     Osteoarthrosis, unspecified whether generalized or localized, hand 11/00     Osteoarthrosis, unspecified whether generalized or localized, lower leg 11/00     Other chronic pain     back     Pacemaker      PONV (postoperative nausea and vomiting)      Pure hypercholesterolemia 11/00     RBBB      Renal disease     mld     Uncomplicated asthma      Urticaria, unspecified 00       PAST SURGICAL HISTORY:  Past Surgical History:   Procedure Laterality Date     ARTHROPLASTY KNEE Left      ARTHROPLASTY KNEE Right     partial     ARTHROPLASTY REVISION HIP Left 2/3/2017    Procedure: ARTHROPLASTY REVISION HIP;  Surgeon: Juan Moncada MD;  Location: [facility] OR     CARDIAC SURGERY  2017    pacemaker     COLONOSCOPY N/A 1/15/2019    Procedure: COLONOSCOPY;   Surgeon: Eliane Barone MD;  Location:  GI     EYE SURGERY      zoila cataract surgery     GI SURGERY      hemorrhoid repair     GYN SURGERY      tubal ligation     OPTICAL TRACKING SYSTEM FUSION SPINE POSTERIOR LUMBAR TWO LEVELS N/A 1/9/2020    Procedure: L3-5 decompressive laminectomies with L4-5 Tranforaminal Lumbar Interbody Fusion with L3-5 posterior lateral fusion;  Surgeon: Anjel Bernardo MD;  Location: [facility] OR     ORTHOPEDIC SURGERY      right sabine-arthroplasty, trigger finger repair, left knee arthroscopy and replacement, bilateral hip replacement,     ZZC NONSPECIFIC PROCEDURE      s/p Rt. Carpal tunnel release     ZZC NONSPECIFIC PROCEDURE      s/p bilat Tubal ligation       FAMILY HISTORY:  Family History   Problem Relation Age of Onset     Hypertension Mother      Alzheimer Disease Mother         Memory issues- unsure if alzheimers     Arthritis Mother         osteoarthritis     Depression Mother      Gastrointestinal Disease Mother         acid reflux     Allergies Father      Neurologic Disorder Father         migraines     Respiratory Father         chronic brochitis- born during a flu epidemic in 1918     Cerebrovascular Disease Maternal Grandmother         series of TIA's     Diabetes Maternal Grandfather         adult onset     Cancer Maternal Grandfather         of stomach or esophagus-heavy smoker     C.A.D. Paternal Grandmother         angina     Depression Daughter      Neurologic Disorder Sister         migraines       SOCIAL HISTORY:  Social History     Socioeconomic History     Marital status:      Spouse name: Christiano Rangel     Number of children: 3     Years of education: Masters+     Highest education level: None   Occupational History     Occupation: Nursing [employer]     Employer: [employer]   Social Needs     Financial resource strain: None     Food insecurity     Worry: None     Inability: None     Transportation needs     Medical: None      Non-medical: None   Tobacco Use     Smoking status: Never Smoker     Smokeless tobacco: Never Used   Substance and Sexual Activity     Alcohol use: Yes     Comment: 1 Drink with dinner     Drug use: No     Sexual activity: Yes     Partners: Male   Lifestyle     Physical activity     Days per week: None     Minutes per session: None     Stress: None   Relationships     Social connections     Talks on phone: None     Gets together: None     Attends Jainism service: None     Active member of club or organization: None     Attends meetings of clubs or organizations: None     Relationship status: None     Intimate partner violence     Fear of current or ex partner: None     Emotionally abused: None     Physically abused: None     Forced sexual activity: None   Other Topics Concern      Service No     Blood Transfusions Yes     Comment: [employer]. 1974, lost a lot of blood-given 6? 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, had vaccines Hep B, Flu     Hobby Hazards No     Sleep Concern No     Comment: Nortriptyline helps     Stress Concern No     Comment: 3 children     Weight Concern No     Special Diet No     Back Care Yes     Comment: Low back pain, has osteoarthritis     Exercise Yes     Comment: 3-4 times a week     Bike Helmet No     Seat Belt Yes     Self-Exams No     Parent/sibling w/ CABG, MI or angioplasty before 65F 55M? 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

## 2021-05-20 ENCOUNTER — OFFICE VISIT (OUTPATIENT)
Dept: CARDIOLOGY | Facility: CLINIC | Age: 76
End: 2021-05-20
Payer: MEDICARE

## 2021-05-20 VITALS
HEART RATE: 77 BPM | DIASTOLIC BLOOD PRESSURE: 87 MMHG | BODY MASS INDEX: 24.11 KG/M2 | HEIGHT: 62 IN | SYSTOLIC BLOOD PRESSURE: 139 MMHG | WEIGHT: 131 LBS

## 2021-05-20 DIAGNOSIS — I44.2 ATRIOVENTRICULAR BLOCK, COMPLETE (H): ICD-10-CM

## 2021-05-20 DIAGNOSIS — Z95.0 CARDIAC PACEMAKER IN SITU: Primary | ICD-10-CM

## 2021-05-20 PROCEDURE — 99213 OFFICE O/P EST LOW 20 MIN: CPT | Performed by: PHYSICIAN ASSISTANT

## 2021-05-20 RX ORDER — MIRTAZAPINE 15 MG/1
30 TABLET, FILM COATED ORAL AT BEDTIME
COMMUNITY
Start: 2021-05-20 | End: 2024-09-26

## 2021-05-20 RX ORDER — DULOXETIN HYDROCHLORIDE 60 MG/1
60 CAPSULE, DELAYED RELEASE ORAL DAILY
COMMUNITY
End: 2023-07-07

## 2021-05-20 RX ORDER — ESZOPICLONE 3 MG/1
3 TABLET, FILM COATED ORAL AT BEDTIME
COMMUNITY
Start: 2021-05-20 | End: 2024-09-26

## 2021-05-20 ASSESSMENT — MIFFLIN-ST. JEOR: SCORE: 1042.46

## 2021-05-20 NOTE — LETTER
"5/20/2021    Nevaeh Wood MD  [address]  [facility] [address]    RE: Anne Rangel       Dear Colleague,    I had the pleasure of seeing Anne Rangel in the [facility].    [facility]    I had the pleasure of seeing Anne when she came for annual follow-up.  This 75 year old sees Dr. Monson for her history of:    1. 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  S/p dual-chamber [employer] pacemaker 2/2017  2. Chronic Pain - sees integrative Medicine Team for this.     I saw Anne 5/2019 at which time she was doing well, doing water aerobics and dance classes. She'd recently been in [facility] on a River Cruise. She then saw Dr. Monson virtually 5/2020 at which time she was recovering from back surgery done 1/2020. Annual follow-up rec'd.     Interval History:  Thinks she's done well from a cardiac perspective but admits she didn't do any exercise over the winter d/t her back surgery from 1/2020 and fear of falling on the ice.  She does do some water aerobics and with that, no c/o CP.    Feels she's \"gasping for air\" at times, along with fatigue. She'll do the water aerobics for 1 hour and still feel winded/fatigued for some time after this (can't quantify). She thinks this might be d/t her significant issues with sleep and the meds she's on for this. She's wondering if things might be better if her LRL on PPM was increased to 65 or 70 bpm. Of note, she's VPacing 80% of the time.    She's seeing her Integrative Medication Team for her chronic pain and we updated her medication list.     VITALS:  Vitals: /87   Pulse 77   Ht 1.575 m (5' 2\")   Wt 59.4 kg (131 lb)   BMI 23.96 kg/m      Diagnostic Testing:  Device interrogation 5/10/2021, showed 80%   Echocardiogram 2/2017 showed EF 60 to " 65%.  Left atrium was moderately dilated.  No significant valvular abnormalities.    Plan:  1. Echo and annual follow-up with PPM check    Assessment/Plan:    1. H/o 2:1 Heart Block; nl EF    S/p [employer] PPM placement in 2/2017    Interrogations as above    PLAN:    Continue routine PPM interrogations      2. Fatigue/SOB    Reviewed concerns that she has that this could be related to LRL 60 bpm vs deconditioning vs poor sleep. She really thinks that continued water aerobics may be helpful given more conditioning    Reviewed she's only  80% of the time so I don't think increasing LRL will help too [facility]    Reviewed no CP    PLAN:    She would like to monitor how she does. If continued water aerobics/better sleep aren't helping, I would like to do an echocardiogram and we can contact with results    If she does feel better, we will plan routine echo 1 year with annual follow-up     Nichelle Curtis PA-C, MSPAS      Orders Placed This Encounter   Procedures     Follow-Up with Cardiac Advanced Practice Provider     EKG 12-lead complete w/read - Clinics (to be scheduled)     Echocardiogram Complete     Orders Placed This Encounter   Medications     DULoxetine (CYMBALTA) 60 MG capsule     Sig: Take 60 mg by mouth daily     eszopiclone (LUNESTA) 3 MG tablet     Sig: Take 0.5 tablets (1.5 mg) by mouth At Bedtime     mirtazapine (REMERON) 15 MG tablet     Sig: Take 1 tablet (15 mg) by mouth At Bedtime     Dispense:        Medications Discontinued During This Encounter   Medication Reason     escitalopram (LEXAPRO) 20 MG tablet Stopped by Patient     celecoxib (CELEBREX) 200 MG capsule Stopped by Patient     BuPROPion HCl (WELLBUTRIN XL PO) Stopped by Patient     eszopiclone (LUNESTA) 2 MG tablet Reorder         Encounter Diagnoses   Name Primary?     Atrioventricular block 2:1      Cardiac pacemaker in situ Yes       CURRENT MEDICATIONS:  Current Outpatient Medications   Medication Sig Dispense Refill     atorvastatin  "(LIPITOR) 40 MG tablet Take 40 mg by mouth At Bedtime  30 tablet 1     DULoxetine (CYMBALTA) 60 MG capsule Take 60 mg by mouth daily       eszopiclone (LUNESTA) 3 MG tablet Take 0.5 tablets (1.5 mg) by mouth At Bedtime       LORAZEPAM PO Take 0.5 mg by mouth At Bedtime (0.5 x 1 mg tablet = 0.5 mg dose)       mirtazapine (REMERON) 15 MG tablet Take 1 tablet (15 mg) by mouth At Bedtime       polyethylene glycol (MIRALAX/GLYCOLAX) powder Take 17 g by mouth daily as needed for constipation       Probiotic Product (PROBIOTIC & ACIDOPHILUS EX ST PO) Take 1 capsule by mouth daily       VITAMIN D, CHOLECALCIFEROL, PO Take 5,000 Units by mouth three times a week (Monday, Wednesday and Friday)         ALLERGIES     Allergies   Allergen Reactions     Blood-Group Specific Substance Other (See Comments)     Patient has anti-K antibody.  Blood for the transfusion might be delayed.       Dilaudid [Hydromorphone] Nausea and Vomiting     Doxepin Unknown     Other reaction(s): Intolerance-Can't Take  Not effective for sleep     Meperidine Nausea and Vomiting     Demerol     Meperidine Nausea and Vomiting     Morphine      PN: LW Reaction: Vomiting,Nausea     No Clinical Screening - See Comments      PN: LW Reaction: all opiates     Pregabalin Other (See Comments)     Not effective         Review of Systems:  Skin:  Negative     Eyes:  Positive for glasses  ENT:  Negative    Respiratory:  Positive for dyspnea on exertion  Cardiovascular:  Negative for;palpitations;chest pain;edema;syncope or near-syncope;fatigue;lightheadedness;dizziness Positive for;exercise intolerance  Gastroenterology: Negative    Genitourinary:  Negative    Musculoskeletal:  Positive for arthritis;fibromyalgia  Neurologic:  Negative    Psychiatric:  Negative    Heme/Lymph/Imm:  Negative    Endocrine:  Negative      Physical Exam:  Vitals: /87   Pulse 77   Ht 1.575 m (5' 2\")   Wt 59.4 kg (131 lb)   BMI 23.96 kg/m      Constitutional:  cooperative, alert " and oriented, well developed, well nourished, in no acute distress        Skin:  warm and dry to the touch, no apparent skin lesions or masses noted        Head:  normocephalic, no masses or lesions        Eyes:           ENT:  no pallor or cyanosis, dentition good        Neck:  JVP normal;no carotid bruit        Chest:  normal breath sounds, clear to auscultation, normal A-P diameter, normal symmetry, normal respiratory excursion, no use of accessory muscles        Cardiac: regular rhythm, normal S1/S2, no S3 or S4, apical impulse not displaced, no murmurs, gallops or rubs                  Abdomen:  abdomen soft        Vascular: pulses full and equal                                      Extremities and Back:  no deformities, clubbing, cyanosis, erythema observed;no edema        Neurological:  no gross motor deficits            PAST MEDICAL HISTORY:  Past Medical History:   Diagnosis Date     Anemia      Carpal tunnel syndrome      Chronic fatigue syndrome 11/00     Complication of anesthesia      Decreased libido      Depression with anxiety      Fatigue      Gastroesophageal reflux disease      HA (headache)      History of blood transfusion      iamAFTERCARE FOLLOWING JOINT REPLACEMENT 7/23/2007     Irritable bowel syndrome 11/00     Myalgia and myositis, unspecified 11/00     Near syncope 5/17/2017     Neutropenia 00     Noninfectious ileitis      Osteoarthritis     rt knee, back     Osteoarthrosis, unspecified whether generalized or localized, hand 11/00     Osteoarthrosis, unspecified whether generalized or localized, lower leg 11/00     Other chronic pain     back     Pacemaker      PONV (postoperative nausea and vomiting)      Pure hypercholesterolemia 11/00     RBBB      Renal disease     mld     Uncomplicated asthma      Urticaria, unspecified 00       PAST SURGICAL HISTORY:  Past Surgical History:   Procedure Laterality Date     ARTHROPLASTY KNEE Left      ARTHROPLASTY KNEE Right     partial      ARTHROPLASTY REVISION HIP Left 2/3/2017    Procedure: ARTHROPLASTY REVISION HIP;  Surgeon: Juan Moncada MD;  Location:  OR     CARDIAC SURGERY  2017    pacemaker     COLONOSCOPY N/A 1/15/2019    Procedure: COLONOSCOPY;  Surgeon: Eliane Barone MD;  Location:  GI     EYE SURGERY      zoila cataract surgery     GI SURGERY      hemorrhoid repair     GYN SURGERY      tubal ligation     OPTICAL TRACKING SYSTEM FUSION SPINE POSTERIOR LUMBAR TWO LEVELS N/A 1/9/2020    Procedure: L3-5 decompressive laminectomies with L4-5 Tranforaminal Lumbar Interbody Fusion with L3-5 posterior lateral fusion;  Surgeon: Anjel Bernardo MD;  Location:  OR     ORTHOPEDIC SURGERY      right sabine-arthroplasty, trigger finger repair, left knee arthroscopy and replacement, bilateral hip replacement,     ZZC NONSPECIFIC PROCEDURE      s/p Rt. Carpal tunnel release     ZZC NONSPECIFIC PROCEDURE      s/p bilat Tubal ligation       FAMILY HISTORY:  Family History   Problem Relation Age of Onset     Hypertension Mother      Alzheimer Disease Mother         Memory issues- unsure if alzheimers     Arthritis Mother         osteoarthritis     Depression Mother      Gastrointestinal Disease Mother         acid reflux     Allergies Father      Neurologic Disorder Father         migraines     Respiratory Father         chronic brochitis- born during a flu epidemic in 1918     Cerebrovascular Disease Maternal Grandmother         series of TIA's     Diabetes Maternal Grandfather         adult onset     Cancer Maternal Grandfather         of stomach or esophagus-heavy smoker     C.A.D. Paternal Grandmother         angina     Depression Daughter      Neurologic Disorder Sister         migraines       SOCIAL HISTORY:  Social History     Socioeconomic History     Marital status:      Spouse name: Christiano Rangel     Number of children: 3     Years of education: Masters+     Highest education level: None   Occupational History      Occupation: [employer]     Employer: [employer]   Social Needs     Financial resource strain: None     Food insecurity     Worry: None     Inability: None     Transportation needs     Medical: None     Non-medical: None   Tobacco Use     Smoking status: Never Smoker     Smokeless tobacco: Never Used   Substance and Sexual Activity     Alcohol use: Yes     Comment: 1 Drink with dinner     Drug use: No     Sexual activity: Yes     Partners: Male   Lifestyle     Physical activity     Days per week: None     Minutes per session: None     Stress: None   Relationships     Social connections     Talks on phone: None     Gets together: None     Attends Restorationism service: None     Active member of club or organization: None     Attends meetings of clubs or organizations: None     Relationship status: None     Intimate partner violence     Fear of current or ex partner: None     Emotionally abused: None     Physically abused: None     Forced sexual activity: None   Other Topics Concern      Service No     Blood Transfusions Yes     Comment: [employer]. 1974, lost a lot of blood-given 6? 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, had vaccines Hep B, Flu     Hobby Hazards No     Sleep Concern No     Comment: Nortriptyline helps     Stress Concern No     Comment: 3 children     Weight Concern No     Special Diet No     Back Care Yes     Comment: Low back pain, has osteoarthritis     Exercise Yes     Comment: 3-4 times a week     Bike Helmet No     Seat Belt Yes     Self-Exams No     Parent/sibling w/ CABG, MI or angioplasty before 65F 55M? 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               Thank you for allowing me to participate in the care of your patient.      Sincerely,     Alison Curtis PA-C     [facility]    cc:   Eliud Shaffer MD  [address]  [place] [address]

## 2021-05-20 NOTE — PATIENT INSTRUCTIONS
Anne - it was nice to see you today! It sounds like you've had a really hard year    1. Thanks for updating all the meds  2. Reviewed that you do think the water aerobics are helping    PLAN:  1. Agreed that unsure if the breathing/fatigue is the sleep/meds OR if could be related to heart.    2. Decided we should plan to revisit in 1 year with echocardiogram BUT YOU WILL CALL ME if anything changes or if you get worried about anything b/c we can do testing early!  [phone]

## 2021-07-16 ENCOUNTER — HOSPITAL ENCOUNTER (INPATIENT)
Facility: CLINIC | Age: 76
Setting detail: SURGERY ADMIT
End: 2021-07-16
Attending: ORTHOPAEDIC SURGERY | Admitting: ORTHOPAEDIC SURGERY
Payer: MEDICARE

## 2021-07-18 DIAGNOSIS — Z11.59 ENCOUNTER FOR SCREENING FOR OTHER VIRAL DISEASES: ICD-10-CM

## 2021-07-20 ENCOUNTER — TELEPHONE (OUTPATIENT)
Dept: CARDIOLOGY | Facility: CLINIC | Age: 76
End: 2021-07-20

## 2021-07-20 NOTE — TELEPHONE ENCOUNTER
Received call from patient's  stating that Anne is hoping to get an infrared biomat for back pain. He is wondering if this will interfere with her pacemaker. Provided them with the number to [employer] and asked that they call for more specific direction on whether this is safe.

## 2021-08-23 ENCOUNTER — ANCILLARY PROCEDURE (OUTPATIENT)
Dept: CARDIOLOGY | Facility: CLINIC | Age: 76
End: 2021-08-23
Attending: INTERNAL MEDICINE
Payer: MEDICARE

## 2021-08-23 DIAGNOSIS — Z95.0 CARDIAC PACEMAKER IN SITU: ICD-10-CM

## 2021-08-23 PROCEDURE — 93294 REM INTERROG EVL PM/LDLS PM: CPT | Performed by: INTERNAL MEDICINE

## 2021-08-23 PROCEDURE — 93296 REM INTERROG EVL PM/IDS: CPT | Performed by: INTERNAL MEDICINE

## 2021-08-25 LAB
MDC_IDC_EPISODE_DTM: NORMAL
MDC_IDC_EPISODE_ID: NORMAL
MDC_IDC_EPISODE_TYPE: NORMAL
MDC_IDC_LEAD_IMPLANT_DT: NORMAL
MDC_IDC_LEAD_IMPLANT_DT: NORMAL
MDC_IDC_LEAD_LOCATION: NORMAL
MDC_IDC_LEAD_LOCATION: NORMAL
MDC_IDC_LEAD_LOCATION_DETAIL_1: NORMAL
MDC_IDC_LEAD_LOCATION_DETAIL_1: NORMAL
MDC_IDC_LEAD_MFG: NORMAL
MDC_IDC_LEAD_MFG: NORMAL
MDC_IDC_LEAD_MODEL: NORMAL
MDC_IDC_LEAD_MODEL: NORMAL
MDC_IDC_LEAD_POLARITY_TYPE: NORMAL
MDC_IDC_LEAD_POLARITY_TYPE: NORMAL
MDC_IDC_LEAD_SERIAL: NORMAL
MDC_IDC_LEAD_SERIAL: NORMAL
MDC_IDC_MSMT_BATTERY_DTM: NORMAL
MDC_IDC_MSMT_BATTERY_REMAINING_LONGEVITY: 96 MO
MDC_IDC_MSMT_BATTERY_REMAINING_PERCENTAGE: 93 %
MDC_IDC_MSMT_BATTERY_STATUS: NORMAL
MDC_IDC_MSMT_LEADCHNL_RA_IMPEDANCE_VALUE: 606 OHM
MDC_IDC_MSMT_LEADCHNL_RA_PACING_THRESHOLD_AMPLITUDE: 0.5 V
MDC_IDC_MSMT_LEADCHNL_RA_PACING_THRESHOLD_PULSEWIDTH: 0.4 MS
MDC_IDC_MSMT_LEADCHNL_RV_IMPEDANCE_VALUE: 649 OHM
MDC_IDC_MSMT_LEADCHNL_RV_PACING_THRESHOLD_AMPLITUDE: 1.2 V
MDC_IDC_MSMT_LEADCHNL_RV_PACING_THRESHOLD_PULSEWIDTH: 0.4 MS
MDC_IDC_PG_IMPLANT_DTM: NORMAL
MDC_IDC_PG_MFG: NORMAL
MDC_IDC_PG_MODEL: NORMAL
MDC_IDC_PG_SERIAL: NORMAL
MDC_IDC_PG_TYPE: NORMAL
MDC_IDC_SESS_CLINIC_NAME: NORMAL
MDC_IDC_SESS_DTM: NORMAL
MDC_IDC_SESS_TYPE: NORMAL
MDC_IDC_SET_BRADY_AT_MODE_SWITCH_MODE: NORMAL
MDC_IDC_SET_BRADY_AT_MODE_SWITCH_RATE: 170 {BEATS}/MIN
MDC_IDC_SET_BRADY_LOWRATE: 60 {BEATS}/MIN
MDC_IDC_SET_BRADY_MAX_SENSOR_RATE: 130 {BEATS}/MIN
MDC_IDC_SET_BRADY_MAX_TRACKING_RATE: 130 {BEATS}/MIN
MDC_IDC_SET_BRADY_MODE: NORMAL
MDC_IDC_SET_BRADY_PAV_DELAY_HIGH: 200 MS
MDC_IDC_SET_BRADY_PAV_DELAY_LOW: 300 MS
MDC_IDC_SET_BRADY_SAV_DELAY_HIGH: 200 MS
MDC_IDC_SET_BRADY_SAV_DELAY_LOW: 300 MS
MDC_IDC_SET_LEADCHNL_RA_PACING_AMPLITUDE: 2 V
MDC_IDC_SET_LEADCHNL_RA_PACING_CAPTURE_MODE: NORMAL
MDC_IDC_SET_LEADCHNL_RA_PACING_POLARITY: NORMAL
MDC_IDC_SET_LEADCHNL_RA_PACING_PULSEWIDTH: 0.4 MS
MDC_IDC_SET_LEADCHNL_RA_SENSING_ADAPTATION_MODE: NORMAL
MDC_IDC_SET_LEADCHNL_RA_SENSING_POLARITY: NORMAL
MDC_IDC_SET_LEADCHNL_RA_SENSING_SENSITIVITY: 0.25 MV
MDC_IDC_SET_LEADCHNL_RV_PACING_AMPLITUDE: 3.5 V
MDC_IDC_SET_LEADCHNL_RV_PACING_CAPTURE_MODE: NORMAL
MDC_IDC_SET_LEADCHNL_RV_PACING_POLARITY: NORMAL
MDC_IDC_SET_LEADCHNL_RV_PACING_PULSEWIDTH: 0.4 MS
MDC_IDC_SET_LEADCHNL_RV_SENSING_ADAPTATION_MODE: NORMAL
MDC_IDC_SET_LEADCHNL_RV_SENSING_POLARITY: NORMAL
MDC_IDC_SET_LEADCHNL_RV_SENSING_SENSITIVITY: 1.5 MV
MDC_IDC_SET_ZONE_DETECTION_INTERVAL: 375 MS
MDC_IDC_SET_ZONE_TYPE: NORMAL
MDC_IDC_SET_ZONE_VENDOR_TYPE: NORMAL
MDC_IDC_STAT_AT_BURDEN_PERCENT: 0 %
MDC_IDC_STAT_AT_DTM_END: NORMAL
MDC_IDC_STAT_AT_DTM_START: NORMAL
MDC_IDC_STAT_BRADY_DTM_END: NORMAL
MDC_IDC_STAT_BRADY_DTM_START: NORMAL
MDC_IDC_STAT_BRADY_RA_PERCENT_PACED: 0 %
MDC_IDC_STAT_BRADY_RV_PERCENT_PACED: 82 %
MDC_IDC_STAT_EPISODE_RECENT_COUNT: 0
MDC_IDC_STAT_EPISODE_RECENT_COUNT_DTM_END: NORMAL
MDC_IDC_STAT_EPISODE_RECENT_COUNT_DTM_START: NORMAL
MDC_IDC_STAT_EPISODE_TYPE: NORMAL
MDC_IDC_STAT_EPISODE_VENDOR_TYPE: NORMAL

## 2021-09-04 ENCOUNTER — HEALTH MAINTENANCE LETTER (OUTPATIENT)
Age: 76
End: 2021-09-04

## 2021-10-06 ENCOUNTER — IMMUNIZATION (OUTPATIENT)
Dept: NURSING | Facility: CLINIC | Age: 76
End: 2021-10-06
Payer: MEDICARE

## 2021-10-06 PROCEDURE — G0008 ADMIN INFLUENZA VIRUS VAC: HCPCS

## 2021-10-06 PROCEDURE — 0004A PR COVID VAC PFIZER DIL RECON 30 MCG/0.3 ML IM: CPT

## 2021-10-06 PROCEDURE — 90662 IIV NO PRSV INCREASED AG IM: CPT

## 2021-10-06 PROCEDURE — 91300 PR COVID VAC PFIZER DIL RECON 30 MCG/0.3 ML IM: CPT

## 2021-11-19 ENCOUNTER — ANCILLARY PROCEDURE (OUTPATIENT)
Dept: CARDIOLOGY | Facility: CLINIC | Age: 76
End: 2021-11-19
Attending: INTERNAL MEDICINE
Payer: MEDICARE

## 2021-11-19 DIAGNOSIS — I44.2 ATRIOVENTRICULAR BLOCK, COMPLETE (H): Primary | ICD-10-CM

## 2021-11-19 DIAGNOSIS — Z95.0 CARDIAC PACEMAKER IN SITU: ICD-10-CM

## 2021-11-19 PROCEDURE — 93280 PM DEVICE PROGR EVAL DUAL: CPT | Performed by: INTERNAL MEDICINE

## 2021-12-07 LAB
MDC_IDC_LEAD_IMPLANT_DT: NORMAL
MDC_IDC_LEAD_IMPLANT_DT: NORMAL
MDC_IDC_LEAD_LOCATION: NORMAL
MDC_IDC_LEAD_LOCATION: NORMAL
MDC_IDC_LEAD_LOCATION_DETAIL_1: NORMAL
MDC_IDC_LEAD_LOCATION_DETAIL_1: NORMAL
MDC_IDC_LEAD_MFG: NORMAL
MDC_IDC_LEAD_MFG: NORMAL
MDC_IDC_LEAD_MODEL: NORMAL
MDC_IDC_LEAD_MODEL: NORMAL
MDC_IDC_LEAD_POLARITY_TYPE: NORMAL
MDC_IDC_LEAD_POLARITY_TYPE: NORMAL
MDC_IDC_LEAD_SERIAL: NORMAL
MDC_IDC_LEAD_SERIAL: NORMAL
MDC_IDC_MSMT_BATTERY_DTM: NORMAL
MDC_IDC_MSMT_BATTERY_REMAINING_LONGEVITY: 114 MO
MDC_IDC_MSMT_BATTERY_REMAINING_PERCENTAGE: 100 %
MDC_IDC_MSMT_BATTERY_STATUS: NORMAL
MDC_IDC_MSMT_LEADCHNL_RA_IMPEDANCE_VALUE: 595 OHM
MDC_IDC_MSMT_LEADCHNL_RA_PACING_THRESHOLD_AMPLITUDE: 0.5 V
MDC_IDC_MSMT_LEADCHNL_RA_PACING_THRESHOLD_PULSEWIDTH: 0.4 MS
MDC_IDC_MSMT_LEADCHNL_RV_IMPEDANCE_VALUE: 654 OHM
MDC_IDC_MSMT_LEADCHNL_RV_PACING_THRESHOLD_AMPLITUDE: 1 V
MDC_IDC_MSMT_LEADCHNL_RV_PACING_THRESHOLD_PULSEWIDTH: 0.4 MS
MDC_IDC_PG_IMPLANT_DTM: NORMAL
MDC_IDC_PG_MFG: NORMAL
MDC_IDC_PG_MODEL: NORMAL
MDC_IDC_PG_SERIAL: NORMAL
MDC_IDC_PG_TYPE: NORMAL
MDC_IDC_SESS_CLINIC_NAME: NORMAL
MDC_IDC_SESS_DTM: NORMAL
MDC_IDC_SESS_TYPE: NORMAL
MDC_IDC_SET_BRADY_AT_MODE_SWITCH_MODE: NORMAL
MDC_IDC_SET_BRADY_AT_MODE_SWITCH_RATE: 170 {BEATS}/MIN
MDC_IDC_SET_BRADY_LOWRATE: 60 {BEATS}/MIN
MDC_IDC_SET_BRADY_MAX_SENSOR_RATE: 130 {BEATS}/MIN
MDC_IDC_SET_BRADY_MAX_TRACKING_RATE: 130 {BEATS}/MIN
MDC_IDC_SET_BRADY_MODE: NORMAL
MDC_IDC_SET_BRADY_PAV_DELAY_HIGH: 200 MS
MDC_IDC_SET_BRADY_PAV_DELAY_LOW: 300 MS
MDC_IDC_SET_BRADY_SAV_DELAY_HIGH: 200 MS
MDC_IDC_SET_BRADY_SAV_DELAY_LOW: 300 MS
MDC_IDC_SET_LEADCHNL_RA_PACING_AMPLITUDE: 2 V
MDC_IDC_SET_LEADCHNL_RA_PACING_CAPTURE_MODE: NORMAL
MDC_IDC_SET_LEADCHNL_RA_PACING_POLARITY: NORMAL
MDC_IDC_SET_LEADCHNL_RA_PACING_PULSEWIDTH: 0.4 MS
MDC_IDC_SET_LEADCHNL_RA_SENSING_ADAPTATION_MODE: NORMAL
MDC_IDC_SET_LEADCHNL_RA_SENSING_POLARITY: NORMAL
MDC_IDC_SET_LEADCHNL_RA_SENSING_SENSITIVITY: 0.25 MV
MDC_IDC_SET_LEADCHNL_RV_PACING_AMPLITUDE: 1.5 V
MDC_IDC_SET_LEADCHNL_RV_PACING_CAPTURE_MODE: NORMAL
MDC_IDC_SET_LEADCHNL_RV_PACING_POLARITY: NORMAL
MDC_IDC_SET_LEADCHNL_RV_PACING_PULSEWIDTH: 0.4 MS
MDC_IDC_SET_LEADCHNL_RV_SENSING_ADAPTATION_MODE: NORMAL
MDC_IDC_SET_LEADCHNL_RV_SENSING_POLARITY: NORMAL
MDC_IDC_SET_LEADCHNL_RV_SENSING_SENSITIVITY: 1.5 MV
MDC_IDC_SET_ZONE_DETECTION_INTERVAL: 375 MS
MDC_IDC_SET_ZONE_TYPE: NORMAL
MDC_IDC_SET_ZONE_VENDOR_TYPE: NORMAL
MDC_IDC_STAT_AT_BURDEN_PERCENT: 0 %
MDC_IDC_STAT_AT_DTM_END: NORMAL
MDC_IDC_STAT_AT_DTM_START: NORMAL
MDC_IDC_STAT_BRADY_DTM_END: NORMAL
MDC_IDC_STAT_BRADY_DTM_START: NORMAL
MDC_IDC_STAT_BRADY_RA_PERCENT_PACED: 0 %
MDC_IDC_STAT_BRADY_RV_PERCENT_PACED: 79 %
MDC_IDC_STAT_EPISODE_RECENT_COUNT: 0
MDC_IDC_STAT_EPISODE_RECENT_COUNT_DTM_END: NORMAL
MDC_IDC_STAT_EPISODE_RECENT_COUNT_DTM_START: NORMAL
MDC_IDC_STAT_EPISODE_TYPE: NORMAL
MDC_IDC_STAT_EPISODE_VENDOR_TYPE: NORMAL

## 2022-02-18 ENCOUNTER — ANCILLARY PROCEDURE (OUTPATIENT)
Dept: CARDIOLOGY | Facility: CLINIC | Age: 77
End: 2022-02-18
Attending: INTERNAL MEDICINE
Payer: MEDICARE

## 2022-02-18 DIAGNOSIS — Z95.0 CARDIAC PACEMAKER IN SITU: ICD-10-CM

## 2022-02-18 DIAGNOSIS — I44.2 ATRIOVENTRICULAR BLOCK, COMPLETE (H): ICD-10-CM

## 2022-02-18 PROCEDURE — 93296 REM INTERROG EVL PM/IDS: CPT | Performed by: INTERNAL MEDICINE

## 2022-02-18 PROCEDURE — 93294 REM INTERROG EVL PM/LDLS PM: CPT | Performed by: INTERNAL MEDICINE

## 2022-02-19 ENCOUNTER — HEALTH MAINTENANCE LETTER (OUTPATIENT)
Age: 77
End: 2022-02-19

## 2022-02-24 LAB
MDC_IDC_EPISODE_DTM: NORMAL
MDC_IDC_EPISODE_ID: NORMAL
MDC_IDC_EPISODE_TYPE: NORMAL
MDC_IDC_LEAD_IMPLANT_DT: NORMAL
MDC_IDC_LEAD_IMPLANT_DT: NORMAL
MDC_IDC_LEAD_LOCATION: NORMAL
MDC_IDC_LEAD_LOCATION: NORMAL
MDC_IDC_LEAD_LOCATION_DETAIL_1: NORMAL
MDC_IDC_LEAD_LOCATION_DETAIL_1: NORMAL
MDC_IDC_LEAD_MFG: NORMAL
MDC_IDC_LEAD_MFG: NORMAL
MDC_IDC_LEAD_MODEL: NORMAL
MDC_IDC_LEAD_MODEL: NORMAL
MDC_IDC_LEAD_POLARITY_TYPE: NORMAL
MDC_IDC_LEAD_POLARITY_TYPE: NORMAL
MDC_IDC_LEAD_SERIAL: NORMAL
MDC_IDC_LEAD_SERIAL: NORMAL
MDC_IDC_MSMT_BATTERY_DTM: NORMAL
MDC_IDC_MSMT_BATTERY_REMAINING_LONGEVITY: 96 MO
MDC_IDC_MSMT_BATTERY_REMAINING_PERCENTAGE: 95 %
MDC_IDC_MSMT_BATTERY_STATUS: NORMAL
MDC_IDC_MSMT_LEADCHNL_RA_IMPEDANCE_VALUE: 638 OHM
MDC_IDC_MSMT_LEADCHNL_RA_PACING_THRESHOLD_AMPLITUDE: 0.5 V
MDC_IDC_MSMT_LEADCHNL_RA_PACING_THRESHOLD_PULSEWIDTH: 0.4 MS
MDC_IDC_MSMT_LEADCHNL_RV_IMPEDANCE_VALUE: 651 OHM
MDC_IDC_MSMT_LEADCHNL_RV_PACING_THRESHOLD_AMPLITUDE: 1.2 V
MDC_IDC_MSMT_LEADCHNL_RV_PACING_THRESHOLD_PULSEWIDTH: 0.4 MS
MDC_IDC_PG_IMPLANT_DTM: NORMAL
MDC_IDC_PG_MFG: NORMAL
MDC_IDC_PG_MODEL: NORMAL
MDC_IDC_PG_SERIAL: NORMAL
MDC_IDC_PG_TYPE: NORMAL
MDC_IDC_SESS_CLINIC_NAME: NORMAL
MDC_IDC_SESS_DTM: NORMAL
MDC_IDC_SESS_TYPE: NORMAL
MDC_IDC_SET_BRADY_AT_MODE_SWITCH_MODE: NORMAL
MDC_IDC_SET_BRADY_AT_MODE_SWITCH_RATE: 170 {BEATS}/MIN
MDC_IDC_SET_BRADY_LOWRATE: 60 {BEATS}/MIN
MDC_IDC_SET_BRADY_MAX_SENSOR_RATE: 130 {BEATS}/MIN
MDC_IDC_SET_BRADY_MAX_TRACKING_RATE: 130 {BEATS}/MIN
MDC_IDC_SET_BRADY_MODE: NORMAL
MDC_IDC_SET_BRADY_PAV_DELAY_HIGH: 200 MS
MDC_IDC_SET_BRADY_PAV_DELAY_LOW: 300 MS
MDC_IDC_SET_BRADY_SAV_DELAY_HIGH: 200 MS
MDC_IDC_SET_BRADY_SAV_DELAY_LOW: 300 MS
MDC_IDC_SET_LEADCHNL_RA_PACING_AMPLITUDE: 2 V
MDC_IDC_SET_LEADCHNL_RA_PACING_CAPTURE_MODE: NORMAL
MDC_IDC_SET_LEADCHNL_RA_PACING_POLARITY: NORMAL
MDC_IDC_SET_LEADCHNL_RA_PACING_PULSEWIDTH: 0.4 MS
MDC_IDC_SET_LEADCHNL_RA_SENSING_ADAPTATION_MODE: NORMAL
MDC_IDC_SET_LEADCHNL_RA_SENSING_POLARITY: NORMAL
MDC_IDC_SET_LEADCHNL_RA_SENSING_SENSITIVITY: 0.25 MV
MDC_IDC_SET_LEADCHNL_RV_PACING_AMPLITUDE: 3.5 V
MDC_IDC_SET_LEADCHNL_RV_PACING_CAPTURE_MODE: NORMAL
MDC_IDC_SET_LEADCHNL_RV_PACING_POLARITY: NORMAL
MDC_IDC_SET_LEADCHNL_RV_PACING_PULSEWIDTH: 0.4 MS
MDC_IDC_SET_LEADCHNL_RV_SENSING_ADAPTATION_MODE: NORMAL
MDC_IDC_SET_LEADCHNL_RV_SENSING_POLARITY: NORMAL
MDC_IDC_SET_LEADCHNL_RV_SENSING_SENSITIVITY: 1.5 MV
MDC_IDC_SET_ZONE_DETECTION_INTERVAL: 375 MS
MDC_IDC_SET_ZONE_TYPE: NORMAL
MDC_IDC_SET_ZONE_VENDOR_TYPE: NORMAL
MDC_IDC_STAT_AT_BURDEN_PERCENT: 0 %
MDC_IDC_STAT_AT_DTM_END: NORMAL
MDC_IDC_STAT_AT_DTM_START: NORMAL
MDC_IDC_STAT_BRADY_DTM_END: NORMAL
MDC_IDC_STAT_BRADY_DTM_START: NORMAL
MDC_IDC_STAT_BRADY_RA_PERCENT_PACED: 0 %
MDC_IDC_STAT_BRADY_RV_PERCENT_PACED: 99 %
MDC_IDC_STAT_EPISODE_RECENT_COUNT: 0
MDC_IDC_STAT_EPISODE_RECENT_COUNT_DTM_END: NORMAL
MDC_IDC_STAT_EPISODE_RECENT_COUNT_DTM_START: NORMAL
MDC_IDC_STAT_EPISODE_TYPE: NORMAL
MDC_IDC_STAT_EPISODE_VENDOR_TYPE: NORMAL

## 2022-05-11 ENCOUNTER — HOSPITAL ENCOUNTER (OUTPATIENT)
Dept: CARDIOLOGY | Facility: CLINIC | Age: 77
Discharge: HOME | End: 2022-05-11
Attending: PHYSICIAN ASSISTANT | Admitting: PHYSICIAN ASSISTANT
Payer: MEDICARE

## 2022-05-11 DIAGNOSIS — I44.2 ATRIOVENTRICULAR BLOCK, COMPLETE (H): ICD-10-CM

## 2022-05-11 DIAGNOSIS — Z95.0 CARDIAC PACEMAKER IN SITU: ICD-10-CM

## 2022-05-11 LAB — LVEF ECHO: NORMAL

## 2022-05-11 PROCEDURE — 93306 TTE W/DOPPLER COMPLETE: CPT

## 2022-05-11 PROCEDURE — 93306 TTE W/DOPPLER COMPLETE: CPT | Mod: 26 | Performed by: INTERNAL MEDICINE

## 2022-05-12 ENCOUNTER — IMMUNIZATION (OUTPATIENT)
Dept: NURSING | Facility: CLINIC | Age: 77
End: 2022-05-12
Payer: MEDICARE

## 2022-05-12 PROCEDURE — 91306 COVID-19,PF,MODERNA (18+ YRS BOOSTER .25ML): CPT

## 2022-05-12 PROCEDURE — 0064A COVID-19,PF,MODERNA (18+ YRS BOOSTER .25ML): CPT

## 2022-05-13 NOTE — PROGRESS NOTES
"[facility]    I had the pleasure of seeing Anne when she came for annual follow-up.  This 76 year old sees Dr. Monson for her history of:    1. 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  S/p dual-chamber [employer] pacemaker 2/2017  2. Chronic Pain - sees integrative Medicine Team for this.   3. Depression/Anxiety/Insomnia - works with Dr. Quiles       I saw Anne 5/2021 at which time she noted she'd really not been exercising much over that winter d/t recent back surgery and fear of falling.  She noted increased fatigue with intermittent SOB and increased OAKLEY. She opted to try increasing activity/improve conditioning before to see if this made her fatigue/SOB any better. Echo in 1 year was planned unless her sxs hadn't improved.    Echo 5/2022 really looked good.    Interval History:  Doing water aerobics 4 mornings/week and really does well with this and enjoys it. Notes no CP, SOB with this. Feels stamina has improved. She's not enjoying living in Senior Living and wishes she were still in her own home.     No edema, orthopnea, PND. No dizziness, lightheadedness, syncope.     Continues to have back pain from her chronic pain.     VITALS:  Vitals: /77 (BP Location: Left arm, Patient Position: Sitting)   Pulse 83   Ht 1.575 m (5' 2\")   Wt 56.1 kg (123 lb 11.2 oz)   BMI 22.63 kg/m      Diagnostic Testing:  EKG today, which I overread, showed ASVP 80 bpm  Device interrogation today, showed 0% AP and 97%  in DDD 60/130. No true arrhythmias sesn  Echo 5/2022 showed LVEF 60-65% with proximal septal thickening noted. RV nl. Trace TR. Trace MR. Ao sclerosis  Echocardiogram 2/2017 showed EF 60 to 65%.  Left atrium was moderately dilated.  No significant valvular abnormalities.      Plan:  Annual follow-up with annual PPM check    Assessment/Plan:    1. AV " Block    S/p dual chamber [employer] PPM 2017 as above    Device interrogation today showed 0% and 97%  in DDD 60/130    Echo remains with nl LVEF    PLAN:    Continue routine PPM checks          Nichelle Curtis PA-C, MSPAS      Orders Placed This Encounter   Procedures     Follow-Up with Cardiology DONNIE     Orders Placed This Encounter   Medications     magnesium 100 MG CAPS     UNABLE TO FIND     Sig: MEDICATION NAME: stool softner: unknown brand and dosage     Multiple Vitamin (MULTIVITAMIN ADULT PO)     Digestive Enzymes (DIGESTIVE ENZYME PO)     There are no discontinued medications.      Encounter Diagnoses   Name Primary?     Atrioventricular block 2:1      Cardiac pacemaker in situ        CURRENT MEDICATIONS:  Current Outpatient Medications   Medication Sig Dispense Refill     atorvastatin (LIPITOR) 40 MG tablet Take 40 mg by mouth At Bedtime  30 tablet 1     Digestive Enzymes (DIGESTIVE ENZYME PO)        DULoxetine (CYMBALTA) 60 MG capsule Take 60 mg by mouth daily Patient is weaning off medication       eszopiclone (LUNESTA) 3 MG tablet Take 1.5 mg by mouth At Bedtime Patient taking one tablet at night       LORAZEPAM PO Take 0.5 mg by mouth At Bedtime (0.5 x 1 mg tablet = 0.5 mg dose) As needed       magnesium 100 MG CAPS        mirtazapine (REMERON) 15 MG tablet Take 1 tablet (15 mg) by mouth At Bedtime       Multiple Vitamin (MULTIVITAMIN ADULT PO)        Probiotic Product (PROBIOTIC & ACIDOPHILUS EX ST PO) Take 1 capsule by mouth daily       UNABLE TO FIND MEDICATION NAME: stool softner: unknown brand and dosage       polyethylene glycol (MIRALAX/GLYCOLAX) powder Take 17 g by mouth daily as needed for constipation (Patient not taking: Reported on 5/16/2022)       VITAMIN D, CHOLECALCIFEROL, PO Take 5,000 Units by mouth three times a week (Monday, Wednesday and Friday) (Patient not taking: Reported on 5/16/2022)         ALLERGIES     Allergies   Allergen Reactions     Blood-Group Specific Substance  "Other (See Comments)     Patient has anti-K antibody.  Blood for the transfusion might be delayed.       Dilaudid [Hydromorphone] Nausea and Vomiting     Doxepin Unknown     Other reaction(s): Intolerance-Can't Take  Not effective for sleep     Meperidine Nausea and Vomiting     Demerol     Meperidine Nausea and Vomiting     Morphine      PN: LW Reaction: Vomiting,Nausea     No Clinical Screening - See Comments      PN: LW Reaction: all opiates     Pregabalin Other (See Comments)     Not effective         Review of Systems:  Skin:  Negative     Eyes:  Positive for glasses  ENT:  Negative    Respiratory:  Positive for dyspnea on exertion  Cardiovascular:  Negative for;palpitations;chest pain;edema;syncope or near-syncope;fatigue;lightheadedness;dizziness Positive for;exercise intolerance  Gastroenterology: Negative    Genitourinary:  Negative    Musculoskeletal:  Positive for arthritis;fibromyalgia  Neurologic:  Negative    Psychiatric:  Negative    Heme/Lymph/Imm:  Negative    Endocrine:  Negative      Physical Exam:  Vitals: /77 (BP Location: Left arm, Patient Position: Sitting)   Pulse 83   Ht 1.575 m (5' 2\")   Wt 56.1 kg (123 lb 11.2 oz)   BMI 22.63 kg/m      Constitutional:  cooperative, alert and oriented, well developed, well nourished, in no acute distress        Skin:  warm and dry to the touch, no apparent skin lesions or masses noted        Head:  normocephalic, no masses or lesions        Eyes:           ENT:  no pallor or cyanosis, dentition good        Neck:  JVP normal;no carotid bruit        Chest:  normal breath sounds, clear to auscultation, normal A-P diameter, normal symmetry, normal respiratory excursion, no use of accessory muscles        Cardiac: regular rhythm       systolic ejection murmur          Abdomen:  abdomen soft        Vascular: pulses full and equal                                      Extremities and Back:  no deformities, clubbing, cyanosis, erythema observed;no edema    "     Neurological:  no gross motor deficits            PAST MEDICAL HISTORY:  Past Medical History:   Diagnosis Date     Anemia      Carpal tunnel syndrome      Chronic fatigue syndrome 11/00     Complication of anesthesia      Decreased libido      Depression with anxiety      Fatigue      Gastroesophageal reflux disease      HA (headache)      History of blood transfusion      [facility] FOLLOWING JOINT REPLACEMENT 7/23/2007     Irritable bowel syndrome 11/00     Myalgia and myositis, unspecified 11/00     Near syncope 5/17/2017     Neutropenia 00     Noninfectious ileitis      Osteoarthritis     rt knee, back     Osteoarthrosis, unspecified whether generalized or localized, hand 11/00     Osteoarthrosis, unspecified whether generalized or localized, lower leg 11/00     Other chronic pain     back     Pacemaker      PONV (postoperative nausea and vomiting)      Pure hypercholesterolemia 11/00     RBBB      Renal disease     mld     Uncomplicated asthma      Urticaria, unspecified 00       PAST SURGICAL HISTORY:  Past Surgical History:   Procedure Laterality Date     ARTHROPLASTY KNEE Left      ARTHROPLASTY KNEE Right     partial     ARTHROPLASTY REVISION HIP Left 2/3/2017    Procedure: ARTHROPLASTY REVISION HIP;  Surgeon: Juan Moncada MD;  Location:  OR     CARDIAC SURGERY  2017    pacemaker     COLONOSCOPY N/A 1/15/2019    Procedure: COLONOSCOPY;  Surgeon: Eliane Barone MD;  Location:  GI     EYE SURGERY      zoila cataract surgery     GI SURGERY      hemorrhoid repair     GYN SURGERY      tubal ligation     OPTICAL TRACKING SYSTEM FUSION SPINE POSTERIOR LUMBAR TWO LEVELS N/A 1/9/2020    Procedure: L3-5 decompressive laminectomies with L4-5 Tranforaminal Lumbar Interbody Fusion with L3-5 posterior lateral fusion;  Surgeon: Anjel Bernardo MD;  Location:  OR     ORTHOPEDIC SURGERY      right sabine-arthroplasty, trigger finger repair, left knee arthroscopy and replacement, bilateral hip  replacement,     ZZC NONSPECIFIC PROCEDURE      s/p Rt. Carpal tunnel release     ZZC NONSPECIFIC PROCEDURE      s/p bilat Tubal ligation       FAMILY HISTORY:  Family History   Problem Relation Age of Onset     Hypertension Mother      Alzheimer Disease Mother         Memory issues- unsure if alzheimers     Arthritis Mother         osteoarthritis     Depression Mother      Gastrointestinal Disease Mother         acid reflux     Allergies Father      Neurologic Disorder Father         migraines     Respiratory Father         chronic brochitis- born during a flu epidemic in 1918     Cerebrovascular Disease Maternal Grandmother         series of TIA's     Diabetes Maternal Grandfather         adult onset     Cancer Maternal Grandfather         of stomach or esophagus-heavy smoker     C.A.D. 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 1974, lost a lot of blood-given 6? 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, had vaccines Hep B, Flu     Hobby Hazards No     Sleep Concern No     Comment: Nortriptyline helps     Stress Concern No     Comment: 3 children     Weight Concern No     Special Diet No     Back Care Yes     Comment: Low back pain, has osteoarthritis     Exercise  Yes     Comment: 3-4 times a week     Bike Helmet No     Seat Belt Yes     Self-Exams No   Social History Narrative    Last pap:6-13-02    Last Lipid Panel: 8-6-03    Last Mammo: 7-18-03,WNL    Last DEXA: Unsure    Last Colonoscopy: 06-22-01    Last Tetanus:7-10-03    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

## 2022-05-16 ENCOUNTER — OFFICE VISIT (OUTPATIENT)
Dept: CARDIOLOGY | Facility: CLINIC | Age: 77
End: 2022-05-16
Attending: PHYSICIAN ASSISTANT
Payer: MEDICARE

## 2022-05-16 VITALS
SYSTOLIC BLOOD PRESSURE: 115 MMHG | HEIGHT: 62 IN | BODY MASS INDEX: 22.76 KG/M2 | DIASTOLIC BLOOD PRESSURE: 77 MMHG | HEART RATE: 83 BPM | WEIGHT: 123.7 LBS

## 2022-05-16 DIAGNOSIS — I44.2 ATRIOVENTRICULAR BLOCK, COMPLETE (H): ICD-10-CM

## 2022-05-16 DIAGNOSIS — Z95.0 CARDIAC PACEMAKER IN SITU: ICD-10-CM

## 2022-05-16 PROCEDURE — 93280 PM DEVICE PROGR EVAL DUAL: CPT | Performed by: INTERNAL MEDICINE

## 2022-05-16 PROCEDURE — 93000 ELECTROCARDIOGRAM COMPLETE: CPT | Mod: 59 | Performed by: PHYSICIAN ASSISTANT

## 2022-05-16 PROCEDURE — 99214 OFFICE O/P EST MOD 30 MIN: CPT | Mod: 25 | Performed by: PHYSICIAN ASSISTANT

## 2022-05-16 NOTE — LETTER
"5/16/2022    Nevaeh Wood MD  [address]  [place] [address]    RE: Anne Rangel       Dear Colleague,     I had the pleasure of seeing Anne Rangel in the [facility].  [facility]    I had the pleasure of seeing Anne when she came for annual follow-up.  This 76 year old sees Dr. Monson for her history of:    1. 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  S/p dual-chamber [employer] pacemaker 2/2017  2. Chronic Pain - sees integrative Medicine Team for this.   3. Depression/Anxiety/Insomnia - works with Dr. Quiles       I saw Anne 5/2021 at which time she noted she'd really not been exercising much over that winter d/t recent back surgery and fear of falling.  She noted increased fatigue with intermittent SOB and increased OAK[place]. She opted to try increasing activity/improve conditioning before to see if this made her fatigue/SOB any better. Echo in 1 year was planned unless her sxs hadn't improved.    Echo 5/2022 really looked good.    Interval History:  Doing water aerobics 4 mornings/week and really does well with this and enjoys it. Notes no CP, SOB with this. Feels stamina has improved. She's not enjoying living in Senior Living and wishes she were still in her own home.     No edema, orthopnea, PND. No dizziness, lightheadedness, syncope.     Continues to have back pain from her chronic pain.     VITALS:  Vitals: /77 (BP Location: Left arm, Patient Position: Sitting)   Pulse 83   Ht 1.575 m (5' 2\")   Wt 56.1 kg (123 lb 11.2 oz)   BMI 22.63 kg/m      Diagnostic Testing:  EKG today, which I overread, showed ASVP 80 bpm  Device interrogation today, showed 0% AP and 97%  in DDD 60/130. No true arrhythmias sesn  Echo 5/2022 showed LVEF 60-65% with proximal septal thickening noted. RV nl. Trace TR. Trace MR. Ao " sclerosis  Echocardiogram 2/2017 showed EF 60 to 65%.  Left atrium was moderately dilated.  No significant valvular abnormalities.      Plan:  Annual follow-up with annual PPM check    Assessment/Plan:    1. AV Block    S/p dual chamber [employer] PPM 2017 as above    Device interrogation today showed 0% and 97%  in DDD 60/130    Echo remains with nl LVEF    PLAN:    Continue routine PPM checks          Nichelle Curtis PA-C, MSPAS      Orders Placed This Encounter   Procedures     Follow-Up with Cardiology DONNIE     Orders Placed This Encounter   Medications     magnesium 100 MG CAPS     UNABLE TO FIND     Sig: MEDICATION NAME: stool softner: unknown brand and dosage     Multiple Vitamin (MULTIVITAMIN ADULT PO)     Digestive Enzymes (DIGESTIVE ENZYME PO)     There are no discontinued medications.      Encounter Diagnoses   Name Primary?     Atrioventricular block 2:1      Cardiac pacemaker in situ        CURRENT MEDICATIONS:  Current Outpatient Medications   Medication Sig Dispense Refill     atorvastatin (LIPITOR) 40 MG tablet Take 40 mg by mouth At Bedtime  30 tablet 1     Digestive Enzymes (DIGESTIVE ENZYME PO)        DULoxetine (CYMBALTA) 60 MG capsule Take 60 mg by mouth daily Patient is weaning off medication       eszopiclone (LUNESTA) 3 MG tablet Take 1.5 mg by mouth At Bedtime Patient taking one tablet at night       LORAZEPAM PO Take 0.5 mg by mouth At Bedtime (0.5 x 1 mg tablet = 0.5 mg dose) As needed       magnesium 100 MG CAPS        mirtazapine (REMERON) 15 MG tablet Take 1 tablet (15 mg) by mouth At Bedtime       Multiple Vitamin (MULTIVITAMIN ADULT PO)        Probiotic Product (PROBIOTIC & ACIDOPHILUS EX ST PO) Take 1 capsule by mouth daily       UNABLE TO FIND MEDICATION NAME: stool softner: unknown brand and dosage       polyethylene glycol (MIRALAX/GLYCOLAX) powder Take 17 g by mouth daily as needed for constipation (Patient not taking: Reported on 5/16/2022)       VITAMIN D, CHOLECALCIFEROL,  "PO Take 5,000 Units by mouth three times a week (Monday, Wednesday and Friday) (Patient not taking: Reported on 5/16/2022)         ALLERGIES     Allergies   Allergen Reactions     Blood-Group Specific Substance Other (See Comments)     Patient has anti-K antibody.  Blood for the transfusion might be delayed.       Dilaudid [Hydromorphone] Nausea and Vomiting     Doxepin Unknown     Other reaction(s): Intolerance-Can't Take  Not effective for sleep     Meperidine Nausea and Vomiting     Demerol     Meperidine Nausea and Vomiting     Morphine      PN: LW Reaction: Vomiting,Nausea     No Clinical Screening - See Comments      PN: LW Reaction: all opiates     Pregabalin Other (See Comments)     Not effective         Review of Systems:  Skin:  Negative     Eyes:  Positive for glasses  ENT:  Negative    Respiratory:  Positive for dyspnea on exertion  Cardiovascular:  Negative for;palpitations;chest pain;edema;syncope or near-syncope;fatigue;lightheadedness;dizziness Positive for;exercise intolerance  Gastroenterology: Negative    Genitourinary:  Negative    Musculoskeletal:  Positive for arthritis;fibromyalgia  Neurologic:  Negative    Psychiatric:  Negative    Heme/Lymph/Imm:  Negative    Endocrine:  Negative      Physical Exam:  Vitals: /77 (BP Location: Left arm, Patient Position: Sitting)   Pulse 83   Ht 1.575 m (5' 2\")   Wt 56.1 kg (123 lb 11.2 oz)   BMI 22.63 kg/m      Constitutional:  cooperative, alert and oriented, well developed, well nourished, in no acute distress        Skin:  warm and dry to the touch, no apparent skin lesions or masses noted        Head:  normocephalic, no masses or lesions        Eyes:           ENT:  no pallor or cyanosis, dentition good        Neck:  JVP normal;no carotid bruit        Chest:  normal breath sounds, clear to auscultation, normal A-P diameter, normal symmetry, normal respiratory excursion, no use of accessory muscles        Cardiac: regular rhythm       systolic " ejection murmur          Abdomen:  abdomen soft        Vascular: pulses full and equal                                      Extremities and Back:  no deformities, clubbing, cyanosis, erythema observed;no edema        Neurological:  no gross motor deficits            PAST MEDICAL HISTORY:  Past Medical History:   Diagnosis Date     Anemia      Carpal tunnel syndrome      Chronic fatigue syndrome 11/00     Complication of anesthesia      Decreased libido      Depression with anxiety      Fatigue      Gastroesophageal reflux disease      HA (headache)      History of blood transfusion      [facility] FOLLOWING JOINT REPLACEMENT 7/23/2007     Irritable bowel syndrome 11/00     Myalgia and myositis, unspecified 11/00     Near syncope 5/17/2017     Neutropenia 00     Noninfectious ileitis      Osteoarthritis     rt knee, back     Osteoarthrosis, unspecified whether generalized or localized, hand 11/00     Osteoarthrosis, unspecified whether generalized or localized, lower leg 11/00     Other chronic pain     back     Pacemaker      PONV (postoperative nausea and vomiting)      Pure hypercholesterolemia 11/00     RBBB      Renal disease     mld     Uncomplicated asthma      Urticaria, unspecified 00       PAST SURGICAL HISTORY:  Past Surgical History:   Procedure Laterality Date     ARTHROPLASTY KNEE Left      ARTHROPLASTY KNEE Right     partial     ARTHROPLASTY REVISION HIP Left 2/3/2017    Procedure: ARTHROPLASTY REVISION HIP;  Surgeon: Juan Moncada MD;  Location:  OR     CARDIAC SURGERY  2017    pacemaker     COLONOSCOPY N/A 1/15/2019    Procedure: COLONOSCOPY;  Surgeon: Eliane Barone MD;  Location:  GI     EYE SURGERY      zoila cataract surgery     GI SURGERY      hemorrhoid repair     GYN SURGERY      tubal ligation     OPTICAL TRACKING SYSTEM FUSION SPINE POSTERIOR LUMBAR TWO LEVELS N/A 1/9/2020    Procedure: L3-5 decompressive laminectomies with L4-5 Tranforaminal Lumbar Interbody Fusion with L3-5  posterior lateral fusion;  Surgeon: Anjel Bernardo MD;  Location: [facility] OR     ORTHOPEDIC SURGERY      right sabine-arthroplasty, trigger finger repair, left knee arthroscopy and replacement, bilateral hip replacement,     Z NONSPECIFIC PROCEDURE      s/p Rt. Carpal tunnel release     ZZC NONSPECIFIC PROCEDURE      s/p bilat Tubal ligation       FAMILY HISTORY:  Family History   Problem Relation Age of Onset     Hypertension Mother      Alzheimer Disease Mother         Memory issues- unsure if alzheimers     Arthritis Mother         osteoarthritis     Depression Mother      Gastrointestinal Disease Mother         acid reflux     Allergies Father      Neurologic Disorder Father         migraines     Respiratory Father         chronic brochitis- born during a flu epidemic in 1918     Cerebrovascular Disease Maternal Grandmother         series of TIA's     Diabetes Maternal Grandfather         adult onset     Cancer Maternal Grandfather         of stomach or esophagus-heavy smoker     C.A.D. Paternal Grandmother         angina     Depression Daughter      Neurologic Disorder Sister         migraines       SOCIAL HISTORY:  Social History     Socioeconomic History     Marital status:      Spouse name: Christiano Rangel     Number of children: 3     Years of education: Masters+     Highest education level: None   Occupational History     Occupation: [employer]     Employer: [employer]   Tobacco Use     Smoking status: Never Smoker     Smokeless tobacco: Never Used   Substance and Sexual Activity     Alcohol use: Yes     Comment: 1 Drink with dinner     Drug use: No     Sexual activity: Yes     Partners: Male   Other Topics Concern      Service No     Blood Transfusions Yes     Comment: C-[employer]. 1974, lost a lot of blood-given 6? 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[place], had vaccines Hep B, Flu     Hobby Hazards No      Sleep Concern No     Comment: Nortriptyline helps     Stress Concern No     Comment: 3 children     Weight Concern No     Special Diet No     Back Care Yes     Comment: Low back pain, has osteoarthritis     Exercise Yes     Comment: 3-4 times a week     Bike Helmet No     Seat Belt Yes     Self-Exams No   Social History Narrative    Last pap:6-13-02    Last Lipid Panel: 8-6-03    Last Mammo: 7-18-03,WNL    Last DEXA: Unsure    Last Colonoscopy: 06-22-01    Last Tetanus:7-10-03    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             Thank you for allowing me to participate in the care of your patient.      Sincerely,     Alison Curtis PA-C     [facility]  cc:   Alison Curtis PA-C  [address]  [place] [address]

## 2022-05-16 NOTE — PATIENT INSTRUCTIONS
Anne - it was great to see you today!    EKG today looked good!  Device check showed normal pacer function  Reviewed that you've been doing water aerobics and it's been going well!   Echo looked great! Normal valves and function of heart    PLAN:  See us 1 year with annual pacer check. CALL if issues prior! [phone]

## 2022-05-18 LAB
MDC_IDC_LEAD_IMPLANT_DT: NORMAL
MDC_IDC_LEAD_IMPLANT_DT: NORMAL
MDC_IDC_LEAD_LOCATION: NORMAL
MDC_IDC_LEAD_LOCATION: NORMAL
MDC_IDC_LEAD_LOCATION_DETAIL_1: NORMAL
MDC_IDC_LEAD_LOCATION_DETAIL_1: NORMAL
MDC_IDC_LEAD_MFG: NORMAL
MDC_IDC_LEAD_MFG: NORMAL
MDC_IDC_LEAD_MODEL: NORMAL
MDC_IDC_LEAD_MODEL: NORMAL
MDC_IDC_LEAD_POLARITY_TYPE: NORMAL
MDC_IDC_LEAD_POLARITY_TYPE: NORMAL
MDC_IDC_LEAD_SERIAL: NORMAL
MDC_IDC_LEAD_SERIAL: NORMAL
MDC_IDC_MSMT_BATTERY_DTM: NORMAL
MDC_IDC_MSMT_BATTERY_REMAINING_LONGEVITY: 78 MO
MDC_IDC_MSMT_BATTERY_REMAINING_PERCENTAGE: 75 %
MDC_IDC_MSMT_BATTERY_STATUS: NORMAL
MDC_IDC_MSMT_LEADCHNL_RA_IMPEDANCE_VALUE: 586 OHM
MDC_IDC_MSMT_LEADCHNL_RA_PACING_THRESHOLD_AMPLITUDE: 0.6 V
MDC_IDC_MSMT_LEADCHNL_RA_PACING_THRESHOLD_PULSEWIDTH: 0.4 MS
MDC_IDC_MSMT_LEADCHNL_RV_IMPEDANCE_VALUE: 671 OHM
MDC_IDC_MSMT_LEADCHNL_RV_PACING_THRESHOLD_AMPLITUDE: 1 V
MDC_IDC_MSMT_LEADCHNL_RV_PACING_THRESHOLD_PULSEWIDTH: 0.4 MS
MDC_IDC_PG_IMPLANT_DTM: NORMAL
MDC_IDC_PG_MFG: NORMAL
MDC_IDC_PG_MODEL: NORMAL
MDC_IDC_PG_SERIAL: NORMAL
MDC_IDC_PG_TYPE: NORMAL
MDC_IDC_SESS_CLINIC_NAME: NORMAL
MDC_IDC_SESS_DTM: NORMAL
MDC_IDC_SESS_TYPE: NORMAL
MDC_IDC_SET_BRADY_AT_MODE_SWITCH_MODE: NORMAL
MDC_IDC_SET_BRADY_AT_MODE_SWITCH_RATE: 170 {BEATS}/MIN
MDC_IDC_SET_BRADY_LOWRATE: 60 {BEATS}/MIN
MDC_IDC_SET_BRADY_MAX_SENSOR_RATE: 130 {BEATS}/MIN
MDC_IDC_SET_BRADY_MAX_TRACKING_RATE: 130 {BEATS}/MIN
MDC_IDC_SET_BRADY_MODE: NORMAL
MDC_IDC_SET_BRADY_PAV_DELAY_HIGH: 200 MS
MDC_IDC_SET_BRADY_PAV_DELAY_LOW: 300 MS
MDC_IDC_SET_BRADY_SAV_DELAY_HIGH: 200 MS
MDC_IDC_SET_BRADY_SAV_DELAY_LOW: 300 MS
MDC_IDC_SET_LEADCHNL_RA_PACING_AMPLITUDE: 2 V
MDC_IDC_SET_LEADCHNL_RA_PACING_CAPTURE_MODE: NORMAL
MDC_IDC_SET_LEADCHNL_RA_PACING_POLARITY: NORMAL
MDC_IDC_SET_LEADCHNL_RA_PACING_PULSEWIDTH: 0.4 MS
MDC_IDC_SET_LEADCHNL_RA_SENSING_ADAPTATION_MODE: NORMAL
MDC_IDC_SET_LEADCHNL_RA_SENSING_POLARITY: NORMAL
MDC_IDC_SET_LEADCHNL_RA_SENSING_SENSITIVITY: 0.25 MV
MDC_IDC_SET_LEADCHNL_RV_PACING_AMPLITUDE: 3.5 V
MDC_IDC_SET_LEADCHNL_RV_PACING_CAPTURE_MODE: NORMAL
MDC_IDC_SET_LEADCHNL_RV_PACING_POLARITY: NORMAL
MDC_IDC_SET_LEADCHNL_RV_PACING_PULSEWIDTH: 0.4 MS
MDC_IDC_SET_LEADCHNL_RV_SENSING_ADAPTATION_MODE: NORMAL
MDC_IDC_SET_LEADCHNL_RV_SENSING_POLARITY: NORMAL
MDC_IDC_SET_LEADCHNL_RV_SENSING_SENSITIVITY: 1.5 MV
MDC_IDC_SET_ZONE_DETECTION_INTERVAL: 375 MS
MDC_IDC_SET_ZONE_TYPE: NORMAL
MDC_IDC_SET_ZONE_VENDOR_TYPE: NORMAL
MDC_IDC_STAT_AT_BURDEN_PERCENT: 0 %
MDC_IDC_STAT_AT_DTM_END: NORMAL
MDC_IDC_STAT_AT_DTM_START: NORMAL
MDC_IDC_STAT_BRADY_DTM_END: NORMAL
MDC_IDC_STAT_BRADY_DTM_START: NORMAL
MDC_IDC_STAT_BRADY_RA_PERCENT_PACED: 0 %
MDC_IDC_STAT_BRADY_RV_PERCENT_PACED: 97 %
MDC_IDC_STAT_EPISODE_RECENT_COUNT: 0
MDC_IDC_STAT_EPISODE_RECENT_COUNT_DTM_END: NORMAL
MDC_IDC_STAT_EPISODE_RECENT_COUNT_DTM_START: NORMAL
MDC_IDC_STAT_EPISODE_TYPE: NORMAL
MDC_IDC_STAT_EPISODE_VENDOR_TYPE: NORMAL

## 2022-08-22 ENCOUNTER — ANCILLARY PROCEDURE (OUTPATIENT)
Dept: CARDIOLOGY | Facility: CLINIC | Age: 77
End: 2022-08-22
Attending: INTERNAL MEDICINE
Payer: MEDICARE

## 2022-08-22 DIAGNOSIS — Z95.0 CARDIAC PACEMAKER IN SITU: ICD-10-CM

## 2022-08-22 PROCEDURE — 93296 REM INTERROG EVL PM/IDS: CPT | Performed by: INTERNAL MEDICINE

## 2022-08-22 PROCEDURE — 93294 REM INTERROG EVL PM/LDLS PM: CPT | Performed by: INTERNAL MEDICINE

## 2022-08-23 LAB
MDC_IDC_EPISODE_DTM: NORMAL
MDC_IDC_EPISODE_ID: NORMAL
MDC_IDC_EPISODE_TYPE: NORMAL
MDC_IDC_LEAD_IMPLANT_DT: NORMAL
MDC_IDC_LEAD_IMPLANT_DT: NORMAL
MDC_IDC_LEAD_LOCATION: NORMAL
MDC_IDC_LEAD_LOCATION: NORMAL
MDC_IDC_LEAD_LOCATION_DETAIL_1: NORMAL
MDC_IDC_LEAD_LOCATION_DETAIL_1: NORMAL
MDC_IDC_LEAD_MFG: NORMAL
MDC_IDC_LEAD_MFG: NORMAL
MDC_IDC_LEAD_MODEL: NORMAL
MDC_IDC_LEAD_MODEL: NORMAL
MDC_IDC_LEAD_POLARITY_TYPE: NORMAL
MDC_IDC_LEAD_POLARITY_TYPE: NORMAL
MDC_IDC_LEAD_SERIAL: NORMAL
MDC_IDC_LEAD_SERIAL: NORMAL
MDC_IDC_MSMT_BATTERY_DTM: NORMAL
MDC_IDC_MSMT_BATTERY_REMAINING_LONGEVITY: 96 MO
MDC_IDC_MSMT_BATTERY_REMAINING_PERCENTAGE: 93 %
MDC_IDC_MSMT_BATTERY_STATUS: NORMAL
MDC_IDC_MSMT_LEADCHNL_RA_IMPEDANCE_VALUE: 752 OHM
MDC_IDC_MSMT_LEADCHNL_RA_PACING_THRESHOLD_AMPLITUDE: 0.5 V
MDC_IDC_MSMT_LEADCHNL_RA_PACING_THRESHOLD_PULSEWIDTH: 0.4 MS
MDC_IDC_MSMT_LEADCHNL_RV_IMPEDANCE_VALUE: 640 OHM
MDC_IDC_MSMT_LEADCHNL_RV_PACING_THRESHOLD_AMPLITUDE: 1 V
MDC_IDC_MSMT_LEADCHNL_RV_PACING_THRESHOLD_PULSEWIDTH: 0.4 MS
MDC_IDC_PG_IMPLANT_DTM: NORMAL
MDC_IDC_PG_MFG: NORMAL
MDC_IDC_PG_MODEL: NORMAL
MDC_IDC_PG_SERIAL: NORMAL
MDC_IDC_PG_TYPE: NORMAL
MDC_IDC_SESS_CLINIC_NAME: NORMAL
MDC_IDC_SESS_DTM: NORMAL
MDC_IDC_SESS_TYPE: NORMAL
MDC_IDC_SET_BRADY_AT_MODE_SWITCH_MODE: NORMAL
MDC_IDC_SET_BRADY_AT_MODE_SWITCH_RATE: 170 {BEATS}/MIN
MDC_IDC_SET_BRADY_LOWRATE: 60 {BEATS}/MIN
MDC_IDC_SET_BRADY_MAX_SENSOR_RATE: 130 {BEATS}/MIN
MDC_IDC_SET_BRADY_MAX_TRACKING_RATE: 130 {BEATS}/MIN
MDC_IDC_SET_BRADY_MODE: NORMAL
MDC_IDC_SET_BRADY_PAV_DELAY_HIGH: 200 MS
MDC_IDC_SET_BRADY_PAV_DELAY_LOW: 300 MS
MDC_IDC_SET_BRADY_SAV_DELAY_HIGH: 200 MS
MDC_IDC_SET_BRADY_SAV_DELAY_LOW: 300 MS
MDC_IDC_SET_LEADCHNL_RA_PACING_AMPLITUDE: 2 V
MDC_IDC_SET_LEADCHNL_RA_PACING_CAPTURE_MODE: NORMAL
MDC_IDC_SET_LEADCHNL_RA_PACING_POLARITY: NORMAL
MDC_IDC_SET_LEADCHNL_RA_PACING_PULSEWIDTH: 0.4 MS
MDC_IDC_SET_LEADCHNL_RA_SENSING_ADAPTATION_MODE: NORMAL
MDC_IDC_SET_LEADCHNL_RA_SENSING_POLARITY: NORMAL
MDC_IDC_SET_LEADCHNL_RA_SENSING_SENSITIVITY: 0.25 MV
MDC_IDC_SET_LEADCHNL_RV_PACING_AMPLITUDE: 1.5 V
MDC_IDC_SET_LEADCHNL_RV_PACING_CAPTURE_MODE: NORMAL
MDC_IDC_SET_LEADCHNL_RV_PACING_POLARITY: NORMAL
MDC_IDC_SET_LEADCHNL_RV_PACING_PULSEWIDTH: 0.4 MS
MDC_IDC_SET_LEADCHNL_RV_SENSING_ADAPTATION_MODE: NORMAL
MDC_IDC_SET_LEADCHNL_RV_SENSING_POLARITY: NORMAL
MDC_IDC_SET_LEADCHNL_RV_SENSING_SENSITIVITY: 1.5 MV
MDC_IDC_SET_ZONE_DETECTION_INTERVAL: 375 MS
MDC_IDC_SET_ZONE_TYPE: NORMAL
MDC_IDC_SET_ZONE_VENDOR_TYPE: NORMAL
MDC_IDC_STAT_AT_BURDEN_PERCENT: 0 %
MDC_IDC_STAT_AT_DTM_END: NORMAL
MDC_IDC_STAT_AT_DTM_START: NORMAL
MDC_IDC_STAT_BRADY_DTM_END: NORMAL
MDC_IDC_STAT_BRADY_DTM_START: NORMAL
MDC_IDC_STAT_BRADY_RA_PERCENT_PACED: 0 %
MDC_IDC_STAT_BRADY_RV_PERCENT_PACED: 97 %
MDC_IDC_STAT_EPISODE_RECENT_COUNT: 0
MDC_IDC_STAT_EPISODE_RECENT_COUNT_DTM_END: NORMAL
MDC_IDC_STAT_EPISODE_RECENT_COUNT_DTM_START: NORMAL
MDC_IDC_STAT_EPISODE_TYPE: NORMAL
MDC_IDC_STAT_EPISODE_VENDOR_TYPE: NORMAL

## 2022-10-16 ENCOUNTER — HEALTH MAINTENANCE LETTER (OUTPATIENT)
Age: 77
End: 2022-10-16

## 2022-11-28 ENCOUNTER — ANCILLARY PROCEDURE (OUTPATIENT)
Dept: CARDIOLOGY | Facility: CLINIC | Age: 77
End: 2022-11-28
Attending: INTERNAL MEDICINE
Payer: MEDICARE

## 2022-11-28 DIAGNOSIS — Z95.0 CARDIAC PACEMAKER IN SITU: Primary | ICD-10-CM

## 2022-11-28 DIAGNOSIS — Z95.0 CARDIAC PACEMAKER IN SITU: ICD-10-CM

## 2022-11-28 PROCEDURE — 93294 REM INTERROG EVL PM/LDLS PM: CPT | Performed by: INTERNAL MEDICINE

## 2022-11-28 PROCEDURE — 93296 REM INTERROG EVL PM/IDS: CPT | Performed by: INTERNAL MEDICINE

## 2022-12-05 LAB
MDC_IDC_EPISODE_DTM: NORMAL
MDC_IDC_EPISODE_DTM: NORMAL
MDC_IDC_EPISODE_ID: NORMAL
MDC_IDC_EPISODE_ID: NORMAL
MDC_IDC_EPISODE_TYPE: NORMAL
MDC_IDC_EPISODE_TYPE: NORMAL
MDC_IDC_LEAD_IMPLANT_DT: NORMAL
MDC_IDC_LEAD_IMPLANT_DT: NORMAL
MDC_IDC_LEAD_LOCATION: NORMAL
MDC_IDC_LEAD_LOCATION: NORMAL
MDC_IDC_LEAD_LOCATION_DETAIL_1: NORMAL
MDC_IDC_LEAD_LOCATION_DETAIL_1: NORMAL
MDC_IDC_LEAD_MFG: NORMAL
MDC_IDC_LEAD_MFG: NORMAL
MDC_IDC_LEAD_MODEL: NORMAL
MDC_IDC_LEAD_MODEL: NORMAL
MDC_IDC_LEAD_POLARITY_TYPE: NORMAL
MDC_IDC_LEAD_POLARITY_TYPE: NORMAL
MDC_IDC_LEAD_SERIAL: NORMAL
MDC_IDC_LEAD_SERIAL: NORMAL
MDC_IDC_MSMT_BATTERY_DTM: NORMAL
MDC_IDC_MSMT_BATTERY_REMAINING_LONGEVITY: 90 MO
MDC_IDC_MSMT_BATTERY_REMAINING_PERCENTAGE: 87 %
MDC_IDC_MSMT_BATTERY_STATUS: NORMAL
MDC_IDC_MSMT_LEADCHNL_RA_IMPEDANCE_VALUE: 777 OHM
MDC_IDC_MSMT_LEADCHNL_RA_PACING_THRESHOLD_AMPLITUDE: 0.5 V
MDC_IDC_MSMT_LEADCHNL_RA_PACING_THRESHOLD_PULSEWIDTH: 0.4 MS
MDC_IDC_MSMT_LEADCHNL_RV_IMPEDANCE_VALUE: 689 OHM
MDC_IDC_MSMT_LEADCHNL_RV_PACING_THRESHOLD_AMPLITUDE: 1.1 V
MDC_IDC_MSMT_LEADCHNL_RV_PACING_THRESHOLD_PULSEWIDTH: 0.4 MS
MDC_IDC_PG_IMPLANT_DTM: NORMAL
MDC_IDC_PG_MFG: NORMAL
MDC_IDC_PG_MODEL: NORMAL
MDC_IDC_PG_SERIAL: NORMAL
MDC_IDC_PG_TYPE: NORMAL
MDC_IDC_SESS_CLINIC_NAME: NORMAL
MDC_IDC_SESS_DTM: NORMAL
MDC_IDC_SESS_TYPE: NORMAL
MDC_IDC_SET_BRADY_AT_MODE_SWITCH_MODE: NORMAL
MDC_IDC_SET_BRADY_AT_MODE_SWITCH_RATE: 170 {BEATS}/MIN
MDC_IDC_SET_BRADY_LOWRATE: 60 {BEATS}/MIN
MDC_IDC_SET_BRADY_MAX_SENSOR_RATE: 130 {BEATS}/MIN
MDC_IDC_SET_BRADY_MAX_TRACKING_RATE: 130 {BEATS}/MIN
MDC_IDC_SET_BRADY_MODE: NORMAL
MDC_IDC_SET_BRADY_PAV_DELAY_HIGH: 200 MS
MDC_IDC_SET_BRADY_PAV_DELAY_LOW: 300 MS
MDC_IDC_SET_BRADY_SAV_DELAY_HIGH: 200 MS
MDC_IDC_SET_BRADY_SAV_DELAY_LOW: 300 MS
MDC_IDC_SET_LEADCHNL_RA_PACING_AMPLITUDE: 2 V
MDC_IDC_SET_LEADCHNL_RA_PACING_CAPTURE_MODE: NORMAL
MDC_IDC_SET_LEADCHNL_RA_PACING_POLARITY: NORMAL
MDC_IDC_SET_LEADCHNL_RA_PACING_PULSEWIDTH: 0.4 MS
MDC_IDC_SET_LEADCHNL_RA_SENSING_ADAPTATION_MODE: NORMAL
MDC_IDC_SET_LEADCHNL_RA_SENSING_POLARITY: NORMAL
MDC_IDC_SET_LEADCHNL_RA_SENSING_SENSITIVITY: 0.25 MV
MDC_IDC_SET_LEADCHNL_RV_PACING_AMPLITUDE: 1.4 V
MDC_IDC_SET_LEADCHNL_RV_PACING_CAPTURE_MODE: NORMAL
MDC_IDC_SET_LEADCHNL_RV_PACING_POLARITY: NORMAL
MDC_IDC_SET_LEADCHNL_RV_PACING_PULSEWIDTH: 0.4 MS
MDC_IDC_SET_LEADCHNL_RV_SENSING_ADAPTATION_MODE: NORMAL
MDC_IDC_SET_LEADCHNL_RV_SENSING_POLARITY: NORMAL
MDC_IDC_SET_LEADCHNL_RV_SENSING_SENSITIVITY: 1.5 MV
MDC_IDC_SET_ZONE_DETECTION_INTERVAL: 375 MS
MDC_IDC_SET_ZONE_TYPE: NORMAL
MDC_IDC_SET_ZONE_VENDOR_TYPE: NORMAL
MDC_IDC_STAT_AT_BURDEN_PERCENT: 0 %
MDC_IDC_STAT_AT_DTM_END: NORMAL
MDC_IDC_STAT_AT_DTM_START: NORMAL
MDC_IDC_STAT_BRADY_DTM_END: NORMAL
MDC_IDC_STAT_BRADY_DTM_START: NORMAL
MDC_IDC_STAT_BRADY_RA_PERCENT_PACED: 0 %
MDC_IDC_STAT_BRADY_RV_PERCENT_PACED: 98 %
MDC_IDC_STAT_EPISODE_RECENT_COUNT: 0
MDC_IDC_STAT_EPISODE_RECENT_COUNT_DTM_END: NORMAL
MDC_IDC_STAT_EPISODE_RECENT_COUNT_DTM_START: NORMAL
MDC_IDC_STAT_EPISODE_TYPE: NORMAL
MDC_IDC_STAT_EPISODE_VENDOR_TYPE: NORMAL

## 2023-01-24 ENCOUNTER — DOCUMENTATION ONLY (OUTPATIENT)
Dept: CARDIOLOGY | Facility: CLINIC | Age: 78
End: 2023-01-24
Payer: MEDICARE

## 2023-01-24 NOTE — PROGRESS NOTES
Is the implanted device safe for MRI Exam?  Yes  Is this device 3T compatible? Yes  Device Type: Pacemaker      Device Information: [employer], Dual Chamber      Cardiology Orders for Device Programming     -- Yes -- The patient has a MRI conditional pulse generator and leads from the same     -- Yes -- The pulse generator and leads have been implanted for at least 6 weeks    -- Yes-- The device is implanted in the right or left pectoral region    -- Yes -- There are not any additional active cardiac devices, abandoned leads, lead extenders or adapters    -- Yes -- The device lead impedance measurements are within the normal range. (Manufacture recommendations: Medtronic Advisa and Revo 200-1,500 ohms; [employer]tronic ICD and CRT's 200-3000 ohms and defibrillation lead impedance   ohms)    -- Yes -- If the patient is pacemaker dependent the thresholds are less than or equal to 2.0V @ 0.4ms.     Date of last Remote Device check: 11/28/2022 (auto cap on)   Results of last in-office/remote Device check:  1.   Right atrium impedance: 777 Ohms   2.   Right ventricle impedance: 689 Ohms   3.   Left ventricle impedance: n/a      4.   Right atrium threshold: 0.5V @ 0.4ms   5.   Right ventricle threshold: 1.1V @ 0.4 ms   6.   Left ventricle threshold: n/a      Device programming during the scan guidelines   Pacing Mode (check one): DOO  Pacing Rate: 80  bpm or > intrinsic     Bethany Julio RN

## 2023-02-28 ENCOUNTER — TELEPHONE (OUTPATIENT)
Dept: CARDIOLOGY | Facility: CLINIC | Age: 78
End: 2023-02-28

## 2023-02-28 DIAGNOSIS — Z95.0 CARDIAC PACEMAKER IN SITU: Primary | ICD-10-CM

## 2023-02-28 NOTE — TELEPHONE ENCOUNTER
Health Call Center    Phone Message    May a detailed message be left on voicemail: yes     Reason for Call: Other: Patient is due to see Nichelle Curtis in May with in clinic device check. In clinic device check order will  3/2023. Please extend order and contact patient to schedule both appointments.     Action Taken: Other: cardiology    Travel Screening: Not Applicable   Thank you!  [facility]

## 2023-03-06 ENCOUNTER — ANCILLARY PROCEDURE (OUTPATIENT)
Dept: CARDIOLOGY | Facility: CLINIC | Age: 78
End: 2023-03-06
Attending: INTERNAL MEDICINE
Payer: MEDICARE

## 2023-03-06 DIAGNOSIS — Z95.0 CARDIAC PACEMAKER IN SITU: ICD-10-CM

## 2023-03-06 PROCEDURE — 93294 REM INTERROG EVL PM/LDLS PM: CPT | Performed by: INTERNAL MEDICINE

## 2023-03-06 PROCEDURE — 93296 REM INTERROG EVL PM/IDS: CPT | Performed by: INTERNAL MEDICINE

## 2023-03-15 LAB
MDC_IDC_EPISODE_DTM: NORMAL
MDC_IDC_EPISODE_ID: NORMAL
MDC_IDC_EPISODE_TYPE: NORMAL
MDC_IDC_LEAD_IMPLANT_DT: NORMAL
MDC_IDC_LEAD_IMPLANT_DT: NORMAL
MDC_IDC_LEAD_LOCATION: NORMAL
MDC_IDC_LEAD_LOCATION: NORMAL
MDC_IDC_LEAD_LOCATION_DETAIL_1: NORMAL
MDC_IDC_LEAD_LOCATION_DETAIL_1: NORMAL
MDC_IDC_LEAD_MFG: NORMAL
MDC_IDC_LEAD_MFG: NORMAL
MDC_IDC_LEAD_MODEL: NORMAL
MDC_IDC_LEAD_MODEL: NORMAL
MDC_IDC_LEAD_POLARITY_TYPE: NORMAL
MDC_IDC_LEAD_POLARITY_TYPE: NORMAL
MDC_IDC_LEAD_SERIAL: NORMAL
MDC_IDC_LEAD_SERIAL: NORMAL
MDC_IDC_MSMT_BATTERY_DTM: NORMAL
MDC_IDC_MSMT_BATTERY_REMAINING_LONGEVITY: 84 MO
MDC_IDC_MSMT_BATTERY_REMAINING_PERCENTAGE: 85 %
MDC_IDC_MSMT_BATTERY_STATUS: NORMAL
MDC_IDC_MSMT_LEADCHNL_RA_IMPEDANCE_VALUE: 665 OHM
MDC_IDC_MSMT_LEADCHNL_RA_PACING_THRESHOLD_AMPLITUDE: 0.4 V
MDC_IDC_MSMT_LEADCHNL_RA_PACING_THRESHOLD_PULSEWIDTH: 0.4 MS
MDC_IDC_MSMT_LEADCHNL_RV_IMPEDANCE_VALUE: 663 OHM
MDC_IDC_MSMT_LEADCHNL_RV_PACING_THRESHOLD_AMPLITUDE: 1 V
MDC_IDC_MSMT_LEADCHNL_RV_PACING_THRESHOLD_PULSEWIDTH: 0.4 MS
MDC_IDC_PG_IMPLANT_DTM: NORMAL
MDC_IDC_PG_MFG: NORMAL
MDC_IDC_PG_MODEL: NORMAL
MDC_IDC_PG_SERIAL: NORMAL
MDC_IDC_PG_TYPE: NORMAL
MDC_IDC_SESS_CLINIC_NAME: NORMAL
MDC_IDC_SESS_DTM: NORMAL
MDC_IDC_SESS_TYPE: NORMAL
MDC_IDC_SET_BRADY_AT_MODE_SWITCH_MODE: NORMAL
MDC_IDC_SET_BRADY_AT_MODE_SWITCH_RATE: 170 {BEATS}/MIN
MDC_IDC_SET_BRADY_LOWRATE: 60 {BEATS}/MIN
MDC_IDC_SET_BRADY_MAX_SENSOR_RATE: 130 {BEATS}/MIN
MDC_IDC_SET_BRADY_MAX_TRACKING_RATE: 130 {BEATS}/MIN
MDC_IDC_SET_BRADY_MODE: NORMAL
MDC_IDC_SET_BRADY_PAV_DELAY_HIGH: 200 MS
MDC_IDC_SET_BRADY_PAV_DELAY_LOW: 300 MS
MDC_IDC_SET_BRADY_SAV_DELAY_HIGH: 200 MS
MDC_IDC_SET_BRADY_SAV_DELAY_LOW: 300 MS
MDC_IDC_SET_LEADCHNL_RA_PACING_AMPLITUDE: 2 V
MDC_IDC_SET_LEADCHNL_RA_PACING_CAPTURE_MODE: NORMAL
MDC_IDC_SET_LEADCHNL_RA_PACING_POLARITY: NORMAL
MDC_IDC_SET_LEADCHNL_RA_PACING_PULSEWIDTH: 0.4 MS
MDC_IDC_SET_LEADCHNL_RA_SENSING_ADAPTATION_MODE: NORMAL
MDC_IDC_SET_LEADCHNL_RA_SENSING_POLARITY: NORMAL
MDC_IDC_SET_LEADCHNL_RA_SENSING_SENSITIVITY: 0.25 MV
MDC_IDC_SET_LEADCHNL_RV_PACING_AMPLITUDE: 1.5 V
MDC_IDC_SET_LEADCHNL_RV_PACING_CAPTURE_MODE: NORMAL
MDC_IDC_SET_LEADCHNL_RV_PACING_POLARITY: NORMAL
MDC_IDC_SET_LEADCHNL_RV_PACING_PULSEWIDTH: 0.4 MS
MDC_IDC_SET_LEADCHNL_RV_SENSING_ADAPTATION_MODE: NORMAL
MDC_IDC_SET_LEADCHNL_RV_SENSING_POLARITY: NORMAL
MDC_IDC_SET_LEADCHNL_RV_SENSING_SENSITIVITY: 1.5 MV
MDC_IDC_SET_ZONE_DETECTION_INTERVAL: 375 MS
MDC_IDC_SET_ZONE_TYPE: NORMAL
MDC_IDC_SET_ZONE_VENDOR_TYPE: NORMAL
MDC_IDC_STAT_AT_BURDEN_PERCENT: 0 %
MDC_IDC_STAT_AT_DTM_END: NORMAL
MDC_IDC_STAT_AT_DTM_START: NORMAL
MDC_IDC_STAT_BRADY_DTM_END: NORMAL
MDC_IDC_STAT_BRADY_DTM_START: NORMAL
MDC_IDC_STAT_BRADY_RA_PERCENT_PACED: 0 %
MDC_IDC_STAT_BRADY_RV_PERCENT_PACED: 99 %
MDC_IDC_STAT_EPISODE_RECENT_COUNT: 0
MDC_IDC_STAT_EPISODE_RECENT_COUNT_DTM_END: NORMAL
MDC_IDC_STAT_EPISODE_RECENT_COUNT_DTM_START: NORMAL
MDC_IDC_STAT_EPISODE_TYPE: NORMAL
MDC_IDC_STAT_EPISODE_VENDOR_TYPE: NORMAL

## 2023-04-05 ENCOUNTER — TELEPHONE (OUTPATIENT)
Dept: CARDIOLOGY | Facility: CLINIC | Age: 78
End: 2023-04-05
Payer: MEDICARE

## 2023-04-05 NOTE — TELEPHONE ENCOUNTER
Received MRI Cardiology Clearance form from [facility] MRI department.  Form completed, signed by MD, and faxed back to MRI at [phone].

## 2023-04-21 ENCOUNTER — TELEPHONE (OUTPATIENT)
Dept: MEDSURG UNIT | Facility: CLINIC | Age: 78
End: 2023-04-21
Payer: MEDICARE

## 2023-04-27 ENCOUNTER — DOCUMENTATION ONLY (OUTPATIENT)
Dept: CARDIOLOGY | Facility: CLINIC | Age: 78
End: 2023-04-27

## 2023-04-27 ENCOUNTER — HOSPITAL ENCOUNTER (OUTPATIENT)
Facility: CLINIC | Age: 78
Discharge: HOME | End: 2023-04-27
Admitting: NURSE PRACTITIONER
Payer: MEDICARE

## 2023-04-27 ENCOUNTER — HOSPITAL ENCOUNTER (OUTPATIENT)
Dept: MRI IMAGING | Facility: CLINIC | Age: 78
Discharge: HOME | End: 2023-04-27
Attending: NURSE PRACTITIONER
Payer: MEDICARE

## 2023-04-27 VITALS — OXYGEN SATURATION: 93 % | HEART RATE: 90 BPM

## 2023-04-27 DIAGNOSIS — M54.16 LUMBAR RADICULAR PAIN: ICD-10-CM

## 2023-04-27 PROCEDURE — 999N000154 HC STATISTIC RADIOLOGY XRAY, US, CT, MAR, NM

## 2023-04-27 PROCEDURE — 72148 MRI LUMBAR SPINE W/O DYE: CPT

## 2023-04-27 ASSESSMENT — ACTIVITIES OF DAILY LIVING (ADL): ADLS_ACUITY_SCORE: 37

## 2023-05-08 ENCOUNTER — TELEPHONE (OUTPATIENT)
Dept: MEDSURG UNIT | Facility: CLINIC | Age: 78
End: 2023-05-08
Payer: MEDICARE

## 2023-05-17 ENCOUNTER — DOCUMENTATION ONLY (OUTPATIENT)
Dept: CARDIOLOGY | Facility: CLINIC | Age: 78
End: 2023-05-17
Payer: MEDICARE

## 2023-05-17 NOTE — PROGRESS NOTES
[employer] (D) Courtesy Check post MRI  AP: 0%  : 999%  Mode: DDD   Presenting Rhythm: AS/ V rates 70's bpm  Historical underlying rhythm: SR in the 90's with a 2:1 block at VVI 40 per in-clinic visit 5/16/22  Sensing: WNL  Pacing Threshold: RA WNL, RV not recorded due to pacing  Impedance: WNL  Battery Status: Estimated 7.5 years  Atrial Arrhythmia: 0  Ventricular Arrhythmia: 0    Pt returned to previous settings, no arrhythmia noted.    MARIO Sanchez

## 2023-05-19 ENCOUNTER — HOSPITAL ENCOUNTER (OUTPATIENT)
Dept: GENERAL RADIOLOGY | Facility: CLINIC | Age: 78
Discharge: HOME | End: 2023-05-19
Attending: NURSE PRACTITIONER
Payer: MEDICARE

## 2023-05-19 ENCOUNTER — HOSPITAL ENCOUNTER (OUTPATIENT)
Facility: CLINIC | Age: 78
Discharge: HOME | End: 2023-05-19
Admitting: RADIOLOGY
Payer: MEDICARE

## 2023-05-19 ENCOUNTER — HOSPITAL ENCOUNTER (OUTPATIENT)
Dept: MRI IMAGING | Facility: CLINIC | Age: 78
Discharge: HOME | End: 2023-05-19
Attending: NURSE PRACTITIONER
Payer: MEDICARE

## 2023-05-19 VITALS — OXYGEN SATURATION: 95 % | HEART RATE: 85 BPM

## 2023-05-19 DIAGNOSIS — M54.6 THORACIC BACK PAIN: ICD-10-CM

## 2023-05-19 PROCEDURE — 999N000065 XR CHEST 1 VIEW

## 2023-05-19 PROCEDURE — 72146 MRI CHEST SPINE W/O DYE: CPT

## 2023-05-19 PROCEDURE — 999N000154 HC STATISTIC RADIOLOGY XRAY, US, CT, MAR, NM

## 2023-05-19 ASSESSMENT — ACTIVITIES OF DAILY LIVING (ADL)
ADLS_ACUITY_SCORE: 37

## 2023-05-19 NOTE — PROGRESS NOTES
1015 Pt here for MRI w/ PPM.      1015   [employer] rep (Al) here. PPM placed in Asynchronus mode - rate 85    1020 MRI initiated. HR & sat documented Q 5 min. See Doc Flowsheet.    1040 MRI completed.   rep called to return PPM setting to previous.     1040 PPM returned to previous settings.    1045 Pt discharged per ambulatory. All personal belongings taken with pt.

## 2023-07-04 NOTE — PROGRESS NOTES
"[facility]    I had the pleasure of seeing Anne when she came for annual follow-up.  This 77 year old sees Dr. Monson for her history of:    1. 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  S/p dual-chamber [employer] pacemaker 2/2017  2. Chronic Pain   3. Depression/Anxiety/Insomnia       Last Visit & Interval History:  I saw Anne last in 5/2022 at which time she was doing well, feeling stamina was improving and doing water aerobics 4 days/week. Her biggest issue continued to be chronic pain. No changes were made and annual follow-up recommended.     Today's Visit:  Anne reports no new cardiac concerns since last visit. Still continues to attend water aerobics 4d/w. Able to tolerate activity without chest discomfort, increased [place].  No palpitations, tachycardia, lightheadedness, edema, SOB.  Has no current cardiac concerns.     Additionally, reports she follows with Pain Clinic for chronic back pain. Was seen last week and reports tentative plan to have steroid injection of spine. Additionally follows with Psychiatry for depression/anxiety related to major-life-change of moving to senior living complex. Patient brought medication list to update our Epic medication list.    VITALS:  Vitals: /63   Pulse 83   Ht 1.575 m (5' 2\")   Wt 59.1 kg (130 lb 6.4 oz)   BMI 23.85 kg/m      Diagnostic Testing:  Device interrogation today <1% AP and 99%  in DDD . Underlying Rhythm: SR 70s with CHB with JE at VVI 40. HR per histogram 70-90 primarily but ranges from . 7y battery. No atrial or ventricular arrhythmias  Echo 5/2022 showed LVEF 60-65% with proximal septal thickening noted. RV nl. Trace TR. Trace MR. Ao sclerosis  Echocardiogram 2/2017 showed EF 60 to 65%.  Left atrium was moderately dilated.  No significant valvular abnormalities.    Plan:  1. Next " remote device check on 10/6/23. SH/RN   2 .1 year follow up for routine PPM interrogation       Assessment/Plan:    1. AV Block    S/p dual chamber [employer] PPM 2017    Echo last year 5/2022 with nl LVEF    No reported cardiac symptoms     No erosion noted of pacemaker implantation site     PLAN:    Continue with current device settings     Follow up in one year for routine device interrogation     2. Chronic pain     Reports recurrent back pain     Follows with pain clinic for management     PLAN:    Defer to Pain Clinic for management of symptoms     3. Depression/Anxiety/Insomnia    Follows with Psychiatry through [facility]     Patient brought medication of all new medication changes     PLAN:    Medications updated in chart    Patient will continue to follow with Psychiatry for management       Con LAUREN I, CODY Hill was present with the DONNIE student who participated in the service and in the documentation of the note.  I have verified the history and personally performed the physical exam and medical decision making.  I agree with the assessment and plan of care as documented in the note.       Items personally reviewed: vitals, labs, and device check and agree with the interpretation documented in the note.  I agree with the interpretation documented in the note and the assessment and plan as above.    Nichelle Curtis PA-C, ZEV          Orders Placed This Encounter   Procedures     Follow-Up with Cardiology DONNIE     Orders Placed This Encounter   Medications     lamoTRIgine (LAMICTAL) 25 MG tablet     Sig: Start with 1/2 tab daily at bedtime for first week, then can take 1 tab daily at bedtime     clonazePAM (KLONOPIN) 0.5 MG tablet     Sig: Take by mouth 2 times daily as needed     buPROPion (WELLBUTRIN SR) 200 MG 12 hr tablet     Sig: Take 1 tablet by mouth every morning     escitalopram (LEXAPRO) 10 MG tablet     Sig: Take 10 mg by mouth daily     traMADol  (ULTRAM) 50 MG tablet     Sig: Take 50 mg by mouth every 6 hours as needed for severe pain     acetaminophen (ACETAMINOPHEN 8 HOUR) 650 MG CR tablet     Sig: Take 650 mg by mouth every 8 hours as needed for mild pain or fever     clobetasol (TEMOVATE) 0.05 % external ointment     Sig: Apply very thin layer twice daily to affected area on vulva, tapering to twice weekly as able.     esomeprazole (NEXIUM) 20 MG DR capsule     Sig: Take 20 mg by mouth every morning (before breakfast) Take 30-60 minutes before eating.     LACTOBACILLUS PO     UNABLE TO FIND     Sig: Ortho digestyme, 2 per day     Medications Discontinued During This Encounter   Medication Reason     LORAZEPAM PO Discontinued by another Health Care Provider (No AVS)     VITAMIN D, CHOLECALCIFEROL, PO Stopped by Patient (No AVS)     DULoxetine (CYMBALTA) 60 MG capsule Discontinued by another Health Care Provider (No AVS)         Encounter Diagnosis   Name Primary?     Atrioventricular block 2:1 Yes       CURRENT MEDICATIONS:  Current Outpatient Medications   Medication Sig Dispense Refill     acetaminophen (ACETAMINOPHEN 8 HOUR) 650 MG CR tablet Take 650 mg by mouth every 8 hours as needed for mild pain or fever       atorvastatin (LIPITOR) 40 MG tablet Take 40 mg by mouth At Bedtime  30 tablet 1     buPROPion (WELLBUTRIN SR) 200 MG 12 hr tablet Take 1 tablet by mouth every morning       clobetasol (TEMOVATE) 0.05 % external ointment Apply very thin layer twice daily to affected area on vulva, tapering to twice weekly as able.       clonazePAM (KLONOPIN) 0.5 MG tablet Take by mouth 2 times daily as needed       Digestive Enzymes (DIGESTIVE ENZYME PO)        escitalopram (LEXAPRO) 10 MG tablet Take 10 mg by mouth daily       esomeprazole (NEXIUM) 20 MG DR capsule Take 20 mg by mouth every morning (before breakfast) Take 30-60 minutes before eating.       eszopiclone (LUNESTA) 3 MG tablet Take 3 mg by mouth At Bedtime Patient taking one tablet at night        LACTOBACILLUS PO        lamoTRIgine (LAMICTAL) 25 MG tablet Start with 1/2 tab daily at bedtime for first week, then can take 1 tab daily at bedtime       magnesium 100 MG CAPS MAGNESIUM OXIDE 250 mg, 2 at bedtime       mirtazapine (REMERON) 15 MG tablet Take 30 mg by mouth At Bedtime       Multiple Vitamin (MULTIVITAMIN ADULT PO)        polyethylene glycol (MIRALAX/GLYCOLAX) powder Take 17 g by mouth daily as needed for constipation       Probiotic Product (PROBIOTIC & ACIDOPHILUS EX ST PO) Take 1 capsule by mouth daily       traMADol (ULTRAM) 50 MG tablet Take 50 mg by mouth every 6 hours as needed for severe pain       UNABLE TO FIND Ortho digestyme, 2 per day       UNABLE TO FIND MEDICATION NAME: stool softner: unknown brand and dosage         ALLERGIES     Allergies   Allergen Reactions     Blood-Group Specific Substance Other (See Comments)     Patient has anti-K antibody.  Blood for the transfusion might be delayed.       Dilaudid [Hydromorphone] Nausea and Vomiting     Doxepin Unknown     Other reaction(s): Intolerance-Can't Take  Not effective for sleep     Meperidine Nausea and Vomiting     Meperidine And Related Nausea and Vomiting     Demerol     Morphine      PN: LW Reaction: Vomiting,Nausea     No Clinical Screening - See Comments      PN: LW Reaction: all opiates     Pregabalin Other (See Comments)     Not effective         Review of Systems:  Skin:  not assessed     Eyes:  Negative    ENT:  Negative nasal congestion;persistent sore throat  Respiratory:  Negative dyspnea on exertion;shortness of breath;cough;wheezing  Cardiovascular:  Negative for;palpitations;chest pain;edema;fatigue;lightheadedness;dizziness    Gastroenterology: Negative abdominal pain  Genitourinary:  not assessed    Musculoskeletal:  Positive for back pain  Neurologic:  Negative headaches  Psychiatric:  Positive for anxiety;depression  Heme/Lymph/Imm:  not assessed    Endocrine:  not assessed      Physical Exam:  Vitals: BP  "100/63   Pulse 83   Ht 1.575 m (5' 2\")   Wt 59.1 kg (130 lb 6.4 oz)   BMI 23.85 kg/m      Constitutional:  cooperative, alert and oriented, well developed, well nourished, in no acute distress        Skin:  warm and dry to the touch   No errosion    Head:  normocephalic, no masses or lesions        Eyes:  pupils equal and round;EOMS intact        Chest:  normal respiratory excursion;clear to auscultation        Cardiac: regular rhythm;normal S1 and S2;no S3 or S4;no murmurs, gallops or rubs detected                  Extremities and Back:  no edema              PAST MEDICAL HISTORY:  Past Medical History:   Diagnosis Date     Anemia      Carpal tunnel syndrome      Chronic fatigue syndrome 11/00     Complication of anesthesia      Decreased libido      Depression with anxiety      Fatigue      Gastroesophageal reflux disease      HA (headache)      History of blood transfusion      [facility] FOLLOWING JOINT REPLACEMENT 7/23/2007     Irritable bowel syndrome 11/00     Myalgia and myositis, unspecified 11/00     Near syncope 5/17/2017     Neutropenia 00     Noninfectious ileitis      Osteoarthritis     rt knee, back     Osteoarthrosis, unspecified whether generalized or localized, hand 11/00     Osteoarthrosis, unspecified whether generalized or localized, lower leg 11/00     Other chronic pain     back     Pacemaker      PONV (postoperative nausea and vomiting)      Pure hypercholesterolemia 11/00     RBBB      Renal disease     mld     Uncomplicated asthma      Urticaria, unspecified 00       PAST SURGICAL HISTORY:  Past Surgical History:   Procedure Laterality Date     ARTHROPLASTY KNEE Left      ARTHROPLASTY KNEE Right     partial     ARTHROPLASTY REVISION HIP Left 2/3/2017    Procedure: ARTHROPLASTY REVISION HIP;  Surgeon: Juan Moncada MD;  Location:  OR     CARDIAC SURGERY  2017    pacemaker     COLONOSCOPY N/A 1/15/2019    Procedure: COLONOSCOPY;  Surgeon: Eliane Barone MD;  Location:  GI "     EYE SURGERY      zoila cataract surgery     GI SURGERY      hemorrhoid repair     GYN SURGERY      tubal ligation     OPTICAL TRACKING SYSTEM FUSION SPINE POSTERIOR LUMBAR TWO LEVELS N/A 1/9/2020    Procedure: L3-5 decompressive laminectomies with L4-5 Tranforaminal Lumbar Interbody Fusion with L3-5 posterior lateral fusion;  Surgeon: Anjel Bernardo MD;  Location: [facility] OR     ORTHOPEDIC SURGERY      right sabine-arthroplasty, trigger finger repair, left knee arthroscopy and replacement, bilateral hip replacement,     ZZC NONSPECIFIC PROCEDURE      s/p Rt. Carpal tunnel release     ZZC NONSPECIFIC PROCEDURE      s/p bilat Tubal ligation       FAMILY HISTORY:  Family History   Problem Relation Age of Onset     Hypertension Mother      Alzheimer Disease Mother         Memory issues- unsure if alzheimers     Arthritis Mother         osteoarthritis     Depression Mother      Gastrointestinal Disease Mother         acid reflux     Allergies Father      Neurologic Disorder Father         migraines     Respiratory Father         chronic brochitis- born during a flu epidemic in 1918     Cerebrovascular Disease Maternal Grandmother         series of TIA's     Diabetes Maternal Grandfather         adult onset     Cancer Maternal Grandfather         of stomach or esophagus-heavy smoker     C.A.D. 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 1974, lost a lot of blood-given 6? 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, had vaccines Hep B, Flu     Hobby Hazards No     Sleep Concern No     Comment: Nortriptyline helps     Stress Concern No     Comment: 3 children     Weight Concern No     Special Diet No     Back Care Yes     Comment: Low back pain, has osteoarthritis     Exercise Yes     Comment: 3-4 times a week     Bike Helmet No     Seat Belt Yes     Self-Exams No   Social History Narrative    Last pap:6-13-02    Last Lipid Panel: 8-6-03    Last Mammo: 7-18-03,WNL    Last DEXA: Unsure    Last Colonoscopy: 06-22-01    Last Tetanus:7-10-03    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

## 2023-07-07 ENCOUNTER — OFFICE VISIT (OUTPATIENT)
Dept: CARDIOLOGY | Facility: CLINIC | Age: 78
End: 2023-07-07
Payer: MEDICARE

## 2023-07-07 ENCOUNTER — ANCILLARY PROCEDURE (OUTPATIENT)
Dept: CARDIOLOGY | Facility: CLINIC | Age: 78
End: 2023-07-07
Attending: INTERNAL MEDICINE
Payer: MEDICARE

## 2023-07-07 VITALS
HEIGHT: 62 IN | SYSTOLIC BLOOD PRESSURE: 100 MMHG | WEIGHT: 130.4 LBS | DIASTOLIC BLOOD PRESSURE: 63 MMHG | HEART RATE: 83 BPM | BODY MASS INDEX: 24 KG/M2

## 2023-07-07 DIAGNOSIS — I44.2 ATRIOVENTRICULAR BLOCK, COMPLETE (H): Primary | ICD-10-CM

## 2023-07-07 DIAGNOSIS — Z95.0 CARDIAC PACEMAKER IN SITU: ICD-10-CM

## 2023-07-07 PROCEDURE — 93280 PM DEVICE PROGR EVAL DUAL: CPT | Performed by: INTERNAL MEDICINE

## 2023-07-07 PROCEDURE — 99214 OFFICE O/P EST MOD 30 MIN: CPT | Performed by: PHYSICIAN ASSISTANT

## 2023-07-07 RX ORDER — TRAMADOL HYDROCHLORIDE 50 MG/1
50 TABLET ORAL EVERY 6 HOURS PRN
Status: ON HOLD | COMMUNITY
End: 2024-09-30

## 2023-07-07 RX ORDER — SENNOSIDES 8.6 MG
650 CAPSULE ORAL EVERY 8 HOURS PRN
COMMUNITY
End: 2024-08-26 | Stop reason: DRUGHIGH

## 2023-07-07 RX ORDER — BUPROPION HYDROCHLORIDE 200 MG/1
1 TABLET, EXTENDED RELEASE ORAL EVERY MORNING
COMMUNITY
Start: 2022-11-28

## 2023-07-07 RX ORDER — CLONAZEPAM 0.5 MG/1
0.5 TABLET ORAL AT BEDTIME
Status: ON HOLD | COMMUNITY
Start: 2022-09-14 | End: 2024-09-30

## 2023-07-07 RX ORDER — LAMOTRIGINE 25 MG/1
TABLET ORAL
Status: ON HOLD | COMMUNITY
Start: 2023-06-30 | End: 2024-09-30

## 2023-07-07 RX ORDER — ESCITALOPRAM OXALATE 10 MG/1
10 TABLET ORAL DAILY
COMMUNITY
Start: 2023-05-22

## 2023-07-07 RX ORDER — CLOBETASOL PROPIONATE 0.5 MG/G
OINTMENT TOPICAL
COMMUNITY
Start: 2023-01-23

## 2023-07-07 NOTE — PATIENT INSTRUCTIONS
Anne - it was good to see you today!    I am glad you're doing so well from a cardiac standpoint!  Updated all your medications  Pacer check today looked great - so glad it could be in [place] :-)    PLAN:  1. No changes needed  2.  Annual follow-up with pacer check (in [place]!)  CALL if issues prior! [phone]

## 2023-07-07 NOTE — LETTER
"7/7/2023    Nevaeh Wood MD  [address]  [place] [address]    RE: Anne Rangel       Dear Colleague,     I had the pleasure of seeing Anne Rangel in the [facility].  [facility]    I had the pleasure of seeing Anne when she came for annual follow-up.  This 77 year old sees Dr. Monson for her history of:    1. 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  S/p dual-chamber [employer] pacemaker 2/2017  2. Chronic Pain   3. Depression/Anxiety/Insomnia       Last Visit & Interval History:  I saw Anne last in 5/2022 at which time she was doing well, feeling stamina was improving and doing water aerobics 4 days/week. Her biggest issue continued to be chronic pain. No changes were made and annual follow-up recommended.     Today's Visit:  Anne reports no new cardiac concerns since last visit. Still continues to attend water aerobics 4d/w. Able to tolerate activity without chest discomfort, increased [place].  No palpitations, tachycardia, lightheadedness, edema, SOB.  Has no current cardiac concerns.     Additionally, reports she follows with Pain Clinic for chronic back pain. Was seen last week and reports tentative plan to have steroid injection of spine. Additionally follows with Psychiatry for depression/anxiety related to major-life-change of moving to senior living complex. Patient brought medication list to update our Epic medication list.    VITALS:  Vitals: /63   Pulse 83   Ht 1.575 m (5' 2\")   Wt 59.1 kg (130 lb 6.4 oz)   BMI 23.85 kg/m      Diagnostic Testing:  Device interrogation today <1% AP and 99%  in DDD . Underlying Rhythm: SR 70s with CHB with JE at VVI 40. HR per histogram 70-90 primarily but ranges from . 7y battery. No atrial or ventricular arrhythmias  Echo 5/2022 showed LVEF 60-65% with " proximal septal thickening noted. RV nl. Trace TR. Trace MR. Ao sclerosis  Echocardiogram 2/2017 showed EF 60 to 65%.  Left atrium was moderately dilated.  No significant valvular abnormalities.    Plan:  1. Next remote device check on 10/6/23. SH/RN   2 .1 year follow up for routine PPM interrogation       Assessment/Plan:    AV Block  S/p dual chamber [employer] PPM 2017  Echo last year 5/2022 with nl LVEF  No reported cardiac symptoms   No erosion noted of pacemaker implantation site     PLAN:  Continue with current device settings   Follow up in one year for routine device interrogation     Chronic pain   Reports recurrent back pain   Follows with pain clinic for management     PLAN:  Defer to Pain Clinic for management of symptoms     3. Depression/Anxiety/Insomnia  Follows with Psychiatry through [facility]   Patient brought medication of all new medication changes     PLAN:  Medications updated in chart  Patient will continue to follow with Psychiatry for management       Con LAUREN I, CODY Hill was present with the DONNIE student who participated in the service and in the documentation of the note.  I have verified the history and personally performed the physical exam and medical decision making.  I agree with the assessment and plan of care as documented in the note.       Items personally reviewed: vitals, labs, and device check and agree with the interpretation documented in the note.  I agree with the interpretation documented in the note and the assessment and plan as above.    Nichelle Curtis PA-C, ZEV          Orders Placed This Encounter   Procedures    Follow-Up with Cardiology DONNIE     Orders Placed This Encounter   Medications    lamoTRIgine (LAMICTAL) 25 MG tablet     Sig: Start with 1/2 tab daily at bedtime for first week, then can take 1 tab daily at bedtime    clonazePAM (KLONOPIN) 0.5 MG tablet     Sig: Take by mouth 2 times daily as needed     buPROPion (WELLBUTRIN SR) 200 MG 12 hr tablet     Sig: Take 1 tablet by mouth every morning    escitalopram (LEXAPRO) 10 MG tablet     Sig: Take 10 mg by mouth daily    traMADol (ULTRAM) 50 MG tablet     Sig: Take 50 mg by mouth every 6 hours as needed for severe pain    acetaminophen (ACETAMINOPHEN 8 HOUR) 650 MG CR tablet     Sig: Take 650 mg by mouth every 8 hours as needed for mild pain or fever    clobetasol (TEMOVATE) 0.05 % external ointment     Sig: Apply very thin layer twice daily to affected area on vulva, tapering to twice weekly as able.    esomeprazole (NEXIUM) 20 MG DR capsule     Sig: Take 20 mg by mouth every morning (before breakfast) Take 30-60 minutes before eating.    LACTOBACILLUS PO    UNABLE TO FIND     Sig: Ortho digestyme, 2 per day     Medications Discontinued During This Encounter   Medication Reason    LORAZEPAM PO Discontinued by another Health Care Provider (No AVS)    VITAMIN D, CHOLECALCIFEROL, PO Stopped by Patient (No AVS)    DULoxetine (CYMBALTA) 60 MG capsule Discontinued by another Health Care Provider (No AVS)         Encounter Diagnosis   Name Primary?    Atrioventricular block 2:1 Yes       CURRENT MEDICATIONS:  Current Outpatient Medications   Medication Sig Dispense Refill    acetaminophen (ACETAMINOPHEN 8 HOUR) 650 MG CR tablet Take 650 mg by mouth every 8 hours as needed for mild pain or fever      atorvastatin (LIPITOR) 40 MG tablet Take 40 mg by mouth At Bedtime  30 tablet 1    buPROPion (WELLBUTRIN SR) 200 MG 12 hr tablet Take 1 tablet by mouth every morning      clobetasol (TEMOVATE) 0.05 % external ointment Apply very thin layer twice daily to affected area on vulva, tapering to twice weekly as able.      clonazePAM (KLONOPIN) 0.5 MG tablet Take by mouth 2 times daily as needed      Digestive Enzymes (DIGESTIVE ENZYME PO)       escitalopram (LEXAPRO) 10 MG tablet Take 10 mg by mouth daily      esomeprazole (NEXIUM) 20 MG DR capsule Take 20 mg by mouth every morning  (before breakfast) Take 30-60 minutes before eating.      eszopiclone (LUNESTA) 3 MG tablet Take 3 mg by mouth At Bedtime Patient taking one tablet at night      LACTOBACILLUS PO       lamoTRIgine (LAMICTAL) 25 MG tablet Start with 1/2 tab daily at bedtime for first week, then can take 1 tab daily at bedtime      magnesium 100 MG CAPS MAGNESIUM OXIDE 250 mg, 2 at bedtime      mirtazapine (REMERON) 15 MG tablet Take 30 mg by mouth At Bedtime      Multiple Vitamin (MULTIVITAMIN ADULT PO)       polyethylene glycol (MIRALAX/GLYCOLAX) powder Take 17 g by mouth daily as needed for constipation      Probiotic Product (PROBIOTIC & ACIDOPHILUS EX ST PO) Take 1 capsule by mouth daily      traMADol (ULTRAM) 50 MG tablet Take 50 mg by mouth every 6 hours as needed for severe pain      UNABLE TO FIND Ortho digestyme, 2 per day      UNABLE TO FIND MEDICATION NAME: stool softner: unknown brand and dosage         ALLERGIES     Allergies   Allergen Reactions    Blood-Group Specific Substance Other (See Comments)     Patient has anti-K antibody.  Blood for the transfusion might be delayed.      Dilaudid [Hydromorphone] Nausea and Vomiting    Doxepin Unknown     Other reaction(s): Intolerance-Can't Take  Not effective for sleep    Meperidine Nausea and Vomiting    Meperidine And Related Nausea and Vomiting     Demerol    Morphine      PN: LW Reaction: Vomiting,Nausea    No Clinical Screening - See Comments      PN: LW Reaction: all opiates    Pregabalin Other (See Comments)     Not effective         Review of Systems:  Skin:  not assessed     Eyes:  Negative    ENT:  Negative nasal congestion;persistent sore throat  Respiratory:  Negative dyspnea on exertion;shortness of breath;cough;wheezing  Cardiovascular:  Negative for;palpitations;chest pain;edema;fatigue;lightheadedness;dizziness    Gastroenterology: Negative abdominal pain  Genitourinary:  not assessed    Musculoskeletal:  Positive for back pain  Neurologic:  Negative  "headaches  Psychiatric:  Positive for anxiety;depression  Heme/Lymph/Imm:  not assessed    Endocrine:  not assessed      Physical Exam:  Vitals: /63   Pulse 83   Ht 1.575 m (5' 2\")   Wt 59.1 kg (130 lb 6.4 oz)   BMI 23.85 kg/m      Constitutional:  cooperative, alert and oriented, well developed, well nourished, in no acute distress        Skin:  warm and dry to the touch   No errosion    Head:  normocephalic, no masses or lesions        Eyes:  pupils equal and round;EOMS intact        Chest:  normal respiratory excursion;clear to auscultation        Cardiac: regular rhythm;normal S1 and S2;no S3 or S4;no murmurs, gallops or rubs detected                  Extremities and Back:  no edema             PAST MEDICAL HISTORY:  Past Medical History:   Diagnosis Date    Anemia     Carpal tunnel syndrome     Chronic fatigue syndrome 11/00    Complication of anesthesia     Decreased libido     Depression with anxiety     Fatigue     Gastroesophageal reflux disease     HA (headache)     History of blood transfusion     [facility] FOLLOWING JOINT REPLACEMENT 7/23/2007    Irritable bowel syndrome 11/00    Myalgia and myositis, unspecified 11/00    Near syncope 5/17/2017    Neutropenia 00    Noninfectious ileitis     Osteoarthritis     rt knee, back    Osteoarthrosis, unspecified whether generalized or localized, hand 11/00    Osteoarthrosis, unspecified whether generalized or localized, lower leg 11/00    Other chronic pain     back    Pacemaker     PONV (postoperative nausea and vomiting)     Pure hypercholesterolemia 11/00    RBBB     Renal disease     mld    Uncomplicated asthma     Urticaria, unspecified 00       PAST SURGICAL HISTORY:  Past Surgical History:   Procedure Laterality Date    ARTHROPLASTY KNEE Left     ARTHROPLASTY KNEE Right     partial    ARTHROPLASTY REVISION HIP Left 2/3/2017    Procedure: ARTHROPLASTY REVISION HIP;  Surgeon: Juan Moncada MD;  Location:  OR    CARDIAC SURGERY  2017    " pacemaker    COLONOSCOPY N/A 1/15/2019    Procedure: COLONOSCOPY;  Surgeon: Eliane Barone MD;  Location:  GI    EYE SURGERY      zoila cataract surgery    GI SURGERY      hemorrhoid repair    GYN SURGERY      tubal ligation    OPTICAL TRACKING SYSTEM FUSION SPINE POSTERIOR LUMBAR TWO LEVELS N/A 1/9/2020    Procedure: L3-5 decompressive laminectomies with L4-5 Tranforaminal Lumbar Interbody Fusion with L3-5 posterior lateral fusion;  Surgeon: Anjel Bernardo MD;  Location: [facility] OR    ORTHOPEDIC SURGERY      right sabine-arthroplasty, trigger finger repair, left knee arthroscopy and replacement, bilateral hip replacement,    ZZC NONSPECIFIC PROCEDURE      s/p Rt. Carpal tunnel release    ZZC NONSPECIFIC PROCEDURE      s/p bilat Tubal ligation       FAMILY HISTORY:  Family History   Problem Relation Age of Onset    Hypertension Mother     Alzheimer Disease Mother         Memory issues- unsure if alzheimers    Arthritis Mother         osteoarthritis    Depression Mother     Gastrointestinal Disease Mother         acid reflux    Allergies Father     Neurologic Disorder Father         migraines    Respiratory Father         chronic brochitis- born during a flu epidemic in 1918    Cerebrovascular Disease Maternal Grandmother         series of TIA's    Diabetes Maternal Grandfather         adult onset    Cancer Maternal Grandfather         of stomach or esophagus-heavy smoker    C.A.D. Paternal Grandmother         angina    Depression Daughter     Neurologic Disorder Sister         migraines       SOCIAL HISTORY:  Social History     Socioeconomic History    Marital status:      Spouse name: Christiano Rangel    Number of children: 3    Years of education: Masters+    Highest education level: None   Occupational History    Occupation: Nursing Home [place]     Employer: [employer]   Tobacco Use    Smoking status: Never    Smokeless tobacco: Never   Substance and Sexual Activity    Alcohol use:  Yes     Comment: 1 Drink with dinner    Drug use: No    Sexual activity: Yes     Partners: Male   Other Topics Concern     Service No    Blood Transfusions Yes     Comment: [employer] 1974, lost a lot of blood-given 6? 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Home [employer], had vaccines Hep B, Flu    Hobby Hazards No    Sleep Concern No     Comment: Nortriptyline helps    Stress Concern No     Comment: 3 children    Weight Concern No    Special Diet No    Back Care Yes     Comment: Low back pain, has osteoarthritis    Exercise Yes     Comment: 3-4 times a week    Bike Helmet No    Seat Belt Yes    Self-Exams No   Social History Narrative    Last pap:6-13-02    Last Lipid Panel: 8-6-03    Last Mammo: 7-18-03,WNL    Last DEXA: Unsure    Last Colonoscopy: 06-22-01    Last Tetanus:7-10-03    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                        Thank you for allowing me to participate in the care of your patient.      Sincerely,     Alison Curtis PA-C     [facility]  cc:   Alison Curtis PA-C  [address] [address]

## 2023-07-10 LAB
MDC_IDC_LEAD_IMPLANT_DT: NORMAL
MDC_IDC_LEAD_IMPLANT_DT: NORMAL
MDC_IDC_LEAD_LOCATION: NORMAL
MDC_IDC_LEAD_LOCATION: NORMAL
MDC_IDC_LEAD_LOCATION_DETAIL_1: NORMAL
MDC_IDC_LEAD_LOCATION_DETAIL_1: NORMAL
MDC_IDC_LEAD_MFG: NORMAL
MDC_IDC_LEAD_MFG: NORMAL
MDC_IDC_LEAD_MODEL: NORMAL
MDC_IDC_LEAD_MODEL: NORMAL
MDC_IDC_LEAD_POLARITY_TYPE: NORMAL
MDC_IDC_LEAD_POLARITY_TYPE: NORMAL
MDC_IDC_LEAD_SERIAL: NORMAL
MDC_IDC_LEAD_SERIAL: NORMAL
MDC_IDC_MSMT_BATTERY_DTM: NORMAL
MDC_IDC_MSMT_BATTERY_REMAINING_LONGEVITY: 84 MO
MDC_IDC_MSMT_BATTERY_REMAINING_PERCENTAGE: 84 %
MDC_IDC_MSMT_BATTERY_STATUS: NORMAL
MDC_IDC_MSMT_LEADCHNL_RA_IMPEDANCE_VALUE: 720 OHM
MDC_IDC_MSMT_LEADCHNL_RA_PACING_THRESHOLD_AMPLITUDE: 0.7 V
MDC_IDC_MSMT_LEADCHNL_RA_PACING_THRESHOLD_PULSEWIDTH: 0.4 MS
MDC_IDC_MSMT_LEADCHNL_RV_IMPEDANCE_VALUE: 660 OHM
MDC_IDC_MSMT_LEADCHNL_RV_PACING_THRESHOLD_AMPLITUDE: 0.9 V
MDC_IDC_MSMT_LEADCHNL_RV_PACING_THRESHOLD_PULSEWIDTH: 0.4 MS
MDC_IDC_PG_IMPLANT_DTM: NORMAL
MDC_IDC_PG_MFG: NORMAL
MDC_IDC_PG_MODEL: NORMAL
MDC_IDC_PG_SERIAL: NORMAL
MDC_IDC_PG_TYPE: NORMAL
MDC_IDC_SESS_CLINIC_NAME: NORMAL
MDC_IDC_SESS_DTM: NORMAL
MDC_IDC_SESS_TYPE: NORMAL
MDC_IDC_SET_BRADY_AT_MODE_SWITCH_MODE: NORMAL
MDC_IDC_SET_BRADY_AT_MODE_SWITCH_RATE: 170 {BEATS}/MIN
MDC_IDC_SET_BRADY_LOWRATE: 60 {BEATS}/MIN
MDC_IDC_SET_BRADY_MAX_SENSOR_RATE: 130 {BEATS}/MIN
MDC_IDC_SET_BRADY_MAX_TRACKING_RATE: 130 {BEATS}/MIN
MDC_IDC_SET_BRADY_MODE: NORMAL
MDC_IDC_SET_BRADY_PAV_DELAY_HIGH: 200 MS
MDC_IDC_SET_BRADY_PAV_DELAY_LOW: 300 MS
MDC_IDC_SET_BRADY_SAV_DELAY_HIGH: 200 MS
MDC_IDC_SET_BRADY_SAV_DELAY_LOW: 300 MS
MDC_IDC_SET_LEADCHNL_RA_PACING_AMPLITUDE: 2 V
MDC_IDC_SET_LEADCHNL_RA_PACING_CAPTURE_MODE: NORMAL
MDC_IDC_SET_LEADCHNL_RA_PACING_POLARITY: NORMAL
MDC_IDC_SET_LEADCHNL_RA_PACING_PULSEWIDTH: 0.4 MS
MDC_IDC_SET_LEADCHNL_RA_SENSING_ADAPTATION_MODE: NORMAL
MDC_IDC_SET_LEADCHNL_RA_SENSING_POLARITY: NORMAL
MDC_IDC_SET_LEADCHNL_RA_SENSING_SENSITIVITY: 0.25 MV
MDC_IDC_SET_LEADCHNL_RV_PACING_AMPLITUDE: 1.5 V
MDC_IDC_SET_LEADCHNL_RV_PACING_CAPTURE_MODE: NORMAL
MDC_IDC_SET_LEADCHNL_RV_PACING_POLARITY: NORMAL
MDC_IDC_SET_LEADCHNL_RV_PACING_PULSEWIDTH: 0.4 MS
MDC_IDC_SET_LEADCHNL_RV_SENSING_ADAPTATION_MODE: NORMAL
MDC_IDC_SET_LEADCHNL_RV_SENSING_POLARITY: NORMAL
MDC_IDC_SET_LEADCHNL_RV_SENSING_SENSITIVITY: 1.5 MV
MDC_IDC_SET_ZONE_DETECTION_INTERVAL: 375 MS
MDC_IDC_SET_ZONE_TYPE: NORMAL
MDC_IDC_SET_ZONE_VENDOR_TYPE: NORMAL
MDC_IDC_STAT_AT_BURDEN_PERCENT: 0 %
MDC_IDC_STAT_AT_DTM_END: NORMAL
MDC_IDC_STAT_AT_DTM_START: NORMAL
MDC_IDC_STAT_BRADY_DTM_END: NORMAL
MDC_IDC_STAT_BRADY_DTM_START: NORMAL
MDC_IDC_STAT_BRADY_RA_PERCENT_PACED: 0 %
MDC_IDC_STAT_BRADY_RV_PERCENT_PACED: 99 %
MDC_IDC_STAT_EPISODE_RECENT_COUNT: 0
MDC_IDC_STAT_EPISODE_RECENT_COUNT: 2
MDC_IDC_STAT_EPISODE_RECENT_COUNT_DTM_END: NORMAL
MDC_IDC_STAT_EPISODE_RECENT_COUNT_DTM_START: NORMAL
MDC_IDC_STAT_EPISODE_TYPE: NORMAL
MDC_IDC_STAT_EPISODE_VENDOR_TYPE: NORMAL

## 2023-10-11 ENCOUNTER — ANCILLARY PROCEDURE (OUTPATIENT)
Dept: CARDIOLOGY | Facility: CLINIC | Age: 78
End: 2023-10-11
Attending: INTERNAL MEDICINE
Payer: MEDICARE

## 2023-10-11 DIAGNOSIS — Z95.0 CARDIAC PACEMAKER IN SITU: ICD-10-CM

## 2023-10-11 LAB
MDC_IDC_EPISODE_DTM: NORMAL
MDC_IDC_EPISODE_ID: NORMAL
MDC_IDC_EPISODE_TYPE: NORMAL
MDC_IDC_LEAD_CONNECTION_STATUS: NORMAL
MDC_IDC_LEAD_CONNECTION_STATUS: NORMAL
MDC_IDC_LEAD_IMPLANT_DT: NORMAL
MDC_IDC_LEAD_IMPLANT_DT: NORMAL
MDC_IDC_LEAD_LOCATION: NORMAL
MDC_IDC_LEAD_LOCATION: NORMAL
MDC_IDC_LEAD_LOCATION_DETAIL_1: NORMAL
MDC_IDC_LEAD_LOCATION_DETAIL_1: NORMAL
MDC_IDC_LEAD_MFG: NORMAL
MDC_IDC_LEAD_MFG: NORMAL
MDC_IDC_LEAD_MODEL: NORMAL
MDC_IDC_LEAD_MODEL: NORMAL
MDC_IDC_LEAD_POLARITY_TYPE: NORMAL
MDC_IDC_LEAD_POLARITY_TYPE: NORMAL
MDC_IDC_LEAD_SERIAL: NORMAL
MDC_IDC_LEAD_SERIAL: NORMAL
MDC_IDC_MSMT_BATTERY_DTM: NORMAL
MDC_IDC_MSMT_BATTERY_REMAINING_LONGEVITY: 84 MO
MDC_IDC_MSMT_BATTERY_REMAINING_PERCENTAGE: 81 %
MDC_IDC_MSMT_BATTERY_STATUS: NORMAL
MDC_IDC_MSMT_LEADCHNL_RA_IMPEDANCE_VALUE: 815 OHM
MDC_IDC_MSMT_LEADCHNL_RA_PACING_THRESHOLD_AMPLITUDE: 0.5 V
MDC_IDC_MSMT_LEADCHNL_RA_PACING_THRESHOLD_PULSEWIDTH: 0.4 MS
MDC_IDC_MSMT_LEADCHNL_RV_IMPEDANCE_VALUE: 583 OHM
MDC_IDC_MSMT_LEADCHNL_RV_PACING_THRESHOLD_AMPLITUDE: 1 V
MDC_IDC_MSMT_LEADCHNL_RV_PACING_THRESHOLD_PULSEWIDTH: 0.4 MS
MDC_IDC_PG_IMPLANT_DTM: NORMAL
MDC_IDC_PG_MFG: NORMAL
MDC_IDC_PG_MODEL: NORMAL
MDC_IDC_PG_SERIAL: NORMAL
MDC_IDC_PG_TYPE: NORMAL
MDC_IDC_SESS_CLINIC_NAME: NORMAL
MDC_IDC_SESS_DTM: NORMAL
MDC_IDC_SESS_TYPE: NORMAL
MDC_IDC_SET_BRADY_AT_MODE_SWITCH_MODE: NORMAL
MDC_IDC_SET_BRADY_AT_MODE_SWITCH_RATE: 170 {BEATS}/MIN
MDC_IDC_SET_BRADY_LOWRATE: 60 {BEATS}/MIN
MDC_IDC_SET_BRADY_MAX_SENSOR_RATE: 130 {BEATS}/MIN
MDC_IDC_SET_BRADY_MAX_TRACKING_RATE: 130 {BEATS}/MIN
MDC_IDC_SET_BRADY_MODE: NORMAL
MDC_IDC_SET_BRADY_PAV_DELAY_HIGH: 200 MS
MDC_IDC_SET_BRADY_PAV_DELAY_LOW: 300 MS
MDC_IDC_SET_BRADY_SAV_DELAY_HIGH: 200 MS
MDC_IDC_SET_BRADY_SAV_DELAY_LOW: 300 MS
MDC_IDC_SET_LEADCHNL_RA_PACING_AMPLITUDE: 2 V
MDC_IDC_SET_LEADCHNL_RA_PACING_CAPTURE_MODE: NORMAL
MDC_IDC_SET_LEADCHNL_RA_PACING_POLARITY: NORMAL
MDC_IDC_SET_LEADCHNL_RA_PACING_PULSEWIDTH: 0.4 MS
MDC_IDC_SET_LEADCHNL_RA_SENSING_ADAPTATION_MODE: NORMAL
MDC_IDC_SET_LEADCHNL_RA_SENSING_POLARITY: NORMAL
MDC_IDC_SET_LEADCHNL_RA_SENSING_SENSITIVITY: 0.25 MV
MDC_IDC_SET_LEADCHNL_RV_PACING_AMPLITUDE: 1.6 V
MDC_IDC_SET_LEADCHNL_RV_PACING_CAPTURE_MODE: NORMAL
MDC_IDC_SET_LEADCHNL_RV_PACING_POLARITY: NORMAL
MDC_IDC_SET_LEADCHNL_RV_PACING_PULSEWIDTH: 0.4 MS
MDC_IDC_SET_LEADCHNL_RV_SENSING_ADAPTATION_MODE: NORMAL
MDC_IDC_SET_LEADCHNL_RV_SENSING_POLARITY: NORMAL
MDC_IDC_SET_LEADCHNL_RV_SENSING_SENSITIVITY: 1.5 MV
MDC_IDC_SET_ZONE_DETECTION_INTERVAL: 375 MS
MDC_IDC_SET_ZONE_STATUS: NORMAL
MDC_IDC_SET_ZONE_TYPE: NORMAL
MDC_IDC_SET_ZONE_VENDOR_TYPE: NORMAL
MDC_IDC_STAT_AT_BURDEN_PERCENT: 0 %
MDC_IDC_STAT_AT_DTM_END: NORMAL
MDC_IDC_STAT_AT_DTM_START: NORMAL
MDC_IDC_STAT_BRADY_DTM_END: NORMAL
MDC_IDC_STAT_BRADY_DTM_START: NORMAL
MDC_IDC_STAT_BRADY_RA_PERCENT_PACED: 0 %
MDC_IDC_STAT_BRADY_RV_PERCENT_PACED: 100 %
MDC_IDC_STAT_EPISODE_RECENT_COUNT: 0
MDC_IDC_STAT_EPISODE_RECENT_COUNT_DTM_END: NORMAL
MDC_IDC_STAT_EPISODE_RECENT_COUNT_DTM_START: NORMAL
MDC_IDC_STAT_EPISODE_TYPE: NORMAL
MDC_IDC_STAT_EPISODE_VENDOR_TYPE: NORMAL
MDC_IDC_STAT_EPISODE_VENDOR_TYPE: NORMAL

## 2023-10-11 PROCEDURE — 93296 REM INTERROG EVL PM/IDS: CPT | Performed by: INTERNAL MEDICINE

## 2023-10-11 PROCEDURE — 93294 REM INTERROG EVL PM/LDLS PM: CPT | Performed by: INTERNAL MEDICINE

## 2023-10-13 NOTE — TELEPHONE ENCOUNTER
Chart reviewed for upcoming procedure.     General: NAD  HEENT: NCAT. R neck swelling.  Cardiac: RRR, 2+ radial pulses  Chest: CTA  Abdomen: soft, non-distended, no ttp, no rebound or guarding  Extremities: no peripheral edema, calf tenderness, or leg size discrepancies  Skin: no rashes  Neuro: AAOx3, motor and sensory grossly intact.   Psych: mood and affect appropriate

## 2024-01-13 ENCOUNTER — HEALTH MAINTENANCE LETTER (OUTPATIENT)
Age: 79
End: 2024-01-13

## 2024-01-19 ENCOUNTER — ANCILLARY PROCEDURE (OUTPATIENT)
Dept: CARDIOLOGY | Facility: CLINIC | Age: 79
End: 2024-01-19
Attending: INTERNAL MEDICINE
Payer: MEDICARE

## 2024-01-19 DIAGNOSIS — Z95.0 CARDIAC PACEMAKER IN SITU: ICD-10-CM

## 2024-01-19 PROCEDURE — 93294 REM INTERROG EVL PM/LDLS PM: CPT | Performed by: INTERNAL MEDICINE

## 2024-01-19 PROCEDURE — 93296 REM INTERROG EVL PM/IDS: CPT | Performed by: INTERNAL MEDICINE

## 2024-02-01 LAB
MDC_IDC_EPISODE_DTM: NORMAL
MDC_IDC_EPISODE_ID: NORMAL
MDC_IDC_EPISODE_TYPE: NORMAL
MDC_IDC_LEAD_CONNECTION_STATUS: NORMAL
MDC_IDC_LEAD_CONNECTION_STATUS: NORMAL
MDC_IDC_LEAD_IMPLANT_DT: NORMAL
MDC_IDC_LEAD_IMPLANT_DT: NORMAL
MDC_IDC_LEAD_LOCATION: NORMAL
MDC_IDC_LEAD_LOCATION: NORMAL
MDC_IDC_LEAD_LOCATION_DETAIL_1: NORMAL
MDC_IDC_LEAD_LOCATION_DETAIL_1: NORMAL
MDC_IDC_LEAD_MFG: NORMAL
MDC_IDC_LEAD_MFG: NORMAL
MDC_IDC_LEAD_MODEL: NORMAL
MDC_IDC_LEAD_MODEL: NORMAL
MDC_IDC_LEAD_POLARITY_TYPE: NORMAL
MDC_IDC_LEAD_POLARITY_TYPE: NORMAL
MDC_IDC_LEAD_SERIAL: NORMAL
MDC_IDC_LEAD_SERIAL: NORMAL
MDC_IDC_MSMT_BATTERY_DTM: NORMAL
MDC_IDC_MSMT_BATTERY_REMAINING_LONGEVITY: 78 MO
MDC_IDC_MSMT_BATTERY_REMAINING_PERCENTAGE: 75 %
MDC_IDC_MSMT_BATTERY_STATUS: NORMAL
MDC_IDC_MSMT_LEADCHNL_RA_IMPEDANCE_VALUE: 789 OHM
MDC_IDC_MSMT_LEADCHNL_RA_PACING_THRESHOLD_AMPLITUDE: 0.6 V
MDC_IDC_MSMT_LEADCHNL_RA_PACING_THRESHOLD_PULSEWIDTH: 0.4 MS
MDC_IDC_MSMT_LEADCHNL_RV_IMPEDANCE_VALUE: 588 OHM
MDC_IDC_MSMT_LEADCHNL_RV_PACING_THRESHOLD_AMPLITUDE: 1 V
MDC_IDC_MSMT_LEADCHNL_RV_PACING_THRESHOLD_PULSEWIDTH: 0.4 MS
MDC_IDC_PG_IMPLANT_DTM: NORMAL
MDC_IDC_PG_MFG: NORMAL
MDC_IDC_PG_MODEL: NORMAL
MDC_IDC_PG_SERIAL: NORMAL
MDC_IDC_PG_TYPE: NORMAL
MDC_IDC_SESS_CLINIC_NAME: NORMAL
MDC_IDC_SESS_DTM: NORMAL
MDC_IDC_SESS_TYPE: NORMAL
MDC_IDC_SET_BRADY_AT_MODE_SWITCH_MODE: NORMAL
MDC_IDC_SET_BRADY_AT_MODE_SWITCH_RATE: 170 {BEATS}/MIN
MDC_IDC_SET_BRADY_LOWRATE: 60 {BEATS}/MIN
MDC_IDC_SET_BRADY_MAX_SENSOR_RATE: 130 {BEATS}/MIN
MDC_IDC_SET_BRADY_MAX_TRACKING_RATE: 130 {BEATS}/MIN
MDC_IDC_SET_BRADY_MODE: NORMAL
MDC_IDC_SET_BRADY_PAV_DELAY_HIGH: 200 MS
MDC_IDC_SET_BRADY_PAV_DELAY_LOW: 300 MS
MDC_IDC_SET_BRADY_SAV_DELAY_HIGH: 200 MS
MDC_IDC_SET_BRADY_SAV_DELAY_LOW: 300 MS
MDC_IDC_SET_LEADCHNL_RA_PACING_AMPLITUDE: 2 V
MDC_IDC_SET_LEADCHNL_RA_PACING_CAPTURE_MODE: NORMAL
MDC_IDC_SET_LEADCHNL_RA_PACING_POLARITY: NORMAL
MDC_IDC_SET_LEADCHNL_RA_PACING_PULSEWIDTH: 0.4 MS
MDC_IDC_SET_LEADCHNL_RA_SENSING_ADAPTATION_MODE: NORMAL
MDC_IDC_SET_LEADCHNL_RA_SENSING_POLARITY: NORMAL
MDC_IDC_SET_LEADCHNL_RA_SENSING_SENSITIVITY: 0.25 MV
MDC_IDC_SET_LEADCHNL_RV_PACING_AMPLITUDE: 1.6 V
MDC_IDC_SET_LEADCHNL_RV_PACING_CAPTURE_MODE: NORMAL
MDC_IDC_SET_LEADCHNL_RV_PACING_POLARITY: NORMAL
MDC_IDC_SET_LEADCHNL_RV_PACING_PULSEWIDTH: 0.4 MS
MDC_IDC_SET_LEADCHNL_RV_SENSING_ADAPTATION_MODE: NORMAL
MDC_IDC_SET_LEADCHNL_RV_SENSING_POLARITY: NORMAL
MDC_IDC_SET_LEADCHNL_RV_SENSING_SENSITIVITY: 1.5 MV
MDC_IDC_SET_ZONE_DETECTION_INTERVAL: 375 MS
MDC_IDC_SET_ZONE_STATUS: NORMAL
MDC_IDC_SET_ZONE_TYPE: NORMAL
MDC_IDC_SET_ZONE_VENDOR_TYPE: NORMAL
MDC_IDC_STAT_AT_BURDEN_PERCENT: 0 %
MDC_IDC_STAT_AT_DTM_END: NORMAL
MDC_IDC_STAT_AT_DTM_START: NORMAL
MDC_IDC_STAT_BRADY_DTM_END: NORMAL
MDC_IDC_STAT_BRADY_DTM_START: NORMAL
MDC_IDC_STAT_BRADY_RA_PERCENT_PACED: 0 %
MDC_IDC_STAT_BRADY_RV_PERCENT_PACED: 100 %
MDC_IDC_STAT_EPISODE_RECENT_COUNT: 0
MDC_IDC_STAT_EPISODE_RECENT_COUNT_DTM_END: NORMAL
MDC_IDC_STAT_EPISODE_RECENT_COUNT_DTM_START: NORMAL
MDC_IDC_STAT_EPISODE_TYPE: NORMAL
MDC_IDC_STAT_EPISODE_VENDOR_TYPE: NORMAL
MDC_IDC_STAT_EPISODE_VENDOR_TYPE: NORMAL

## 2024-04-30 ENCOUNTER — ANCILLARY PROCEDURE (OUTPATIENT)
Dept: CARDIOLOGY | Facility: CLINIC | Age: 79
End: 2024-04-30
Attending: INTERNAL MEDICINE
Payer: MEDICARE

## 2024-04-30 DIAGNOSIS — Z95.0 CARDIAC PACEMAKER IN SITU: ICD-10-CM

## 2024-04-30 PROCEDURE — 93294 REM INTERROG EVL PM/LDLS PM: CPT | Performed by: INTERNAL MEDICINE

## 2024-04-30 PROCEDURE — 93296 REM INTERROG EVL PM/IDS: CPT | Performed by: INTERNAL MEDICINE

## 2024-05-02 LAB
MDC_IDC_EPISODE_DTM: NORMAL
MDC_IDC_EPISODE_ID: NORMAL
MDC_IDC_EPISODE_TYPE: NORMAL
MDC_IDC_LEAD_CONNECTION_STATUS: NORMAL
MDC_IDC_LEAD_CONNECTION_STATUS: NORMAL
MDC_IDC_LEAD_IMPLANT_DT: NORMAL
MDC_IDC_LEAD_IMPLANT_DT: NORMAL
MDC_IDC_LEAD_LOCATION: NORMAL
MDC_IDC_LEAD_LOCATION: NORMAL
MDC_IDC_LEAD_LOCATION_DETAIL_1: NORMAL
MDC_IDC_LEAD_LOCATION_DETAIL_1: NORMAL
MDC_IDC_LEAD_MFG: NORMAL
MDC_IDC_LEAD_MFG: NORMAL
MDC_IDC_LEAD_MODEL: NORMAL
MDC_IDC_LEAD_MODEL: NORMAL
MDC_IDC_LEAD_POLARITY_TYPE: NORMAL
MDC_IDC_LEAD_POLARITY_TYPE: NORMAL
MDC_IDC_LEAD_SERIAL: NORMAL
MDC_IDC_LEAD_SERIAL: NORMAL
MDC_IDC_MSMT_BATTERY_DTM: NORMAL
MDC_IDC_MSMT_BATTERY_REMAINING_LONGEVITY: 72 MO
MDC_IDC_MSMT_BATTERY_REMAINING_PERCENTAGE: 72 %
MDC_IDC_MSMT_BATTERY_STATUS: NORMAL
MDC_IDC_MSMT_LEADCHNL_RA_IMPEDANCE_VALUE: 841 OHM
MDC_IDC_MSMT_LEADCHNL_RA_PACING_THRESHOLD_AMPLITUDE: 0.5 V
MDC_IDC_MSMT_LEADCHNL_RA_PACING_THRESHOLD_PULSEWIDTH: 0.4 MS
MDC_IDC_MSMT_LEADCHNL_RV_IMPEDANCE_VALUE: 689 OHM
MDC_IDC_MSMT_LEADCHNL_RV_PACING_THRESHOLD_AMPLITUDE: 1 V
MDC_IDC_MSMT_LEADCHNL_RV_PACING_THRESHOLD_PULSEWIDTH: 0.4 MS
MDC_IDC_PG_IMPLANT_DTM: NORMAL
MDC_IDC_PG_MFG: NORMAL
MDC_IDC_PG_MODEL: NORMAL
MDC_IDC_PG_SERIAL: NORMAL
MDC_IDC_PG_TYPE: NORMAL
MDC_IDC_SESS_CLINIC_NAME: NORMAL
MDC_IDC_SESS_DTM: NORMAL
MDC_IDC_SESS_TYPE: NORMAL
MDC_IDC_SET_BRADY_AT_MODE_SWITCH_MODE: NORMAL
MDC_IDC_SET_BRADY_AT_MODE_SWITCH_RATE: 170 {BEATS}/MIN
MDC_IDC_SET_BRADY_LOWRATE: 60 {BEATS}/MIN
MDC_IDC_SET_BRADY_MAX_SENSOR_RATE: 130 {BEATS}/MIN
MDC_IDC_SET_BRADY_MAX_TRACKING_RATE: 130 {BEATS}/MIN
MDC_IDC_SET_BRADY_MODE: NORMAL
MDC_IDC_SET_BRADY_PAV_DELAY_HIGH: 200 MS
MDC_IDC_SET_BRADY_PAV_DELAY_LOW: 300 MS
MDC_IDC_SET_BRADY_SAV_DELAY_HIGH: 200 MS
MDC_IDC_SET_BRADY_SAV_DELAY_LOW: 300 MS
MDC_IDC_SET_LEADCHNL_RA_PACING_AMPLITUDE: 2 V
MDC_IDC_SET_LEADCHNL_RA_PACING_CAPTURE_MODE: NORMAL
MDC_IDC_SET_LEADCHNL_RA_PACING_POLARITY: NORMAL
MDC_IDC_SET_LEADCHNL_RA_PACING_PULSEWIDTH: 0.4 MS
MDC_IDC_SET_LEADCHNL_RA_SENSING_ADAPTATION_MODE: NORMAL
MDC_IDC_SET_LEADCHNL_RA_SENSING_POLARITY: NORMAL
MDC_IDC_SET_LEADCHNL_RA_SENSING_SENSITIVITY: 0.25 MV
MDC_IDC_SET_LEADCHNL_RV_PACING_AMPLITUDE: 1.5 V
MDC_IDC_SET_LEADCHNL_RV_PACING_CAPTURE_MODE: NORMAL
MDC_IDC_SET_LEADCHNL_RV_PACING_POLARITY: NORMAL
MDC_IDC_SET_LEADCHNL_RV_PACING_PULSEWIDTH: 0.4 MS
MDC_IDC_SET_LEADCHNL_RV_SENSING_ADAPTATION_MODE: NORMAL
MDC_IDC_SET_LEADCHNL_RV_SENSING_POLARITY: NORMAL
MDC_IDC_SET_LEADCHNL_RV_SENSING_SENSITIVITY: 1.5 MV
MDC_IDC_SET_ZONE_DETECTION_INTERVAL: 375 MS
MDC_IDC_SET_ZONE_STATUS: NORMAL
MDC_IDC_SET_ZONE_TYPE: NORMAL
MDC_IDC_SET_ZONE_VENDOR_TYPE: NORMAL
MDC_IDC_STAT_AT_BURDEN_PERCENT: 0 %
MDC_IDC_STAT_AT_DTM_END: NORMAL
MDC_IDC_STAT_AT_DTM_START: NORMAL
MDC_IDC_STAT_BRADY_DTM_END: NORMAL
MDC_IDC_STAT_BRADY_DTM_START: NORMAL
MDC_IDC_STAT_BRADY_RA_PERCENT_PACED: 0 %
MDC_IDC_STAT_BRADY_RV_PERCENT_PACED: 100 %
MDC_IDC_STAT_EPISODE_RECENT_COUNT: 0
MDC_IDC_STAT_EPISODE_RECENT_COUNT_DTM_END: NORMAL
MDC_IDC_STAT_EPISODE_RECENT_COUNT_DTM_START: NORMAL
MDC_IDC_STAT_EPISODE_TYPE: NORMAL
MDC_IDC_STAT_EPISODE_VENDOR_TYPE: NORMAL
MDC_IDC_STAT_EPISODE_VENDOR_TYPE: NORMAL

## 2024-08-23 NOTE — PROGRESS NOTES
"[facility]    I had the pleasure of seeing Anne when she came for annual follow-up.  This 77 year old sees Dr. Monson for her history of:    1. 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  S/p dual-chamber [employer] pacemaker 2/2017  2. Chronic Pain   3. Depression/Anxiety/Insomnia       Last Visit & Interval History:  I saw Anne last in 7/2023 at which time she was doing well, attending water aerobics 4x/week. No changes were made with plans for follow-up to coincide with in-office device check.    Today's Visit:  Anne thinks things are going well!  She's still adjusting to moving to [facility]. She still goes to water aerobics 4x/week without any concerns whatsoever.    No issues with dizziness, lightheadedness. No palpitations or problems with PPM site.    No issues with medications. No myalgias on the atorvastatin but wonders if she should take CoQ 10. No claudication sxs.       VITALS:  Vitals: /69 (BP Location: Right arm, Patient Position: Sitting)   Pulse 81   Ht 1.575 m (5' 2\")   Wt 58.8 kg (129 lb 9.6 oz)   SpO2 97%   BMI 23.70 kg/m      Diagnostic Testing:  Device interrogation today <1%AP and 100%  in DDD 60/130. Underlying SR 70s with CHB. HR , primarily 70-90 bpm. No atrial or ventricular arrhythmias  Echo 5/2022 showed LVEF 60-65% with proximal septal thickening noted. RV nl. Trace TR. Trace MR. Ao sclerosis  Echocardiogram 2/2017 showed EF 60 to 65%.  Left atrium was moderately dilated.  No significant valvular abnormalities.    Plan:  Annual follow-up with device check      Assessment/Plan:    AV Block  S/p dual chamber [employer] PPM 2017  Device interrogation as above looks great     Echo 5/2022 with nl LVEF    PLAN:  Annual follow-up with in-office     Chronic pain   Reports recurrent back pain   Follows with Pain [facility] Clinic for management     PLAN:  Defer to Pain Clinic for management of symptoms       3. Depression/Anxiety/Insomnia  Follows with Psychiatry through [facility]     PLAN:    Patient will continue to follow with Psychiatry for management       Nichelle Curtis PA-C, MSPAS          Orders Placed This Encounter   Procedures    Follow-Up with Cardiology DONNIE     No orders of the defined types were placed in this encounter.    Medications Discontinued During This Encounter   Medication Reason    acetaminophen (ACETAMINOPHEN 8 HOUR) 650 MG CR tablet Dose adjustment           Encounter Diagnoses   Name Primary?    Atrioventricular block, complete (H) Yes    Cardiac pacemaker in situ          CURRENT MEDICATIONS:  Current Outpatient Medications   Medication Sig Dispense Refill    atorvastatin (LIPITOR) 40 MG tablet Take 40 mg by mouth At Bedtime  30 tablet 1    buPROPion (WELLBUTRIN SR) 200 MG 12 hr tablet Take 1 tablet by mouth every morning      clobetasol (TEMOVATE) 0.05 % external ointment Apply very thin layer twice daily to affected area on vulva, tapering to twice weekly as able.      clonazePAM (KLONOPIN) 0.5 MG tablet Take by mouth 2 times daily as needed      Digestive Enzymes (DIGESTIVE ENZYME PO)       escitalopram (LEXAPRO) 10 MG tablet Take 10 mg by mouth daily      esomeprazole (NEXIUM) 20 MG DR capsule Take 20 mg by mouth every morning (before breakfast) Take 30-60 minutes before eating.      eszopiclone (LUNESTA) 3 MG tablet Take 3 mg by mouth At Bedtime Patient taking one tablet at night      LACTOBACILLUS PO       lamoTRIgine (LAMICTAL) 25 MG tablet Start with 1/2 tab daily at bedtime for first week, then can take 1 tab daily at bedtime      magnesium 100 MG CAPS MAGNESIUM OXIDE 250 mg, 2 at bedtime      mirtazapine (REMERON) 15 MG tablet Take 30 mg by mouth At Bedtime      Multiple Vitamin (MULTIVITAMIN ADULT PO)       polyethylene glycol (MIRALAX/GLYCOLAX) powder Take 17 g by mouth daily as needed for  "constipation      Probiotic Product (PROBIOTIC & ACIDOPHILUS EX ST PO) Take 1 capsule by mouth daily      traMADol (ULTRAM) 50 MG tablet Take 50 mg by mouth every 6 hours as needed for severe pain      UNABLE TO FIND Ortho digestyme, 2 per day      UNABLE TO FIND MEDICATION NAME: stool softner: unknown brand and dosage         ALLERGIES     Allergies   Allergen Reactions    Blood-Group Specific Substance Other (See Comments)     Patient has anti-K antibody.  Blood for the transfusion might be delayed.      Dilaudid [Hydromorphone] Nausea and Vomiting    Doxepin Unknown     Other reaction(s): Intolerance-Can't Take  Not effective for sleep    Meperidine Nausea and Vomiting    Meperidine And Related Nausea and Vomiting     Demerol    Morphine      PN: LW Reaction: Vomiting,Nausea    No Clinical Screening - See Comments      PN: LW Reaction: all opiates    Pregabalin Other (See Comments)     Not effective         Review of Systems:  Skin:  not assessed     Eyes:  Negative glasses  ENT:  Negative nasal congestion;persistent sore throat  Respiratory:  Negative dyspnea on exertion;shortness of breath;cough;wheezing  Cardiovascular:  Negative for;palpitations;chest pain;edema;fatigue;lightheadedness;dizziness Positive for;exercise intolerance  Gastroenterology: Negative abdominal pain  Genitourinary:  not assessed    Musculoskeletal:  Positive for back pain  Neurologic:  Negative headaches  Psychiatric:  Positive for anxiety;depression  Heme/Lymph/Imm:  not assessed    Endocrine:  not assessed      Physical Exam:  Vitals: /69 (BP Location: Right arm, Patient Position: Sitting)   Pulse 81   Ht 1.575 m (5' 2\")   Wt 58.8 kg (129 lb 9.6 oz)   SpO2 97%   BMI 23.70 kg/m      Constitutional:  cooperative, alert and oriented, well developed, well nourished, in no acute distress        Skin:  warm and dry to the touch   No errosion    Head:  normocephalic, no masses or lesions        Eyes:  pupils equal and round;EOMS " intact        Chest:  normal respiratory excursion;clear to auscultation        Cardiac: regular rhythm;normal S1 and S2;no S3 or S4;no murmurs, gallops or rubs detected       systolic ejection murmur          Extremities and Back:  no edema              PAST MEDICAL HISTORY:  Past Medical History:   Diagnosis Date    Anemia     Carpal tunnel syndrome     Chronic fatigue syndrome 11/00    Complication of anesthesia     Decreased libido     Depression with anxiety     Fatigue     Gastroesophageal reflux disease     HA (headache)     History of blood transfusion     [facility]ARE FOLLOWING JOINT REPLACEMENT 7/23/2007    Irritable bowel syndrome 11/00    Myalgia and myositis, unspecified 11/00    Near syncope 5/17/2017    Neutropenia 00    Noninfectious ileitis     Osteoarthritis     rt knee, back    Osteoarthrosis, unspecified whether generalized or localized, hand 11/00    Osteoarthrosis, unspecified whether generalized or localized, lower leg 11/00    Other chronic pain     back    Pacemaker     PONV (postoperative nausea and vomiting)     Pure hypercholesterolemia 11/00    RBBB     Renal disease     mld    Uncomplicated asthma     Urticaria, unspecified 00       PAST SURGICAL HISTORY:  Past Surgical History:   Procedure Laterality Date    ARTHROPLASTY KNEE Left     ARTHROPLASTY KNEE Right     partial    ARTHROPLASTY REVISION HIP Left 2/3/2017    Procedure: ARTHROPLASTY REVISION HIP;  Surgeon: Juan Moncada MD;  Location:  OR    CARDIAC SURGERY  2017    pacemaker    COLONOSCOPY N/A 1/15/2019    Procedure: COLONOSCOPY;  Surgeon: Eliane Barone MD;  Location:  GI    EYE SURGERY      zoila cataract surgery    GI SURGERY      hemorrhoid repair    GYN SURGERY      tubal ligation    OPTICAL TRACKING SYSTEM FUSION SPINE POSTERIOR LUMBAR TWO LEVELS N/A 1/9/2020    Procedure: L3-5 decompressive laminectomies with L4-5 Tranforaminal Lumbar Interbody Fusion with L3-5 posterior lateral fusion;  Surgeon: Az  Anjel Caruso MD;  Location: [facility] OR    ORTHOPEDIC SURGERY      right sabine-arthroplasty, trigger finger repair, left knee arthroscopy and replacement, bilateral hip replacement,    Z NONSPECIFIC PROCEDURE      s/p Rt. Carpal tunnel release    ZZC NONSPECIFIC PROCEDURE      s/p bilat Tubal ligation       FAMILY HISTORY:  Family History   Problem Relation Age of Onset    Hypertension Mother     Alzheimer Disease Mother         Memory issues- unsure if alzheimers    Arthritis Mother         osteoarthritis    Depression Mother     Gastrointestinal Disease Mother         acid reflux    Allergies Father     Neurologic Disorder Father         migraines    Respiratory Father         chronic brochitis- born during a flu epidemic in 1918    Cerebrovascular Disease Maternal Grandmother         series of TIA's    Diabetes Maternal Grandfather         adult onset    Cancer Maternal Grandfather         of stomach or esophagus-heavy smoker    C.A.D. Paternal Grandmother         angina    Depression Daughter     Neurologic Disorder Sister         migraines       SOCIAL HISTORY:  Social History     Socioeconomic History    Marital status:      Spouse name: Christiano Rangel    Number of children: 3    Years of education: Masters+   Occupational History    Occupation: [employer]     Employer: [employer]   Tobacco Use    Smoking status: Never    Smokeless tobacco: Never   Substance and Sexual Activity    Alcohol use: Yes     Comment: 1 Drink with dinner    Drug use: No    Sexual activity: Yes     Partners: Male   Other Topics Concern     Service No    Blood Transfusions Yes     Comment: [employer] 1974, lost a lot of blood-given 6? 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[employer], had vaccines Hep B, Flu    Hobby Hazards No    Sleep Concern No     Comment: Nortriptyline helps    Stress Concern No     Comment: 3 children    Weight Concern No     Special Diet No    Back Care Yes     Comment: Low back pain, has osteoarthritis    Exercise Yes     Comment: 3-4 times a week    Bike Helmet No    Seat Belt Yes    Self-Exams No   Social History Narrative    Last pap:6-13-02    Last Lipid Panel: 8-6-03    Last Mammo: 7-18-03,WNL    Last DEXA: Unsure    Last Colonoscopy: 06-22-01    Last Tetanus:7-10-03    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             Social Determinants of Health     Financial Resource Strain: Low Risk  (10/31/2023)    [facility] [employer] [facility]    Financial Resource Strain     Difficulty of Paying Living Expenses: 3   Food Insecurity: No Food Insecurity (10/31/2023)    [facility] [employer] [facility] [employer] [facility]    Food Insecurity     Worried About Running Out of Food in the Last Year: 1   Transportation Needs: No Transportation Needs (10/31/2023)    [facility] [employer] [facility]    Transportation Needs     Lack of Transportation (Medical): 1   Social Connections: Socially Integrated (10/31/2023)    [facility] [employer] [employer] [facility] [employer] [facility]    Social Connections     Frequency of Communication with Friends and Family: 0   Housing Stability: Low Risk  (10/31/2023)    [facility] [employer] [facility] [employer] [facility]    Housing Stability     Unable to Pay for Housing in the Last Year: 1

## 2024-08-26 ENCOUNTER — OFFICE VISIT (OUTPATIENT)
Dept: CARDIOLOGY | Facility: CLINIC | Age: 79
End: 2024-08-26
Payer: MEDICARE

## 2024-08-26 ENCOUNTER — ANCILLARY PROCEDURE (OUTPATIENT)
Dept: CARDIOLOGY | Facility: CLINIC | Age: 79
End: 2024-08-26
Attending: INTERNAL MEDICINE
Payer: MEDICARE

## 2024-08-26 VITALS
BODY MASS INDEX: 23.85 KG/M2 | SYSTOLIC BLOOD PRESSURE: 111 MMHG | OXYGEN SATURATION: 97 % | HEIGHT: 62 IN | HEART RATE: 81 BPM | DIASTOLIC BLOOD PRESSURE: 69 MMHG | WEIGHT: 129.6 LBS

## 2024-08-26 DIAGNOSIS — I44.2 ATRIOVENTRICULAR BLOCK, COMPLETE (H): Primary | ICD-10-CM

## 2024-08-26 DIAGNOSIS — Z95.0 CARDIAC PACEMAKER IN SITU: ICD-10-CM

## 2024-08-26 DIAGNOSIS — I44.2 ATRIOVENTRICULAR BLOCK, COMPLETE (H): ICD-10-CM

## 2024-08-26 DIAGNOSIS — Z95.0 CARDIAC PACEMAKER IN SITU: Primary | ICD-10-CM

## 2024-08-26 LAB
MDC_IDC_LEAD_CONNECTION_STATUS: NORMAL
MDC_IDC_LEAD_CONNECTION_STATUS: NORMAL
MDC_IDC_LEAD_IMPLANT_DT: NORMAL
MDC_IDC_LEAD_IMPLANT_DT: NORMAL
MDC_IDC_LEAD_LOCATION: NORMAL
MDC_IDC_LEAD_LOCATION: NORMAL
MDC_IDC_LEAD_LOCATION_DETAIL_1: NORMAL
MDC_IDC_LEAD_LOCATION_DETAIL_1: NORMAL
MDC_IDC_LEAD_MFG: NORMAL
MDC_IDC_LEAD_MFG: NORMAL
MDC_IDC_LEAD_MODEL: NORMAL
MDC_IDC_LEAD_MODEL: NORMAL
MDC_IDC_LEAD_POLARITY_TYPE: NORMAL
MDC_IDC_LEAD_POLARITY_TYPE: NORMAL
MDC_IDC_LEAD_SERIAL: NORMAL
MDC_IDC_LEAD_SERIAL: NORMAL
MDC_IDC_MSMT_BATTERY_DTM: NORMAL
MDC_IDC_MSMT_BATTERY_REMAINING_LONGEVITY: 72 MO
MDC_IDC_MSMT_BATTERY_REMAINING_PERCENTAGE: 70 %
MDC_IDC_MSMT_BATTERY_STATUS: NORMAL
MDC_IDC_MSMT_LEADCHNL_RA_IMPEDANCE_VALUE: 795 OHM
MDC_IDC_MSMT_LEADCHNL_RA_PACING_THRESHOLD_AMPLITUDE: 0.5 V
MDC_IDC_MSMT_LEADCHNL_RA_PACING_THRESHOLD_PULSEWIDTH: 0.4 MS
MDC_IDC_MSMT_LEADCHNL_RV_IMPEDANCE_VALUE: 627 OHM
MDC_IDC_MSMT_LEADCHNL_RV_PACING_THRESHOLD_AMPLITUDE: 0.9 V
MDC_IDC_MSMT_LEADCHNL_RV_PACING_THRESHOLD_PULSEWIDTH: 0.4 MS
MDC_IDC_PG_IMPLANT_DTM: NORMAL
MDC_IDC_PG_MFG: NORMAL
MDC_IDC_PG_MODEL: NORMAL
MDC_IDC_PG_SERIAL: NORMAL
MDC_IDC_PG_TYPE: NORMAL
MDC_IDC_SESS_CLINIC_NAME: NORMAL
MDC_IDC_SESS_DTM: NORMAL
MDC_IDC_SESS_TYPE: NORMAL
MDC_IDC_SET_BRADY_AT_MODE_SWITCH_MODE: NORMAL
MDC_IDC_SET_BRADY_AT_MODE_SWITCH_RATE: 170 {BEATS}/MIN
MDC_IDC_SET_BRADY_LOWRATE: 60 {BEATS}/MIN
MDC_IDC_SET_BRADY_MAX_SENSOR_RATE: 130 {BEATS}/MIN
MDC_IDC_SET_BRADY_MAX_TRACKING_RATE: 130 {BEATS}/MIN
MDC_IDC_SET_BRADY_MODE: NORMAL
MDC_IDC_SET_BRADY_PAV_DELAY_HIGH: 200 MS
MDC_IDC_SET_BRADY_PAV_DELAY_LOW: 300 MS
MDC_IDC_SET_BRADY_SAV_DELAY_HIGH: 200 MS
MDC_IDC_SET_BRADY_SAV_DELAY_LOW: 300 MS
MDC_IDC_SET_LEADCHNL_RA_PACING_AMPLITUDE: 2 V
MDC_IDC_SET_LEADCHNL_RA_PACING_CAPTURE_MODE: NORMAL
MDC_IDC_SET_LEADCHNL_RA_PACING_POLARITY: NORMAL
MDC_IDC_SET_LEADCHNL_RA_PACING_PULSEWIDTH: 0.4 MS
MDC_IDC_SET_LEADCHNL_RA_SENSING_ADAPTATION_MODE: NORMAL
MDC_IDC_SET_LEADCHNL_RA_SENSING_POLARITY: NORMAL
MDC_IDC_SET_LEADCHNL_RA_SENSING_SENSITIVITY: 0.25 MV
MDC_IDC_SET_LEADCHNL_RV_PACING_AMPLITUDE: 1.5 V
MDC_IDC_SET_LEADCHNL_RV_PACING_CAPTURE_MODE: NORMAL
MDC_IDC_SET_LEADCHNL_RV_PACING_POLARITY: NORMAL
MDC_IDC_SET_LEADCHNL_RV_PACING_PULSEWIDTH: 0.4 MS
MDC_IDC_SET_LEADCHNL_RV_SENSING_ADAPTATION_MODE: NORMAL
MDC_IDC_SET_LEADCHNL_RV_SENSING_POLARITY: NORMAL
MDC_IDC_SET_LEADCHNL_RV_SENSING_SENSITIVITY: 1.5 MV
MDC_IDC_SET_ZONE_DETECTION_INTERVAL: 375 MS
MDC_IDC_SET_ZONE_STATUS: NORMAL
MDC_IDC_SET_ZONE_TYPE: NORMAL
MDC_IDC_SET_ZONE_VENDOR_TYPE: NORMAL
MDC_IDC_STAT_AT_BURDEN_PERCENT: 0 %
MDC_IDC_STAT_AT_DTM_END: NORMAL
MDC_IDC_STAT_AT_DTM_START: NORMAL
MDC_IDC_STAT_BRADY_DTM_END: NORMAL
MDC_IDC_STAT_BRADY_DTM_START: NORMAL
MDC_IDC_STAT_BRADY_RA_PERCENT_PACED: 0 %
MDC_IDC_STAT_BRADY_RV_PERCENT_PACED: 100 %
MDC_IDC_STAT_EPISODE_RECENT_COUNT: 0
MDC_IDC_STAT_EPISODE_RECENT_COUNT_DTM_END: NORMAL
MDC_IDC_STAT_EPISODE_RECENT_COUNT_DTM_START: NORMAL
MDC_IDC_STAT_EPISODE_TYPE: NORMAL
MDC_IDC_STAT_EPISODE_VENDOR_TYPE: NORMAL
MDC_IDC_STAT_EPISODE_VENDOR_TYPE: NORMAL

## 2024-08-26 PROCEDURE — 99213 OFFICE O/P EST LOW 20 MIN: CPT | Mod: 25 | Performed by: PHYSICIAN ASSISTANT

## 2024-08-26 PROCEDURE — 93280 PM DEVICE PROGR EVAL DUAL: CPT | Performed by: INTERNAL MEDICINE

## 2024-08-26 RX ORDER — MIRTAZAPINE 15 MG/1
30 TABLET, FILM COATED ORAL AT BEDTIME
Qty: 180 TABLET | Refills: 3 | Status: CANCELLED | OUTPATIENT
Start: 2024-08-26

## 2024-08-26 NOTE — LETTER
"8/26/2024    Nevaeh Wood MD  [address]  [facility] [address]    RE: Anne Rangel       Dear Colleague,     I had the pleasure of seeing Anne Rangel in the [facility].  [facility]    I had the pleasure of seeing Anne when she came for annual follow-up.  This 77 year old sees Dr. Monson for her history of:    1. 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  S/p dual-chamber [employer] pacemaker 2/2017  2. Chronic Pain   3. Depression/Anxiety/Insomnia       Last Visit & Interval History:  I saw Anne last in 7/2023 at which time she was doing well, attending water aerobics 4x/week. No changes were made with plans for follow-up to coincide with in-office device check.    Today's Visit:  Anne thinks things are going well!  She's still adjusting to moving to [facility] Living. She still goes to water aerobics 4x/week without any concerns whatsoever.    No issues with dizziness, lightheadedness. No palpitations or problems with PPM site.    No issues with medications. No myalgias on the atorvastatin but wonders if she should take CoQ 10. No claudication sxs.       VITALS:  Vitals: /69 (BP Location: Right arm, Patient Position: Sitting)   Pulse 81   Ht 1.575 m (5' 2\")   Wt 58.8 kg (129 lb 9.6 oz)   SpO2 97%   BMI 23.70 kg/m      Diagnostic Testing:  Device interrogation today <1%AP and 100%  in DDD 60/130. Underlying SR 70s with CHB. HR , primarily 70-90 bpm. No atrial or ventricular arrhythmias  Echo 5/2022 showed LVEF 60-65% with proximal septal thickening noted. RV nl. Trace TR. Trace MR. Ao sclerosis  Echocardiogram 2/2017 showed EF 60 to 65%.  Left atrium was moderately dilated.  No significant valvular abnormalities.    Plan:  Annual follow-up with device check      Assessment/Plan:    AV Block  S/p dual " chamber [employer] PPM 2017  Device interrogation as above looks great     Echo 5/2022 with nl LVEF    PLAN:  Annual follow-up with in-office     Chronic pain   Reports recurrent back pain   Follows with Pain Clinic for management     PLAN:  Defer to Pain Clinic for management of symptoms       3. Depression/Anxiety/Insomnia  Follows with Psychiatry through [facility]     PLAN:    Patient will continue to follow with Psychiatry for management       Nichelle Curtis PA-C, MSPAS          Orders Placed This Encounter   Procedures     Follow-Up with Cardiology DONNIE     No orders of the defined types were placed in this encounter.    Medications Discontinued During This Encounter   Medication Reason     acetaminophen (ACETAMINOPHEN 8 HOUR) 650 MG CR tablet Dose adjustment           Encounter Diagnoses   Name Primary?     Atrioventricular block, complete (H) Yes     Cardiac pacemaker in situ          CURRENT MEDICATIONS:  Current Outpatient Medications   Medication Sig Dispense Refill     atorvastatin (LIPITOR) 40 MG tablet Take 40 mg by mouth At Bedtime  30 tablet 1     buPROPion (WELLBUTRIN SR) 200 MG 12 hr tablet Take 1 tablet by mouth every morning       clobetasol (TEMOVATE) 0.05 % external ointment Apply very thin layer twice daily to affected area on vulva, tapering to twice weekly as able.       clonazePAM (KLONOPIN) 0.5 MG tablet Take by mouth 2 times daily as needed       Digestive Enzymes (DIGESTIVE ENZYME PO)        escitalopram (LEXAPRO) 10 MG tablet Take 10 mg by mouth daily       esomeprazole (NEXIUM) 20 MG DR capsule Take 20 mg by mouth every morning (before breakfast) Take 30-60 minutes before eating.       eszopiclone (LUNESTA) 3 MG tablet Take 3 mg by mouth At Bedtime Patient taking one tablet at night       LACTOBACILLUS PO        lamoTRIgine (LAMICTAL) 25 MG tablet Start with 1/2 tab daily at bedtime for first week, then can take 1 tab daily at bedtime       magnesium 100 MG CAPS MAGNESIUM OXIDE  "250 mg, 2 at bedtime       mirtazapine (REMERON) 15 MG tablet Take 30 mg by mouth At Bedtime       Multiple Vitamin (MULTIVITAMIN ADULT PO)        polyethylene glycol (MIRALAX/GLYCOLAX) powder Take 17 g by mouth daily as needed for constipation       Probiotic Product (PROBIOTIC & ACIDOPHILUS EX ST PO) Take 1 capsule by mouth daily       traMADol (ULTRAM) 50 MG tablet Take 50 mg by mouth every 6 hours as needed for severe pain       UNABLE TO FIND Ortho digestyme, 2 per day       UNABLE TO FIND MEDICATION NAME: stool softner: unknown brand and dosage         ALLERGIES     Allergies   Allergen Reactions     Blood-Group Specific Substance Other (See Comments)     Patient has anti-K antibody.  Blood for the transfusion might be delayed.       Dilaudid [Hydromorphone] Nausea and Vomiting     Doxepin Unknown     Other reaction(s): Intolerance-Can't Take  Not effective for sleep     Meperidine Nausea and Vomiting     Meperidine And Related Nausea and Vomiting     Demerol     Morphine      PN: LW Reaction: Vomiting,Nausea     No Clinical Screening - See Comments      PN: LW Reaction: all opiates     Pregabalin Other (See Comments)     Not effective         Review of Systems:  Skin:  not assessed     Eyes:  Negative glasses  ENT:  Negative nasal congestion;persistent sore throat  Respiratory:  Negative dyspnea on exertion;shortness of breath;cough;wheezing  Cardiovascular:  Negative for;palpitations;chest pain;edema;fatigue;lightheadedness;dizziness Positive for;exercise intolerance  Gastroenterology: Negative abdominal pain  Genitourinary:  not assessed    Musculoskeletal:  Positive for back pain  Neurologic:  Negative headaches  Psychiatric:  Positive for anxiety;depression  Heme/Lymph/Imm:  not assessed    Endocrine:  not assessed      Physical Exam:  Vitals: /69 (BP Location: Right arm, Patient Position: Sitting)   Pulse 81   Ht 1.575 m (5' 2\")   Wt 58.8 kg (129 lb 9.6 oz)   SpO2 97%   BMI 23.70 kg/m  "     Constitutional:  cooperative, alert and oriented, well developed, well nourished, in no acute distress        Skin:  warm and dry to the touch   No errosion    Head:  normocephalic, no masses or lesions        Eyes:  pupils equal and round;EOMS intact        Chest:  normal respiratory excursion;clear to auscultation        Cardiac: regular rhythm;normal S1 and S2;no S3 or S4;no murmurs, gallops or rubs detected       systolic ejection murmur          Extremities and Back:  no edema              PAST MEDICAL HISTORY:  Past Medical History:   Diagnosis Date     Anemia      Carpal tunnel syndrome      Chronic fatigue syndrome 11/00     Complication of anesthesia      Decreased libido      Depression with anxiety      Fatigue      Gastroesophageal reflux disease      HA (headache)      History of blood transfusion      iamAFTERCARE FOLLOWING JOINT REPLACEMENT 7/23/2007     Irritable bowel syndrome 11/00     Myalgia and myositis, unspecified 11/00     Near syncope 5/17/2017     Neutropenia 00     Noninfectious ileitis      Osteoarthritis     rt knee, back     Osteoarthrosis, unspecified whether generalized or localized, hand 11/00     Osteoarthrosis, unspecified whether generalized or localized, lower leg 11/00     Other chronic pain     back     Pacemaker      PONV (postoperative nausea and vomiting)      Pure hypercholesterolemia 11/00     RBBB      Renal disease     mld     Uncomplicated asthma      Urticaria, unspecified 00       PAST SURGICAL HISTORY:  Past Surgical History:   Procedure Laterality Date     ARTHROPLASTY KNEE Left      ARTHROPLASTY KNEE Right     partial     ARTHROPLASTY REVISION HIP Left 2/3/2017    Procedure: ARTHROPLASTY REVISION HIP;  Surgeon: Juan Moncada MD;  Location:  OR     CARDIAC SURGERY  2017    pacemaker     COLONOSCOPY N/A 1/15/2019    Procedure: COLONOSCOPY;  Surgeon: Eliane Barone MD;  Location:  GI     EYE SURGERY      zoila cataract surgery     GI SURGERY       hemorrhoid repair     GYN SURGERY      tubal ligation     OPTICAL TRACKING SYSTEM FUSION SPINE POSTERIOR LUMBAR TWO LEVELS N/A 1/9/2020    Procedure: L3-5 decompressive laminectomies with L4-5 Tranforaminal Lumbar Interbody Fusion with L3-5 posterior lateral fusion;  Surgeon: Anjel Bernardo MD;  Location: [facility] OR     ORTHOPEDIC SURGERY      right sabine-arthroplasty, trigger finger repair, left knee arthroscopy and replacement, bilateral hip replacement,     ZZC NONSPECIFIC PROCEDURE      s/p Rt. Carpal tunnel release     ZZC NONSPECIFIC PROCEDURE      s/p bilat Tubal ligation       FAMILY HISTORY:  Family History   Problem Relation Age of Onset     Hypertension Mother      Alzheimer Disease Mother         Memory issues- unsure if alzheimers     Arthritis Mother         osteoarthritis     Depression Mother      Gastrointestinal Disease Mother         acid reflux     Allergies Father      Neurologic Disorder Father         migraines     Respiratory Father         chronic brochitis- born during a flu epidemic in 1918     Cerebrovascular Disease Maternal Grandmother         series of TIA's     Diabetes Maternal Grandfather         adult onset     Cancer Maternal Grandfather         of stomach or esophagus-heavy smoker     C.A.D. 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 1974, lost a lot of blood-given 6? 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, had vaccines Hep B, Flu     Hobby Hazards No     Sleep Concern No     Comment: Nortriptyline helps     Stress Concern No     Comment: 3 children     Weight Concern No     Special Diet No     Back Care Yes     Comment: Low back pain, has osteoarthritis     Exercise Yes     Comment: 3-4 times a week     Bike Helmet No     Seat Belt Yes     Self-Exams No   Social History Narrative    Last pap:6-13-02    Last Lipid Panel: 8-6-03    Last Mammo: 7-18-03,WNL    Last DEXA: Unsure    Last Colonoscopy: 06-22-01    Last Tetanus:7-10-03    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             Social Determinants of Health     Financial Resource Strain: Low Risk  (10/31/2023)    Received from [facility], [facility]    Financial Resource Strain      Difficulty of Paying Living Expenses: 3   Food Insecurity: No Food Insecurity (10/31/2023)    [facility]    Food Insecurity      Worried About Running Out of Food in the Last Year: 1   Transportation Needs: No Transportation Needs (10/31/2023)    [facility], [facility]    Transportation Needs      Lack of Transportation (Medical): 1   Social Connections: Socially Integrated (10/31/2023)    [facility] from [facility], [facility]    Social Connections      Frequency of Communication with Friends and Family: 0   Housing Stability: Low  Risk  (10/31/2023)    Received from [facility], [facility]    Housing Stability      Unable to Pay for Housing in the Last Year: 1               Thank you for allowing me to participate in the care of your patient.      Sincerely,     Alison Curtis PA-C     [facility]  cc:   Alison Curtis PA-C  [address] [place] [id number]

## 2024-08-26 NOTE — PATIENT INSTRUCTIONS
"Anne - it was nice to see you today!    Today we reviewed:    Pacer check today looked great  BP looks fine  You're doing well!    MEDICATION CHANGES:    NONE    RECOMMENDATIONS:    Continue to monitor for muscle aching (\"myalgias\") - could always try CoQ 10 supplement    FOLLOW UP:    Annual follow-up with pacer check    IMPORTANT PHONE NUMBERS FOR [facility] ([place]):    Device nurses: [phone]    "

## 2024-09-26 ENCOUNTER — TELEPHONE (OUTPATIENT)
Dept: CARDIOLOGY | Facility: CLINIC | Age: 79
End: 2024-09-26

## 2024-09-26 ENCOUNTER — HOSPITAL ENCOUNTER (INPATIENT)
Facility: CLINIC | Age: 79
LOS: 3 days | Discharge: HOME | DRG: 552 | End: 2024-09-30
Attending: EMERGENCY MEDICINE | Admitting: INTERNAL MEDICINE
Payer: MEDICARE

## 2024-09-26 ENCOUNTER — APPOINTMENT (OUTPATIENT)
Dept: CT IMAGING | Facility: CLINIC | Age: 79
DRG: 552 | End: 2024-09-26
Attending: BEHAVIOR TECHNICIAN
Payer: MEDICARE

## 2024-09-26 DIAGNOSIS — M54.9 INTRACTABLE BACK PAIN: Primary | ICD-10-CM

## 2024-09-26 DIAGNOSIS — M54.6 THORACIC BACK PAIN: ICD-10-CM

## 2024-09-26 LAB
ALBUMIN SERPL BCG-MCNC: 4.1 G/DL (ref 3.5–5.2)
ALBUMIN UR-MCNC: NEGATIVE MG/DL
ALP SERPL-CCNC: 85 U/L (ref 40–150)
ALT SERPL W P-5'-P-CCNC: 18 U/L (ref 0–50)
ANION GAP SERPL CALCULATED.3IONS-SCNC: 11 MMOL/L (ref 7–15)
APPEARANCE UR: CLEAR
AST SERPL W P-5'-P-CCNC: 25 U/L (ref 0–45)
BASOPHILS # BLD AUTO: 0 10E3/UL (ref 0–0.2)
BASOPHILS NFR BLD AUTO: 1 %
BILIRUB SERPL-MCNC: 0.4 MG/DL
BILIRUB UR QL STRIP: NEGATIVE
BUN SERPL-MCNC: 16.7 MG/DL (ref 8–23)
CALCIUM SERPL-MCNC: 9.3 MG/DL (ref 8.8–10.4)
CHLORIDE SERPL-SCNC: 103 MMOL/L (ref 98–107)
COLOR UR AUTO: NORMAL
CREAT SERPL-MCNC: 0.95 MG/DL (ref 0.51–0.95)
EGFRCR SERPLBLD CKD-EPI 2021: 61 ML/MIN/1.73M2
EOSINOPHIL # BLD AUTO: 0.1 10E3/UL (ref 0–0.7)
EOSINOPHIL NFR BLD AUTO: 2 %
ERYTHROCYTE [DISTWIDTH] IN BLOOD BY AUTOMATED COUNT: 12.9 % (ref 10–15)
GLUCOSE SERPL-MCNC: 116 MG/DL (ref 70–99)
GLUCOSE UR STRIP-MCNC: NEGATIVE MG/DL
HCO3 SERPL-SCNC: 25 MMOL/L (ref 22–29)
HCT VFR BLD AUTO: 39.8 % (ref 35–47)
HGB BLD-MCNC: 13.2 G/DL (ref 11.7–15.7)
HGB UR QL STRIP: NEGATIVE
HOLD SPECIMEN: NORMAL
IMM GRANULOCYTES # BLD: 0 10E3/UL
IMM GRANULOCYTES NFR BLD: 0 %
KETONES UR STRIP-MCNC: NEGATIVE MG/DL
LEUKOCYTE ESTERASE UR QL STRIP: NEGATIVE
LYMPHOCYTES # BLD AUTO: 0.7 10E3/UL (ref 0.8–5.3)
LYMPHOCYTES NFR BLD AUTO: 13 %
MCH RBC QN AUTO: 32.4 PG (ref 26.5–33)
MCHC RBC AUTO-ENTMCNC: 33.2 G/DL (ref 31.5–36.5)
MCV RBC AUTO: 98 FL (ref 78–100)
MONOCYTES # BLD AUTO: 0.4 10E3/UL (ref 0–1.3)
MONOCYTES NFR BLD AUTO: 7 %
NEUTROPHILS # BLD AUTO: 4.2 10E3/UL (ref 1.6–8.3)
NEUTROPHILS NFR BLD AUTO: 78 %
NITRATE UR QL: NEGATIVE
NRBC # BLD AUTO: 0 10E3/UL
NRBC BLD AUTO-RTO: 0 /100
PH UR STRIP: 7 [PH] (ref 5–7)
PLATELET # BLD AUTO: 177 10E3/UL (ref 150–450)
POTASSIUM SERPL-SCNC: 4.3 MMOL/L (ref 3.4–5.3)
PROT SERPL-MCNC: 6.6 G/DL (ref 6.4–8.3)
RBC # BLD AUTO: 4.08 10E6/UL (ref 3.8–5.2)
RBC URINE: <1 /HPF
SODIUM SERPL-SCNC: 139 MMOL/L (ref 135–145)
SP GR UR STRIP: 1.02 (ref 1–1.03)
UROBILINOGEN UR STRIP-MCNC: NORMAL MG/DL
WBC # BLD AUTO: 5.4 10E3/UL (ref 4–11)
WBC URINE: 1 /HPF

## 2024-09-26 PROCEDURE — 96376 TX/PRO/DX INJ SAME DRUG ADON: CPT

## 2024-09-26 PROCEDURE — 99222 1ST HOSP IP/OBS MODERATE 55: CPT | Mod: AI | Performed by: INTERNAL MEDICINE

## 2024-09-26 PROCEDURE — 250N000013 HC RX MED GY IP 250 OP 250 PS 637: Performed by: INTERNAL MEDICINE

## 2024-09-26 PROCEDURE — 250N000012 HC RX MED GY IP 250 OP 636 PS 637: Performed by: INTERNAL MEDICINE

## 2024-09-26 PROCEDURE — 250N000013 HC RX MED GY IP 250 OP 250 PS 637: Performed by: BEHAVIOR TECHNICIAN

## 2024-09-26 PROCEDURE — G0378 HOSPITAL OBSERVATION PER HR: HCPCS

## 2024-09-26 PROCEDURE — 85025 COMPLETE CBC W/AUTO DIFF WBC: CPT | Performed by: EMERGENCY MEDICINE

## 2024-09-26 PROCEDURE — 250N000011 HC RX IP 250 OP 636: Performed by: EMERGENCY MEDICINE

## 2024-09-26 PROCEDURE — G1010 CDSM STANSON: HCPCS

## 2024-09-26 PROCEDURE — 72131 CT LUMBAR SPINE W/O DYE: CPT | Mod: MF

## 2024-09-26 PROCEDURE — 36415 COLL VENOUS BLD VENIPUNCTURE: CPT | Performed by: EMERGENCY MEDICINE

## 2024-09-26 PROCEDURE — 82040 ASSAY OF SERUM ALBUMIN: CPT | Performed by: EMERGENCY MEDICINE

## 2024-09-26 PROCEDURE — 81001 URINALYSIS AUTO W/SCOPE: CPT | Performed by: BEHAVIOR TECHNICIAN

## 2024-09-26 PROCEDURE — 250N000011 HC RX IP 250 OP 636: Performed by: INTERNAL MEDICINE

## 2024-09-26 PROCEDURE — 99285 EMERGENCY DEPT VISIT HI MDM: CPT | Mod: 25

## 2024-09-26 PROCEDURE — 96375 TX/PRO/DX INJ NEW DRUG ADDON: CPT

## 2024-09-26 PROCEDURE — 96374 THER/PROPH/DIAG INJ IV PUSH: CPT

## 2024-09-26 PROCEDURE — 250N000011 HC RX IP 250 OP 636: Performed by: BEHAVIOR TECHNICIAN

## 2024-09-26 RX ORDER — LORAZEPAM 2 MG/ML
0.5 INJECTION INTRAMUSCULAR ONCE
Status: COMPLETED | OUTPATIENT
Start: 2024-09-26 | End: 2024-09-26

## 2024-09-26 RX ORDER — NALOXONE HYDROCHLORIDE 0.4 MG/ML
0.2 INJECTION, SOLUTION INTRAMUSCULAR; INTRAVENOUS; SUBCUTANEOUS
Status: DISCONTINUED | OUTPATIENT
Start: 2024-09-26 | End: 2024-09-30 | Stop reason: HOSPADM

## 2024-09-26 RX ORDER — PROCHLORPERAZINE MALEATE 5 MG
5 TABLET ORAL EVERY 6 HOURS PRN
Status: DISCONTINUED | OUTPATIENT
Start: 2024-09-26 | End: 2024-09-30 | Stop reason: HOSPADM

## 2024-09-26 RX ORDER — NALOXONE HYDROCHLORIDE 0.4 MG/ML
0.4 INJECTION, SOLUTION INTRAMUSCULAR; INTRAVENOUS; SUBCUTANEOUS
Status: DISCONTINUED | OUTPATIENT
Start: 2024-09-26 | End: 2024-09-30 | Stop reason: HOSPADM

## 2024-09-26 RX ORDER — CELECOXIB 100 MG/1
100 CAPSULE ORAL 2 TIMES DAILY
Status: DISCONTINUED | OUTPATIENT
Start: 2024-09-26 | End: 2024-09-30 | Stop reason: HOSPADM

## 2024-09-26 RX ORDER — PROCHLORPERAZINE 25 MG
12.5 SUPPOSITORY, RECTAL RECTAL EVERY 12 HOURS PRN
Status: DISCONTINUED | OUTPATIENT
Start: 2024-09-26 | End: 2024-09-30 | Stop reason: HOSPADM

## 2024-09-26 RX ORDER — ONDANSETRON 2 MG/ML
4 INJECTION INTRAMUSCULAR; INTRAVENOUS EVERY 30 MIN PRN
Status: DISCONTINUED | OUTPATIENT
Start: 2024-09-26 | End: 2024-09-26

## 2024-09-26 RX ORDER — ONDANSETRON 4 MG/1
4 TABLET, ORALLY DISINTEGRATING ORAL EVERY 6 HOURS PRN
Status: DISCONTINUED | OUTPATIENT
Start: 2024-09-26 | End: 2024-09-30 | Stop reason: HOSPADM

## 2024-09-26 RX ORDER — ONDANSETRON 2 MG/ML
4 INJECTION INTRAMUSCULAR; INTRAVENOUS EVERY 6 HOURS PRN
Status: DISCONTINUED | OUTPATIENT
Start: 2024-09-26 | End: 2024-09-30 | Stop reason: HOSPADM

## 2024-09-26 RX ORDER — CLONAZEPAM 0.5 MG/1
0.5 TABLET ORAL DAILY PRN
Status: ON HOLD | COMMUNITY
End: 2024-09-30

## 2024-09-26 RX ORDER — CLONAZEPAM 0.5 MG/1
0.5 TABLET ORAL AT BEDTIME
Status: DISCONTINUED | OUTPATIENT
Start: 2024-09-26 | End: 2024-09-30 | Stop reason: HOSPADM

## 2024-09-26 RX ORDER — ESZOPICLONE 1 MG/1
1 TABLET, FILM COATED ORAL AT BEDTIME
Status: DISCONTINUED | OUTPATIENT
Start: 2024-09-26 | End: 2024-09-30 | Stop reason: HOSPADM

## 2024-09-26 RX ORDER — MIRTAZAPINE 30 MG/1
30 TABLET, FILM COATED ORAL AT BEDTIME
COMMUNITY

## 2024-09-26 RX ORDER — BUPROPION HYDROCHLORIDE 200 MG/1
200 TABLET, EXTENDED RELEASE ORAL EVERY MORNING
Status: DISCONTINUED | OUTPATIENT
Start: 2024-09-27 | End: 2024-09-30 | Stop reason: HOSPADM

## 2024-09-26 RX ORDER — ESZOPICLONE 1 MG/1
1 TABLET, FILM COATED ORAL AT BEDTIME
COMMUNITY

## 2024-09-26 RX ORDER — AMOXICILLIN 250 MG
2 CAPSULE ORAL 2 TIMES DAILY PRN
Status: DISCONTINUED | OUTPATIENT
Start: 2024-09-26 | End: 2024-09-30 | Stop reason: HOSPADM

## 2024-09-26 RX ORDER — LIDOCAINE 4 G/G
1 PATCH TOPICAL ONCE
Status: COMPLETED | OUTPATIENT
Start: 2024-09-26 | End: 2024-09-26

## 2024-09-26 RX ORDER — POLYETHYLENE GLYCOL 3350 17 G/17G
17 POWDER, FOR SOLUTION ORAL DAILY
Status: DISCONTINUED | OUTPATIENT
Start: 2024-09-26 | End: 2024-09-30 | Stop reason: HOSPADM

## 2024-09-26 RX ORDER — AMOXICILLIN 250 MG
1 CAPSULE ORAL 2 TIMES DAILY PRN
Status: DISCONTINUED | OUTPATIENT
Start: 2024-09-26 | End: 2024-09-30 | Stop reason: HOSPADM

## 2024-09-26 RX ORDER — HYDROMORPHONE HYDROCHLORIDE 1 MG/ML
0.5 INJECTION, SOLUTION INTRAMUSCULAR; INTRAVENOUS; SUBCUTANEOUS
Status: COMPLETED | OUTPATIENT
Start: 2024-09-26 | End: 2024-09-26

## 2024-09-26 RX ORDER — ACETAMINOPHEN 325 MG/1
975 TABLET ORAL 3 TIMES DAILY
Status: DISCONTINUED | OUTPATIENT
Start: 2024-09-26 | End: 2024-09-30

## 2024-09-26 RX ORDER — OXYCODONE HYDROCHLORIDE 5 MG/1
5 TABLET ORAL EVERY 4 HOURS PRN
Status: DISCONTINUED | OUTPATIENT
Start: 2024-09-26 | End: 2024-09-27

## 2024-09-26 RX ORDER — DEXAMETHASONE 2 MG/1
4 TABLET ORAL EVERY 6 HOURS SCHEDULED
Status: COMPLETED | OUTPATIENT
Start: 2024-09-26 | End: 2024-09-27

## 2024-09-26 RX ORDER — LIDOCAINE 4 G/G
2 PATCH TOPICAL
Status: DISCONTINUED | OUTPATIENT
Start: 2024-09-27 | End: 2024-09-30 | Stop reason: HOSPADM

## 2024-09-26 RX ORDER — ESZOPICLONE 2 MG/1
2 TABLET, FILM COATED ORAL AT BEDTIME
Status: ON HOLD | COMMUNITY
End: 2024-09-30

## 2024-09-26 RX ORDER — HYDROMORPHONE HYDROCHLORIDE 1 MG/ML
0.5 INJECTION, SOLUTION INTRAMUSCULAR; INTRAVENOUS; SUBCUTANEOUS EVERY 30 MIN PRN
Status: COMPLETED | OUTPATIENT
Start: 2024-09-26 | End: 2024-09-27

## 2024-09-26 RX ORDER — METHOCARBAMOL 500 MG/1
500 TABLET, FILM COATED ORAL 4 TIMES DAILY
Status: DISCONTINUED | OUTPATIENT
Start: 2024-09-26 | End: 2024-09-27

## 2024-09-26 RX ORDER — MIRTAZAPINE 15 MG/1
30 TABLET, FILM COATED ORAL AT BEDTIME
Status: DISCONTINUED | OUTPATIENT
Start: 2024-09-26 | End: 2024-09-30 | Stop reason: HOSPADM

## 2024-09-26 RX ORDER — ESCITALOPRAM OXALATE 10 MG/1
10 TABLET ORAL DAILY
Status: DISCONTINUED | OUTPATIENT
Start: 2024-09-26 | End: 2024-09-30 | Stop reason: HOSPADM

## 2024-09-26 RX ADMIN — ONDANSETRON 4 MG: 2 INJECTION INTRAMUSCULAR; INTRAVENOUS at 09:35

## 2024-09-26 RX ADMIN — HYDROMORPHONE HYDROCHLORIDE 0.5 MG: 1 INJECTION, SOLUTION INTRAMUSCULAR; INTRAVENOUS; SUBCUTANEOUS at 13:19

## 2024-09-26 RX ADMIN — CLONAZEPAM 0.5 MG: 0.5 TABLET ORAL at 22:10

## 2024-09-26 RX ADMIN — ONDANSETRON 4 MG: 2 INJECTION INTRAMUSCULAR; INTRAVENOUS at 20:54

## 2024-09-26 RX ADMIN — METHOCARBAMOL 500 MG: 500 TABLET ORAL at 21:01

## 2024-09-26 RX ADMIN — METHOCARBAMOL 500 MG: 500 TABLET ORAL at 14:28

## 2024-09-26 RX ADMIN — MIRTAZAPINE 30 MG: 15 TABLET, FILM COATED ORAL at 22:10

## 2024-09-26 RX ADMIN — LIDOCAINE 1 PATCH: 4 PATCH TOPICAL at 09:49

## 2024-09-26 RX ADMIN — ESZOPICLONE 1 MG: 1 TABLET, FILM COATED ORAL at 22:10

## 2024-09-26 RX ADMIN — LORAZEPAM 0.5 MG: 2 INJECTION INTRAMUSCULAR; INTRAVENOUS at 12:47

## 2024-09-26 RX ADMIN — DEXAMETHASONE 4 MG: 2 TABLET ORAL at 22:10

## 2024-09-26 RX ADMIN — ACETAMINOPHEN 325MG 975 MG: 325 TABLET ORAL at 20:59

## 2024-09-26 RX ADMIN — ACETAMINOPHEN 325MG 975 MG: 325 TABLET ORAL at 14:28

## 2024-09-26 RX ADMIN — OXYCODONE HYDROCHLORIDE 2.5 MG: 5 TABLET ORAL at 20:59

## 2024-09-26 RX ADMIN — POLYETHYLENE GLYCOL 3350 17 G: 17 POWDER, FOR SOLUTION ORAL at 14:28

## 2024-09-26 RX ADMIN — HYDROMORPHONE HYDROCHLORIDE 0.5 MG: 1 INJECTION, SOLUTION INTRAMUSCULAR; INTRAVENOUS; SUBCUTANEOUS at 10:47

## 2024-09-26 RX ADMIN — HYDROMORPHONE HYDROCHLORIDE 0.5 MG: 1 INJECTION, SOLUTION INTRAMUSCULAR; INTRAVENOUS; SUBCUTANEOUS at 09:32

## 2024-09-26 RX ADMIN — DEXAMETHASONE 10 MG: 2 TABLET ORAL at 17:06

## 2024-09-26 ASSESSMENT — ACTIVITIES OF DAILY LIVING (ADL)
ADLS_ACUITY_SCORE: 46
ADLS_ACUITY_SCORE: 40
ADLS_ACUITY_SCORE: 50
ADLS_ACUITY_SCORE: 46
ADLS_ACUITY_SCORE: 42
ADLS_ACUITY_SCORE: 42
ADLS_ACUITY_SCORE: 46
ADLS_ACUITY_SCORE: 42
ADLS_ACUITY_SCORE: 42
ADLS_ACUITY_SCORE: 49
ADLS_ACUITY_SCORE: 42
ADLS_ACUITY_SCORE: 42
ADLS_ACUITY_SCORE: 46
ADLS_ACUITY_SCORE: 42
ADLS_ACUITY_SCORE: 50

## 2024-09-26 NOTE — ED PROVIDER NOTES
Emergency Department Note      History of Present Illness     Chief Complaint   Back Pain      HPI   Anne Rangel is a 78 year old female with history of osteoarthritis, anemia, depression who presents to the ED for evaluation of back pain.  Patient reports that she had a spinal fusion of her thoracic back many years ago.  Since that she has been doing supportive cares including icing, physical therapy, Tylenol, and tramadol as needed.  She reports acute pain in her mid back that started yesterday afternoon.  She states that the pain was persistent this morning when she woke up.  She had a hard time getting out of bed.  She tried tramadol but has not helped.  She denies any lower extremity weakness, numbness, and tingling.  No chest pain or shortness of breath.  No bladder/bowel incontinence.  No recent injuries or falls.  She denies abdominal pain, nausea, vomiting, dysuria.    Independent Historian   None    Review of External Notes   Reviewed MRI thoracic spine from 5/19/2023  IMPRESSION:  1. Diffuse degenerative disease throughout the thoracic spine.  2. Moderate degenerative neural foraminal stenosis on the left at  T10-T11.  3. No other spinal canal or neural foraminal narrowing of the thoracic  spine.  4. No fractures.  5. Modic 1 degenerative marrow signal change in the opposing endplates  at T3-T4, T9-T10, T10-T11 and T11-T12.     Past Medical History     Medical History and Problem List   Past Medical History:   Diagnosis Date    Anemia     Carpal tunnel syndrome     Chronic fatigue syndrome 11/00    Complication of anesthesia     Decreased libido     Depression with anxiety     Fatigue     Gastroesophageal reflux disease     HA (headache)     History of blood transfusion     iamAFTERCARE FOLLOWING JOINT REPLACEMENT 7/23/2007    Irritable bowel syndrome 11/00    Myalgia and myositis, unspecified 11/00    Near syncope 5/17/2017    Neutropenia 00    Noninfectious ileitis     Osteoarthritis      Osteoarthrosis, unspecified whether generalized or localized, hand 11/00    Osteoarthrosis, unspecified whether generalized or localized, lower leg 11/00    Other chronic pain     Pacemaker     PONV (postoperative nausea and vomiting)     Pure hypercholesterolemia 11/00    RBBB     Renal disease     Uncomplicated asthma     Urticaria, unspecified 00       Medications   No current outpatient medications on file.      Surgical History   Past Surgical History:   Procedure Laterality Date    ARTHROPLASTY KNEE Left     ARTHROPLASTY KNEE Right     partial    ARTHROPLASTY REVISION HIP Left 2/3/2017    Procedure: ARTHROPLASTY REVISION HIP;  Surgeon: Juan Moncada MD;  Location:  OR    CARDIAC SURGERY  2017    pacemaker    COLONOSCOPY N/A 1/15/2019    Procedure: COLONOSCOPY;  Surgeon: Eliane Barone MD;  Location:  GI    EYE SURGERY      zoila cataract surgery    GI SURGERY      hemorrhoid repair    GYN SURGERY      tubal ligation    OPTICAL TRACKING SYSTEM FUSION SPINE POSTERIOR LUMBAR TWO LEVELS N/A 1/9/2020    Procedure: L3-5 decompressive laminectomies with L4-5 Tranforaminal Lumbar Interbody Fusion with L3-5 posterior lateral fusion;  Surgeon: Anjel Bernardo MD;  Location:  OR    ORTHOPEDIC SURGERY      right sabine-arthroplasty, trigger finger repair, left knee arthroscopy and replacement, bilateral hip replacement,    ZZC NONSPECIFIC PROCEDURE      s/p Rt. Carpal tunnel release    ZZC NONSPECIFIC PROCEDURE      s/p bilat Tubal ligation       Physical Exam     Patient Vitals for the past 24 hrs:   BP Temp Temp src Pulse Resp SpO2 Weight   09/26/24 1315 130/82 98.1  F (36.7  C) Oral 82 19 90 % 61.2 kg (134 lb 14.7 oz)   09/26/24 0854 135/81 98  F (36.7  C) Oral 77 18 96 % --     Physical Exam  .Physical Exam:  General: lying comfortably on hospital bed  Head: normocephalic, atraumatic  Eyes: PERRLA, EOMI  Ears: External ears appear normal.   Nose: no signs of bleeding   Throat: moist mucous  membranes  Neck: No JVD  CV: regular rate and rhythm  Pulm: lungs clear to ausculation bilaterally, normal respiratory effort, normal chest expansion with breathing   Abdomen: soft, non-tender, non-distended  MSK: No midline tenderness  Ext: Tenderness to palpation of mid thoracic spine.  No deformities or step-offs.  Normal range of motion of all extremities. No gross deformities.  Normal sensation to light touch throughout lower extremities.  Distal pulses intact.  Skin: warm, dry, no rashes  Neuro: alert and oriented  Psych: Appropriate mood. Cooperative        Diagnostics     Lab Results   Labs Ordered and Resulted from Time of ED Arrival to Time of ED Departure   COMPREHENSIVE METABOLIC PANEL - Abnormal       Result Value    Sodium 139      Potassium 4.3      Carbon Dioxide (CO2) 25      Anion Gap 11      Urea Nitrogen 16.7      Creatinine 0.95      GFR Estimate 61      Calcium 9.3      Chloride 103      Glucose 116 (*)     Alkaline Phosphatase 85      AST 25      ALT 18      Protein Total 6.6      Albumin 4.1      Bilirubin Total 0.4     CBC WITH PLATELETS AND DIFFERENTIAL - Abnormal    WBC Count 5.4      RBC Count 4.08      Hemoglobin 13.2      Hematocrit 39.8      MCV 98      MCH 32.4      MCHC 33.2      RDW 12.9      Platelet Count 177      % Neutrophils 78      % Lymphocytes 13      % Monocytes 7      % Eosinophils 2      % Basophils 1      % Immature Granulocytes 0      NRBCs per 100 WBC 0      Absolute Neutrophils 4.2      Absolute Lymphocytes 0.7 (*)     Absolute Monocytes 0.4      Absolute Eosinophils 0.1      Absolute Basophils 0.0      Absolute Immature Granulocytes 0.0      Absolute NRBCs 0.0     ROUTINE UA WITH MICROSCOPIC REFLEX TO CULTURE - Normal    Color Urine Light Yellow      Appearance Urine Clear      Glucose Urine Negative      Bilirubin Urine Negative      Ketones Urine Negative      Specific Gravity Urine 1.021      Blood Urine Negative      pH Urine 7.0      Protein Albumin Urine  Negative      Urobilinogen Urine Normal      Nitrite Urine Negative      Leukocyte Esterase Urine Negative      RBC Urine <1      WBC Urine 1         Imaging   CT Lumbar Spine w/o Contrast   Final Result   IMPRESSION:  No acute lumbar fracture. Extensive postsurgical changes   in the inferior lumbar spine.       GORDON MATHEW MD            SYSTEM ID:  [id number]      CT Thoracic Spine w/o Contrast   Final Result   IMPRESSION:   No acute fracture findings. Degenerative changes   throughout the thoracic spine are not significantly altered from   5/19/2023      GORDON MATHEW MD            SYSTEM ID:  [id number]      MR Lumbar Spine w/o Contrast    (Results Pending)       EKG   None  Independent Interpretation   None    ED Course      Medications Administered   Medications   Lidocaine (LIDOCARE) 4 % Patch 1 patch (1 patch Transdermal $Patch/Med Applied 9/26/24 5038)   HYDROmorphone (PF) (DILAUDID) injection 0.5 mg (0.5 mg Intravenous $Given 9/26/24 1319)   senna-docusate (SENOKOT-S/PERICOLACE) 8.6-50 MG per tablet 1 tablet (has no administration in time range)     Or   senna-docusate (SENOKOT-S/PERICOLACE) 8.6-50 MG per tablet 2 tablet (has no administration in time range)   ondansetron (ZOFRAN ODT) ODT tab 4 mg (has no administration in time range)     Or   ondansetron (ZOFRAN) injection 4 mg (has no administration in time range)   acetaminophen (TYLENOL) tablet 975 mg (975 mg Oral $Given 9/26/24 1428)   oxyCODONE IR (ROXICODONE) half-tab 2.5 mg (has no administration in time range)   oxyCODONE (ROXICODONE) tablet 5 mg (has no administration in time range)   polyethylene glycol (MIRALAX) Packet 17 g (17 g Oral $Given 9/26/24 1428)   Lidocaine (LIDOCARE) 4 % Patch 2 patch (has no administration in time range)   methocarbamol (ROBAXIN) tablet 500 mg ( Oral Canceled Entry 9/26/24 1514)   celecoxib (celeBREX) capsule 100 mg (has no administration in time range)   buPROPion (WELLBUTRIN SR) 12 hr tablet 200 mg (has no  administration in time range)   clonazePAM (klonoPIN) tablet 0.5 mg (has no administration in time range)   escitalopram (LEXAPRO) tablet 10 mg (10 mg Oral Not Given 9/26/24 1429)   eszopiclone (LUNESTA) tablet 1 mg (has no administration in time range)   mirtazapine (REMERON) tablet 30 mg (has no administration in time range)   naloxone (NARCAN) injection 0.2 mg (has no administration in time range)     Or   naloxone (NARCAN) injection 0.4 mg (has no administration in time range)     Or   naloxone (NARCAN) injection 0.2 mg (has no administration in time range)     Or   naloxone (NARCAN) injection 0.4 mg (has no administration in time range)   prochlorperazine (COMPAZINE) injection 5 mg (has no administration in time range)     Or   prochlorperazine (COMPAZINE) tablet 5 mg (has no administration in time range)     Or   prochlorperazine (COMPAZINE) suppository 12.5 mg (has no administration in time range)   HYDROmorphone (PF) (DILAUDID) injection 0.5 mg (0.5 mg Intravenous $Given 9/26/24 0932)   LORazepam (ATIVAN) injection 0.5 mg (0.5 mg Intravenous $Given 9/26/24 1247)       Procedures   Procedures     Discussion of Management   None    ED Course   ED Course as of 09/26/24 1540   Thu Sep 26, 2024   0924 I evaluated patient and obtained history.    1039 Reassessed patient.   1111 Reassessed patient.   1116 Discussed with Dr. Wang, hospitalist who accepted patient for admission.        Additional Documentation  None    Medical Decision Making / Diagnosis     CMS Diagnoses: None    MIPS       None    MDM   Anne Rangel is a 78 year old female who presents to the ED for evaluation of acute on chronic thoracic back pain.  She has a history of spinal fusion many years ago.  She presents today with acutely worsening mid back pain.  No neurologic symptoms.  No bladder or bowel incontinence.  Denies weakness, numbness, tingling of lower extremities.  No fever.  No recent trauma.  See further HPI details above.  The  above workup was undertaken.  Broad differential was considered including arthritis, epidural abscess, compression fracture, mass, epidural hematoma.  On exam, patient is well-appearing.  Her vitals are reassuring.  She has tenderness to the thoracic spine.  Normal sensation and range of motion of bilateral lower extremities.  CT does not show any acute fractures.  No mass or lesions.  No abscess.  It shows some extensive degenerative changes along thoracic spine.  She was given multiple doses of IV pain meds without much improvement.  Patient is unable to ambulate or even get out of the hospital bed.  Given inability to ambulate, and uncontrolled pain she will need to come into the hospital for further pain control and physical therapy.  Discussed with hospitalist accepted patient for admission.    Disposition   The patient was admitted to the hospital.     Diagnosis     ICD-10-CM    1. Thoracic back pain  M54.6            Discharge Medications   Current Discharge Medication List            CODY Wood Kausar, PA-C  09/26/24 1600

## 2024-09-26 NOTE — TELEPHONE ENCOUNTER
Received call from MRI requesting MRI Cardiology Clearance for patient.  Message routed to the device team to complete MRI Safety Clearance Form.    Inpatient MRI Phone number: [phone].

## 2024-09-26 NOTE — H&P
[facility]    History and Physical  Hospitalist       Date of Admission:  9/26/2024    Assessment & Plan   Anne Rangel is a 78 year old female with previous history of L3-L5 decompressive lumbar laminectomies with chronic low back pain presents to the emergency room with acute worsening of chronic low back pain.    Intractable acute on chronic low back pain.  Previous L3-L5 decompression laminectomies in 2020.  Degenerative disc disease.  -Patient has a known history of degenerative disc disease with above-stated surgery in 2020  -CT spine done in the emergency room which is unremarkable.  I think her pain is more in the lumbar area.  Will add CT lumbar spine and MRI of the lumbar spine depending on CT findings  -[facility] orthopedic consult  -Multimodal pain management with Lidoderm patch, scheduled Tylenol, Robaxin and as needed oxycodone  -Physical therapy evaluation    Sinus node disease s/p permanent pacemaker placement in 2017  -Noted, no active issues  -Reportedly her pacemaker is compatible with MRI    Major depression  -Resume prior to admission Wellbutrin, clonazepam, Lexapro, Lamictal and Remeron once verified by the pharmacy    Hyperlipidemia  -Resume statin at discharge    Clinically Significant Risk Factors Present on Admission                               # Pacemaker present         Medical Decision Making       **CLEAR ALL SELECTIONS**        DVT Prophylaxis: Pneumatic Compression Devices  Code Status: Full Code    Disposition: Expected discharge in <2 days    Chart documentation was completed, in part, with [employer] voice-recognition software. Even though reviewed, some grammatical, spelling, and word errors may remain.    Jaquan Valenzuela MD, MD    Primary Care Physician   Nevaeh Wood    Chief Complaint   Low back pain.    History is obtained from the patient    History of Present Illness   Anne Rangel is a 78 year old female with known history of  degenerative disc disease, previous L3-L5 decompressive laminectomies in 2020 and chronic low back pain presents to the emergency room with intractable low back pain that started yesterday afternoon around 4 PM.  Patient reports that she was in her usual state of health with on and off low-level chronic back pain which acutely got worse without any obvious reasons.  She denies any trauma, fall or lifting any heavy objects.  She does do water aerobics about 4 times a week and had a session yesterday but does not think she overdid anything.  Patient tried taking her Tylenol which she does on a regular basis and also tramadol however she continued to have pain which went up to a 10/10 in intensity and was not able to move around and therefore came to the emergency room.  She denies focal tingling, numbness, weakness of her lower extremities.  No loss of strength of her lower extremities.  Denies bowel or bladder incontinence.  No fever or chills.  No recent acute illness.  No cough, shortness of breath, abdominal pain, dysuria, urinary urgency or frequency.    Past Medical History    I have reviewed this patient's medical history and updated it with pertinent information if needed.   Past Medical History:   Diagnosis Date    Anemia     Carpal tunnel syndrome     Chronic fatigue syndrome 11/00    Complication of anesthesia     Decreased libido     Depression with anxiety     Fatigue     Gastroesophageal reflux disease     HA (headache)     History of blood transfusion     iamAFTERCARE FOLLOWING JOINT REPLACEMENT 7/23/2007    Irritable bowel syndrome 11/00    Myalgia and myositis, unspecified 11/00    Near syncope 5/17/2017    Neutropenia 00    Noninfectious ileitis     Osteoarthritis     rt knee, back    Osteoarthrosis, unspecified whether generalized or localized, hand 11/00    Osteoarthrosis, unspecified whether generalized or localized, lower leg 11/00    Other chronic pain     back    Pacemaker     PONV  (postoperative nausea and vomiting)     Pure hypercholesterolemia 11/00    RBBB     Renal disease     mld    Uncomplicated asthma     Urticaria, unspecified 00       Past Surgical History   I have reviewed this patient's surgical history and updated it with pertinent information if needed.  Past Surgical History:   Procedure Laterality Date    ARTHROPLASTY KNEE Left     ARTHROPLASTY KNEE Right     partial    ARTHROPLASTY REVISION HIP Left 2/3/2017    Procedure: ARTHROPLASTY REVISION HIP;  Surgeon: Juan Moncada MD;  Location:  OR    CARDIAC SURGERY  2017    pacemaker    COLONOSCOPY N/A 1/15/2019    Procedure: COLONOSCOPY;  Surgeon: Eliane Barone MD;  Location:  GI    EYE SURGERY      zoila cataract surgery    GI SURGERY      hemorrhoid repair    GYN SURGERY      tubal ligation    OPTICAL TRACKING SYSTEM FUSION SPINE POSTERIOR LUMBAR TWO LEVELS N/A 1/9/2020    Procedure: L3-5 decompressive laminectomies with L4-5 Tranforaminal Lumbar Interbody Fusion with L3-5 posterior lateral fusion;  Surgeon: Anjel Bernardo MD;  Location:  OR    ORTHOPEDIC SURGERY      right sabine-arthroplasty, trigger finger repair, left knee arthroscopy and replacement, bilateral hip replacement,    ZZC NONSPECIFIC PROCEDURE      s/p Rt. Carpal tunnel release    ZZC NONSPECIFIC PROCEDURE      s/p bilat Tubal ligation       Prior to Admission Medications   Prior to Admission Medications   Prescriptions Last Dose Informant Patient Reported? Taking?   Digestive Enzymes (DIGESTIVE ENZYME PO)   Yes No   LACTOBACILLUS PO   Yes No   Multiple Vitamin (MULTIVITAMIN ADULT PO)   Yes No   Probiotic Product (PROBIOTIC & ACIDOPHILUS EX ST PO)  Self Yes No   Sig: Take 1 capsule by mouth daily   UNABLE TO FIND   Yes No   Sig: MEDICATION NAME: stool softner: unknown brand and dosage   UNABLE TO FIND   Yes No   Sig: Ortho digestyme, 2 per day   atorvastatin (LIPITOR) 40 MG tablet  Self Yes No   Sig: Take 40 mg by mouth At Bedtime     buPROPion (WELLBUTRIN SR) 200 MG 12 hr tablet   Yes No   Sig: Take 1 tablet by mouth every morning   clobetasol (TEMOVATE) 0.05 % external ointment   Yes No   Sig: Apply very thin layer twice daily to affected area on vulva, tapering to twice weekly as able.   clonazePAM (KLONOPIN) 0.5 MG tablet   Yes No   Sig: Take by mouth 2 times daily as needed   escitalopram (LEXAPRO) 10 MG tablet   Yes No   Sig: Take 10 mg by mouth daily   esomeprazole (NEXIUM) 20 MG DR capsule   Yes No   Sig: Take 20 mg by mouth every morning (before breakfast) Take 30-60 minutes before eating.   eszopiclone (LUNESTA) 3 MG tablet   Yes No   Sig: Take 3 mg by mouth At Bedtime Patient taking one tablet at night   lamoTRIgine (LAMICTAL) 25 MG tablet   Yes No   Sig: Start with 1/2 tab daily at bedtime for first week, then can take 1 tab daily at bedtime   magnesium 100 MG CAPS   Yes No   Sig: MAGNESIUM OXIDE 250 mg, 2 at bedtime   mirtazapine (REMERON) 15 MG tablet   Yes No   Sig: Take 30 mg by mouth At Bedtime   polyethylene glycol (MIRALAX/GLYCOLAX) powder  Self Yes No   Sig: Take 17 g by mouth daily as needed for constipation   traMADol (ULTRAM) 50 MG tablet   Yes No   Sig: Take 50 mg by mouth every 6 hours as needed for severe pain      Facility-Administered Medications: None     Allergies   Allergies   Allergen Reactions    Blood-Group Specific Substance Other (See Comments)     Patient has anti-K antibody.  Blood for the transfusion might be delayed.      Dilaudid [Hydromorphone] Nausea and Vomiting    Doxepin Unknown     Other reaction(s): Intolerance-Can't Take  Not effective for sleep    Meperidine Nausea and Vomiting    Meperidine And Related Nausea and Vomiting     Demerol    Morphine      PN: LW Reaction: Vomiting,Nausea    No Clinical Screening - See Comments      PN: LW Reaction: all opiates    Pregabalin Other (See Comments)     Not effective       Social History   I have reviewed this patient's social history and updated it with  pertinent information if needed. Anne Rangel  reports that she has never smoked. She has never used smokeless tobacco. She reports current alcohol use. She reports that she does not use drugs.    Family History   I have reviewed this patient's family history and updated it with pertinent information if needed.   Family History   Problem Relation Age of Onset    Hypertension Mother     Alzheimer Disease Mother         Memory issues- unsure if alzheimers    Arthritis Mother         osteoarthritis    Depression Mother     Gastrointestinal Disease Mother         acid reflux    Allergies Father     Neurologic Disorder Father         migraines    Respiratory Father         chronic brochitis- born during a flu epidemic in 1918    Cerebrovascular Disease Maternal Grandmother         series of TIA's    Diabetes Maternal Grandfather         adult onset    Cancer Maternal Grandfather         of stomach or esophagus-heavy smoker    C.A.D. Paternal Grandmother         angina    Depression Daughter     Neurologic Disorder Sister         migraines       Review of Systems   The 10 point Review of Systems is negative other than noted in the HPI or here.     Physical Exam   Temp: 98  F (36.7  C) Temp src: Oral BP: 135/81 Pulse: 77   Resp: 18 SpO2: 96 % O2 Device: None (Room air)    Vital Signs with Ranges  Temp:  [98  F (36.7  C)] 98  F (36.7  C)  Pulse:  [77] 77  Resp:  [18] 18  BP: (135)/(81) 135/81  SpO2:  [96 %] 96 %  0 lbs 0 oz    Gen: AAOX3, NAD, comfortable  HEENT: Moist oral mucosa, no pallor or icterus  Neck: Supple neck, good ROM, no stridor  Resp: CTA B/L, normal WOB; no crackles; no wheezes  CVS- RRR, no murmur, no edema  Abd/GI: Soft, non-tender. BS- normoactive  Skin- Warm, dry, no rashes  MSK: Mild tenderness to palpation around the lumbar vertebrae-spanning multilevel.  No point tenderness.  SLRT is about 45 degrees bilaterally  Neuro- CN- intact. Moving X 4, no obvious focal deficits of the lower extremities.   Sensation is intact    Data   Data reviewed today:  I personally reviewed no images or EKG's today.        Recent Labs   Lab 09/26/24  0853   WBC 5.4   HGB 13.2   MCV 98          Recent Results (from the past 24 hour(s))   CT Thoracic Spine w/o Contrast    Narrative    CT THORACIC SPINE WITHOUT CONTRAST   9/26/2024 9:51 AM     HISTORY: acute on chronic back pain     TECHNIQUE: Axial images of the thoracic spine were obtained without  intravenous contrast. Multiplanar reformations were performed.   Radiation dose for this scan was reduced using automated exposure  control, adjustment of the mA and/or kV according to patient size, or  iterative reconstruction technique.    COMPARISON: 5/19/2023.    FINDINGS: Vertebral body heights are maintained. Multilevel loss of  disc height. No anterolisthesis or retrolisthesis. Multilevel facet  disease in the lower cervical spine. Posterior ribs are intact.    Paravertebral soft tissues are unremarkable. Bibasilar atelectasis.  Cardiomegaly.      Impression    IMPRESSION:   No acute fracture findings. Degenerative changes  throughout the thoracic spine are not significantly altered from  5/19/2023    GORDON MATHEW MD         SYSTEM ID:  [id number]

## 2024-09-26 NOTE — ED TRIAGE NOTES
Per EMS: Pt comes from home. Hx of spinal fusion 2-3 years ago. Takes Tramadol and uses rest/ice if any flare up of pain. Yesterday at 4pm pain became much worse, usual measures ineffective. Unable to get up to standing position currently.  present at bedside.

## 2024-09-26 NOTE — PHARMACY-ADMISSION MEDICATION HISTORY
Pharmacist Admission Medication History    Admission medication history is complete. The information provided in this note is only as accurate as the sources available at the time of the update.    Information Source(s): Patient and CareEverywhere/SureScripts via in-person    Pertinent Information:   -She was trying to taper Lunesta down to 2mg but now back to 3mg  -She does not usually take tramadol but took last night and this morning due to back pain    Changes made to PTA medication list:  Added: Co-Q 10 but does not take regularly  Deleted: Not Taking lamotrigine  Changed: None    Allergies reviewed with patient and updates made in EHR: yes- she reports being able to tolerate opioids if needed if she takes with anti-nausea (zofran) and stool softener    Medication History Completed By: Alison Queen [facility] 9/26/2024 11:57 AM    PTA Med List   Medication Sig Last Dose    atorvastatin (LIPITOR) 40 MG tablet Take 40 mg by mouth At Bedtime  9/25/2024 at hs    buPROPion (WELLBUTRIN SR) 200 MG 12 hr tablet Take 1 tablet by mouth every morning 9/26/2024 at am    clonazePAM (KLONOPIN) 0.5 MG tablet Take 0.5 mg by mouth daily as needed for anxiety. Rarely    clonazePAM (KLONOPIN) 0.5 MG tablet Take 0.5 mg by mouth at bedtime. 9/25/2024 at hs    COENZYME Q-10 PO Take by mouth daily. Past Month at Does not take regularly    escitalopram (LEXAPRO) 10 MG tablet Take 10 mg by mouth daily 9/26/2024 at am    esomeprazole (NEXIUM) 20 MG DR capsule Take 20 mg by mouth every morning (before breakfast) Take 30-60 minutes before eating. 9/26/2024 at am    eszopiclone (LUNESTA) 1 MG tablet Take 1 mg by mouth at bedtime. Taking with 2mg for total 3mg 9/25/2024 at hs    eszopiclone (LUNESTA) 2 MG tablet Take 2 mg by mouth at bedtime. Taking with 1mg for total 3mg 9/25/2024 at hs    Magnesium Oxide 250 MG TABS Take 500 mg by mouth at bedtime. 9/25/2024 at hs    mirtazapine (REMERON) 30 MG tablet Take 30 mg by mouth at bedtime.  9/25/2024 at hs    Multiple Vitamin (MULTIVITAMIN ADULT PO) Take 1 tablet by mouth daily.     Probiotic Product (PROBIOTIC & ACIDOPHILUS EX ST PO) Take 1 capsule by mouth daily     traMADol (ULTRAM) 50 MG tablet Take 50 mg by mouth every 6 hours as needed for severe pain 9/26/2024 at am but not usually    UNABLE TO FIND Ortho digestyme, 2 per day     UNABLE TO FIND MEDICATION NAME: stool softner + stimulant every night at bedtime: unknown brand and dosage 9/25/2024 at hs

## 2024-09-26 NOTE — PLAN OF CARE
Goal Outcome Evaluation:      Plan of Care Reviewed With: patient    Overall Patient Progress: no changeOverall Patient Progress: no change      Observation goals  PRIOR TO DISCHARGE        Comments:   -diagnostic tests and consults completed and resulted: Not met  -vital signs normal or at patient baseline: Met  -adequate pain control on oral analgesics: Not met  Nurse to notify provider when observation goals have been met and patient is ready for discharge.

## 2024-09-26 NOTE — PLAN OF CARE
Orthopedic Plan of Care    Orthopedic consult noted for back pain. Patient is established with Dr. Anjel Bernardo (neurosurgeon with [facility]) and underwent lumbar spine surgery with him in 2020. Patient last seen by Dr. Bernardo's team in 2023. Dr. Bernardo was notified of consult. Ortho trauma team will sign off.    Birdie Hines PA-C  [facility]

## 2024-09-26 NOTE — ED PROVIDER NOTES
ED APC SUPERVISION NOTE:   I evaluated this patient in conjunction with Alvaro Green PA-C  I have participated in the care of the patient and personally performed key elements of the history, exam, and medical decision making.      HPI:   Anne Rangel is a 78 year old female with history of chronic pain syndrome, stage 3 CKD, osteoarthritis, and anemia who presents to the ED via EMS with her  for evaluation of back pain. Patient has a history of spinal fusion 2-3 years ago. Presents today with acutely worse atraumatic back pain since 1600 yesterday. This morning, Anne was unable to get out of bed. She tried her usual Tramadol with no relief. No incontinence of bladder or bowels, recent injuries or falls, chest pain, shortness of breath, abdominal pain, nausea, vomiting, or dysuria.    Independent Historian:   None    Review of External Notes: Reviewed office note from [facility] 9/5/2024     EXAM:   General: Alert, No distress. Nontoxic appearance  Head: No signs of trauma.   Mouth/Throat: Oropharynx moist.   Eyes: Conjunctivae are normal. Pupils are equal..   Neck: Normal range of motion.    CV: Appears well perfused.  Resp:No respiratory distress.   MSK: Normal range of motion. No obvious deformity.   Neuro: The patient is alert and interactive. MUSTAFA. Speech normal. GCS 15  Skin: No lesion or sign of trauma noted.   Psych: normal mood and affect. behavior is normal.      Independent Interpretation (X-rays, CTs, rhythm strip):  None    Consultations/Discussion of Management or Tests:  Admitting Hospitalist, Dr. Wang     MEDICAL DECISION MAKING/ASSESSMENT AND PLAN:   Nontraumatic acute on chronic back pain.  CT negative for occult fracture.  Will admit for further evaluation and treatment due to ongoing intractable pain.    Admit.     DIAGNOSIS:     ICD-10-CM    1. Thoracic back pain  M54.6             Scribe Disclosure:  Alison HARMAN, am serving as a scribe at 9:33 AM on  9/26/2024 to document services personally performed by Prince Avery MD based on my observations and the provider's statements to me.   9/26/2024  [facility] EMERGENCY DEPT     Prince Avery MD  09/26/24 [id number]

## 2024-09-26 NOTE — ED NOTES
Bed: ED02  Expected date:   Expected time:   Means of arrival:   Comments:  451 [id number], yellow, chronic back pain

## 2024-09-26 NOTE — CONSULTS
Spine Consult   [facility]         CC:   Thoracic pain     Primary care Provider: Nevaeh Wood was asked to see the patient by:  No referring provider defined for this encounter.      [place]:   Anne Rangel is a 78 year old female that presents to ER for intractable mid back pain. She has a history of a L3-L5 lumbar fusion by Dr. Bernardo in 2020. She denies low back pain. She reports that she has mid back pain, about the bra line bilaterally. She denies radiating upper or lower extremity pain, numbness, tingling, or weakness. She has historically managed her pain with tramadol, tylenol, ice, and rest. She is active and participates in water aerobics. She denies any specific mechanism of injury that caused her pain.       Past Medical History:   Diagnosis Date    Anemia     Carpal tunnel syndrome     Chronic fatigue syndrome 11/00    Complication of anesthesia     Decreased libido     Depression with anxiety     Fatigue     Gastroesophageal reflux disease     HA (headache)     History of blood transfusion     [facility] FOLLOWING JOINT REPLACEMENT 7/23/2007    Irritable bowel syndrome 11/00    Myalgia and myositis, unspecified 11/00    Near syncope 5/17/2017    Neutropenia 00    Noninfectious ileitis     Osteoarthritis     rt knee, back    Osteoarthrosis, unspecified whether generalized or localized, hand 11/00    Osteoarthrosis, unspecified whether generalized or localized, lower leg 11/00    Other chronic pain     back    Pacemaker     PONV (postoperative nausea and vomiting)     Pure hypercholesterolemia 11/00    RBBB     Renal disease     mld    Uncomplicated asthma     Urticaria, unspecified 00       Past Surgical History:   Procedure Laterality Date    ARTHROPLASTY KNEE Left     ARTHROPLASTY KNEE Right     partial    ARTHROPLASTY REVISION HIP Left 2/3/2017    Procedure: ARTHROPLASTY REVISION HIP;  Surgeon: Juan Moncada MD;  Location:  OR    CARDIAC SURGERY  2017    pacemaker     COLONOSCOPY N/A 1/15/2019    Procedure: COLONOSCOPY;  Surgeon: Eliane Barone MD;  Location:  GI    EYE SURGERY      zoila cataract surgery    GI SURGERY      hemorrhoid repair    GYN SURGERY      tubal ligation    OPTICAL TRACKING SYSTEM FUSION SPINE POSTERIOR LUMBAR TWO LEVELS N/A 1/9/2020    Procedure: L3-5 decompressive laminectomies with L4-5 Tranforaminal Lumbar Interbody Fusion with L3-5 posterior lateral fusion;  Surgeon: Anjel Bernardo MD;  Location: [facility] OR    ORTHOPEDIC SURGERY      right sabine-arthroplasty, trigger finger repair, left knee arthroscopy and replacement, bilateral hip replacement,    ZZC NONSPECIFIC PROCEDURE      s/p Rt. Carpal tunnel release    ZZC NONSPECIFIC PROCEDURE      s/p bilat Tubal ligation       Current Facility-Administered Medications   Medication Dose Route Frequency Provider Last Rate Last Admin    acetaminophen (TYLENOL) tablet 975 mg  975 mg Oral TID Jaquan Valenzuela MD   975 mg at 09/26/24 1428    [START ON 9/27/2024] buPROPion (WELLBUTRIN SR) 12 hr tablet 200 mg  200 mg Oral QAM Jaquan Valenzuela MD        celecoxib (celeBREX) capsule 100 mg  100 mg Oral BID Jaquan Valenzuela MD        clonazePAM (klonoPIN) tablet 0.5 mg  0.5 mg Oral At Bedtime Jaquan Valenzuela MD        escitalopram (LEXAPRO) tablet 10 mg  10 mg Oral Daily Jaquan Valenzuela MD        eszopiclone (LUNESTA) tablet 1 mg  1 mg Oral At Bedtime Jaquan Valenzuela MD        HYDROmorphone (PF) (DILAUDID) injection 0.5 mg  0.5 mg Intravenous Q30 Min PRN Alvaro Green PA-C   0.5 mg at 09/26/24 1319    Lidocaine (LIDOCARE) 4 % Patch 1 patch  1 patch Transdermal Once Alvaro Green PA-C   1 patch at 09/26/24 0949    [START ON 9/27/2024] Lidocaine (LIDOCARE) 4 % Patch 2 patch  2 patch Transdermal Q24H Jaquan Valenzuela MD        methocarbamol (ROBAXIN) tablet 500 mg  500 mg Oral 4x Daily Jaquan Valenzuela MD   500 mg at 09/26/24 1428    mirtazapine (REMERON) tablet 30 mg  30 mg Oral At  Bedtime Jaquan Valenzuela MD        naloxone (NARCAN) injection 0.2 mg  0.2 mg Intravenous Q2 Min PRN Jaquan Valenzuela MD        Or    naloxone (NARCAN) injection 0.4 mg  0.4 mg Intravenous Q2 Min PRN Jaquan Valenzuela MD        Or    naloxone (NARCAN) injection 0.2 mg  0.2 mg Intramuscular Q2 Min PRN Jaquan Valenzuela MD        Or    naloxone (NARCAN) injection 0.4 mg  0.4 mg Intramuscular Q2 Min PRN Jaquan Valenzuela MD        ondansetron (ZOFRAN ODT) ODT tab 4 mg  4 mg Oral Q6H PRN Jaquan Valenzuela MD        Or    ondansetron (ZOFRAN) injection 4 mg  4 mg Intravenous Q6H PRN Jaquan Valenzuela MD        oxyCODONE (ROXICODONE) tablet 5 mg  5 mg Oral Q4H PRN Jaquan Valenzuela MD        oxyCODONE IR (ROXICODONE) half-tab 2.5 mg  2.5 mg Oral Q4H PRN Jaquan Valenzuela MD        polyethylene glycol (MIRALAX) Packet 17 g  17 g Oral Daily Jaquan Valenzuela MD   17 g at 09/26/24 1428    prochlorperazine (COMPAZINE) injection 5 mg  5 mg Intravenous Q6H PRN Jaquan Valenzuela MD        Or    prochlorperazine (COMPAZINE) tablet 5 mg  5 mg Oral Q6H PRN Jaquan Valenzuela MD        Or    prochlorperazine (COMPAZINE) suppository 12.5 mg  12.5 mg Rectal Q12H PRN Jaquan Valenzuela MD        senna-docusate (SENOKOT-S/PERICOLACE) 8.6-50 MG per tablet 1 tablet  1 tablet Oral BID PRN Jaquan Valenzuela MD        Or    senna-docusate (SENOKOT-S/PERICOLACE) 8.6-50 MG per tablet 2 tablet  2 tablet Oral BID PRN Jaquan Valenzuela MD           Allergies   Allergen Reactions    Blood-Group Specific Substance Other (See Comments)     Patient has anti-K antibody.  Blood for the transfusion might be delayed.      Dilaudid [Hydromorphone] Nausea and Vomiting    Doxepin Unknown     Other reaction(s): Intolerance-Can't Take  Not effective for sleep    Meperidine Nausea and Vomiting    Meperidine And Related Nausea and Vomiting     Demerol    Morphine      PN: LW Reaction: Vomiting,Nausea    No Clinical Screening - See Comments       PN: LW Reaction: all opiates cause N/V. 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 1974, lost a lot of blood-given 6? 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Home [place], had vaccines Hep B, Flu    Hobby Hazards No    Sleep Concern No     Comment: Nortriptyline helps    Stress Concern No     Comment: 3 children    Weight Concern No    Special Diet No    Back Care Yes     Comment: Low back pain, has osteoarthritis    Exercise Yes     Comment: 3-4 times a week    Bike Helmet No    Seat Belt Yes    Self-Exams No   Social History Narrative    Last pap:6-13-02    Last Lipid Panel: 8-6-03    Last Mammo: 7-18-03,WNL    Last DEXA: Unsure    Last Colonoscopy: 06-22-01    Last Tetanus:7-10-03    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             Social Determinants of Health     Financial Resource Strain: Low Risk  (10/31/2023)    Received from [employer] [employer] [facility]    Financial Resource Strain     Difficulty of Paying Living Expenses: 3   Food Insecurity: No Food  Insecurity (10/31/2023)    Received from [facility] [employer], [facility]    Food Insecurity     Worried About Running Out of Food in the Last Year: 1   Transportation Needs: No Transportation Needs (10/31/2023)    Received from [facility] [employer] [facility], [facility]    Transportation Needs     Lack of Transportation (Medical): 1   Social Connections: Socially Integrated (10/31/2023)    Received from [facility] [employer] [facility], [facility]    Social Connections     Frequency of Communication with Friends and Family: 0   Housing Stability: Low Risk  (10/31/2023)    Received from [facility], [facility]    Housing Stability     Unable to Pay for Housing in the Last Year: 1       Family History   Problem Relation Age of Onset    Hypertension Mother     Alzheimer Disease Mother         Memory issues- unsure if alzheimers    Arthritis Mother         osteoarthritis    Depression Mother     Gastrointestinal Disease Mother         acid reflux    Allergies Father     Neurologic Disorder Father         migraines    Respiratory Father         chronic brochitis- born during a flu epidemic in 1918    Cerebrovascular Disease Maternal Grandmother         series of TIA's    Diabetes Maternal Grandfather         adult onset    Cancer Maternal Grandfather         of stomach or esophagus-heavy smoker    C.A.D. Paternal Grandmother         angina    Depression Daughter     Neurologic Disorder Sister         migraines         Review Of Systems    B/P: 130/82, T: 98.1, P: 82, R: 19    Examination:  Biceps:           Right 5/5 Left 5/5  Triceps:          Right 5/5 Left 5/5  Intrinsics:         Right 5/5 Left 5/5    Hip flexion/iliopsoas: Right (5); Left (5), pain increased with testing  Knee extension/Quadriceps:  Right (5); Left (5)  Knee flexion/hamstrings: Right (5); Left (5)  Great toe dorsiflexion/EHL: Right (5); Left (5)  Foot dorsiflexion/ Tib. Ant: Right (5); Left (5)  Foot plantar flexion/ Gastroc: Right (5); Left (5)    Sensation normal to bilateral upper and lower extremities   Muscle tone to bilateral upper and lower extremities normal  Goff negative    Thoracic spine: Tenderness to palpation, about the T8 to T12 region      Imaging:   CT of thoracic and lumbar spine, dated 9/26/24 was reviewed by myself and Dr. Bernardo.                                                       Thoracic CT  IMPRESSION:   No acute fracture findings. Degenerative changes throughout the thoracic spine are not significantly altered from 5/19/2023  GORDON MATHEW MD  Lumbar CT:  IMPRESSION:  No acute lumbar fracture. Extensive postsurgical changes in the inferior lumbar spine.    GORDON MATHEW MD         Diagnosis:  Intractable thoracic pain  Thoracic muscle spasms  S/P L3-5 lumbar fusion with L4-5 TLIF (DOS 2020, Dr. Bernardo)      Assessment/Plan:   1.  Recommend physical therapy, ordered  2.  Start decadron taper. 10mgx1, followed by 4mgx2 doses  3. Pain management per primary  4. Recommend she follow up with our service in 1 week for follow up.     Ania Good [facility]  [facility]    [phone] (office)  [phone] (Care Coordinator)            Addendum:  Agree with above note  MRI thoracic and MRI lumbar reviewed and there are no surgical lesions and no role for surgical intervention  Recommend PT, muscle relaxers, chronic pain management   Follow up with chronic pain management for recommendations for pain control

## 2024-09-26 NOTE — PLAN OF CARE
Goal Outcome Evaluation:      Plan of Care Reviewed With: patient    Overall Patient Progress: no changeOverall Patient Progress: no change    PRIMARY Concern: acute worsening of chronic low back pain.  SAFETY RISK Concerns (fall risk, behaviors, etc.): fall risk      Aggression Tool Color: green  Isolation/Type: n/a  Tests/Procedures for NEXT shift: n/a  Consults? (Pending/following, signed-off?) Neurosurg following, PT/SW to see  Where is patient from? (Home, TCU, etc.): Home with spouse  Other Important info for NEXT shift: ongoing sporadic back spasms. Started on decadron taper.   Anticipated DC date & active delays: TBD  _____________________________________________________________________________  SUMMARY NOTE:  Orientation/Cognitive: A&Ox4  Observation Goals (Met/ Not Met): Not met  Mobility Level/Assist Equipment: Not out of bed yet. Intense pain with movements  Antibiotics & Plan (IV/po, length of tx left): n/a  Pain Management: dilaudid x1. On scheduled tylenol, robaxin, and decadron taper.   Tele/VS/O2: VSS on RA; no tele, has pacer  ABNL Lab/BG: n/a; needs MRI completed (will need pacer rep present).  Diet: Reg, tolerating  Bowel/Bladder: Purewick in place, voiding.   Skin Concerns: n/a  Drains/Devices: purewick. PIV SL  Patient Stated Goal for Today: pain control and rest.

## 2024-09-26 NOTE — PLAN OF CARE
Goal Outcome Evaluation:      Plan of Care Reviewed With: patient    Overall Patient Progress: no changeOverall Patient Progress: no change    Observation goals  PRIOR TO DISCHARGE        Comments:   -diagnostic tests and consults completed and resulted: Partially met, neurosurg following  -vital signs normal or at patient baseline: Met  -adequate pain control on oral analgesics: Not met  Nurse to notify provider when observation goals have been met and patient is ready for discharge.

## 2024-09-26 NOTE — ED NOTES
[facility]  ED Nurse Handoff Report    ED Chief complaint: Back Pain      ED Diagnosis:   Final diagnoses:   None       Code Status: To be addressed by admitting provider    Allergies:   Allergies   Allergen Reactions    Blood-Group Specific Substance Other (See Comments)     Patient has anti-K antibody.  Blood for the transfusion might be delayed.      Dilaudid [Hydromorphone] Nausea and Vomiting    Doxepin Unknown     Other reaction(s): Intolerance-Can't Take  Not effective for sleep    Meperidine Nausea and Vomiting    Meperidine And Related Nausea and Vomiting     Demerol    Morphine      PN: LW Reaction: Vomiting,Nausea    No Clinical Screening - See Comments      PN: LW Reaction: all opiates    Pregabalin Other (See Comments)     Not effective       Patient Story: BIBA from home, c/o back pain. Has chronic BP, normally managed with tramadol, ice, rest if flare ups. Yesterday at 1600, pain became acutely worse with usual measures ineffective. Pt denies any falls or associated trauma. Hx of spinal fusion X 2-3 years ago.  Focused Assessment:  Alert and oriented X 4. Laying flat or with HOB up to 15 degrees d/t pain. Dilaudid X 2. Zofran given for upset stomach with narcotics. Purewick for voiding d/t difficulty moving.  at bedside.    Treatments and/or interventions provided: Labs, CT, meds (see MAR)  Patient's response to treatments and/or interventions: Pain slightly improved with Dilaudid    To be done/followed up on inpatient unit:  Admission orders    Does this patient have any cognitive concerns?:  Alert and oriented x 4    Activity level - Baseline/Home:  Independent  Activity Level - Current:   Unknown, unable to stand d/t pain    Patient's Preferred language: English   Needed?: No    Isolation: None  Infection: Not Applicable  Patient tested for COVID 19 prior to admission: NO  Bariatric?: No    Vital Signs:   Vitals:    09/26/24 0854   BP: 135/81   Pulse: 77   Resp:  18   Temp: 98  F (36.7  C)   TempSrc: Oral   SpO2: 96%       Cardiac Rhythm:     Was the PSS-3 completed:   Yes  What interventions are required if any?               Family Comments:  at bedside  OBS brochure/video discussed/provided to patient/family: N/A              Name of person given brochure if not patient: na              Relationship to patient: na    For the majority of the shift this patient's behavior was Green.   Behavioral interventions performed were na.    ED NURSE PHONE NUMBER: [phone]

## 2024-09-26 NOTE — TELEPHONE ENCOUNTER
This Device meets the FDA criteria for MRI approval.    [employer] traditional device: Device can be programmed remotely with [employer]. No need to schedule a rep. Called [facility] MRI department at [phone] to let them know.   Text sent to Dr. Monson (NENA MD of the Day) for STAT cosign per inpatient protocol.  _________________________________________    Is the implanted device safe for MRI Exam? Yes  Is this device 3T compatible? Yes  Device Type: Pacemaker      Device Information: [employer], Dual Chamber and PPM  [employer] MRI and (EL)      Cardiology Orders for Device Programming     -- Yes -- The patient has a MRI conditional pulse generator and leads from the same     -- Yes -- The pulse generator and leads have been implanted for at least 6 weeks. (Does not apply to [employer] devices)    -- Yes-- The device is implanted in the right or left pectoral region    -- Correct, none known -- There are not any additional active cardiac devices, abandoned leads, lead extenders or adapters    -- Yes -- Lead impedances are within the normal range per  guidelines.    -- Yes -- If the patient is pacemaker dependent the thresholds are less than or equal to 4.0 V @ 1.0 ms    -- Yes -- RA and RV leads are programmed to bipolar pacing operation or pacing off. If no, MRI TO CONTACT THE DEVICE REP FOR PROGRAMMING     Date of last In-clinic Device check: 8/26/24  Results of last Device check:       Device programming during the scan guidelines   Pacing Mode (check one): DOO  Pacing Rate: 60  bpm or 20bpm > intrinsic rhythm, not to exceed 120bpm    If remote reprogramming is applicable, please contact the Rep to assist    MARIO Drake

## 2024-09-27 ENCOUNTER — DOCUMENTATION ONLY (OUTPATIENT)
Dept: CARDIOLOGY | Facility: CLINIC | Age: 79
End: 2024-09-27

## 2024-09-27 ENCOUNTER — APPOINTMENT (OUTPATIENT)
Dept: MRI IMAGING | Facility: CLINIC | Age: 79
DRG: 552 | End: 2024-09-27
Attending: INTERNAL MEDICINE
Payer: MEDICARE

## 2024-09-27 ENCOUNTER — APPOINTMENT (OUTPATIENT)
Dept: PHYSICAL THERAPY | Facility: CLINIC | Age: 79
DRG: 552 | End: 2024-09-27
Attending: INTERNAL MEDICINE
Payer: MEDICARE

## 2024-09-27 PROCEDURE — 99232 SBSQ HOSP IP/OBS MODERATE 35: CPT | Performed by: INTERNAL MEDICINE

## 2024-09-27 PROCEDURE — 250N000011 HC RX IP 250 OP 636: Performed by: PHYSICIAN ASSISTANT

## 2024-09-27 PROCEDURE — G0378 HOSPITAL OBSERVATION PER HR: HCPCS

## 2024-09-27 PROCEDURE — 250N000013 HC RX MED GY IP 250 OP 250 PS 637: Performed by: PHYSICIAN ASSISTANT

## 2024-09-27 PROCEDURE — 72148 MRI LUMBAR SPINE W/O DYE: CPT | Mod: MF

## 2024-09-27 PROCEDURE — 96376 TX/PRO/DX INJ SAME DRUG ADON: CPT

## 2024-09-27 PROCEDURE — 250N000011 HC RX IP 250 OP 636: Performed by: INTERNAL MEDICINE

## 2024-09-27 PROCEDURE — 97530 THERAPEUTIC ACTIVITIES: CPT | Mod: GP | Performed by: PHYSICAL THERAPIST

## 2024-09-27 PROCEDURE — 250N000013 HC RX MED GY IP 250 OP 250 PS 637: Performed by: INTERNAL MEDICINE

## 2024-09-27 PROCEDURE — 97161 PT EVAL LOW COMPLEX 20 MIN: CPT | Mod: GP | Performed by: PHYSICAL THERAPIST

## 2024-09-27 PROCEDURE — 72146 MRI CHEST SPINE W/O DYE: CPT | Mod: MG

## 2024-09-27 PROCEDURE — 250N000011 HC RX IP 250 OP 636: Performed by: BEHAVIOR TECHNICIAN

## 2024-09-27 PROCEDURE — 250N000012 HC RX MED GY IP 250 OP 636 PS 637: Performed by: INTERNAL MEDICINE

## 2024-09-27 PROCEDURE — 120N000001 HC R&B MED SURG/OB

## 2024-09-27 RX ORDER — OXYCODONE HYDROCHLORIDE 5 MG/1
5-10 TABLET ORAL EVERY 4 HOURS PRN
Status: DISCONTINUED | OUTPATIENT
Start: 2024-09-27 | End: 2024-09-30

## 2024-09-27 RX ORDER — HYDROMORPHONE HCL IN WATER/PF 6 MG/30 ML
0.2 PATIENT CONTROLLED ANALGESIA SYRINGE INTRAVENOUS ONCE
Status: COMPLETED | OUTPATIENT
Start: 2024-09-27 | End: 2024-09-27

## 2024-09-27 RX ORDER — TRAMADOL HYDROCHLORIDE 50 MG/1
50-100 TABLET ORAL EVERY 6 HOURS PRN
Status: DISCONTINUED | OUTPATIENT
Start: 2024-09-27 | End: 2024-09-30

## 2024-09-27 RX ORDER — METHOCARBAMOL 500 MG/1
1000 TABLET, FILM COATED ORAL 4 TIMES DAILY
Status: DISCONTINUED | OUTPATIENT
Start: 2024-09-27 | End: 2024-09-30

## 2024-09-27 RX ORDER — HYDROXYZINE HYDROCHLORIDE 10 MG/1
10 TABLET, FILM COATED ORAL EVERY 6 HOURS PRN
Status: DISCONTINUED | OUTPATIENT
Start: 2024-09-27 | End: 2024-09-30 | Stop reason: HOSPADM

## 2024-09-27 RX ORDER — HEPARIN SODIUM 5000 [USP'U]/.5ML
5000 INJECTION, SOLUTION INTRAVENOUS; SUBCUTANEOUS EVERY 8 HOURS
Status: DISCONTINUED | OUTPATIENT
Start: 2024-09-27 | End: 2024-09-30 | Stop reason: HOSPADM

## 2024-09-27 RX ADMIN — HYDROMORPHONE HYDROCHLORIDE 0.2 MG: 0.2 INJECTION, SOLUTION INTRAMUSCULAR; INTRAVENOUS; SUBCUTANEOUS at 20:41

## 2024-09-27 RX ADMIN — LIDOCAINE 2 PATCH: 4 PATCH TOPICAL at 08:48

## 2024-09-27 RX ADMIN — BUPROPION HYDROCHLORIDE 200 MG: 200 TABLET, EXTENDED RELEASE ORAL at 08:47

## 2024-09-27 RX ADMIN — METHOCARBAMOL 500 MG: 500 TABLET ORAL at 12:29

## 2024-09-27 RX ADMIN — HYDROMORPHONE HYDROCHLORIDE 0.2 MG: 0.2 INJECTION, SOLUTION INTRAMUSCULAR; INTRAVENOUS; SUBCUTANEOUS at 17:15

## 2024-09-27 RX ADMIN — POLYETHYLENE GLYCOL 3350 17 G: 17 POWDER, FOR SOLUTION ORAL at 08:36

## 2024-09-27 RX ADMIN — SENNOSIDES AND DOCUSATE SODIUM 2 TABLET: 50; 8.6 TABLET ORAL at 08:34

## 2024-09-27 RX ADMIN — MIRTAZAPINE 30 MG: 15 TABLET, FILM COATED ORAL at 22:14

## 2024-09-27 RX ADMIN — ACETAMINOPHEN 325MG 975 MG: 325 TABLET ORAL at 20:39

## 2024-09-27 RX ADMIN — ESCITALOPRAM OXALATE 10 MG: 10 TABLET ORAL at 08:37

## 2024-09-27 RX ADMIN — METHOCARBAMOL 500 MG: 500 TABLET ORAL at 08:38

## 2024-09-27 RX ADMIN — ONDANSETRON 4 MG: 2 INJECTION INTRAMUSCULAR; INTRAVENOUS at 15:01

## 2024-09-27 RX ADMIN — ACETAMINOPHEN 325MG 975 MG: 325 TABLET ORAL at 15:33

## 2024-09-27 RX ADMIN — OXYCODONE HYDROCHLORIDE 2.5 MG: 5 TABLET ORAL at 08:37

## 2024-09-27 RX ADMIN — TRAMADOL HYDROCHLORIDE 100 MG: 50 TABLET, COATED ORAL at 20:56

## 2024-09-27 RX ADMIN — ONDANSETRON 4 MG: 2 INJECTION INTRAMUSCULAR; INTRAVENOUS at 08:33

## 2024-09-27 RX ADMIN — HYDROMORPHONE HYDROCHLORIDE 0.5 MG: 1 INJECTION, SOLUTION INTRAMUSCULAR; INTRAVENOUS; SUBCUTANEOUS at 15:02

## 2024-09-27 RX ADMIN — CLONAZEPAM 0.5 MG: 0.5 TABLET ORAL at 22:15

## 2024-09-27 RX ADMIN — ONDANSETRON 4 MG: 4 TABLET, ORALLY DISINTEGRATING ORAL at 20:44

## 2024-09-27 RX ADMIN — SENNOSIDES AND DOCUSATE SODIUM 1 TABLET: 50; 8.6 TABLET ORAL at 22:16

## 2024-09-27 RX ADMIN — METHOCARBAMOL 500 MG: 500 TABLET ORAL at 15:33

## 2024-09-27 RX ADMIN — HEPARIN SODIUM 5000 UNITS: 5000 INJECTION, SOLUTION INTRAVENOUS; SUBCUTANEOUS at 12:30

## 2024-09-27 RX ADMIN — OXYCODONE HYDROCHLORIDE 2.5 MG: 5 TABLET ORAL at 13:04

## 2024-09-27 RX ADMIN — HEPARIN SODIUM 5000 UNITS: 5000 INJECTION, SOLUTION INTRAVENOUS; SUBCUTANEOUS at 20:41

## 2024-09-27 RX ADMIN — ESZOPICLONE 1 MG: 1 TABLET, FILM COATED ORAL at 22:16

## 2024-09-27 RX ADMIN — DEXAMETHASONE 4 MG: 2 TABLET ORAL at 05:10

## 2024-09-27 RX ADMIN — METHOCARBAMOL 1000 MG: 500 TABLET ORAL at 20:39

## 2024-09-27 RX ADMIN — HYDROXYZINE HYDROCHLORIDE 10 MG: 10 TABLET ORAL at 20:40

## 2024-09-27 RX ADMIN — CELECOXIB 100 MG: 100 CAPSULE ORAL at 20:39

## 2024-09-27 RX ADMIN — ACETAMINOPHEN 325MG 975 MG: 325 TABLET ORAL at 08:46

## 2024-09-27 ASSESSMENT — ACTIVITIES OF DAILY LIVING (ADL)
ADLS_ACUITY_SCORE: 50
ADLS_ACUITY_SCORE: 50
ADLS_ACUITY_SCORE: 40
ADLS_ACUITY_SCORE: 40
ADLS_ACUITY_SCORE: 45
ADLS_ACUITY_SCORE: 50
ADLS_ACUITY_SCORE: 50
ADLS_ACUITY_SCORE: 49
ADLS_ACUITY_SCORE: 50
ADLS_ACUITY_SCORE: 45
ADLS_ACUITY_SCORE: 49
ADLS_ACUITY_SCORE: 40
ADLS_ACUITY_SCORE: 45
ADLS_ACUITY_SCORE: 49

## 2024-09-27 NOTE — PROVIDER NOTIFICATION
MD Notification    Notified Person: MD    Notified Person Name: Pedrito Martinez    Notification Date/Time: 9/27/24 @ 1712    Notification Interaction: [facility]    Purpose of Notification: [id number] Anne Rangel. Can patient have IV pain medication ordered? Pain is out of control at the moment, 10/10. Hospitalist requested I confer with your team regarding pain medication. Thank you    Orders Received: Please page first on call that is listed in AMCOM    Comments: Writer did page the correct person after receiving call back.

## 2024-09-27 NOTE — PROGRESS NOTES
Observation goals  PRIOR TO DISCHARGE       Comments:   -diagnostic tests and consults completed and resulted: not met, MRI ordered   -vital signs normal or at patient baseline: met  -adequate pain control on oral analgesics: met

## 2024-09-27 NOTE — PROGRESS NOTES
Observation goals  PRIOR TO DISCHARGE        Comments:   -diagnostic tests and consults completed and resulted: not met, MRI ordered   -vital signs normal or at patient baseline: met  -adequate pain control on oral analgesics: met  Nurse to notify provider when observation goals have been met and patient is ready for discharge.

## 2024-09-27 NOTE — PROGRESS NOTES
Observation goals  PRIOR TO DISCHARGE       Comments:   -diagnostic tests and consults completed and resulted: Not. Met.  MRI needs to be completed.   -vital signs normal or at patient baseline: met at this time.   -adequate pain control on oral analgesics: Not met.  Unable to work with PT this a.m. due to uncontrolled pain.  Oral pain meds, robaxin, tylenol and lido patches in use.  Will continue to assess.           Pain better controlled following morning scheduled pain meds, prn oxy and placement of lidopatches to low back.  Patient still not OOB but repositioning frequently and IND in the bed.  Waiting for MRI.  Changed to inpatient status today at 1202.

## 2024-09-27 NOTE — PROVIDER NOTIFICATION
MD Notification    Notified Person: MD    Notified Person Name: Leigh Ann Peters     Notification Date/Time: 9/27/24 @ 1729    Notification Interaction: [facility]    Purpose of Notification:  [id number] Anne Rangel. Patient in 10/10 back pain, states due to MRI. Hospitalist did write order for 0.2mg of dilaudid which was given. Hospitalist wanted me to make neurosurgery team aware due to this situation being abnormal. Thank you.   Orders Received:    Comments:

## 2024-09-27 NOTE — DOWNTIME EVENT NOTE
The EMR was down for 1 hour on 9/27/2024.    Writer was responsible for completing the paper charting during this time period.     The following information was re-entered into the system by Anthony Blas RN: MAR    The following information will remain in the paper chart: Copy of the paper MAR.    Anthony Blas RN  9/27/2024

## 2024-09-27 NOTE — PLAN OF CARE
Goal Outcome Evaluation:  PRIMARY Concern: Back pain  --worsening acute on chronic  SAFETY RISK Concerns (fall risk, behaviors, etc.): Fall risk      Isolation/Type: none  Tests/Procedures for NEXT shift: MRI-thoracic spine completed--Results pending.   Consults? (Pending/following, signed-off?) Neurosurg following.  PT/SW consult pending   Where is patient from? (Home, TCU, etc.): home.   Other Important info for NEXT shift: Pain is worse when moving to the right side.  Anticipated DC date & active delays: TBD. Changed to inpatient status today.      SUMMARY NOTE:     Orientation/Cognitive: A&Ox4.  Observation Goals (Met/ Not Met): Not met.   Mobility Level/Assist Equipment: not OOB. Patient is able to turn right to left side when needed.    Antibiotics & Plan (IV/po, length of tx left): None.   Pain Management: Oxycodone x2 prn.  Scheduled Tylenol and Robaxin.  Lidopatches on low back.   Tele/VS/O2: VSS on RA.  ABNL Lab/BG: n/a.  Diet: regular .  Bowel/Bladder: external catheter in place.  Skin Concerns: bruises on L and R shins. (Pt states these are old.)   Drains/Devices: IV-SL.   Patient Stated Goal for Today: Pain control. Complete MRI.            Observation goals  PRIOR TO DISCHARGE       Comments:   -diagnostic tests and consults completed and resulted: partially met, MRI completed; results pending.   -vital signs normal or at patient baseline: met.  -adequate pain control on oral analgesics: Not met.  Not OOB due to pain. PT eval on hold until patient has better pain control.

## 2024-09-27 NOTE — PROGRESS NOTES
09/27/24 7181   Appointment Info   Signing Clinician's Name / Credentials (PT) Audrey Guerrero DPT   Living Environment   People in Home spouse   Current Living Arrangements apartment   Home Accessibility no concerns  (Elevator Access.)   Transportation Anticipated car, drives self;family or friend will provide   Living Environment Comments Pt's spouse is able to assist at discharge.   Self-Care   Usual Activity Tolerance good   Current Activity Tolerance poor   Regular Exercise Yes   Activity/Exercise Type   (Water Aerobics)   Exercise Amount/Frequency 3-5 times/wk   Equipment Currently Used at Home none   Fall history within last six months no   General Information   Onset of Illness/Injury or Date of Surgery 09/26/24   Referring Physician Jaquan Valenzuela MD   Patient/Family Therapy Goals Statement (PT) Improved pain control.   Pertinent History of Current Problem (include personal factors and/or comorbidities that impact the POC) 77 y/o female admitted with back pain. PMH including osteoarthritis, anemia, depression, L3-L5 fusion in 2020.   Existing Precautions/Restrictions fall   General Observations Pt in supine upon arrival of therapist.   Cognition   Affect/Mental Status (Cognition) WFL   Orientation Status (Cognition) oriented x 3   Follows Commands (Cognition) WFL   Pain Assessment   Patient Currently in Pain   (Thoracic back pain at rest: 2/10)   Integumentary/Edema   Integumentary/Edema Comments Noted no edema in B LEs upon PT evaluation.   Posture    Posture Comments Unable to assess posture d/t pain and inability to tolerate OOB mobility.   Range of Motion (ROM)   ROM Comment B LEs WFL.   Strength (Manual Muscle Testing)   Strength Comments Not formally assessed d/t back pain. Pt demonstrates at least 3/5 grossly in B LEs with bed mobility.   Bed Mobility   Comment, (Bed Mobility) Attempted supine-sit with SBA.   Transfers   Comment, (Transfers) Pt unable to tolerate.   Gait/Stairs  (Locomotion)   Comment, (Gait/Stairs) Pt unable to tolerate.   Balance   Balance Comments NT.   Sensory Examination   Sensory Perception Comments Pt denies numbness/tingling in B LEs.   Clinical Impression   Criteria for Skilled Therapeutic Intervention Yes, treatment indicated   PT Diagnosis (PT) Difficulty with functional mobility.   Influenced by the following impairments Pain, Weakness, Decreased activity tolerance   Functional limitations due to impairments Limited functional mobility requiring AD and assist.   Clinical Presentation (PT Evaluation Complexity) stable   Clinical Presentation Rationale Based on PMH, current presentation, and social support.   Clinical Decision Making (Complexity) low complexity   Planned Therapy Interventions (PT) balance training;bed mobility training;cryotherapy;gait training;strengthening;transfer training   Risk & Benefits of therapy have been explained patient   PT Total Evaluation Time   PT Eval, Low Complexity Minutes (84235) 10   Physical Therapy Goals   PT Frequency Daily   PT Predicted Duration/Target Date for Goal Attainment 09/30/24   PT Goals Bed Mobility;Transfers;Gait   PT: Bed Mobility Supervision/stand-by assist;Supine to/from sit   PT: Transfers Supervision/stand-by assist;Sit to/from stand;Assistive device   PT: Gait Supervision/stand-by assist;Rolling walker;150 feet   Interventions   Interventions Quick Adds Therapeutic Activity   Therapeutic Activity   Therapeutic Activities: dynamic activities to improve functional performance Minutes (35997) 15   Symptoms Noted During/After Treatment Fatigue;Increased pain   Treatment Detail/Skilled Intervention PT: Pt in supine upon arrival of therapist, pt sleeping soundly and required time to wake up. Pt agreeable and motivated to trial mobility. Pt attempted supine-sit with SBA, cues provided for log roll technique, pt able to tolerate supine-sidelying and then quickly returned to supine d/t pain. Discussed with pt  techniques to improve pain with positioning, sidelying with pillows to support spine. Applied ice pack to low back. Pt in supine at end of session, bed alarm on and needs within reach.   PT Discharge Planning   PT Plan Check medical POC, progress mobility as able   PT Discharge Recommendation (DC Rec) Transitional Care Facility   PT Rationale for DC Rec Pt is significantly limited by pain, unable to assess OOB mobility. Due to pain limited mobility at this time would recommend TCU. Pending improvement in pain and progress of mobility pt would be able to return home is pt is able to mobilize with FWW and SBA-CGA of 1.   PT Brief overview of current status Pt unable to tolerate OOB mobility. Recommend assist of 1-2 with use of FWW with improvement in pain. Goals of therapy will be to address safe mobility and make recs for d/c to next level of care. Pt and RN will continue to follow all falls risk precautions as documented by RN staff while hospitalized   Total Session Time   Timed Code Treatment Minutes 15   Total Session Time (sum of timed and untimed services) 25

## 2024-09-27 NOTE — PROGRESS NOTES
Observation goals  PRIOR TO DISCHARGE       Comments:   -diagnostic tests and consults completed and resulted: Not. Met.  MRI needs to be completed.   -vital signs normal or at patient baseline: met at this time.   -adequate pain control on oral analgesics: Not met.  Unable to work with PT this a.m. due to uncontrolled pain.  Oral pain meds, robaxin, tylenol and lido patches in use.  Will continue to assess.

## 2024-09-27 NOTE — PLAN OF CARE
[id number]  PRIMARY Concern: back pain  SAFETY RISK Concerns (fall risk, behaviors, etc.): fall risk      Isolation/Type: none  Tests/Procedures for NEXT shift: MRI  Consults? (Pending/following, signed-off?) CM/SW/PT  Where is patient from? (Home, TCU, etc.): home  Other Important info for NEXT shift: PRN oxycodone 2.5 mg was helpful. Pain is worse when moving to the right side. IV zofran given with relief   Anticipated DC date & active delays: TBD  _____________________________________________________________________________  SUMMARY NOTE:  Orientation/Cognitive: A&Ox4  Observation Goals (Met/ Not Met): not met  Mobility Level/Assist Equipment: not OOB, was able to turn in bed for brief change Ax1  Antibiotics & Plan (IV/po, length of tx left): n/a  Pain Management: PRN oxycodone with scheduled pain medications helped   Tele/VS/O2: VSS on RA  ABNL Lab/BG: n/a  Diet: regular   Bowel/Bladder: external catheter in place, changed brief one time due to urine incontinence on pad  Skin Concerns: bruises on L and R shins. Pt states these are old   Drains/Devices: IV-SL  Patient Stated Goal for Today: pain control/sleep

## 2024-09-27 NOTE — PLAN OF CARE
Goal Outcome Evaluation:  PRIMARY Concern: Back pain  --worsening acute on chronic  SAFETY RISK Concerns (fall risk, behaviors, etc.): Fall risk      Isolation/Type: none  Tests/Procedures for NEXT shift: None   Consults? (Pending/following, signed-off?) Neurosurg following.  PT/SW consult pending   Where is patient from? (Home, TCU, etc.): home.   Other Important info for NEXT shift: Pain is worse when moving to the right side.  Anticipated DC date & active delays: TBD. Changed to inpatient status today.      SUMMARY NOTE:     Orientation/Cognitive: A&Ox4.  Observation Goals (Met/ Not Met): Inpatient   Mobility Level/Assist Equipment: not OOB. Patient is able to turn right to left side when needed.    Antibiotics & Plan (IV/po, length of tx left): None.   Pain Management: Oxycodone x2 prn.  Scheduled Tylenol and Robaxin.  Lidopatches on low back. One time dose of 0.2mg hydromorphone ordered.   Tele/VS/O2: VSS on RA.  ABNL Lab/BG: n/a.  Diet: regular .  Bowel/Bladder: external catheter in place.  Skin Concerns: bruises on L and R shins. (Pt states these are old.)   Drains/Devices: IV-SL.   Patient Stated Goal for Today: Pain control.

## 2024-09-27 NOTE — PLAN OF CARE
Goal Outcome Evaluation:  PRIMARY Concern: Back pain  SAFETY RISK Concerns (fall risk, behaviors, etc.): Fall risk      Isolation/Type: none  Tests/Procedures for NEXT shift: MRI  Consults? (Pending/following, signed-off?) Neurosurg following, PT/SW consult pending   Where is patient from? (Home, TCU, etc.): home  Other Important info for NEXT shift: Pain is worse when moving to the right side.  Anticipated DC date & active delays: TBD    SUMMARY NOTE:    Orientation/Cognitive: A&Ox4  Observation Goals (Met/ Not Met): Not met  Mobility Level/Assist Equipment: not OOB, was able to turn in bed for brief change Ax1  Antibiotics & Plan (IV/po, length of tx left): None   Pain Management: Denies pain   Tele/VS/O2: VSS on RA  ABNL Lab/BG: n/a  Diet: regular   Bowel/Bladder: external catheter in place  Skin Concerns: bruises on L and R shins. Pt states these are old   Drains/Devices: IV-SL  Patient Stated Goal for Today: pain control/sleep        Observation goals  PRIOR TO DISCHARGE       Comments:   -diagnostic tests and consults completed and resulted: not met, MRI ordered   -vital signs normal or at patient baseline: met  -adequate pain control on oral analgesics: met

## 2024-09-27 NOTE — PROGRESS NOTES
Reviewed chart and case with Dr. Bernardo. Will order MRI of the thoracic spine. Continue POC, PT and pain management.

## 2024-09-27 NOTE — PROGRESS NOTES
[facility]    Medicine Progress Note - Hospitalist Service        Date of Admission:  9/26/2024  8:44 AM    Assessment & Plan:   Anne Rangel is a 78 year old female with previous history of L3-L5 decompressive lumbar laminectomies with chronic low back pain presents to the emergency room with acute worsening of chronic low back pain.     Intractable acute on chronic low back pain.  Previous L3-L5 decompression laminectomies in 2020.  Degenerative disc disease.  -Patient has a known history of degenerative disc disease with above-stated surgery in 2020  -CT scan of the thoracic and lumbar spine done in the emergency room is unremarkable  -Spine surgery following  -Continue multimodal pain management with Lidoderm patch, scheduled Tylenol, Robaxin and as needed oxycodone  -Started on dexamethasone per spine surgery  -MRI of the thoracic and lumbar spine today  -Physical therapy evaluation as able, patient was not able to participate much earlier this morning due to pain     Sinus node disease s/p permanent pacemaker placement in 2017  -Noted, no active issues  -Reportedly her pacemaker is compatible with MRI     Major depression  -Continue prior to admission Wellbutrin, clonazepam, Lexapro, and Remeron   -Patient reportedly has not been taking Lamictal     Hyperlipidemia  -Resume statin at discharge         Diet: Regular Diet Adult     DVT Prophylaxis: Heparin SQ   Kumar Catheter: Not present  Code Status: Full Code     Disposition Plan       Expected Discharge Date: 09/28/2024        Discharge Comments: acute low back pain/spasms  MRI lumbar  steroid taper  neurosurg  [place]      Entered: Jaquan Valenzuela MD 09/27/2024, 10:10 AM        Medically Ready for Discharge: Anticipated in 2-4 Days       Clinically Significant Risk Factors Present on Admission                               # Pacemaker present          The patient's care was discussed with the Bedside Nurse and Patient.    Medical  "Decision Making       **CLEAR ALL SELECTIONS**      Labs/Imaging Reviewed:  See Information above and Data section below  Time SPENT BY ME on the date of service doing chart review, history, exam, documentation & further activities per the note:  35 MINUTES    Chart documentation was completed, in part, with [employer] voice-recognition software. Even though reviewed, some grammatical, spelling, and word errors may remain.    Jaquan Valenzuela MD  Hospitalist Service  [facility]  Text Page 7AM-6PM  Securely message with the Vocera Web Console (learn more here)  Text page via [facility] Paging/Directory    ______________________________________________________________________    Interval History   Patient slept well through the night however complaining of significant pain this morning.  She was not able to get out of the bed or participate with physical therapy.  Continues to have low back pain with spasms.  Denies radicular symptoms.  No bowel or bladder incontinence.    Data reviewed today: I reviewed all medications, new labs and imaging results over the last 24 hours. I personally reviewed no images or EKG's today.    Physical Exam   Vital signs:  Temp: 97.9  F (36.6  C) Temp src: Oral BP: 132/80 Pulse: 94   Resp: 16 SpO2: 92 % O2 Device: None (Room air)     Weight: 61.2 kg (134 lb 14.7 oz)  Estimated body mass index is 24.68 kg/m  as calculated from the following:    Height as of 8/26/24: 1.575 m (5' 2\").    Weight as of this encounter: 61.2 kg (134 lb 14.7 oz).      Wt Readings from Last 2 Encounters:   09/26/24 61.2 kg (134 lb 14.7 oz)   08/26/24 58.8 kg (129 lb 9.6 oz)       Gen: AAOX3, NAD, comfortable  HEENT: Supple neck, moist oral mucosa, no pallor  Resp: CTA B/L, normal WOB, no crackles, no wheezes  CVS: RRR, no murmur  Abd/GI: Soft, non-tender. BS- normoactive.    Skin: Warm, dry no rashes  MSK: Nonspecific tenderness throughout lower back, SLRT is 45 degrees bilaterally  Neuro- CN- " intact.     Data   Recent Labs   Lab 09/26/24  0853   WBC 5.4   HGB 13.2   MCV 98         POTASSIUM 4.3   CHLORIDE 103   CO2 25   BUN 16.7   CR 0.95   ANIONGAP 11   SUE 9.3   *   ALBUMIN 4.1   PROTTOTAL 6.6   BILITOTAL 0.4   ALKPHOS 85   ALT 18   AST 25       Recent Results (from the past 24 hour(s))   CT Lumbar Spine w/o Contrast    Narrative    CT LUMBAR SPINE WITHOUT CONTRAST  9/26/2024 12:31 PM     HISTORY: low back pain      TECHNIQUE: Axial images of the lumbar spine were obtained without  intravenous contrast. Multiplanar reformations were performed.   Radiation dose for this scan was reduced using automated exposure  control, adjustment of the mA and/or kV according to patient size, or  iterative reconstruction technique.     COMPARISON: None.     FINDINGS: Vertebral body heights are maintained. Posterior fusion  hardware spans L3-L5. Spacer device at L4-5. Trace anterolisthesis of  L4 on L5 and minimal retrolisthesis of L2 on L3. Sacrum and sacroiliac  joints are intact.    No evidence of an acute fracture. No high-grade canal stenotic  findings identified. Paravertebral soft tissues are unremarkable.    Laminectomy changes at L3 and L4.      Impression    IMPRESSION:  No acute lumbar fracture. Extensive postsurgical changes  in the inferior lumbar spine.     GORDON MATHEW MD         SYSTEM ID:  [id number]     Medications   Current Facility-Administered Medications   Medication Dose Route Frequency Provider Last Rate Last Admin     Current Facility-Administered Medications   Medication Dose Route Frequency Provider Last Rate Last Admin    acetaminophen (TYLENOL) tablet 975 mg  975 mg Oral TID Jaquan Valenzuela MD   975 mg at 09/27/24 0846    buPROPion (WELLBUTRIN SR) 12 hr tablet 200 mg  200 mg Oral QAM Jaquan Valenzuela MD   200 mg at 09/27/24 0847    celecoxib (celeBREX) capsule 100 mg  100 mg Oral BID Jaquan Valenzuela MD        clonazePAM (klonoPIN) tablet 0.5 mg  0.5 mg Oral At  Bedtime Jaquan Valenzuela MD   0.5 mg at 09/26/24 2210    escitalopram (LEXAPRO) tablet 10 mg  10 mg Oral Daily Jaquan Valenzuela MD   10 mg at 09/27/24 0837    eszopiclone (LUNESTA) tablet 1 mg  1 mg Oral At Bedtime Jaquan Valenzuela MD   1 mg at 09/26/24 2210    Lidocaine (LIDOCARE) 4 % Patch 2 patch  2 patch Transdermal Q24H Jaquan Valenzuela MD   2 patch at 09/27/24 0848    methocarbamol (ROBAXIN) tablet 500 mg  500 mg Oral 4x Daily Jaquan Valenzuela MD   500 mg at 09/27/24 0838    mirtazapine (REMERON) tablet 30 mg  30 mg Oral At Bedtime Jaquan Valenzuela MD   30 mg at 09/26/24 2210    polyethylene glycol (MIRALAX) Packet 17 g  17 g Oral Daily Jaquan Valenzuela MD   17 g at 09/27/24 0836

## 2024-09-28 PROCEDURE — 250N000013 HC RX MED GY IP 250 OP 250 PS 637: Performed by: INTERNAL MEDICINE

## 2024-09-28 PROCEDURE — 250N000013 HC RX MED GY IP 250 OP 250 PS 637: Performed by: HOSPITALIST

## 2024-09-28 PROCEDURE — 250N000013 HC RX MED GY IP 250 OP 250 PS 637: Performed by: PHYSICIAN ASSISTANT

## 2024-09-28 PROCEDURE — 250N000011 HC RX IP 250 OP 636: Performed by: INTERNAL MEDICINE

## 2024-09-28 PROCEDURE — 120N000001 HC R&B MED SURG/OB

## 2024-09-28 PROCEDURE — 99232 SBSQ HOSP IP/OBS MODERATE 35: CPT | Performed by: INTERNAL MEDICINE

## 2024-09-28 RX ORDER — SIMETHICONE 80 MG
80 TABLET,CHEWABLE ORAL EVERY 6 HOURS PRN
Status: DISCONTINUED | OUTPATIENT
Start: 2024-09-28 | End: 2024-09-30 | Stop reason: HOSPADM

## 2024-09-28 RX ORDER — TIZANIDINE 2 MG/1
2 TABLET ORAL EVERY 8 HOURS PRN
Status: DISCONTINUED | OUTPATIENT
Start: 2024-09-28 | End: 2024-09-30

## 2024-09-28 RX ORDER — GABAPENTIN 100 MG/1
100 CAPSULE ORAL 3 TIMES DAILY
Status: DISCONTINUED | OUTPATIENT
Start: 2024-09-28 | End: 2024-09-30 | Stop reason: HOSPADM

## 2024-09-28 RX ORDER — ATORVASTATIN CALCIUM 40 MG/1
40 TABLET, FILM COATED ORAL AT BEDTIME
Status: DISCONTINUED | OUTPATIENT
Start: 2024-09-28 | End: 2024-09-30 | Stop reason: HOSPADM

## 2024-09-28 RX ADMIN — HEPARIN SODIUM 5000 UNITS: 5000 INJECTION, SOLUTION INTRAVENOUS; SUBCUTANEOUS at 20:19

## 2024-09-28 RX ADMIN — ESZOPICLONE 1 MG: 1 TABLET, FILM COATED ORAL at 22:07

## 2024-09-28 RX ADMIN — METHOCARBAMOL 1000 MG: 500 TABLET ORAL at 08:59

## 2024-09-28 RX ADMIN — HEPARIN SODIUM 5000 UNITS: 5000 INJECTION, SOLUTION INTRAVENOUS; SUBCUTANEOUS at 04:45

## 2024-09-28 RX ADMIN — CELECOXIB 100 MG: 100 CAPSULE ORAL at 20:19

## 2024-09-28 RX ADMIN — ESCITALOPRAM OXALATE 10 MG: 10 TABLET ORAL at 08:59

## 2024-09-28 RX ADMIN — CELECOXIB 100 MG: 100 CAPSULE ORAL at 08:59

## 2024-09-28 RX ADMIN — MIRTAZAPINE 30 MG: 15 TABLET, FILM COATED ORAL at 21:24

## 2024-09-28 RX ADMIN — SIMETHICONE 80 MG: 80 TABLET, CHEWABLE ORAL at 08:59

## 2024-09-28 RX ADMIN — POLYETHYLENE GLYCOL 3350 17 G: 17 POWDER, FOR SOLUTION ORAL at 08:58

## 2024-09-28 RX ADMIN — TRAMADOL HYDROCHLORIDE 100 MG: 50 TABLET, COATED ORAL at 05:02

## 2024-09-28 RX ADMIN — TIZANIDINE 2 MG: 2 TABLET ORAL at 10:29

## 2024-09-28 RX ADMIN — HYDROXYZINE HYDROCHLORIDE 10 MG: 10 TABLET ORAL at 05:02

## 2024-09-28 RX ADMIN — ACETAMINOPHEN 325MG 975 MG: 325 TABLET ORAL at 20:19

## 2024-09-28 RX ADMIN — METHOCARBAMOL 1000 MG: 500 TABLET ORAL at 12:20

## 2024-09-28 RX ADMIN — METHOCARBAMOL 1000 MG: 500 TABLET ORAL at 20:19

## 2024-09-28 RX ADMIN — CLONAZEPAM 0.5 MG: 0.5 TABLET ORAL at 21:24

## 2024-09-28 RX ADMIN — GABAPENTIN 100 MG: 100 CAPSULE ORAL at 13:51

## 2024-09-28 RX ADMIN — METHOCARBAMOL 1000 MG: 500 TABLET ORAL at 16:49

## 2024-09-28 RX ADMIN — ACETAMINOPHEN 325MG 975 MG: 325 TABLET ORAL at 08:59

## 2024-09-28 RX ADMIN — ACETAMINOPHEN 325MG 975 MG: 325 TABLET ORAL at 13:51

## 2024-09-28 RX ADMIN — GABAPENTIN 100 MG: 100 CAPSULE ORAL at 20:19

## 2024-09-28 RX ADMIN — ONDANSETRON 4 MG: 4 TABLET, ORALLY DISINTEGRATING ORAL at 05:09

## 2024-09-28 RX ADMIN — HEPARIN SODIUM 5000 UNITS: 5000 INJECTION, SOLUTION INTRAVENOUS; SUBCUTANEOUS at 12:20

## 2024-09-28 RX ADMIN — BUPROPION HYDROCHLORIDE 200 MG: 200 TABLET, EXTENDED RELEASE ORAL at 08:59

## 2024-09-28 RX ADMIN — ATORVASTATIN CALCIUM 40 MG: 40 TABLET, FILM COATED ORAL at 21:24

## 2024-09-28 RX ADMIN — GABAPENTIN 100 MG: 100 CAPSULE ORAL at 10:29

## 2024-09-28 ASSESSMENT — ACTIVITIES OF DAILY LIVING (ADL)
ADLS_ACUITY_SCORE: 43
ADLS_ACUITY_SCORE: 38
ADLS_ACUITY_SCORE: 43
ADLS_ACUITY_SCORE: 43
ADLS_ACUITY_SCORE: 38
ADLS_ACUITY_SCORE: 43
ADLS_ACUITY_SCORE: 42
ADLS_ACUITY_SCORE: 41
ADLS_ACUITY_SCORE: 38
ADLS_ACUITY_SCORE: 41
ADLS_ACUITY_SCORE: 43
ADLS_ACUITY_SCORE: 41
ADLS_ACUITY_SCORE: 43
ADLS_ACUITY_SCORE: 42
ADLS_ACUITY_SCORE: 41
ADLS_ACUITY_SCORE: 43
ADLS_ACUITY_SCORE: 38
ADLS_ACUITY_SCORE: 43
ADLS_ACUITY_SCORE: 43
ADLS_ACUITY_SCORE: 41
ADLS_ACUITY_SCORE: 43
ADLS_ACUITY_SCORE: 43

## 2024-09-28 NOTE — PLAN OF CARE
Goal Outcome Evaluation:  PRIMARY Concern: Back pain  --worsening acute on chronic  SAFETY RISK Concerns (fall risk, behaviors, etc.): Fall risk      Isolation/Type: none  Tests/Procedures for NEXT shift: None   Consults? (Pending/following, signed-off?) Neurosurg following.  PT/SW consult pending   Where is patient from? (Home, TCU, etc.): home.   Other Important info for NEXT shift: Pain is worse when moving to the right side.  Anticipated DC date & active delays: TBD. Changed to inpatient status today.      SUMMARY NOTE:     Orientation/Cognitive: A&Ox4.  Observation Goals (Met/ Not Met): Inpatient   Mobility Level/Assist Equipment: not OOB. Patient is able to turn right to left side when needed.    Antibiotics & Plan (IV/po, length of tx left): None.   Pain Management: Scheduled Tylenol and Robaxin. PRN tizanidine, oxycodone, and tramadol. Last narcotic received at 0500. Pain managed with muscle relaxers.   Tele/VS/O2: VSS on RA.  ABNL Lab/BG: n/a.  Diet: regular .  Bowel/Bladder: external catheter in place.  Skin Concerns: bruises on L and R shins. (Pt states these are old.)   Drains/Devices: IV-SL.   Patient Stated Goal for Today: Pain control.

## 2024-09-28 NOTE — PROGRESS NOTES
[facility]    Medicine Progress Note - Hospitalist Service        Date of Admission:  9/26/2024  8:44 AM    Assessment & Plan:   Anne Rangel is a 78 year old female with previous history of L3-L5 decompressive lumbar laminectomies with chronic low back pain presents to the emergency room with acute worsening of chronic low back pain.     Intractable acute on chronic low back pain.  Previous L3-L5 decompression laminectomies in 2020.  Degenerative disc disease.  -Patient has a known history of degenerative disc disease with above-stated surgery in 2020  -CT scan of the thoracic and lumbar spine done in the emergency room is unremarkable  -MRI of the thoracic and lumbar spine shows multilevel degenerative disc disease with instrumented posterior spinal fusion at L3-L5 and anterior spinal fusion at L4-5.  Please see MRI reports for details.  -Patient evaluated by spinal/neurosurgery.  They have reviewed MRI of the thoracic and lumbar spine with recommendation for pain management.  No indication for surgical/interventional procedures.  -Patient continues to be in quite a bit of pain and obviously frustrated by the situation  -Continue multimodal pain management with Lidoderm patch, scheduled Tylenol, Robaxin and as needed oxycodone  -At tizanidine 2 mg every 8 hours as needed and gabapentin 100 mg 3 times daily  -Will consider scheduled diazepam 2 mg twice a day pending response to above  -Physical therapy evaluation as able, patient was not able to participate much earlier this morning due to pain -depending on pain control will likely need pain consultation on Monday     Sinus node disease s/p permanent pacemaker placement in 2017  -Noted, no active issues     Major depression  -Continue prior to admission Wellbutrin, clonazepam, Lexapro, and Remeron   -Patient reportedly has not been taking Lamictal     Hyperlipidemia  -Resume statin        Diet: Regular Diet Adult     DVT Prophylaxis: Heparin  SQ   Kumar Catheter: Not present  Code Status: Full Code     Disposition Plan      Expected Discharge Date: 09/29/2024        Discharge Comments: acute low back pain/spasms  MRI lumbar  steroid taper  neurosurg  [place]      Entered: Jaquan Valenzuela MD 09/28/2024, 10:14 AM        Medically Ready for Discharge: Anticipated in the next day or 2 pending adequate pain control and physical therapy evaluation.    Clinically Significant Risk Factors Present on Admission                               # Pacemaker present          The patient's care was discussed with the Bedside Nurse and Patient.    Medical Decision Making       **CLEAR ALL SELECTIONS**      Labs/Imaging Reviewed:  See Information above and Data section below  Time SPENT BY ME on the date of service doing chart review, history, exam, documentation & further activities per the note:  35 MINUTES    Chart documentation was completed, in part, with [employer] voice-recognition software. Even though reviewed, some grammatical, spelling, and word errors may remain.    Jaquan Valenzuela MD  Hospitalist Service  [facility]  Text Page 7AM-6PM  Securely message with the Vocera Web Console (learn more here)  Text page via [employer] Paging/Directory    ______________________________________________________________________    Interval History   Patient had a rough night yesterday, she had escalation of pain after MRI of the thoracic and lumbar spine.  She is obviously quite frustrated about the situation with the pain and the fact that no intervention can be done or was offered to her.  I had a detailed discussion with her about trying to optimize her pain regimen however I also added that this will have to be done cautiously given the sedating potential of the medication we are using and her age.    Data reviewed today: I reviewed all medications, new labs and imaging results over the last 24 hours. I personally reviewed no images or EKG's  "today.    Physical Exam   Vital signs:  Temp: 97.9  F (36.6  C) Temp src: Oral BP: 121/83 Pulse: 81   Resp: 16 SpO2: 92 % O2 Device: None (Room air)     Weight: 61.2 kg (134 lb 14.7 oz)  Estimated body mass index is 24.68 kg/m  as calculated from the following:    Height as of 8/26/24: 1.575 m (5' 2\").    Weight as of this encounter: 61.2 kg (134 lb 14.7 oz).      Wt Readings from Last 2 Encounters:   09/26/24 61.2 kg (134 lb 14.7 oz)   08/26/24 58.8 kg (129 lb 9.6 oz)       Gen: AAOX3, NAD, comfortable  Resp: CTA B/L, normal WOB  CVS: RRR, no murmur  Abd/GI: Soft, non-tender. BS- normoactive.    Skin: Warm, dry no rashes  MSK: SLRT is still around 45 degrees bilaterally  Neuro- CN- intact.     Data   Recent Labs   Lab 09/26/24  0853   WBC 5.4   HGB 13.2   MCV 98         POTASSIUM 4.3   CHLORIDE 103   CO2 25   BUN 16.7   CR 0.95   ANIONGAP 11   SUE 9.3   *   ALBUMIN 4.1   PROTTOTAL 6.6   BILITOTAL 0.4   ALKPHOS 85   ALT 18   AST 25       Recent Results (from the past 24 hour(s))   MR Lumbar Spine w/o Contrast    Narrative    THORACIC AND LUMBAR SPINE MRI WITHOUT CONTRAST   9/27/2024 2:37 PM    History: Thoracic pain.    Comparison: CTs from yesterday.    Technique: Multiplanar multisequence thoracic and lumbar MR without  contrast.     Findings:  Thoracic Spine:    Alignment of the thoracic vertebra appears within normal limits.    Multilevel degenerative changes with loss of disc height, osteophyte  formation and facet arthropathy. Multilevel disc bulges. No high-grade  spinal canal or neural foraminal stenosis.    Bone marrow edema in T11 and T12 vertebral bodies. Multilevel Modic  type 1 degeneration, most pronounced at T3-4.    There is no definite abnormal signal in the thoracic spinal cord at  any level.     Normal paraspinous soft tissues.    Lumbar Spine:  There are 5 type lumbar vertebra, used for the purposes of this  dictation.      Instrumented posterior spinal fusion L3-L5 and " anterior spinal fusion  at L4-5. Decompressive laminectomy at L4. 2.6 x 1.0 cm fluid  collection in the laminectomy bed. Minimal retrolisthesis at L2-3.    T2 hyperintense signal in T12-L1 and L1-2 intervertebral disc spaces,  opposing endplates and vertebral bodies. No significant soft tissue  edema to  suggest discitis osteomyelitis.    The tip of the conus is at approximately L1.     On a level by level basis, the findings are as follows:    T12-L1: Moderate disc height loss. Disc bulge and bilateral facet  arthropathy. Mild to moderate left and mild right neural foraminal  stenosis. No spinal canal stenosis.    L1-2:  Moderate disc height loss. Disc bulge, osteophyte formation and  superimposed left foraminal protrusion. Moderate to severe left neural  foraminal stenosis. Mild to moderate right neural foraminal stenosis.  No spinal canal stenosis.    L2-3: Severe disc height loss. Disc bulge, osteophyte formation and  bilateral facet arthropathy. Mild to moderate spinal canal stenosis.  Mild right and mild to moderate left neural foraminal stenosis. Mild  spinal canal stenosis.    L3-4:  Fusion level. Posterior osteophyte formation. Mild bilateral  neural foraminal stenosis. No spinal canal stenosis.    L4-5:  Fusion level. Anterolisthesis. Posterior osteophyte formation  with mild right and minimal left neural foraminal stenosis. No spinal  canal stenosis.    L5-S1:  Severe disc height loss and disc degeneration. Disc bulge,  osteophyte formation and bilateral facet arthropathy. Mild to moderate  bilateral neural foraminal stenosis. No spinal canal stenosis.    The visualized paraspinous tissues anteriorly are unremarkable.      Impression    Impression:   1. Thoracic:  a. Multilevel disc height loss, osteophyte formation and facet  arthropathy. No high-grade spinal canal or neural foraminal stenosis.  b. No abnormal spinal cord signal.  c. Multilevel Modic type 1 degeneration, most pronounced at T3-4.  Bone  marrow edema in T11 and T12 vertebral bodies, also favored to be  degenerative. No soft tissue edema to suggest discitis osteomyelitis.  2. Lumbar:  a. Instrumented posterior spinal fusion L3-L5 and anterior spinal  fusion at L4-5. Decompressive laminectomy at L4. 2.6 x 1.0 cm fluid  collection in the laminectomy bed, likely representing seroma.  b. T2 hyperintense signal in T12-L1 and L1-L2 intervertebral disc  spaces, opposing endplates and vertebral bodies. No significant soft  tissue edema to  suggest discitis osteomyelitis. This is favored to be  degenerative. However, cannot entirely exclude developing early  discitis osteomyelitis. Recommend follow-up imaging with contrast if  there is concern for discitis osteomyelitis.  c. Multilevel lumbar spondylosis as detailed above.      INES WILDER MD         SYSTEM ID:  [id number]   MR Thoracic Spine w/o Contrast    Narrative    THORACIC AND LUMBAR SPINE MRI WITHOUT CONTRAST   9/27/2024 2:37 PM    History: Thoracic pain.    Comparison: CTs from yesterday.    Technique: Multiplanar multisequence thoracic and lumbar MR without  contrast.     Findings:  Thoracic Spine:    Alignment of the thoracic vertebra appears within normal limits.    Multilevel degenerative changes with loss of disc height, osteophyte  formation and facet arthropathy. Multilevel disc bulges. No high-grade  spinal canal or neural foraminal stenosis.    Bone marrow edema in T11 and T12 vertebral bodies. Multilevel Modic  type 1 degeneration, most pronounced at T3-4.    There is no definite abnormal signal in the thoracic spinal cord at  any level.     Normal paraspinous soft tissues.    Lumbar Spine:  There are 5 type lumbar vertebra, used for the purposes of this  dictation.      Instrumented posterior spinal fusion L3-L5 and anterior spinal fusion  at L4-5. Decompressive laminectomy at L4. 2.6 x 1.0 cm fluid  collection in the laminectomy bed. Minimal retrolisthesis at L2-3.    T2  hyperintense signal in T12-L1 and L1-2 intervertebral disc spaces,  opposing endplates and vertebral bodies. No significant soft tissue  edema to  suggest discitis osteomyelitis.    The tip of the conus is at approximately L1.     On a level by level basis, the findings are as follows:    T12-L1: Moderate disc height loss. Disc bulge and bilateral facet  arthropathy. Mild to moderate left and mild right neural foraminal  stenosis. No spinal canal stenosis.    L1-2:  Moderate disc height loss. Disc bulge, osteophyte formation and  superimposed left foraminal protrusion. Moderate to severe left neural  foraminal stenosis. Mild to moderate right neural foraminal stenosis.  No spinal canal stenosis.    L2-3: Severe disc height loss. Disc bulge, osteophyte formation and  bilateral facet arthropathy. Mild to moderate spinal canal stenosis.  Mild right and mild to moderate left neural foraminal stenosis. Mild  spinal canal stenosis.    L3-4:  Fusion level. Posterior osteophyte formation. Mild bilateral  neural foraminal stenosis. No spinal canal stenosis.    L4-5:  Fusion level. Anterolisthesis. Posterior osteophyte formation  with mild right and minimal left neural foraminal stenosis. No spinal  canal stenosis.    L5-S1:  Severe disc height loss and disc degeneration. Disc bulge,  osteophyte formation and bilateral facet arthropathy. Mild to moderate  bilateral neural foraminal stenosis. No spinal canal stenosis.    The visualized paraspinous tissues anteriorly are unremarkable.      Impression    Impression:   1. Thoracic:  a. Multilevel disc height loss, osteophyte formation and facet  arthropathy. No high-grade spinal canal or neural foraminal stenosis.  b. No abnormal spinal cord signal.  c. Multilevel Modic type 1 degeneration, most pronounced at T3-4. Bone  marrow edema in T11 and T12 vertebral bodies, also favored to be  degenerative. No soft tissue edema to suggest discitis osteomyelitis.  2. Lumbar:  a.  Instrumented posterior spinal fusion L3-L5 and anterior spinal  fusion at L4-5. Decompressive laminectomy at L4. 2.6 x 1.0 cm fluid  collection in the laminectomy bed, likely representing seroma.  b. T2 hyperintense signal in T12-L1 and L1-L2 intervertebral disc  spaces, opposing endplates and vertebral bodies. No significant soft  tissue edema to  suggest discitis osteomyelitis. This is favored to be  degenerative. However, cannot entirely exclude developing early  discitis osteomyelitis. Recommend follow-up imaging with contrast if  there is concern for discitis osteomyelitis.  c. Multilevel lumbar spondylosis as detailed above.      INES WILDER MD         SYSTEM ID:  [id number]     Medications   Current Facility-Administered Medications   Medication Dose Route Frequency Provider Last Rate Last Admin     Current Facility-Administered Medications   Medication Dose Route Frequency Provider Last Rate Last Admin    acetaminophen (TYLENOL) tablet 975 mg  975 mg Oral TID Jaquan Valenzuela MD   975 mg at 09/28/24 0859    buPROPion (WELLBUTRIN SR) 12 hr tablet 200 mg  200 mg Oral QAM Jaquan Valenzuela MD   200 mg at 09/28/24 0859    celecoxib (celeBREX) capsule 100 mg  100 mg Oral BID Jaquan Valenzuela MD   100 mg at 09/28/24 0859    clonazePAM (klonoPIN) tablet 0.5 mg  0.5 mg Oral At Bedtime Jaquan Valenzuela MD   0.5 mg at 09/27/24 2215    escitalopram (LEXAPRO) tablet 10 mg  10 mg Oral Daily Jaquan Valenzuela MD   10 mg at 09/28/24 0859    eszopiclone (LUNESTA) tablet 1 mg  1 mg Oral At Bedtime Jaquan Valenzuela MD   1 mg at 09/27/24 2216    gabapentin (NEURONTIN) capsule 100 mg  100 mg Oral TID Jaquan Valenzuela MD        heparin ANTICOAGULANT injection 5,000 Units  5,000 Units Subcutaneous Q8H Jaquan Valenzuela MD   5,000 Units at 09/28/24 0445    Lidocaine (LIDOCARE) 4 % Patch 2 patch  2 patch Transdermal Q24H Jaquan Valenzuela MD   2 patch at 09/27/24 0848    methocarbamol (ROBAXIN) tablet 1,000 mg   1,000 mg Oral 4x Daily Palak Harper PA-C   1,000 mg at 09/28/24 0859    mirtazapine (REMERON) tablet 30 mg  30 mg Oral At Bedtime Jaquan Valenzuela MD   30 mg at 09/27/24 2214    polyethylene glycol (MIRALAX) Packet 17 g  17 g Oral Daily Jaquan Valenzuela MD   17 g at 09/28/24 0806

## 2024-09-28 NOTE — PLAN OF CARE
Orientation/Cognitive: A&Ox4.  Observation Goals (Met/ Not Met): Inpatient   Mobility Level/Assist Equipment: not OOB. Refuses. Weight shifts and turns self but mostly SF.  Antibiotics & Plan (IV/po, length of tx left): None.   Pain Management: Ultram, atarax   Scheduled Tylenol/Robaxin/Celebrex.  Please give zofran ODT with Ultram  Lidop. One time dose of 0.5mg IV Dilaudid given at beginning of the shift for 10/10 pain. Please give zofran ODT with Ultram Heat  PNV: CMS intact  Tele/VS/O2: VSS on RA.  ABNL Lab/BG: n/a.  Diet: regular .  Bowel/Bladder: external catheter in place. Voiding. + flatus, +BS. BM 9/24 last. Senna given. Miralax scheduled. Requested Simethicone. MD paged x 2  Skin Concerns: bruises on L and R shins. (prior.)   Voiding. + flatus, BM 9/24 last. Senna given. Miralax scheduled. Requested Simethicone. MD paged x 2  Drains/Devices: IV-SL.   Patient Stated Goal for Today: Pain control.    Other: NS signed/off. No surgical intervention.       9/27/2024 [id number]

## 2024-09-28 NOTE — PROGRESS NOTES
MRI thoracic and MRI lumbar reviewed and there are no surgical lesions and no role for surgical intervention  Recommend PT, muscle relaxers, chronic pain management   Pt needs chronic pain management   Will sign off, call with any questions or concerns

## 2024-09-29 PROCEDURE — 99232 SBSQ HOSP IP/OBS MODERATE 35: CPT | Performed by: INTERNAL MEDICINE

## 2024-09-29 PROCEDURE — 250N000013 HC RX MED GY IP 250 OP 250 PS 637: Performed by: INTERNAL MEDICINE

## 2024-09-29 PROCEDURE — 250N000013 HC RX MED GY IP 250 OP 250 PS 637: Performed by: PHYSICIAN ASSISTANT

## 2024-09-29 PROCEDURE — 250N000011 HC RX IP 250 OP 636: Performed by: INTERNAL MEDICINE

## 2024-09-29 PROCEDURE — 120N000001 HC R&B MED SURG/OB

## 2024-09-29 RX ORDER — SENNOSIDES 8.6 MG
2 TABLET ORAL 2 TIMES DAILY
Status: DISCONTINUED | OUTPATIENT
Start: 2024-09-29 | End: 2024-09-30 | Stop reason: HOSPADM

## 2024-09-29 RX ADMIN — SENNOSIDES 2 TABLET: 8.6 TABLET, FILM COATED ORAL at 08:27

## 2024-09-29 RX ADMIN — ESZOPICLONE 1 MG: 1 TABLET, FILM COATED ORAL at 22:01

## 2024-09-29 RX ADMIN — GABAPENTIN 100 MG: 100 CAPSULE ORAL at 08:16

## 2024-09-29 RX ADMIN — ACETAMINOPHEN 325MG 975 MG: 325 TABLET ORAL at 21:06

## 2024-09-29 RX ADMIN — HYDROXYZINE HYDROCHLORIDE 10 MG: 10 TABLET ORAL at 05:48

## 2024-09-29 RX ADMIN — HEPARIN SODIUM 5000 UNITS: 5000 INJECTION, SOLUTION INTRAVENOUS; SUBCUTANEOUS at 14:56

## 2024-09-29 RX ADMIN — METHOCARBAMOL 1000 MG: 500 TABLET ORAL at 21:06

## 2024-09-29 RX ADMIN — METHOCARBAMOL 1000 MG: 500 TABLET ORAL at 17:04

## 2024-09-29 RX ADMIN — GABAPENTIN 100 MG: 100 CAPSULE ORAL at 14:56

## 2024-09-29 RX ADMIN — ESCITALOPRAM OXALATE 10 MG: 10 TABLET ORAL at 08:16

## 2024-09-29 RX ADMIN — MIRTAZAPINE 30 MG: 15 TABLET, FILM COATED ORAL at 22:02

## 2024-09-29 RX ADMIN — GABAPENTIN 100 MG: 100 CAPSULE ORAL at 21:06

## 2024-09-29 RX ADMIN — ACETAMINOPHEN 325MG 975 MG: 325 TABLET ORAL at 14:55

## 2024-09-29 RX ADMIN — CLONAZEPAM 0.5 MG: 0.5 TABLET ORAL at 22:02

## 2024-09-29 RX ADMIN — HEPARIN SODIUM 5000 UNITS: 5000 INJECTION, SOLUTION INTRAVENOUS; SUBCUTANEOUS at 05:15

## 2024-09-29 RX ADMIN — TIZANIDINE 2 MG: 2 TABLET ORAL at 09:50

## 2024-09-29 RX ADMIN — CELECOXIB 100 MG: 100 CAPSULE ORAL at 21:06

## 2024-09-29 RX ADMIN — POLYETHYLENE GLYCOL 3350 17 G: 17 POWDER, FOR SOLUTION ORAL at 08:16

## 2024-09-29 RX ADMIN — TRAMADOL HYDROCHLORIDE 50 MG: 50 TABLET, COATED ORAL at 12:28

## 2024-09-29 RX ADMIN — METHOCARBAMOL 1000 MG: 500 TABLET ORAL at 08:16

## 2024-09-29 RX ADMIN — HEPARIN SODIUM 5000 UNITS: 5000 INJECTION, SOLUTION INTRAVENOUS; SUBCUTANEOUS at 21:06

## 2024-09-29 RX ADMIN — BUPROPION HYDROCHLORIDE 200 MG: 200 TABLET, EXTENDED RELEASE ORAL at 08:17

## 2024-09-29 RX ADMIN — SENNOSIDES 2 TABLET: 8.6 TABLET, FILM COATED ORAL at 21:06

## 2024-09-29 RX ADMIN — METHOCARBAMOL 1000 MG: 500 TABLET ORAL at 12:25

## 2024-09-29 RX ADMIN — LIDOCAINE 2 PATCH: 4 PATCH TOPICAL at 08:16

## 2024-09-29 RX ADMIN — ATORVASTATIN CALCIUM 40 MG: 40 TABLET, FILM COATED ORAL at 22:02

## 2024-09-29 RX ADMIN — CELECOXIB 100 MG: 100 CAPSULE ORAL at 08:16

## 2024-09-29 RX ADMIN — ACETAMINOPHEN 325MG 975 MG: 325 TABLET ORAL at 08:16

## 2024-09-29 ASSESSMENT — ACTIVITIES OF DAILY LIVING (ADL)
ADLS_ACUITY_SCORE: 38
ADLS_ACUITY_SCORE: 43
ADLS_ACUITY_SCORE: 43
ADLS_ACUITY_SCORE: 38
ADLS_ACUITY_SCORE: 43
ADLS_ACUITY_SCORE: 43
ADLS_ACUITY_SCORE: 38
ADLS_ACUITY_SCORE: 43
ADLS_ACUITY_SCORE: 38
ADLS_ACUITY_SCORE: 43
ADLS_ACUITY_SCORE: 38
ADLS_ACUITY_SCORE: 38

## 2024-09-29 NOTE — CONSULTS
Care Management Initial Consult    General Information  Assessment completed with: Patient,    Type of CM/SW Visit: Initial Assessment    Primary Care Provider verified and updated as needed:     Readmission within the last 30 days:        Reason for Consult: discharge planning  Advance Care Planning:            Communication Assessment  Patient's communication style: spoken language (English or Bilingual)    Hearing Difficulty or Deaf: no   Wear Glasses or Blind: yes    Cognitive  Cognitive/Neuro/Behavioral: WDL                      Living Environment:   People in home:  (with )     Current living Arrangements:  (independent living at [facility] living)  Name of Facility: [facility]   Able to return to prior arrangements: other (see comments)       Family/Social Support:  Care provided by: self  Provides care for: no one  Marital Status:   Support system:            Description of Support System:           Current Resources:   Patient receiving home care services: No        Community Resources: None  Equipment currently used at home: none  Supplies currently used at home:      Employment/Financial:  Employment Status: retired        Financial Concerns:             Does the patient's insurance plan have a 3 day qualifying hospital stay waiver?  No    Lifestyle & Psychosocial Needs:  Social Determinants of Health     Food Insecurity: Low Risk  (9/26/2024)    Food Insecurity     Within the past 12 months, did you worry that your food would run out before you got money to buy more?: No     Within the past 12 months, did the food you bought just not last and you didn t have money to get more?: No   Depression: Not at risk (8/26/2024)    PHQ-2     PHQ-2 Score: 0   Housing Stability: Low Risk  (9/26/2024)    Housing Stability     Do you have housing? : Yes     Are you worried about losing your housing?: No   Tobacco Use: Low Risk  (9/4/2024)    Received from [employer] &  [facility]    Patient History     Smoking Tobacco Use: Never     Smokeless Tobacco Use: Never     Passive Exposure: Not on file   Financial Resource Strain: Low Risk  (9/26/2024)    Financial Resource Strain     Within the past 12 months, have you or your family members you live with been unable to get utilities (heat, electricity) when it was really needed?: No   Alcohol Use: Not on file   Transportation Needs: Low Risk  (9/26/2024)    Transportation Needs     Within the past 12 months, has lack of transportation kept you from medical appointments, getting your medicines, non-medical meetings or appointments, work, or from getting things that you need?: No   Physical Activity: Not on file   Interpersonal Safety: Low Risk  (9/28/2024)    Interpersonal Safety     Do you feel physically and emotionally safe where you currently live?: Yes     Within the past 12 months, have you been hit, slapped, kicked or otherwise physically hurt by someone?: No     Within the past 12 months, have you been humiliated or emotionally abused in other ways by your partner or ex-partner?: No   Stress: Not on file   Social Connections: Socially Integrated (10/31/2023)    Received from [facility], [facility]    Social Connections     Frequency of Communication with Friends and Family: 0   Health Literacy: Not on file       Functional Status:  Prior to admission patient needed assistance:              Mental Health Status:          Chemical Dependency Status:                Values/Beliefs:  Spiritual, Cultural Beliefs, Baptism Practices, Values that affect care:                 Discussed  Partnership in Safe Discharge Planning  document with patient/family: No    Additional Information:  Pt is a 78 year old female who was admitted to the hospital with concerns of back pain per  note on 9/26/24.    Writer met with pt at bedside. Introduced self and role. Pt  states that she lives at [facility]. She states that she is completely independent in all ADL's. She states she also is very physically active such as water aerobics, riding bicycle and so forth. Discussed TCU recommendations. She states that she is agreeable to a referral being sent to [facility] but is wanting to see how she does with PT tomorrow before deciding if she will go TCU instead of going home with home care. Writer informed her that writer will ask unit  to have PT work with pt tomorrow and will send a referral to [facility] in the meantime. Pt's  comes in at the end of conversation and is agreeable with plan a well.     Writer sent referral to [facility]. Writer sent message to charge RN asking her to have PT see pt first thing tomorrow morning.    Next Steps: awaiting pt to be medically stable; TCU vs home with home care     LORI Uribe  Social Work  [facility]

## 2024-09-29 NOTE — PLAN OF CARE
Observation goals  PRIOR TO DISCHARGE       Comments:   -diagnostic tests and consults completed and resulted: partially met, MRI completed; results pending.   -vital signs normal or at patient baseline: met.  -adequate pain control on oral analgesics: Not met.  Not OOB due to pain. PT eval on hold until patient has better pain control.

## 2024-09-29 NOTE — PLAN OF CARE
Goal Outcome Evaluation:  PRIMARY Concern: Back pain, worsening acute on chronic  SAFETY RISK Concerns (fall risk, behaviors, etc.): Fall risk      Isolation/Type: none  Tests/Procedures for NEXT shift: None   Consults? (Pending/following, signed-off?) Neurosurg following.  PT/SW consult pending   Where is patient from? (Home, TCU, etc.): home.   Other Important info for NEXT shift: pain worsen with movement.   Anticipated DC date & active delays: TBD. Pending clinical improvement      SUMMARY NOTE:     Orientation/Cognitive: A&Ox4.  Observation Goals (Met/ Not Met): Inpatient   Mobility Level/Assist Equipment: not OOB, but is able to turned from side to side.  Antibiotics & Plan (IV/po, length of tx left): None.   Pain Management: Scheduled Tylenol and Robaxin. PRN pain medications are available for pain management   Tele/VS/O2: VSS on RA.  ABNL Lab/BG: n/a.  Diet: regular .  Bowel/Bladder: incont B/B. Purewick in place   Skin Concerns: bruises on L and R shins  Drains/Devices: IV-SL.   Patient Stated Goal for Today:Sleep                            pain

## 2024-09-29 NOTE — PLAN OF CARE
Goal Outcome Evaluation:  PRIMARY Concern: Back pain, worsening acute on chronic  SAFETY RISK Concerns (fall risk, behaviors, etc.): Fall risk      Isolation/Type: none  Tests/Procedures for NEXT shift: None   Consults? (Pending/following, signed-off?) Spine surgery saw patient; no surgical intervention recommended. Pain consult ordered.   Where is patient from? (Home, TCU, etc.): home.   Other Important info for NEXT shift: pain worsen with movement.   Anticipated DC date & active delays: TBD. Pending clinical improvement      SUMMARY NOTE:     Orientation/Cognitive: A&Ox4.  Observation Goals (Met/ Not Met): Inpatient   Mobility Level/Assist Equipment: A1 GB walker  Antibiotics & Plan (IV/po, length of tx left): None.   Pain Management: Scheduled Tylenol and Robaxin. PRN tizanidine given this shift and one 50mg dose of Ultram.   Tele/VS/O2: VSS on RA.  ABNL Lab/BG: n/a.  Diet: regular .  Bowel/Bladder: incont B/B. Purewick in place, discussing with patient discontinuation of it.  Skin Concerns: bruises on L and R shins  Drains/Devices: IV-SL.   Patient Stated Goal for Today:Sleep

## 2024-09-29 NOTE — PROGRESS NOTES
[facility]    Medicine Progress Note - Hospitalist Service        Date of Admission:  9/26/2024  8:44 AM    Assessment & Plan:   Anne Rangel is a 78 year old female with previous history of L3-L5 decompressive lumbar laminectomies with chronic low back pain presents to the emergency room with acute worsening of chronic low back pain.     Intractable acute on chronic low back pain.  Previous L3-L5 decompression laminectomies in 2020.  Degenerative disc disease.  -Patient has a known history of degenerative disc disease with above-stated surgery in 2020  -CT scan of the thoracic and lumbar spine done in the emergency room is unremarkable  -MRI of the thoracic and lumbar spine shows multilevel degenerative disc disease with instrumented posterior spinal fusion at L3-L5 and anterior spinal fusion at L4-5.  Please see MRI reports for details.  -Patient evaluated by spinal/neurosurgery.  They have reviewed MRI of the thoracic and lumbar spine with recommendation for pain management.  No indication for surgical/interventional procedures.  -Initially pain control was challenging however much improved in the last 24 hours  -Continue multimodal pain management with Lidoderm patch, scheduled Tylenol, scheduled Neurontin, Robaxin and as needed oxycodone, as needed Zanaflex  -Tizanidine as needed and Neurontin 100 mg 3 times daily scheduled added on 9/28 with good results  -Physical therapy reevaluation today.  Patient was not able to participate on initial attempt on 9/27  -Pain management consult for 9/30     Sinus node disease s/p permanent pacemaker placement in 2017  -Noted, no active issues     Major depression  -Continue prior to admission Wellbutrin, clonazepam, Lexapro, and Remeron   -Patient reportedly has not been taking Lamictal     Hyperlipidemia  -Continue statin    Constipation  -Continue scheduled MiraLAX  -Add scheduled Senokot twice a day    Diet: Regular Diet Adult     DVT Prophylaxis:  Heparin SQ   Kumar Catheter: Not present  Code Status: Full Code     Disposition Plan       Expected Discharge Date: 09/30/2024        Discharge Comments: acute low back pain/spasms  MRI lumbar  steroid taper  neurosurg  [place]      Entered: Jaquan Valenzuela MD 09/29/2024, 7:53 AM        Medically Ready for Discharge: Anticipated in 2-4 Days pending evaluation by pain management team on 9/30 and adequate pain control on oral regimen       Clinically Significant Risk Factors                                 # Pacemaker present     The patient's care was discussed with the Bedside Nurse and Patient.    Medical Decision Making       **CLEAR ALL SELECTIONS**      Labs/Imaging Reviewed:  See Information above and Data section below  Time SPENT BY ME on the date of service doing chart review, history, exam, documentation & further activities per the note:  35 MINUTES    Chart documentation was completed, in part, with [employer] voice-recognition software. Even though reviewed, some grammatical, spelling, and word errors may remain.    Jaquan Valenzuela MD  Hospitalist Service  [facility]  Text Page 7AM-6PM  Securely message with the [employer]oc[employer] Web Console (learn more here)  Text page via [employer] Paging/Directory    ______________________________________________________________________    Interval History   Patient reports better pain control in the last 24 hours especially after addition of Zanaflex and Neurontin.  She has minimal to no pain when she is laying still on the bed.  She would like to get out of the bed and see what happens.  She is constipated    Data reviewed today: I reviewed all medications, new labs and imaging results over the last 24 hours. I personally reviewed no images or EKG's today.    Physical Exam   Vital signs:  Temp: 98  F (36.7  C) Temp src: Oral BP: (!) 144/93 Pulse: 73   Resp: 18 SpO2: 95 % O2 Device: None (Room air)     Weight: 61.2 kg (134 lb 14.7 oz)  Estimated body  "mass index is 24.68 kg/m  as calculated from the following:    Height as of 8/26/24: 1.575 m (5' 2\").    Weight as of this encounter: 61.2 kg (134 lb 14.7 oz).      Wt Readings from Last 2 Encounters:   09/26/24 61.2 kg (134 lb 14.7 oz)   08/26/24 58.8 kg (129 lb 9.6 oz)       Gen: AAOX3, NAD, comfortable  Resp: CTA B/L, normal WOB  CVS: RRR, no murmur  Abd/GI: Soft, non-tender. BS- normoactive.   Skin: Warm, dry no rashes  MSK:  no pedal edema  Neuro- CN- intact. No focal deficits.  SLRT is about 60 degrees bilaterally      Data   Recent Labs   Lab 09/26/24  0853   WBC 5.4   HGB 13.2   MCV 98         POTASSIUM 4.3   CHLORIDE 103   CO2 25   BUN 16.7   CR 0.95   ANIONGAP 11   SUE 9.3   *   ALBUMIN 4.1   PROTTOTAL 6.6   BILITOTAL 0.4   ALKPHOS 85   ALT 18   AST 25       No results found for this or any previous visit (from the past 24 hour(s)).  Medications   Current Facility-Administered Medications   Medication Dose Route Frequency Provider Last Rate Last Admin     Current Facility-Administered Medications   Medication Dose Route Frequency Provider Last Rate Last Admin    acetaminophen (TYLENOL) tablet 975 mg  975 mg Oral TID Jaquan Valenzuela MD   975 mg at 09/28/24 2019    atorvastatin (LIPITOR) tablet 40 mg  40 mg Oral At Bedtime Jaquan Valenzuela MD   40 mg at 09/28/24 2124    buPROPion (WELLBUTRIN SR) 12 hr tablet 200 mg  200 mg Oral QAM Jaquan Valenzuela MD   200 mg at 09/28/24 0859    celecoxib (celeBREX) capsule 100 mg  100 mg Oral BID Jaquan Valenzuela MD   100 mg at 09/28/24 2019    clonazePAM (klonoPIN) tablet 0.5 mg  0.5 mg Oral At Bedtime Jaquan Valenzuela MD   0.5 mg at 09/28/24 2124    escitalopram (LEXAPRO) tablet 10 mg  10 mg Oral Daily Jaquan Valenzuela MD   10 mg at 09/28/24 0859    eszopiclone (LUNESTA) tablet 1 mg  1 mg Oral At Bedtime Jaquan Valenzuela MD   1 mg at 09/28/24 2207    gabapentin (NEURONTIN) capsule 100 mg  100 mg Oral TID Jaquan Valenzuela MD   100 mg " at 09/28/24 2019    heparin ANTICOAGULANT injection 5,000 Units  5,000 Units Subcutaneous Q8H Jaquan Valenzuela MD   5,000 Units at 09/29/24 0515    Lidocaine (LIDOCARE) 4 % Patch 2 patch  2 patch Transdermal Q24H Jaquan Valenzuela MD   2 patch at 09/27/24 0848    methocarbamol (ROBAXIN) tablet 1,000 mg  1,000 mg Oral 4x Daily Palak Harper PA-C   1,000 mg at 09/28/24 2019    mirtazapine (REMERON) tablet 30 mg  30 mg Oral At Bedtime Jaquan Valenzuela MD   30 mg at 09/28/24 2124    polyethylene glycol (MIRALAX) Packet 17 g  17 g Oral Daily Jaquan Valenzuela MD   17 g at 09/28/24 0858    sennosides (SENOKOT) tablet 2 tablet  2 tablet Oral BID Jaquan Valenzuela MD

## 2024-09-30 ENCOUNTER — APPOINTMENT (OUTPATIENT)
Dept: PHYSICAL THERAPY | Facility: CLINIC | Age: 79
DRG: 552 | End: 2024-09-30
Payer: MEDICARE

## 2024-09-30 VITALS
OXYGEN SATURATION: 94 % | SYSTOLIC BLOOD PRESSURE: 121 MMHG | RESPIRATION RATE: 18 BRPM | BODY MASS INDEX: 24.68 KG/M2 | TEMPERATURE: 97.8 F | HEART RATE: 79 BPM | DIASTOLIC BLOOD PRESSURE: 80 MMHG | WEIGHT: 134.92 LBS

## 2024-09-30 LAB — PLATELET # BLD AUTO: 147 10E3/UL (ref 150–450)

## 2024-09-30 PROCEDURE — 85049 AUTOMATED PLATELET COUNT: CPT | Performed by: INTERNAL MEDICINE

## 2024-09-30 PROCEDURE — 99222 1ST HOSP IP/OBS MODERATE 55: CPT

## 2024-09-30 PROCEDURE — 97116 GAIT TRAINING THERAPY: CPT | Mod: GP

## 2024-09-30 PROCEDURE — 99239 HOSP IP/OBS DSCHRG MGMT >30: CPT | Performed by: INTERNAL MEDICINE

## 2024-09-30 PROCEDURE — 250N000011 HC RX IP 250 OP 636: Performed by: INTERNAL MEDICINE

## 2024-09-30 PROCEDURE — 250N000013 HC RX MED GY IP 250 OP 250 PS 637

## 2024-09-30 PROCEDURE — 36415 COLL VENOUS BLD VENIPUNCTURE: CPT | Performed by: INTERNAL MEDICINE

## 2024-09-30 PROCEDURE — 250N000013 HC RX MED GY IP 250 OP 250 PS 637: Performed by: INTERNAL MEDICINE

## 2024-09-30 PROCEDURE — 97530 THERAPEUTIC ACTIVITIES: CPT | Mod: GP

## 2024-09-30 PROCEDURE — 250N000013 HC RX MED GY IP 250 OP 250 PS 637: Performed by: PHYSICIAN ASSISTANT

## 2024-09-30 RX ORDER — HYDROXYZINE HYDROCHLORIDE 10 MG/1
10 TABLET, FILM COATED ORAL EVERY 6 HOURS PRN
DISCHARGE
Start: 2024-09-30

## 2024-09-30 RX ORDER — CLONAZEPAM 0.5 MG/1
0.5 TABLET ORAL AT BEDTIME
Qty: 3 TABLET | Refills: 0 | Status: SHIPPED | OUTPATIENT
Start: 2024-09-30 | End: 2024-09-30

## 2024-09-30 RX ORDER — GABAPENTIN 100 MG/1
100 CAPSULE ORAL 3 TIMES DAILY
DISCHARGE
Start: 2024-09-30 | End: 2024-09-30

## 2024-09-30 RX ORDER — POLYETHYLENE GLYCOL 3350 17 G/17G
17 POWDER, FOR SOLUTION ORAL 2 TIMES DAILY PRN
Qty: 116 G | Refills: 0 | Status: SHIPPED | OUTPATIENT
Start: 2024-09-30

## 2024-09-30 RX ORDER — LIDOCAINE 4 G/G
2 PATCH TOPICAL EVERY 24 HOURS
Qty: 7 PATCH | Refills: 0 | Status: SHIPPED | OUTPATIENT
Start: 2024-09-30

## 2024-09-30 RX ORDER — ACETAMINOPHEN 325 MG/1
975 TABLET ORAL EVERY 6 HOURS
DISCHARGE
Start: 2024-09-30 | End: 2024-09-30

## 2024-09-30 RX ORDER — ACETAMINOPHEN 325 MG/1
975 TABLET ORAL EVERY 6 HOURS
Status: DISCONTINUED | OUTPATIENT
Start: 2024-09-30 | End: 2024-09-30 | Stop reason: HOSPADM

## 2024-09-30 RX ORDER — ACETAMINOPHEN 325 MG/1
975 TABLET ORAL EVERY 6 HOURS
Qty: 84 TABLET | Refills: 0 | Status: SHIPPED | OUTPATIENT
Start: 2024-09-30 | End: 2024-10-07

## 2024-09-30 RX ORDER — LIDOCAINE 4 G/G
2 PATCH TOPICAL EVERY 24 HOURS
DISCHARGE
Start: 2024-09-30 | End: 2024-09-30

## 2024-09-30 RX ORDER — CELECOXIB 100 MG/1
100 CAPSULE ORAL 2 TIMES DAILY
Qty: 14 CAPSULE | Refills: 0 | Status: SHIPPED | OUTPATIENT
Start: 2024-09-30 | End: 2024-10-07

## 2024-09-30 RX ORDER — SENNOSIDES 8.6 MG
2 TABLET ORAL 2 TIMES DAILY
DISCHARGE
Start: 2024-09-30 | End: 2024-09-30

## 2024-09-30 RX ORDER — CELECOXIB 100 MG/1
100 CAPSULE ORAL 2 TIMES DAILY
DISCHARGE
Start: 2024-09-30 | End: 2024-09-30

## 2024-09-30 RX ORDER — METHOCARBAMOL 750 MG/1
750 TABLET, FILM COATED ORAL 4 TIMES DAILY
DISCHARGE
Start: 2024-09-30 | End: 2024-09-30

## 2024-09-30 RX ORDER — TRAMADOL HYDROCHLORIDE 50 MG/1
50 TABLET ORAL EVERY 6 HOURS PRN
Status: DISCONTINUED | OUTPATIENT
Start: 2024-09-30 | End: 2024-09-30 | Stop reason: HOSPADM

## 2024-09-30 RX ORDER — METHOCARBAMOL 750 MG/1
750 TABLET, FILM COATED ORAL 4 TIMES DAILY
Qty: 60 TABLET | Refills: 0 | Status: SHIPPED | OUTPATIENT
Start: 2024-09-30 | End: 2024-10-14

## 2024-09-30 RX ORDER — CLONAZEPAM 0.5 MG/1
0.5 TABLET ORAL AT BEDTIME
COMMUNITY
Start: 2024-09-30

## 2024-09-30 RX ORDER — TRAMADOL HYDROCHLORIDE 50 MG/1
50 TABLET ORAL EVERY 6 HOURS PRN
Qty: 12 TABLET | Refills: 0 | Status: SHIPPED | OUTPATIENT
Start: 2024-09-30

## 2024-09-30 RX ORDER — POLYETHYLENE GLYCOL 3350 17 G/17G
17 POWDER, FOR SOLUTION ORAL 2 TIMES DAILY PRN
DISCHARGE
Start: 2024-09-30 | End: 2024-09-30

## 2024-09-30 RX ORDER — GABAPENTIN 100 MG/1
100 CAPSULE ORAL 3 TIMES DAILY
Qty: 90 CAPSULE | Refills: 0 | Status: SHIPPED | OUTPATIENT
Start: 2024-09-30 | End: 2024-10-30

## 2024-09-30 RX ADMIN — HEPARIN SODIUM 5000 UNITS: 5000 INJECTION, SOLUTION INTRAVENOUS; SUBCUTANEOUS at 06:04

## 2024-09-30 RX ADMIN — GABAPENTIN 100 MG: 100 CAPSULE ORAL at 09:10

## 2024-09-30 RX ADMIN — METHOCARBAMOL 750 MG: 500 TABLET ORAL at 13:31

## 2024-09-30 RX ADMIN — METHOCARBAMOL 1000 MG: 500 TABLET ORAL at 09:10

## 2024-09-30 RX ADMIN — GABAPENTIN 100 MG: 100 CAPSULE ORAL at 13:31

## 2024-09-30 RX ADMIN — HEPARIN SODIUM 5000 UNITS: 5000 INJECTION, SOLUTION INTRAVENOUS; SUBCUTANEOUS at 13:31

## 2024-09-30 RX ADMIN — ACETAMINOPHEN 975 MG: 325 TABLET ORAL at 13:31

## 2024-09-30 RX ADMIN — ESCITALOPRAM OXALATE 10 MG: 10 TABLET ORAL at 09:10

## 2024-09-30 RX ADMIN — ACETAMINOPHEN 325MG 975 MG: 325 TABLET ORAL at 09:09

## 2024-09-30 RX ADMIN — SENNOSIDES 2 TABLET: 8.6 TABLET, FILM COATED ORAL at 09:10

## 2024-09-30 RX ADMIN — LIDOCAINE 1 PATCH: 4 PATCH TOPICAL at 09:16

## 2024-09-30 RX ADMIN — CELECOXIB 100 MG: 100 CAPSULE ORAL at 09:10

## 2024-09-30 RX ADMIN — BUPROPION HYDROCHLORIDE 200 MG: 200 TABLET, EXTENDED RELEASE ORAL at 09:21

## 2024-09-30 RX ADMIN — POLYETHYLENE GLYCOL 3350 17 G: 17 POWDER, FOR SOLUTION ORAL at 09:14

## 2024-09-30 ASSESSMENT — ACTIVITIES OF DAILY LIVING (ADL)
ADLS_ACUITY_SCORE: 38

## 2024-09-30 NOTE — PROGRESS NOTES
[facility]    Medicine Progress Note - Hospitalist Service    Date of Admission:  9/26/2024    Assessment & Plan   Anne Rangel is a 78 year old female with previous history of L3-L5 decompressive lumbar laminectomies with chronic low back pain presents to the emergency room with acute worsening of chronic low back pain.     Intractable acute on chronic low back pain.  Previous L3-L5 decompression laminectomies in 2020.  Degenerative disc disease.  -Patient has a known history of degenerative disc disease with above-stated surgery in 2020  -CT scan of the thoracic and lumbar spine done in the emergency room is unremarkable  -MRI of the thoracic and lumbar spine shows multilevel degenerative disc disease with instrumented posterior spinal fusion at L3-L5 and anterior spinal fusion at L4-5.  Please see MRI reports for details.  -Patient evaluated by spinal/neurosurgery.  They have reviewed MRI of the thoracic and lumbar spine with recommendation for pain management.  No indication for surgical/interventional procedures.  -Initially pain control was challenging however much improved in the last 24 hours  -Continue multimodal pain management with Lidoderm patch, scheduled Tylenol, scheduled Neurontin, Robaxin, PRN tramadol   -Pain management consulted and appreciate their recommendations      Sinus node disease s/p permanent pacemaker placement in 2017  Noted, no active issues     Major depression  - Continue prior to admission Wellbutrin, clonazepam, Lexapro, and Remeron   - Patient reportedly has not been taking Lamictal     Hyperlipidemia  - Continue statin     Constipation  - Continue scheduled MiraLAX  - Scheduled Senokot twice a day             Diet: Regular Diet Adult    DVT Prophylaxis: Pneumatic Compression Devices  Kumar Catheter: Not present  Lines: None     Cardiac Monitoring: None  Code Status: Full Code      Clinically Significant Risk Factors                                  # Pacemaker present       Disposition Plan     Medically Ready for Discharge: Ready Now             Arturo Beatty DO  Hospitalist Service  [facility]  Securely message with [employer] (more info)  Text page via [email or web address] Paging/Directory   ______________________________________________________________________    Interval History   Patient seen and examined.  No acute events over night.  Her pain is better controlled today.  No new complaints.  No nausea or vomiting.  No fevers or hypoxia.  Agreeable to TCU     Physical Exam   Vital Signs: Temp: 97.8  F (36.6  C) Temp src: Oral BP: 121/80 Pulse: 79   Resp: 18 SpO2: 94 % O2 Device: None (Room air)    Weight: 134 lbs 14.74 oz    General Appearance: Resting comfortably in chair. NAD  Respiratory: No respiratory distress  Cardiovascular: RRR  GI: Non-distended  Skin: No obvious rashes or cyanosis  Other: Alert.  No edema      Medical Decision Making       40 MINUTES SPENT BY ME on the date of service doing chart review, history, exam, documentation & further activities per the note.      Data   ------------------------- PAST 24 HR DATA REVIEWED -----------------------------------------------    I have personally reviewed the following data over the past 24 hrs:    N/A  \   N/A   / 147 (L)     N/A N/A N/A /  N/A   N/A N/A N/A \       Imaging results reviewed over the past 24 hrs:   No results found for this or any previous visit (from the past 24 hour(s)).

## 2024-09-30 NOTE — DISCHARGE SUMMARY
[facility]  Hospitalist Discharge Summary      Date of Admission:  9/26/2024  Date of Discharge:  9/30/2024  4:33 PM  Discharging Provider: Arturo Beatty DO  Discharge Service: Hospitalist Service    Discharge Diagnoses      Intractable acute on chronic low back pain  Previous L3-L5 decompression laminectomies in 2020  Degenerative disc disease  Sinus node disease s/p permanent pacemaker placement in 2017  Major depression  Hyperlipidemia  Constipation    Clinically Significant Risk Factors          Follow-ups Needed After Discharge   Follow-up Appointments     Follow-up and recommended labs and tests       Follow up with primary care provider, Nevaeh Wood, within 7 days for   hospital follow- up.  No follow up labs or test are needed.  Follow up with spine surgery as recommended          Unresulted Labs Ordered in the Past 30 Days of this Admission       No orders found from 8/27/2024 to 9/27/2024.          Discharge Disposition   Discharged to home  Condition at discharge: Stable    Hospital Course   Anne Rangel is a 78 year old female with previous history of L3-L5 decompressive lumbar laminectomies with chronic low back pain presents to the emergency room with acute worsening of chronic low back pain.     Intractable acute on chronic low back pain.  Previous L3-L5 decompression laminectomies in 2020.  Degenerative disc disease.  -Patient has a known history of degenerative disc disease with above-stated surgery in 2020  -CT scan of the thoracic and lumbar spine done in the emergency room is unremarkable  -MRI of the thoracic and lumbar spine shows multilevel degenerative disc disease with instrumented posterior spinal fusion at L3-L5 and anterior spinal fusion at L4-5.  Please see MRI reports for details.  -Patient evaluated by spinal/neurosurgery.  They have reviewed MRI of the thoracic and lumbar spine with recommendation for pain management.  No indication for  surgical/interventional procedures.  -Initially pain control was challenging however much improved in the last 24 hours  -Continue multimodal pain management with Lidoderm patch, scheduled Tylenol, scheduled Neurontin, Robaxin, PRN tramadol   - Pain management consulted and appreciate their recommendations      Sinus node disease s/p permanent pacemaker placement in 2017  Noted, no active issues     Major depression  - Continue prior to admission Wellbutrin, clonazepam, Lexapro, and Remeron   - Patient reportedly has not been taking Lamictal     Hyperlipidemia  - Continue statin     Constipation  - Continue scheduled MiraLAX  - Scheduled Senokot twice a day    Consultations This Hospital Stay   PHYSICAL THERAPY ADULT IP CONSULT  CARE MANAGEMENT / SOCIAL WORK IP CONSULT  SPINE SURGERY ADULT IP CONSULT  PAIN MANAGEMENT ADULT IP CONSULT    Code Status   Full Code    Time Spent on this Encounter   I, Arutro Beatty DO, personally saw the patient today and spent greater than 30 minutes discharging this patient.       Arturo Beatty DO  [facility]AY  [address] [place] [id number]  Phone: [phone]  ______________________________________________________________________    Physical Exam   Vital Signs: Temp: 97.8  F (36.6  C) Temp src: Oral BP: 121/80 Pulse: 79   Resp: 18 SpO2: 94 % O2 Device: None (Room air)    Weight: 134 lbs 14.74 oz  General Appearance:  Resting comfortably in chair. NAD  Respiratory: No respiratory distress  Cardiovascular: RRR  GI: Non-distended  Skin: No obvious rashes or cyanosis  Other:  Alert.  No edema       Primary Care Physician   Nevaeh Wood    Discharge Orders      Physical Therapy  Referral      Physical Therapy  Referral      Reason for your hospital stay    Acute on chronic back pain     Follow-up and recommended labs and tests     Follow up with primary care provider, Nevaeh Wood, within 7  days for hospital follow- up.  No follow up labs or test are needed.  Follow up with spine surgery as recommended     Activity    Your activity upon discharge: activity as tolerated and no driving for today     Walker Order for DME - ONLY FOR DME     Diet    Follow this diet upon discharge: Current Diet:Orders Placed This Encounter      Regular Diet Adult       Significant Results and Procedures   Most Recent 3 CBC's:  Recent Labs   Lab Test 09/30/24  0723 09/26/24  0853 02/06/17  0713 02/05/17  0620 02/04/17  0640 01/18/17  1635 12/20/16  0810   WBC  --  5.4  --  4.9  --   --  3.5*   HGB  --  13.2  --  10.6* 11.2*   < > 13.3   MCV  --  98  --  97  --   --  94   * 177 132* 141*  --    < > 172    < > = values in this interval not displayed.     Most Recent 3 BMP's:  Recent Labs   Lab Test 09/26/24  0853 01/11/20  0613 02/06/17  0713 02/05/17  0620    142  --  142   POTASSIUM 4.3 4.2  --  4.3   CHLORIDE 103 110*  --  110*   CO2 25 29  --  28   BUN 16.7 11  --  15   CR 0.95 1.01 1.00 1.26*   ANIONGAP 11 3  --  4   SUE 9.3 8.3*  --  7.9*   * 89  --  86   ,   Results for orders placed or performed during the hospital encounter of 09/26/24   CT Thoracic Spine w/o Contrast    Narrative    CT THORACIC SPINE WITHOUT CONTRAST   9/26/2024 9:51 AM     HISTORY: acute on chronic back pain     TECHNIQUE: Axial images of the thoracic spine were obtained without  intravenous contrast. Multiplanar reformations were performed.   Radiation dose for this scan was reduced using automated exposure  control, adjustment of the mA and/or kV according to patient size, or  iterative reconstruction technique.    COMPARISON: 5/19/2023.    FINDINGS: Vertebral body heights are maintained. Multilevel loss of  disc height. No anterolisthesis or retrolisthesis. Multilevel facet  disease in the lower cervical spine. Posterior ribs are intact.    Paravertebral soft tissues are unremarkable. Bibasilar atelectasis.  Cardiomegaly.       Impression    IMPRESSION:   No acute fracture findings. Degenerative changes  throughout the thoracic spine are not significantly altered from  5/19/2023    GORDON MATHEW MD         SYSTEM ID:  [id number]   CT Lumbar Spine w/o Contrast    Narrative    CT LUMBAR SPINE WITHOUT CONTRAST  9/26/2024 12:31 PM     HISTORY: low back pain      TECHNIQUE: Axial images of the lumbar spine were obtained without  intravenous contrast. Multiplanar reformations were performed.   Radiation dose for this scan was reduced using automated exposure  control, adjustment of the mA and/or kV according to patient size, or  iterative reconstruction technique.     COMPARISON: None.     FINDINGS: Vertebral body heights are maintained. Posterior fusion  hardware spans L3-L5. Spacer device at L4-5. Trace anterolisthesis of  L4 on L5 and minimal retrolisthesis of L2 on L3. Sacrum and sacroiliac  joints are intact.    No evidence of an acute fracture. No high-grade canal stenotic  findings identified. Paravertebral soft tissues are unremarkable.    Laminectomy changes at L3 and L4.      Impression    IMPRESSION:  No acute lumbar fracture. Extensive postsurgical changes  in the inferior lumbar spine.     GORDON MATHEW MD         SYSTEM ID:  [id number]   MR Lumbar Spine w/o Contrast    Narrative    THORACIC AND LUMBAR SPINE MRI WITHOUT CONTRAST   9/27/2024 2:37 PM    History: Thoracic pain.    Comparison: CTs from yesterday.    Technique: Multiplanar multisequence thoracic and lumbar MR without  contrast.     Findings:  Thoracic Spine:    Alignment of the thoracic vertebra appears within normal limits.    Multilevel degenerative changes with loss of disc height, osteophyte  formation and facet arthropathy. Multilevel disc bulges. No high-grade  spinal canal or neural foraminal stenosis.    Bone marrow edema in T11 and T12 vertebral bodies. Multilevel Modic  type 1 degeneration, most pronounced at T3-4.    There is no definite abnormal signal in the  thoracic spinal cord at  any level.     Normal paraspinous soft tissues.    Lumbar Spine:  There are 5 type lumbar vertebra, used for the purposes of this  dictation.      Instrumented posterior spinal fusion L3-L5 and anterior spinal fusion  at L4-5. Decompressive laminectomy at L4. 2.6 x 1.0 cm fluid  collection in the laminectomy bed. Minimal retrolisthesis at L2-3.    T2 hyperintense signal in T12-L1 and L1-2 intervertebral disc spaces,  opposing endplates and vertebral bodies. No significant soft tissue  edema to  suggest discitis osteomyelitis.    The tip of the conus is at approximately L1.     On a level by level basis, the findings are as follows:    T12-L1: Moderate disc height loss. Disc bulge and bilateral facet  arthropathy. Mild to moderate left and mild right neural foraminal  stenosis. No spinal canal stenosis.    L1-2:  Moderate disc height loss. Disc bulge, osteophyte formation and  superimposed left foraminal protrusion. Moderate to severe left neural  foraminal stenosis. Mild to moderate right neural foraminal stenosis.  No spinal canal stenosis.    L2-3: Severe disc height loss. Disc bulge, osteophyte formation and  bilateral facet arthropathy. Mild to moderate spinal canal stenosis.  Mild right and mild to moderate left neural foraminal stenosis. Mild  spinal canal stenosis.    L3-4:  Fusion level. Posterior osteophyte formation. Mild bilateral  neural foraminal stenosis. No spinal canal stenosis.    L4-5:  Fusion level. Anterolisthesis. Posterior osteophyte formation  with mild right and minimal left neural foraminal stenosis. No spinal  canal stenosis.    L5-S1:  Severe disc height loss and disc degeneration. Disc bulge,  osteophyte formation and bilateral facet arthropathy. Mild to moderate  bilateral neural foraminal stenosis. No spinal canal stenosis.    The visualized paraspinous tissues anteriorly are unremarkable.      Impression    Impression:   1. Thoracic:  a. Multilevel disc height  loss, osteophyte formation and facet  arthropathy. No high-grade spinal canal or neural foraminal stenosis.  b. No abnormal spinal cord signal.  c. Multilevel Modic type 1 degeneration, most pronounced at T3-4. Bone  marrow edema in T11 and T12 vertebral bodies, also favored to be  degenerative. No soft tissue edema to suggest discitis osteomyelitis.  2. Lumbar:  a. Instrumented posterior spinal fusion L3-L5 and anterior spinal  fusion at L4-5. Decompressive laminectomy at L4. 2.6 x 1.0 cm fluid  collection in the laminectomy bed, likely representing seroma.  b. T2 hyperintense signal in T12-L1 and L1-L2 intervertebral disc  spaces, opposing endplates and vertebral bodies. No significant soft  tissue edema to  suggest discitis osteomyelitis. This is favored to be  degenerative. However, cannot entirely exclude developing early  discitis osteomyelitis. Recommend follow-up imaging with contrast if  there is concern for discitis osteomyelitis.  c. Multilevel lumbar spondylosis as detailed above.      INES WILDER MD         SYSTEM ID:  [id number]   MR Thoracic Spine w/o Contrast    Narrative    THORACIC AND LUMBAR SPINE MRI WITHOUT CONTRAST   9/27/2024 2:37 PM    History: Thoracic pain.    Comparison: CTs from yesterday.    Technique: Multiplanar multisequence thoracic and lumbar MR without  contrast.     Findings:  Thoracic Spine:    Alignment of the thoracic vertebra appears within normal limits.    Multilevel degenerative changes with loss of disc height, osteophyte  formation and facet arthropathy. Multilevel disc bulges. No high-grade  spinal canal or neural foraminal stenosis.    Bone marrow edema in T11 and T12 vertebral bodies. Multilevel Modic  type 1 degeneration, most pronounced at T3-4.    There is no definite abnormal signal in the thoracic spinal cord at  any level.     Normal paraspinous soft tissues.    Lumbar Spine:  There are 5 type lumbar vertebra, used for the purposes of this  dictation.       Instrumented posterior spinal fusion L3-L5 and anterior spinal fusion  at L4-5. Decompressive laminectomy at L4. 2.6 x 1.0 cm fluid  collection in the laminectomy bed. Minimal retrolisthesis at L2-3.    T2 hyperintense signal in T12-L1 and L1-2 intervertebral disc spaces,  opposing endplates and vertebral bodies. No significant soft tissue  edema to  suggest discitis osteomyelitis.    The tip of the conus is at approximately L1.     On a level by level basis, the findings are as follows:    T12-L1: Moderate disc height loss. Disc bulge and bilateral facet  arthropathy. Mild to moderate left and mild right neural foraminal  stenosis. No spinal canal stenosis.    L1-2:  Moderate disc height loss. Disc bulge, osteophyte formation and  superimposed left foraminal protrusion. Moderate to severe left neural  foraminal stenosis. Mild to moderate right neural foraminal stenosis.  No spinal canal stenosis.    L2-3: Severe disc height loss. Disc bulge, osteophyte formation and  bilateral facet arthropathy. Mild to moderate spinal canal stenosis.  Mild right and mild to moderate left neural foraminal stenosis. Mild  spinal canal stenosis.    L3-4:  Fusion level. Posterior osteophyte formation. Mild bilateral  neural foraminal stenosis. No spinal canal stenosis.    L4-5:  Fusion level. Anterolisthesis. Posterior osteophyte formation  with mild right and minimal left neural foraminal stenosis. No spinal  canal stenosis.    L5-S1:  Severe disc height loss and disc degeneration. Disc bulge,  osteophyte formation and bilateral facet arthropathy. Mild to moderate  bilateral neural foraminal stenosis. No spinal canal stenosis.    The visualized paraspinous tissues anteriorly are unremarkable.      Impression    Impression:   1. Thoracic:  a. Multilevel disc height loss, osteophyte formation and facet  arthropathy. No high-grade spinal canal or neural foraminal stenosis.  b. No abnormal spinal cord signal.  c. Multilevel Modic  type 1 degeneration, most pronounced at T3-4. Bone  marrow edema in T11 and T12 vertebral bodies, also favored to be  degenerative. No soft tissue edema to suggest discitis osteomyelitis.  2. Lumbar:  a. Instrumented posterior spinal fusion L3-L5 and anterior spinal  fusion at L4-5. Decompressive laminectomy at L4. 2.6 x 1.0 cm fluid  collection in the laminectomy bed, likely representing seroma.  b. T2 hyperintense signal in T12-L1 and L1-L2 intervertebral disc  spaces, opposing endplates and vertebral bodies. No significant soft  tissue edema to  suggest discitis osteomyelitis. This is favored to be  degenerative. However, cannot entirely exclude developing early  discitis osteomyelitis. Recommend follow-up imaging with contrast if  there is concern for discitis osteomyelitis.  c. Multilevel lumbar spondylosis as detailed above.      INES WILDER MD         SYSTEM ID:  [id number]       Discharge Medications   Current Discharge Medication List        START taking these medications    Details   acetaminophen (TYLENOL) 325 MG tablet Take 3 tablets (975 mg) by mouth every 6 hours for 7 days. Can switch to every 6 hours as needed if having good pain control  Qty: 84 tablet, Refills: 0    Comments: Future refills by PCP Dr. Nevaeh Wood with phone number [phone].  Associated Diagnoses: Intractable back pain      celecoxib (CELEBREX) 100 MG capsule Take 1 capsule (100 mg) by mouth 2 times daily for 7 days. Can switch to twice a day as needed if having good pain control  Qty: 14 capsule, Refills: 0    Comments: Future refills by PCP Dr. Nevaeh Wood with phone number [phone].  Associated Diagnoses: Intractable back pain      diclofenac (VOLTAREN) 1 % topical gel Apply 2 g topically 4 times daily as needed for moderate pain.  Qty: 50 g, Refills: 0    Comments: Future refills by PCP Dr. Nevaeh Wood with phone number [phone].  Associated Diagnoses: Intractable back pain      gabapentin (NEURONTIN)  100 MG capsule Take 1 capsule (100 mg) by mouth 3 times daily.  Qty: 90 capsule, Refills: 0    Comments: Future refills by PCP Dr. Nevaeh Wood with phone number [phone].  Associated Diagnoses: Intractable back pain      hydrOXYzine HCl (ATARAX) 10 MG tablet Take 1 tablet (10 mg) by mouth every 6 hours as needed for other or anxiety (Pain/Spasms).    Associated Diagnoses: Intractable back pain      Lidocaine (LIDOCARE) 4 % Patch Place 2 patches over 12 hours onto the skin every 24 hours. To prevent lidocaine toxicity, patient should be patch free for 12 hrs daily.  Qty: 7 patch, Refills: 0    Comments: Future refills by PCP Dr. Nevaeh Wood with phone number [phone].  Associated Diagnoses: Intractable back pain      methocarbamol (ROBAXIN) 750 MG tablet Take 1 tablet (750 mg) by mouth 4 times daily for 14 days. Can then go to every 6 hours as needed  Qty: 60 tablet, Refills: 0    Associated Diagnoses: Intractable back pain      polyethylene glycol (MIRALAX) 17 GM/Dose powder Take 17 g by mouth 2 times daily as needed for constipation.  Qty: 116 g, Refills: 0    Comments: Future refills by PCP Dr. Nevaeh Wood with phone number [phone].  Associated Diagnoses: Intractable back pain           CONTINUE these medications which have CHANGED    Details   clonazePAM (KLONOPIN) 0.5 MG tablet Take 1 tablet (0.5 mg) by mouth at bedtime.    Associated Diagnoses: Intractable back pain      traMADol (ULTRAM) 50 MG tablet Take 1 tablet (50 mg) by mouth every 6 hours as needed for severe pain.  Qty: 12 tablet, Refills: 0    Comments: Refills by TCU  Associated Diagnoses: Intractable back pain           CONTINUE these medications which have NOT CHANGED    Details   atorvastatin (LIPITOR) 40 MG tablet Take 40 mg by mouth At Bedtime   Qty: 30 tablet, Refills: 1      buPROPion (WELLBUTRIN SR) 200 MG 12 hr tablet Take 1 tablet by mouth every morning      COENZYME Q-10 PO Take by mouth daily.      escitalopram  (LEXAPRO) 10 MG tablet Take 10 mg by mouth daily      esomeprazole (NEXIUM) 20 MG DR capsule Take 20 mg by mouth every morning (before breakfast) Take 30-60 minutes before eating.      eszopiclone (LUNESTA) 1 MG tablet Take 1 mg by mouth at bedtime. Taking with 2mg for total 3mg      Magnesium Oxide 250 MG TABS Take 500 mg by mouth at bedtime.      mirtazapine (REMERON) 30 MG tablet Take 30 mg by mouth at bedtime.      Multiple Vitamin (MULTIVITAMIN ADULT PO) Take 1 tablet by mouth daily.      Probiotic Product (PROBIOTIC & ACIDOPHILUS EX ST PO) Take 1 capsule by mouth daily      !! UNABLE TO FIND Ortho digestyme, 2 per day      !! UNABLE TO FIND MEDICATION NAME: stool softner + stimulant every night at bedtime: unknown brand and dosage      clobetasol (TEMOVATE) 0.05 % external ointment Apply very thin layer twice daily to affected area on vulva, tapering to twice weekly as able.       !! - Potential duplicate medications found. Please discuss with provider.        STOP taking these medications       lamoTRIgine (LAMICTAL) 25 MG tablet Comments:   Reason for Stopping:             Allergies   Allergies   Allergen Reactions    Blood-Group Specific Substance Other (See Comments)     Patient has anti-K antibody.  Blood for the transfusion might be delayed.      Dilaudid [Hydromorphone] Nausea and Vomiting    Doxepin Unknown     Other reaction(s): Intolerance-Can't Take  Not effective for sleep    Meperidine Nausea and Vomiting    Meperidine And Related Nausea and Vomiting     Demerol    Morphine      PN: LW Reaction: Vomiting,Nausea    No Clinical Screening - See Comments      PN: LW Reaction: all opiates cause N/V. She can tolerate if takes with anti-nausea (zofran) and stool softener    Pregabalin Other (See Comments)     Not effective

## 2024-09-30 NOTE — PLAN OF CARE
Observation goals  PRIOR TO DISCHARGE       Comments:   -diagnostic tests and consults completed and resulted: met  -vital signs normal or at patient baseline: met.  -adequate pain control on oral analgesics: met               Awaiting PT reeval for pt to decide btwn home w PT or TCU. Referral sent.

## 2024-09-30 NOTE — PLAN OF CARE
Goal Outcome Evaluation:      Plan of Care Reviewed With: patient, spouse    Overall Patient Progress: improvingOverall Patient Progress: improving    Outcome Evaluation: Improved strength and pain management. PT approved pt to d/c home w PT.    Patient has been discharged to home/ILF September 30, 2024 4:23 PM. Discharge instructions and education reviewed, medication schedule and follow up appts discussed. PT ordered for home. Questions answered. PIV removed. All belongings and medications sent with pt.  for transport. Left by wheelchair.

## 2024-09-30 NOTE — CONSULTS
"[facility] ACUTE INPATIENT PAIN SERVICE    [facility], [facility], [facility], [facility], [place]   PAIN CONSULT     Requesting provider: Jaquan Valenzuela MD   Reason for consult: intractable LBP    Assessment/Plan:  Anne Rangel is a 78 year old female with rpevious history of L3-L5 decompresive lumbar laminectomies with chronic low back pain who was admitted on 9/26/2024 for acute on chronic low back pain. CT scan of thoracic and lumbar spine completed in the ER was unremarkable. MRI of thoracic and lumbar spine shows multilevel degenerative disc disease with instrumented posterior spinal fusion at L3-L5 and anterior spinal fusion at L4-L5. No neurosurgical indication at this time.      reviewed and shows routine fills of eszopiclone and sporadic clonazepam fills, one tramadol fill on 10/31/2023.     She is currently describing her pain as \"not so bad\", back to her baseline in her lower back. Spasming has improved.    In the last 24 hours, patient has received: 1 (50mg) tramadol    PLAN: Severe acute exacerbation of chronic lower back pain, improving. Patient is opioid naive.      Multimodal Medication Therapy:   Adjuvants:   Acetaminophen increased to 975mg q6h   Celebrex 100mg bid   Gabapentin 100mg tid   Lidocaine patch x2   Robaxin decreased to 750mg q6h   Atarax 10mg q6h prn   Tizanidine discontinued   Opioids: Discontinued oxycodone, decreased tramadol to 50mg q6h prn   No IV pain medications  Non-medication interventions- Ice, heat prn   Constipation Prophylaxis- scheduled miralax and senna, prn senna   Follow up /Discharge Recommendations - We recommend prescribing the following at the time of discharge:    Acetaminophen 975mg q6-8h   Celebrex 100mg bid x7 days  Gabapentin 100mg bid x7 days then stop   Lidocaine patch x2 daily   Robaxin 750mg q6h prn   Tramadol 50mg q8h prn       Subjective:  Anne is seen sitting up at the edge of her bed. She describes a history of chronic lower back pain " "which she typically manages at home with 1000mg acetaminophen bid. No long term opioid use, only following surgical procedures. Her pain started to acutely worsen on Thursday, experiencing spasms and shooting pain that eventually became unbearable. This has since resolved and she feels as though her pain is \"not bad\" and back to her baseline, which is a 2-4/10. She is unsure if the gabapentin was helpful or not. Discussed decreasing dose on discharge for short term and then stopping. Reviewed the rest of medication regimen, patient verbalized understanding and is in agreement with the plan.     Denies nausea, vomiting, diarrhea, chest pain, shortness of breath. She is experiencing some constipation, which is her baseline as she has IBS-C. She did just have a bowel movement, reports it was hard to pass.     Discussed plan of care and discharge recommendations with hospitalist.          History   Drug Use No         Tobacco Use      Smoking status: Never      Smokeless tobacco: Never        Objective:  Vital signs in last 24 hours:  B/P: 134/84, T: 98.2, P: 77, R: 18   Blood pressure 134/84, pulse 77, temperature 98.2  F (36.8  C), temperature source Oral, resp. rate 18, weight 61.2 kg (134 lb 14.7 oz), SpO2 93%.        Review of Systems:   As per subjective, all others negative.    Physical Exam  General: in no apparent distress, sitting at edge of bed, appears comfortable   HEENT: Head normocephalic atraumatic, oral mucosa moist.   Resp: Respirations unlabored, on room air  Skin: No rashes or lesions to visualized skin  Extremities: No peripheral edema, able to move all four extremities spontanoeusly   Psych: Normal affect, mood euthymic  Neuro: Grossly normal          Imaging:  Personally Reviewed.    Results for orders placed or performed during the hospital encounter of 09/26/24   CT Thoracic Spine w/o Contrast    Impression    IMPRESSION:   No acute fracture findings. Degenerative changes  throughout the " thoracic spine are not significantly altered from  5/19/2023    GORDON MATHEW MD         SYSTEM ID:  [id number]   CT Lumbar Spine w/o Contrast    Impression    IMPRESSION:  No acute lumbar fracture. Extensive postsurgical changes  in the inferior lumbar spine.     GORDON MATHEW MD         SYSTEM ID:  [id number]   MR Lumbar Spine w/o Contrast    Impression    Impression:   1. Thoracic:  a. Multilevel disc height loss, osteophyte formation and facet  arthropathy. No high-grade spinal canal or neural foraminal stenosis.  b. No abnormal spinal cord signal.  c. Multilevel Modic type 1 degeneration, most pronounced at T3-4. Bone  marrow edema in T11 and T12 vertebral bodies, also favored to be  degenerative. No soft tissue edema to suggest discitis osteomyelitis.  2. Lumbar:  a. Instrumented posterior spinal fusion L3-L5 and anterior spinal  fusion at L4-5. Decompressive laminectomy at L4. 2.6 x 1.0 cm fluid  collection in the laminectomy bed, likely representing seroma.  b. T2 hyperintense signal in T12-L1 and L1-L2 intervertebral disc  spaces, opposing endplates and vertebral bodies. No significant soft  tissue edema to  suggest discitis osteomyelitis. This is favored to be  degenerative. However, cannot entirely exclude developing early  discitis osteomyelitis. Recommend follow-up imaging with contrast if  there is concern for discitis osteomyelitis.  c. Multilevel lumbar spondylosis as detailed above.      INES WILDER MD         SYSTEM ID:  [id number]   MR Thoracic Spine w/o Contrast    Impression    Impression:   1. Thoracic:  a. Multilevel disc height loss, osteophyte formation and facet  arthropathy. No high-grade spinal canal or neural foraminal stenosis.  b. No abnormal spinal cord signal.  c. Multilevel Modic type 1 degeneration, most pronounced at T3-4. Bone  marrow edema in T11 and T12 vertebral bodies, also favored to be  degenerative. No soft tissue edema to suggest discitis osteomyelitis.  2. Lumbar:  a.  Instrumented posterior spinal fusion L3-L5 and anterior spinal  fusion at L4-5. Decompressive laminectomy at L4. 2.6 x 1.0 cm fluid  collection in the laminectomy bed, likely representing seroma.  b. T2 hyperintense signal in T12-L1 and L1-L2 intervertebral disc  spaces, opposing endplates and vertebral bodies. No significant soft  tissue edema to  suggest discitis osteomyelitis. This is favored to be  degenerative. However, cannot entirely exclude developing early  discitis osteomyelitis. Recommend follow-up imaging with contrast if  there is concern for discitis osteomyelitis.  c. Multilevel lumbar spondylosis as detailed above.      INES WILDER MD         SYSTEM ID:  [id number]        Lab Results:  Personally Reviewed.   Last Comprehensive Metabolic Panel:  Sodium   Date Value Ref Range Status   09/26/2024 139 135 - 145 mmol/L Final   01/11/2020 142 133 - 144 mmol/L Final     Potassium   Date Value Ref Range Status   09/26/2024 4.3 3.4 - 5.3 mmol/L Final   01/11/2020 4.2 3.4 - 5.3 mmol/L Final     Chloride   Date Value Ref Range Status   09/26/2024 103 98 - 107 mmol/L Final   01/11/2020 110 (H) 94 - 109 mmol/L Final     Carbon Dioxide   Date Value Ref Range Status   01/11/2020 29 20 - 32 mmol/L Final     Carbon Dioxide (CO2)   Date Value Ref Range Status   09/26/2024 25 22 - 29 mmol/L Final     Anion Gap   Date Value Ref Range Status   09/26/2024 11 7 - 15 mmol/L Final   01/11/2020 3 3 - 14 mmol/L Final     Glucose   Date Value Ref Range Status   09/26/2024 116 (H) 70 - 99 mg/dL Final   01/11/2020 89 70 - 99 mg/dL Final     Urea Nitrogen   Date Value Ref Range Status   09/26/2024 16.7 8.0 - 23.0 mg/dL Final   01/11/2020 11 7 - 30 mg/dL Final     Creatinine   Date Value Ref Range Status   09/26/2024 0.95 0.51 - 0.95 mg/dL Final   01/11/2020 1.01 0.52 - 1.04 mg/dL Final     GFR Estimate   Date Value Ref Range Status   09/26/2024 61 >60 mL/min/1.73m2 Final     Comment:     eGFR calculated using 2021 CKD-EPI  "equation.   01/11/2020 55 (L) >60 mL/min/[1.73_m2] Final     Comment:     Non  GFR Calc  Starting 12/18/2018, serum creatinine based estimated GFR (eGFR) will be   calculated using the Chronic Kidney Disease Epidemiology Collaboration   (CKD-EPI) equation.       Calcium   Date Value Ref Range Status   09/26/2024 9.3 8.8 - 10.4 mg/dL Final     Comment:     Reference intervals for this test were updated on 7/16/2024 to reflect our healthy population more accurately. There may be differences in the flagging of prior results with similar values performed with this method. Those prior results can be interpreted in the context of the updated reference intervals.   01/11/2020 8.3 (L) 8.5 - 10.1 mg/dL Final        UA: No results found for: \"UAMP\", \"UBARB\", \"BENZODIAZEUR\", \"UCANN\", \"UCOC\", \"OPIT\", \"UPCP\"           Please see A&P for additional details of medical decision making.  MANAGEMENT DISCUSSED with the following over the past 24 hours:   -Hospitalist   -Bedside RN    NOTE(S)/MEDICAL RECORDS REVIEWED over the past 24 hours:   -Hospitalist: interval history, PMH, HPI reviewed   -Neurosurgery: no nneurosurgical intervention indicated   Tests personally interpreted in the past 24 hours:    -CT scan lumbar spine   -CT scan thoracic spine   -MRI lumbar spine   -MRI thoracic spine   Tests REVIEWED in the past 24 hours:  - CMP  - CBC  Medical complexity over the past 24 hours:  - Prescription DRUG MANAGEMENT performed        CHAS Encarnacion-BC  Acute Care Pain Management Program   Hours of pain coverage Mon-Fri [id number], afterhours please call the house officer    [employer] (MUKUL, JOSE Gs, SD, RH)   Page via [employer]- Click HERE to page Stephania or Lenny text web console -Click for Vocera            "

## 2024-09-30 NOTE — PLAN OF CARE
Observation goals  PRIOR TO DISCHARGE       Comments:   -diagnostic tests and consults completed and resulted: met  -vital signs normal or at patient baseline: met.  -adequate pain control on oral analgesics: met

## 2024-09-30 NOTE — PROGRESS NOTES
Care Management Follow Up    Length of Stay (days): 3    Expected Discharge Date: 10/01/2024     Concerns to be Addressed: discharge planning     Patient plan of care discussed at interdisciplinary rounds: Yes    Anticipated Discharge Disposition:  (To be determined; TCU vs home with home care pending PT evaluation again on monday.)              Anticipated Discharge Services:    Anticipated Discharge DME:      Patient/family educated on Medicare website which has current facility and service quality ratings:    Education Provided on the Discharge Plan:    Patient/Family in Agreement with the Plan:      Referrals Placed by CM/SW:    Private pay costs discussed:     Discussed  Partnership in Safe Discharge Planning  document with patient/family: No     Handoff Completed: No, handoff not indicated or clinically appropriate    Additional Information:  Per hospitalist pt is ready for discharge. Sent Epic message and left VM for [facility] to see if pt is accepted to their TCU.     Addendum 1145: Received message back from admissions, they are reviewing and want to take patient possibly tomorrow. They will follow up.     Addendum 1212: Informed by [facility] TCU they can accept patient tomorrow.     Addendum 1421: informed that pt no longer needs TCU. Updated [facility] admissions.    JACINDA Wilkins  Social Work  [facility]

## 2024-09-30 NOTE — PLAN OF CARE
Goal Outcome Evaluation:  PRIMARY Concern: Back pain, worsening acute on chronic  SAFETY RISK Concerns (fall risk, behaviors, etc.): Fall risk      Isolation/Type: none  Tests/Procedures for NEXT shift: None   Consults? (Pending/following, signed-off?) Pain consult  Where is patient from? (Home, TCU, etc.): home.   Other Important info for NEXT shift: pain worsen with movement.   Anticipated DC date & active delays: TBD. Pending clinical improvement      SUMMARY NOTE:     Orientation/Cognitive: A&Ox4.  Observation Goals (Met/ Not Met): Inpatient   Mobility Level/Assist Equipment: A1 GB walker  Antibiotics & Plan (IV/po, length of tx left): None.   Pain Management: Scheduled Tylenol and Robaxin, gabapentin given. PRN available for pain management   Tele/VS/O2: VSS on RA.  ABNL Lab/BG: n/a.  Diet: regular .  Bowel/Bladder: incont B/B.  Skin Concerns: bruises on L and R shins  Drains/Devices: IV-SL.   Patient Stated Goal for Today:Sleep

## 2024-11-26 ENCOUNTER — ANCILLARY PROCEDURE (OUTPATIENT)
Dept: CARDIOLOGY | Facility: CLINIC | Age: 79
End: 2024-11-26
Attending: INTERNAL MEDICINE
Payer: MEDICARE

## 2024-11-26 DIAGNOSIS — I44.2 ATRIOVENTRICULAR BLOCK, COMPLETE (H): ICD-10-CM

## 2024-11-26 DIAGNOSIS — Z95.0 CARDIAC PACEMAKER IN SITU: ICD-10-CM

## 2024-11-26 LAB
MDC_IDC_EPISODE_DTM: NORMAL
MDC_IDC_EPISODE_ID: NORMAL
MDC_IDC_EPISODE_TYPE: NORMAL
MDC_IDC_LEAD_CONNECTION_STATUS: NORMAL
MDC_IDC_LEAD_CONNECTION_STATUS: NORMAL
MDC_IDC_LEAD_IMPLANT_DT: NORMAL
MDC_IDC_LEAD_IMPLANT_DT: NORMAL
MDC_IDC_LEAD_LOCATION: NORMAL
MDC_IDC_LEAD_LOCATION: NORMAL
MDC_IDC_LEAD_LOCATION_DETAIL_1: NORMAL
MDC_IDC_LEAD_LOCATION_DETAIL_1: NORMAL
MDC_IDC_LEAD_MFG: NORMAL
MDC_IDC_LEAD_MFG: NORMAL
MDC_IDC_LEAD_MODEL: NORMAL
MDC_IDC_LEAD_MODEL: NORMAL
MDC_IDC_LEAD_POLARITY_TYPE: NORMAL
MDC_IDC_LEAD_POLARITY_TYPE: NORMAL
MDC_IDC_LEAD_SERIAL: NORMAL
MDC_IDC_LEAD_SERIAL: NORMAL
MDC_IDC_MSMT_BATTERY_DTM: NORMAL
MDC_IDC_MSMT_BATTERY_REMAINING_LONGEVITY: 66 MO
MDC_IDC_MSMT_BATTERY_REMAINING_PERCENTAGE: 63 %
MDC_IDC_MSMT_BATTERY_STATUS: NORMAL
MDC_IDC_MSMT_LEADCHNL_RA_IMPEDANCE_VALUE: 827 OHM
MDC_IDC_MSMT_LEADCHNL_RA_PACING_THRESHOLD_AMPLITUDE: 0.4 V
MDC_IDC_MSMT_LEADCHNL_RA_PACING_THRESHOLD_PULSEWIDTH: 0.4 MS
MDC_IDC_MSMT_LEADCHNL_RV_IMPEDANCE_VALUE: 628 OHM
MDC_IDC_MSMT_LEADCHNL_RV_PACING_THRESHOLD_AMPLITUDE: 1 V
MDC_IDC_MSMT_LEADCHNL_RV_PACING_THRESHOLD_PULSEWIDTH: 0.4 MS
MDC_IDC_PG_IMPLANT_DTM: NORMAL
MDC_IDC_PG_MFG: NORMAL
MDC_IDC_PG_MODEL: NORMAL
MDC_IDC_PG_SERIAL: NORMAL
MDC_IDC_PG_TYPE: NORMAL
MDC_IDC_SESS_CLINIC_NAME: NORMAL
MDC_IDC_SESS_DTM: NORMAL
MDC_IDC_SESS_TYPE: NORMAL
MDC_IDC_SET_BRADY_AT_MODE_SWITCH_MODE: NORMAL
MDC_IDC_SET_BRADY_AT_MODE_SWITCH_RATE: 170 {BEATS}/MIN
MDC_IDC_SET_BRADY_LOWRATE: 60 {BEATS}/MIN
MDC_IDC_SET_BRADY_MAX_SENSOR_RATE: 130 {BEATS}/MIN
MDC_IDC_SET_BRADY_MAX_TRACKING_RATE: 130 {BEATS}/MIN
MDC_IDC_SET_BRADY_MODE: NORMAL
MDC_IDC_SET_BRADY_PAV_DELAY_HIGH: 200 MS
MDC_IDC_SET_BRADY_PAV_DELAY_LOW: 300 MS
MDC_IDC_SET_BRADY_SAV_DELAY_HIGH: 200 MS
MDC_IDC_SET_BRADY_SAV_DELAY_LOW: 300 MS
MDC_IDC_SET_LEADCHNL_RA_PACING_AMPLITUDE: 2 V
MDC_IDC_SET_LEADCHNL_RA_PACING_CAPTURE_MODE: NORMAL
MDC_IDC_SET_LEADCHNL_RA_PACING_POLARITY: NORMAL
MDC_IDC_SET_LEADCHNL_RA_PACING_PULSEWIDTH: 0.4 MS
MDC_IDC_SET_LEADCHNL_RA_SENSING_ADAPTATION_MODE: NORMAL
MDC_IDC_SET_LEADCHNL_RA_SENSING_POLARITY: NORMAL
MDC_IDC_SET_LEADCHNL_RA_SENSING_SENSITIVITY: 0.25 MV
MDC_IDC_SET_LEADCHNL_RV_PACING_AMPLITUDE: 1.5 V
MDC_IDC_SET_LEADCHNL_RV_PACING_CAPTURE_MODE: NORMAL
MDC_IDC_SET_LEADCHNL_RV_PACING_POLARITY: NORMAL
MDC_IDC_SET_LEADCHNL_RV_PACING_PULSEWIDTH: 0.4 MS
MDC_IDC_SET_LEADCHNL_RV_SENSING_ADAPTATION_MODE: NORMAL
MDC_IDC_SET_LEADCHNL_RV_SENSING_POLARITY: NORMAL
MDC_IDC_SET_LEADCHNL_RV_SENSING_SENSITIVITY: 1.5 MV
MDC_IDC_SET_ZONE_DETECTION_INTERVAL: 375 MS
MDC_IDC_SET_ZONE_STATUS: NORMAL
MDC_IDC_SET_ZONE_TYPE: NORMAL
MDC_IDC_SET_ZONE_VENDOR_TYPE: NORMAL
MDC_IDC_STAT_AT_BURDEN_PERCENT: 0 %
MDC_IDC_STAT_AT_DTM_END: NORMAL
MDC_IDC_STAT_AT_DTM_START: NORMAL
MDC_IDC_STAT_BRADY_DTM_END: NORMAL
MDC_IDC_STAT_BRADY_DTM_START: NORMAL
MDC_IDC_STAT_BRADY_RA_PERCENT_PACED: 0 %
MDC_IDC_STAT_BRADY_RV_PERCENT_PACED: 100 %
MDC_IDC_STAT_EPISODE_RECENT_COUNT: 0
MDC_IDC_STAT_EPISODE_RECENT_COUNT: 0
MDC_IDC_STAT_EPISODE_RECENT_COUNT: 1
MDC_IDC_STAT_EPISODE_RECENT_COUNT_DTM_END: NORMAL
MDC_IDC_STAT_EPISODE_RECENT_COUNT_DTM_START: NORMAL
MDC_IDC_STAT_EPISODE_TYPE: NORMAL
MDC_IDC_STAT_EPISODE_VENDOR_TYPE: NORMAL
MDC_IDC_STAT_EPISODE_VENDOR_TYPE: NORMAL

## 2024-11-26 PROCEDURE — 93296 REM INTERROG EVL PM/IDS: CPT | Performed by: INTERNAL MEDICINE

## 2024-11-26 PROCEDURE — 93294 REM INTERROG EVL PM/LDLS PM: CPT | Performed by: INTERNAL MEDICINE

## 2025-02-03 NOTE — PROGRESS NOTES
[employer] PPM check on 9/27/2024 for MRI.    [employer] (D) PPM  Presenting rhythm AS/  Battery 6 yrs estimated longevity   Available lead measurements stable and WNL  Mode: DDD   AP 0%   100%  No arrhythmias since last check on 8/26/2024

## 2025-03-04 ENCOUNTER — ANCILLARY PROCEDURE (OUTPATIENT)
Dept: CARDIOLOGY | Facility: CLINIC | Age: 80
End: 2025-03-04
Attending: INTERNAL MEDICINE
Payer: MEDICARE

## 2025-03-04 DIAGNOSIS — Z95.0 CARDIAC PACEMAKER IN SITU: ICD-10-CM

## 2025-03-04 DIAGNOSIS — I44.2 ATRIOVENTRICULAR BLOCK, COMPLETE (H): ICD-10-CM

## 2025-03-04 PROCEDURE — 93296 REM INTERROG EVL PM/IDS: CPT | Performed by: INTERNAL MEDICINE

## 2025-03-04 PROCEDURE — 93294 REM INTERROG EVL PM/LDLS PM: CPT | Performed by: INTERNAL MEDICINE

## 2025-03-05 LAB
MDC_IDC_EPISODE_DTM: NORMAL
MDC_IDC_EPISODE_DURATION: 1 S
MDC_IDC_EPISODE_ID: NORMAL
MDC_IDC_EPISODE_TYPE: NORMAL
MDC_IDC_LEAD_CONNECTION_STATUS: NORMAL
MDC_IDC_LEAD_CONNECTION_STATUS: NORMAL
MDC_IDC_LEAD_IMPLANT_DT: NORMAL
MDC_IDC_LEAD_IMPLANT_DT: NORMAL
MDC_IDC_LEAD_LOCATION: NORMAL
MDC_IDC_LEAD_LOCATION: NORMAL
MDC_IDC_LEAD_LOCATION_DETAIL_1: NORMAL
MDC_IDC_LEAD_LOCATION_DETAIL_1: NORMAL
MDC_IDC_LEAD_MFG: NORMAL
MDC_IDC_LEAD_MFG: NORMAL
MDC_IDC_LEAD_MODEL: NORMAL
MDC_IDC_LEAD_MODEL: NORMAL
MDC_IDC_LEAD_POLARITY_TYPE: NORMAL
MDC_IDC_LEAD_POLARITY_TYPE: NORMAL
MDC_IDC_LEAD_SERIAL: NORMAL
MDC_IDC_LEAD_SERIAL: NORMAL
MDC_IDC_MSMT_BATTERY_DTM: NORMAL
MDC_IDC_MSMT_BATTERY_REMAINING_LONGEVITY: 60 MO
MDC_IDC_MSMT_BATTERY_REMAINING_PERCENTAGE: 58 %
MDC_IDC_MSMT_BATTERY_STATUS: NORMAL
MDC_IDC_MSMT_LEADCHNL_RA_IMPEDANCE_VALUE: 762 OHM
MDC_IDC_MSMT_LEADCHNL_RA_PACING_THRESHOLD_AMPLITUDE: 0.5 V
MDC_IDC_MSMT_LEADCHNL_RA_PACING_THRESHOLD_PULSEWIDTH: 0.4 MS
MDC_IDC_MSMT_LEADCHNL_RV_IMPEDANCE_VALUE: 581 OHM
MDC_IDC_MSMT_LEADCHNL_RV_PACING_THRESHOLD_AMPLITUDE: 1.1 V
MDC_IDC_MSMT_LEADCHNL_RV_PACING_THRESHOLD_PULSEWIDTH: 0.4 MS
MDC_IDC_PG_IMPLANT_DTM: NORMAL
MDC_IDC_PG_MFG: NORMAL
MDC_IDC_PG_MODEL: NORMAL
MDC_IDC_PG_SERIAL: NORMAL
MDC_IDC_PG_TYPE: NORMAL
MDC_IDC_SESS_CLINIC_NAME: NORMAL
MDC_IDC_SESS_DTM: NORMAL
MDC_IDC_SESS_TYPE: NORMAL
MDC_IDC_SET_BRADY_AT_MODE_SWITCH_MODE: NORMAL
MDC_IDC_SET_BRADY_AT_MODE_SWITCH_RATE: 170 {BEATS}/MIN
MDC_IDC_SET_BRADY_LOWRATE: 60 {BEATS}/MIN
MDC_IDC_SET_BRADY_MAX_SENSOR_RATE: 130 {BEATS}/MIN
MDC_IDC_SET_BRADY_MAX_TRACKING_RATE: 130 {BEATS}/MIN
MDC_IDC_SET_BRADY_MODE: NORMAL
MDC_IDC_SET_BRADY_PAV_DELAY_HIGH: 200 MS
MDC_IDC_SET_BRADY_PAV_DELAY_LOW: 300 MS
MDC_IDC_SET_BRADY_SAV_DELAY_HIGH: 200 MS
MDC_IDC_SET_BRADY_SAV_DELAY_LOW: 300 MS
MDC_IDC_SET_LEADCHNL_RA_PACING_AMPLITUDE: 2 V
MDC_IDC_SET_LEADCHNL_RA_PACING_CAPTURE_MODE: NORMAL
MDC_IDC_SET_LEADCHNL_RA_PACING_POLARITY: NORMAL
MDC_IDC_SET_LEADCHNL_RA_PACING_PULSEWIDTH: 0.4 MS
MDC_IDC_SET_LEADCHNL_RA_SENSING_ADAPTATION_MODE: NORMAL
MDC_IDC_SET_LEADCHNL_RA_SENSING_POLARITY: NORMAL
MDC_IDC_SET_LEADCHNL_RA_SENSING_SENSITIVITY: 0.25 MV
MDC_IDC_SET_LEADCHNL_RV_PACING_AMPLITUDE: 1.6 V
MDC_IDC_SET_LEADCHNL_RV_PACING_CAPTURE_MODE: NORMAL
MDC_IDC_SET_LEADCHNL_RV_PACING_POLARITY: NORMAL
MDC_IDC_SET_LEADCHNL_RV_PACING_PULSEWIDTH: 0.4 MS
MDC_IDC_SET_LEADCHNL_RV_SENSING_ADAPTATION_MODE: NORMAL
MDC_IDC_SET_LEADCHNL_RV_SENSING_POLARITY: NORMAL
MDC_IDC_SET_LEADCHNL_RV_SENSING_SENSITIVITY: 1.5 MV
MDC_IDC_SET_ZONE_DETECTION_INTERVAL: 375 MS
MDC_IDC_SET_ZONE_STATUS: NORMAL
MDC_IDC_SET_ZONE_TYPE: NORMAL
MDC_IDC_SET_ZONE_VENDOR_TYPE: NORMAL
MDC_IDC_STAT_AT_BURDEN_PERCENT: 1 %
MDC_IDC_STAT_AT_DTM_END: NORMAL
MDC_IDC_STAT_AT_DTM_START: NORMAL
MDC_IDC_STAT_BRADY_DTM_END: NORMAL
MDC_IDC_STAT_BRADY_DTM_START: NORMAL
MDC_IDC_STAT_BRADY_RA_PERCENT_PACED: 0 %
MDC_IDC_STAT_BRADY_RV_PERCENT_PACED: 100 %
MDC_IDC_STAT_EPISODE_RECENT_COUNT: 0
MDC_IDC_STAT_EPISODE_RECENT_COUNT: 0
MDC_IDC_STAT_EPISODE_RECENT_COUNT: 1
MDC_IDC_STAT_EPISODE_RECENT_COUNT_DTM_END: NORMAL
MDC_IDC_STAT_EPISODE_RECENT_COUNT_DTM_START: NORMAL
MDC_IDC_STAT_EPISODE_TYPE: NORMAL
MDC_IDC_STAT_EPISODE_VENDOR_TYPE: NORMAL
MDC_IDC_STAT_EPISODE_VENDOR_TYPE: NORMAL

## (undated) DEVICE — GLOVE PROTEXIS W/NEU-THERA 6.0  2D73TE60

## (undated) DEVICE — SU ETHIBOND #5 GS-18 B409T

## (undated) DEVICE — SU DERMABOND MINI DHVM12

## (undated) DEVICE — SU VICRYL 0 CT-1 CR 8X18" J740D

## (undated) DEVICE — SOL NACL 0.9% IRRIG 1000ML BOTTLE 07138-09

## (undated) DEVICE — ESU GROUND PAD ADULT W/CORD E7507

## (undated) DEVICE — SU VICRYL 2-0 CP-1 27" UND J266H

## (undated) DEVICE — SU WND CLOSURE VLOC 180 ABS 3-0 12" P-14 VLOCL0114

## (undated) DEVICE — Device

## (undated) DEVICE — SUCTION FRAZIER 12FR W/OBTURATOR 33120

## (undated) DEVICE — HOOD FLYTE 0408-800-000

## (undated) DEVICE — DEVICE DUST COLLECTOR BONE BOX S-3500

## (undated) DEVICE — DRSG GAUZE 4X4" 3033

## (undated) DEVICE — SU VICRYL 2-0 CT-1 18' J739D

## (undated) DEVICE — PACK SMALL SPINE RIDGES

## (undated) DEVICE — BONE CLEANING TIP INTERPULSE FEMORAL CANAL 0210-008-000

## (undated) DEVICE — GOWN IMPERVIOUS SPECIALTY XLG/XLONG 32474

## (undated) DEVICE — PACK SET-UP STD 9102

## (undated) DEVICE — GLOVE PROTEXIS MICRO 7.5  2D73PM75

## (undated) DEVICE — DRSG KERLIX FLUFFS X5

## (undated) DEVICE — GLOVE PROTEXIS BLUE W/NEU-THERA 8.5  2D73EB85

## (undated) DEVICE — LINEN ORTHO ACL PACK 5447

## (undated) DEVICE — SU MONOCRYL 3-0 PS-2 27" Y427H

## (undated) DEVICE — GOWN REINFORCED XXLG 9071

## (undated) DEVICE — LINEN POUCH DBL 5427

## (undated) DEVICE — MANIFOLD NEPTUNE 4 PORT 700-20

## (undated) DEVICE — SUCTION IRR SYSTEM W/O TIP INTERPULSE HANDPIECE 0210-100-000

## (undated) DEVICE — GOWN XLG DISP 9545

## (undated) DEVICE — RX SURGIFLO HEMOSTATIC MATRIX 8ML 2991

## (undated) DEVICE — GLOVE PROTEXIS W/NEU-THERA 8.5  2D73TE85

## (undated) DEVICE — SPONGE SURGIFOAM 100 1974

## (undated) DEVICE — DRSG ADAPTIC 3X8" 6113

## (undated) DEVICE — SU VICRYL 3-0 PSL 27" UND J502H

## (undated) DEVICE — DRSG ABDOMINAL 07 1/2X8" 7197D

## (undated) DEVICE — CATH TRAY FOLEY SURESTEP 16FR DRAIN BAG STATOCK A899916

## (undated) DEVICE — ESU GROUND PAD UNIVERSAL W/O CORD

## (undated) DEVICE — LINEN TOWEL PACK X5 5464

## (undated) DEVICE — SU STRATAFIX PDS PLUS 0 CT-1 18" SXPP1A401

## (undated) DEVICE — MARKER SPHERES PASSIVE MEDT PACK 5 8801075

## (undated) DEVICE — BLADE BONE MILL STRK 5.0MM MED 5400-701-000

## (undated) DEVICE — MIDAS REX DISSECTING TOOL TRI-FLUTED BURR 14MH30T

## (undated) DEVICE — SOL NACL 0.9% INJ 250ML BAG 2B1322Q

## (undated) DEVICE — SUCTION MANIFOLD NEPTUNE 2 SYS 4 PORT 0702-020-000

## (undated) DEVICE — POSITIONER PT PRONESAFE HEAD REST W/DERMAPROX INSERT 40599

## (undated) DEVICE — PACK TOTAL HIP W/POUCH SOP15HPFSM

## (undated) DEVICE — SPONGE LAP 18X18" X8435

## (undated) DEVICE — GLOVE PROTEXIS POWDER FREE 8.5 ORTHOPEDIC 2D73ET85

## (undated) DEVICE — DRSG TELFA ISLAND 4X10"

## (undated) DEVICE — LINEN DRAPE 54X72" 5467

## (undated) DEVICE — MARKER SPHERES PASSIVE MEDT PACK 1 8801071

## (undated) DEVICE — DRAIN JACKSON PRATT RESERVOIR 100ML SU130-1305

## (undated) DEVICE — SU VICRYL 0 CTX CR 8X18" J764D

## (undated) DEVICE — DRAPE IOBAN INCISE 23X17" 6650EZ

## (undated) DEVICE — SU VICRYL 0 CT-1 27" J340H

## (undated) DEVICE — IMM PILLOW ABDUCT HIP MED 31143061

## (undated) DEVICE — PEN MARKING SKIN W/LABELS 31145884

## (undated) DEVICE — GLOVE PROTEXIS BLUE W/NEU-THERA 6.5  2D73EB65

## (undated) DEVICE — SOL WATER IRRIG 1000ML BOTTLE 2F7114

## (undated) DEVICE — STPL SKIN 35W 6.9MM  PXW35

## (undated) DEVICE — IMPLANTABLE DEVICE: Type: IMPLANTABLE DEVICE | Site: HIP | Status: NON-FUNCTIONAL

## (undated) DEVICE — DECANTER BAG 2002S

## (undated) DEVICE — SPONGE COTTONOID 1/2X1" 80-1402

## (undated) DEVICE — PREP DURAPREP 26ML APL 8630

## (undated) DEVICE — GLOVE PROTEXIS POWDER FREE 6.5 ORTHOPEDIC 2D73ET65

## (undated) DEVICE — PREP CHLORAPREP 26ML TINTED ORANGE  260815

## (undated) DEVICE — DRAIN JACKSON PRATT CHANNEL 10FR RND SIL W/TROCAR JP-2227

## (undated) DEVICE — ESU CLEANER TIP 31142717

## (undated) DEVICE — DRAPE MICROSCOPE OPMI ZEISS 48X118" 306071-0000-000

## (undated) DEVICE — ESU BIPOLAR SEALER AQUAMANTYS 6MM 23-112-1

## (undated) DEVICE — TUBING SUCTION 12"X1/4" N612

## (undated) DEVICE — PAD FOAM MCGUIRE KIT 0814-0150

## (undated) DEVICE — SOL NACL 0.9% IRRIG 1000ML BOTTLE 2F7124

## (undated) RX ORDER — LIDOCAINE HYDROCHLORIDE 10 MG/ML
INJECTION, SOLUTION EPIDURAL; INFILTRATION; INTRACAUDAL; PERINEURAL
Status: DISPENSED
Start: 2020-01-09

## (undated) RX ORDER — PROPOFOL 10 MG/ML
INJECTION, EMULSION INTRAVENOUS
Status: DISPENSED
Start: 2020-01-09

## (undated) RX ORDER — CEFAZOLIN SODIUM 1 G/3ML
INJECTION, POWDER, FOR SOLUTION INTRAMUSCULAR; INTRAVENOUS
Status: DISPENSED
Start: 2020-01-09

## (undated) RX ORDER — CEFAZOLIN SODIUM 2 G/100ML
INJECTION, SOLUTION INTRAVENOUS
Status: DISPENSED
Start: 2017-02-03

## (undated) RX ORDER — ACETAMINOPHEN 500 MG
TABLET ORAL
Status: DISPENSED
Start: 2017-02-03

## (undated) RX ORDER — PHENYLEPHRINE HCL IN 0.9% NACL 1 MG/10 ML
SYRINGE (ML) INTRAVENOUS
Status: DISPENSED
Start: 2020-01-09

## (undated) RX ORDER — FENTANYL CITRATE 50 UG/ML
INJECTION, SOLUTION INTRAMUSCULAR; INTRAVENOUS
Status: DISPENSED
Start: 2017-02-03

## (undated) RX ORDER — FENTANYL CITRATE 50 UG/ML
INJECTION, SOLUTION INTRAMUSCULAR; INTRAVENOUS
Status: DISPENSED
Start: 2020-01-09

## (undated) RX ORDER — DEXAMETHASONE SODIUM PHOSPHATE 4 MG/ML
INJECTION, SOLUTION INTRA-ARTICULAR; INTRALESIONAL; INTRAMUSCULAR; INTRAVENOUS; SOFT TISSUE
Status: DISPENSED
Start: 2017-02-03

## (undated) RX ORDER — KETOROLAC TROMETHAMINE 30 MG/ML
INJECTION, SOLUTION INTRAMUSCULAR; INTRAVENOUS
Status: DISPENSED
Start: 2017-02-03

## (undated) RX ORDER — BUPIVACAINE HYDROCHLORIDE AND EPINEPHRINE 2.5; 5 MG/ML; UG/ML
INJECTION, SOLUTION EPIDURAL; INFILTRATION; INTRACAUDAL; PERINEURAL
Status: DISPENSED
Start: 2020-01-09

## (undated) RX ORDER — ONDANSETRON 2 MG/ML
INJECTION INTRAMUSCULAR; INTRAVENOUS
Status: DISPENSED
Start: 2019-01-15

## (undated) RX ORDER — SCOLOPAMINE TRANSDERMAL SYSTEM 1 MG/1
PATCH, EXTENDED RELEASE TRANSDERMAL
Status: DISPENSED
Start: 2020-01-09

## (undated) RX ORDER — BUPIVACAINE HYDROCHLORIDE AND EPINEPHRINE 5; 5 MG/ML; UG/ML
INJECTION, SOLUTION EPIDURAL; INTRACAUDAL; PERINEURAL
Status: DISPENSED
Start: 2017-02-03

## (undated) RX ORDER — GLYCOPYRROLATE 0.2 MG/ML
INJECTION INTRAMUSCULAR; INTRAVENOUS
Status: DISPENSED
Start: 2020-01-09

## (undated) RX ORDER — PROPOFOL 10 MG/ML
INJECTION, EMULSION INTRAVENOUS
Status: DISPENSED
Start: 2017-02-03

## (undated) RX ORDER — ONDANSETRON 2 MG/ML
INJECTION INTRAMUSCULAR; INTRAVENOUS
Status: DISPENSED
Start: 2020-01-09

## (undated) RX ORDER — VANCOMYCIN HYDROCHLORIDE 1 G/20ML
INJECTION, POWDER, LYOPHILIZED, FOR SOLUTION INTRAVENOUS
Status: DISPENSED
Start: 2020-01-09

## (undated) RX ORDER — FENTANYL CITRATE 50 UG/ML
INJECTION, SOLUTION INTRAMUSCULAR; INTRAVENOUS
Status: DISPENSED
Start: 2019-01-15

## (undated) RX ORDER — EPHEDRINE SULFATE 50 MG/ML
INJECTION, SOLUTION INTRAMUSCULAR; INTRAVENOUS; SUBCUTANEOUS
Status: DISPENSED
Start: 2020-01-09

## (undated) RX ORDER — DEXAMETHASONE SODIUM PHOSPHATE 4 MG/ML
INJECTION, SOLUTION INTRA-ARTICULAR; INTRALESIONAL; INTRAMUSCULAR; INTRAVENOUS; SOFT TISSUE
Status: DISPENSED
Start: 2020-01-09

## (undated) RX ORDER — KETAMINE HCL IN 0.9 % NACL 50 MG/5 ML
SYRINGE (ML) INTRAVENOUS
Status: DISPENSED
Start: 2020-01-09

## (undated) RX ORDER — CEFAZOLIN SODIUM 2 G/100ML
INJECTION, SOLUTION INTRAVENOUS
Status: DISPENSED
Start: 2020-01-09

## (undated) RX ORDER — KETOROLAC TROMETHAMINE 30 MG/ML
INJECTION, SOLUTION INTRAMUSCULAR; INTRAVENOUS
Status: DISPENSED
Start: 2020-01-09

## (undated) RX ORDER — NEOSTIGMINE METHYLSULFATE 1 MG/ML
VIAL (ML) INJECTION
Status: DISPENSED
Start: 2020-01-09

## (undated) RX ORDER — CEFAZOLIN SODIUM 1 G/3ML
INJECTION, POWDER, FOR SOLUTION INTRAMUSCULAR; INTRAVENOUS
Status: DISPENSED
Start: 2017-02-03